# Patient Record
Sex: MALE | Race: WHITE | Employment: UNEMPLOYED | URBAN - METROPOLITAN AREA
[De-identification: names, ages, dates, MRNs, and addresses within clinical notes are randomized per-mention and may not be internally consistent; named-entity substitution may affect disease eponyms.]

---

## 2017-02-08 ENCOUNTER — OP HISTORICAL/CONVERTED ENCOUNTER (OUTPATIENT)
Dept: OTHER | Age: 62
End: 2017-02-08

## 2017-02-10 ENCOUNTER — OP HISTORICAL/CONVERTED ENCOUNTER (OUTPATIENT)
Dept: OTHER | Age: 62
End: 2017-02-10

## 2017-02-17 ENCOUNTER — OP HISTORICAL/CONVERTED ENCOUNTER (OUTPATIENT)
Dept: OTHER | Age: 62
End: 2017-02-17

## 2017-02-23 ENCOUNTER — OP HISTORICAL/CONVERTED ENCOUNTER (OUTPATIENT)
Dept: OTHER | Age: 62
End: 2017-02-23

## 2017-02-24 ENCOUNTER — OP HISTORICAL/CONVERTED ENCOUNTER (OUTPATIENT)
Dept: OTHER | Age: 62
End: 2017-02-24

## 2017-03-10 ENCOUNTER — OP HISTORICAL/CONVERTED ENCOUNTER (OUTPATIENT)
Dept: OTHER | Age: 62
End: 2017-03-10

## 2017-03-17 ENCOUNTER — OP HISTORICAL/CONVERTED ENCOUNTER (OUTPATIENT)
Dept: OTHER | Age: 62
End: 2017-03-17

## 2017-03-22 ENCOUNTER — OP HISTORICAL/CONVERTED ENCOUNTER (OUTPATIENT)
Dept: OTHER | Age: 62
End: 2017-03-22

## 2017-03-23 ENCOUNTER — OP HISTORICAL/CONVERTED ENCOUNTER (OUTPATIENT)
Dept: OTHER | Age: 62
End: 2017-03-23

## 2017-03-29 ENCOUNTER — OP HISTORICAL/CONVERTED ENCOUNTER (OUTPATIENT)
Dept: OTHER | Age: 62
End: 2017-03-29

## 2017-03-31 ENCOUNTER — OP HISTORICAL/CONVERTED ENCOUNTER (OUTPATIENT)
Dept: OTHER | Age: 62
End: 2017-03-31

## 2017-04-07 ENCOUNTER — OP HISTORICAL/CONVERTED ENCOUNTER (OUTPATIENT)
Dept: OTHER | Age: 62
End: 2017-04-07

## 2017-04-21 ENCOUNTER — OP HISTORICAL/CONVERTED ENCOUNTER (OUTPATIENT)
Dept: OTHER | Age: 62
End: 2017-04-21

## 2017-04-28 ENCOUNTER — OP HISTORICAL/CONVERTED ENCOUNTER (OUTPATIENT)
Dept: OTHER | Age: 62
End: 2017-04-28

## 2017-05-05 ENCOUNTER — OP HISTORICAL/CONVERTED ENCOUNTER (OUTPATIENT)
Dept: OTHER | Age: 62
End: 2017-05-05

## 2017-05-12 ENCOUNTER — OP HISTORICAL/CONVERTED ENCOUNTER (OUTPATIENT)
Dept: OTHER | Age: 62
End: 2017-05-12

## 2017-05-19 ENCOUNTER — OP HISTORICAL/CONVERTED ENCOUNTER (OUTPATIENT)
Dept: OTHER | Age: 62
End: 2017-05-19

## 2017-05-22 ENCOUNTER — OP HISTORICAL/CONVERTED ENCOUNTER (OUTPATIENT)
Dept: OTHER | Age: 62
End: 2017-05-22

## 2017-06-02 ENCOUNTER — OP HISTORICAL/CONVERTED ENCOUNTER (OUTPATIENT)
Dept: OTHER | Age: 62
End: 2017-06-02

## 2017-06-09 ENCOUNTER — OP HISTORICAL/CONVERTED ENCOUNTER (OUTPATIENT)
Dept: OTHER | Age: 62
End: 2017-06-09

## 2017-06-16 ENCOUNTER — OP HISTORICAL/CONVERTED ENCOUNTER (OUTPATIENT)
Dept: OTHER | Age: 62
End: 2017-06-16

## 2017-06-21 ENCOUNTER — ED HISTORICAL/CONVERTED ENCOUNTER (OUTPATIENT)
Dept: OTHER | Age: 62
End: 2017-06-21

## 2017-07-05 ENCOUNTER — ED HISTORICAL/CONVERTED ENCOUNTER (OUTPATIENT)
Dept: OTHER | Age: 62
End: 2017-07-05

## 2017-07-13 ENCOUNTER — IP HISTORICAL/CONVERTED ENCOUNTER (OUTPATIENT)
Dept: OTHER | Age: 62
End: 2017-07-13

## 2017-07-25 ENCOUNTER — OP HISTORICAL/CONVERTED ENCOUNTER (OUTPATIENT)
Dept: OTHER | Age: 62
End: 2017-07-25

## 2017-08-11 ENCOUNTER — OP HISTORICAL/CONVERTED ENCOUNTER (OUTPATIENT)
Dept: OTHER | Age: 62
End: 2017-08-11

## 2017-08-25 ENCOUNTER — OP HISTORICAL/CONVERTED ENCOUNTER (OUTPATIENT)
Dept: OTHER | Age: 62
End: 2017-08-25

## 2017-09-08 ENCOUNTER — OP HISTORICAL/CONVERTED ENCOUNTER (OUTPATIENT)
Dept: OTHER | Age: 62
End: 2017-09-08

## 2017-09-15 ENCOUNTER — OP HISTORICAL/CONVERTED ENCOUNTER (OUTPATIENT)
Dept: OTHER | Age: 62
End: 2017-09-15

## 2017-09-19 ENCOUNTER — OP HISTORICAL/CONVERTED ENCOUNTER (OUTPATIENT)
Dept: OTHER | Age: 62
End: 2017-09-19

## 2017-09-22 ENCOUNTER — OP HISTORICAL/CONVERTED ENCOUNTER (OUTPATIENT)
Dept: OTHER | Age: 62
End: 2017-09-22

## 2017-10-06 ENCOUNTER — OP HISTORICAL/CONVERTED ENCOUNTER (OUTPATIENT)
Dept: OTHER | Age: 62
End: 2017-10-06

## 2017-10-13 ENCOUNTER — OP HISTORICAL/CONVERTED ENCOUNTER (OUTPATIENT)
Dept: OTHER | Age: 62
End: 2017-10-13

## 2017-10-20 ENCOUNTER — OP HISTORICAL/CONVERTED ENCOUNTER (OUTPATIENT)
Dept: OTHER | Age: 62
End: 2017-10-20

## 2017-11-03 ENCOUNTER — OP HISTORICAL/CONVERTED ENCOUNTER (OUTPATIENT)
Dept: OTHER | Age: 62
End: 2017-11-03

## 2017-11-10 ENCOUNTER — OP HISTORICAL/CONVERTED ENCOUNTER (OUTPATIENT)
Dept: OTHER | Age: 62
End: 2017-11-10

## 2017-11-17 ENCOUNTER — OP HISTORICAL/CONVERTED ENCOUNTER (OUTPATIENT)
Dept: OTHER | Age: 62
End: 2017-11-17

## 2017-12-01 ENCOUNTER — OP HISTORICAL/CONVERTED ENCOUNTER (OUTPATIENT)
Dept: OTHER | Age: 62
End: 2017-12-01

## 2017-12-15 ENCOUNTER — OP HISTORICAL/CONVERTED ENCOUNTER (OUTPATIENT)
Dept: OTHER | Age: 62
End: 2017-12-15

## 2017-12-21 ENCOUNTER — IP HISTORICAL/CONVERTED ENCOUNTER (OUTPATIENT)
Dept: OTHER | Age: 62
End: 2017-12-21

## 2018-05-14 ENCOUNTER — ED HISTORICAL/CONVERTED ENCOUNTER (OUTPATIENT)
Dept: OTHER | Age: 63
End: 2018-05-14

## 2018-05-23 ENCOUNTER — ED HISTORICAL/CONVERTED ENCOUNTER (OUTPATIENT)
Dept: OTHER | Age: 63
End: 2018-05-23

## 2018-06-12 ENCOUNTER — IP HISTORICAL/CONVERTED ENCOUNTER (OUTPATIENT)
Dept: OTHER | Age: 63
End: 2018-06-12

## 2018-09-09 ENCOUNTER — ED HISTORICAL/CONVERTED ENCOUNTER (OUTPATIENT)
Dept: OTHER | Age: 63
End: 2018-09-09

## 2018-11-22 ENCOUNTER — IP HISTORICAL/CONVERTED ENCOUNTER (OUTPATIENT)
Dept: OTHER | Age: 63
End: 2018-11-22

## 2018-11-30 ENCOUNTER — ED HISTORICAL/CONVERTED ENCOUNTER (OUTPATIENT)
Dept: OTHER | Age: 63
End: 2018-11-30

## 2018-12-01 ENCOUNTER — IP HISTORICAL/CONVERTED ENCOUNTER (OUTPATIENT)
Dept: OTHER | Age: 63
End: 2018-12-01

## 2023-05-29 ENCOUNTER — APPOINTMENT (OUTPATIENT)
Facility: HOSPITAL | Age: 68
End: 2023-05-29
Payer: MEDICAID

## 2023-05-29 ENCOUNTER — HOSPITAL ENCOUNTER (EMERGENCY)
Facility: HOSPITAL | Age: 68
Discharge: ANOTHER HOSPITAL | End: 2023-05-29
Attending: EMERGENCY MEDICINE
Payer: MEDICAID

## 2023-05-29 ENCOUNTER — HOSPITAL ENCOUNTER (INPATIENT)
Facility: HOSPITAL | Age: 68
LOS: 10 days | Discharge: NURSING, REHAB OR LONG-TERM CARE | DRG: 044 | End: 2023-06-08
Attending: ANESTHESIOLOGY | Admitting: ANESTHESIOLOGY
Payer: MEDICAID

## 2023-05-29 VITALS
DIASTOLIC BLOOD PRESSURE: 125 MMHG | SYSTOLIC BLOOD PRESSURE: 149 MMHG | HEIGHT: 65 IN | WEIGHT: 200 LBS | TEMPERATURE: 98.1 F | BODY MASS INDEX: 33.32 KG/M2 | HEART RATE: 102 BPM | OXYGEN SATURATION: 97 % | RESPIRATION RATE: 25 BRPM

## 2023-05-29 DIAGNOSIS — Z86.718 HISTORY OF DVT (DEEP VEIN THROMBOSIS): Primary | ICD-10-CM

## 2023-05-29 DIAGNOSIS — I61.9 INTRAPARENCHYMAL HEMORRHAGE OF BRAIN (HCC): Primary | ICD-10-CM

## 2023-05-29 DIAGNOSIS — I61.9 INTRAPARENCHYMAL HEMORRHAGE OF BRAIN (HCC): ICD-10-CM

## 2023-05-29 LAB
ALBUMIN SERPL-MCNC: 3.8 G/DL (ref 3.5–5)
ALBUMIN/GLOB SERPL: 1 (ref 1.1–2.2)
ALP SERPL-CCNC: 91 U/L (ref 45–117)
ALT SERPL-CCNC: 38 U/L (ref 12–78)
ANION GAP SERPL CALC-SCNC: 11 MMOL/L (ref 5–15)
AST SERPL W P-5'-P-CCNC: 48 U/L (ref 15–37)
BILIRUB SERPL-MCNC: 0.8 MG/DL (ref 0.2–1)
BUN SERPL-MCNC: 21 MG/DL (ref 6–20)
BUN/CREAT SERPL: 26 (ref 12–20)
CA-I BLD-MCNC: 9.5 MG/DL (ref 8.5–10.1)
CHLORIDE SERPL-SCNC: 108 MMOL/L (ref 97–108)
CO2 SERPL-SCNC: 22 MMOL/L (ref 21–32)
CREAT SERPL-MCNC: 0.82 MG/DL (ref 0.7–1.3)
GLOBULIN SER CALC-MCNC: 3.9 G/DL (ref 2–4)
GLUCOSE SERPL-MCNC: 87 MG/DL (ref 65–100)
POTASSIUM SERPL-SCNC: 4.5 MMOL/L (ref 3.5–5.1)
PROT SERPL-MCNC: 7.7 G/DL (ref 6.4–8.2)
SODIUM SERPL-SCNC: 141 MMOL/L (ref 136–145)
TROPONIN I SERPL HS-MCNC: 5 NG/L (ref 0–76)

## 2023-05-29 PROCEDURE — 36415 COLL VENOUS BLD VENIPUNCTURE: CPT

## 2023-05-29 PROCEDURE — 99285 EMERGENCY DEPT VISIT HI MDM: CPT

## 2023-05-29 PROCEDURE — 80053 COMPREHEN METABOLIC PANEL: CPT

## 2023-05-29 PROCEDURE — 2580000003 HC RX 258: Performed by: NURSE PRACTITIONER

## 2023-05-29 PROCEDURE — 84484 ASSAY OF TROPONIN QUANT: CPT

## 2023-05-29 PROCEDURE — 70450 CT HEAD/BRAIN W/O DYE: CPT

## 2023-05-29 PROCEDURE — 6360000002 HC RX W HCPCS: Performed by: NURSE PRACTITIONER

## 2023-05-29 PROCEDURE — 93005 ELECTROCARDIOGRAM TRACING: CPT | Performed by: EMERGENCY MEDICINE

## 2023-05-29 PROCEDURE — 2000000000 HC ICU R&B

## 2023-05-29 RX ORDER — SODIUM CHLORIDE 9 MG/ML
50 INJECTION, SOLUTION INTRAVENOUS ONCE
Status: COMPLETED | OUTPATIENT
Start: 2023-05-29 | End: 2023-05-29

## 2023-05-29 RX ORDER — LABETALOL HYDROCHLORIDE 5 MG/ML
20 INJECTION, SOLUTION INTRAVENOUS EVERY 4 HOURS PRN
Status: DISCONTINUED | OUTPATIENT
Start: 2023-05-29 | End: 2023-06-06

## 2023-05-29 RX ORDER — ONDANSETRON 2 MG/ML
4 INJECTION INTRAMUSCULAR; INTRAVENOUS EVERY 6 HOURS PRN
Status: DISCONTINUED | OUTPATIENT
Start: 2023-05-29 | End: 2023-06-05 | Stop reason: SDUPTHER

## 2023-05-29 RX ORDER — IPRATROPIUM BROMIDE AND ALBUTEROL SULFATE 2.5; .5 MG/3ML; MG/3ML
1 SOLUTION RESPIRATORY (INHALATION) EVERY 4 HOURS PRN
Status: DISCONTINUED | OUTPATIENT
Start: 2023-05-29 | End: 2023-06-06

## 2023-05-29 RX ORDER — MAGNESIUM SULFATE IN WATER 40 MG/ML
2000 INJECTION, SOLUTION INTRAVENOUS PRN
Status: DISCONTINUED | OUTPATIENT
Start: 2023-05-29 | End: 2023-06-01

## 2023-05-29 RX ORDER — SODIUM CHLORIDE, SODIUM LACTATE, POTASSIUM CHLORIDE, CALCIUM CHLORIDE 600; 310; 30; 20 MG/100ML; MG/100ML; MG/100ML; MG/100ML
INJECTION, SOLUTION INTRAVENOUS CONTINUOUS
Status: DISCONTINUED | OUTPATIENT
Start: 2023-05-29 | End: 2023-05-30

## 2023-05-29 RX ORDER — ACETAMINOPHEN 650 MG/1
650 SUPPOSITORY RECTAL EVERY 6 HOURS PRN
Status: DISCONTINUED | OUTPATIENT
Start: 2023-05-29 | End: 2023-06-08 | Stop reason: HOSPADM

## 2023-05-29 RX ORDER — POLYETHYLENE GLYCOL 3350 17 G/17G
17 POWDER, FOR SOLUTION ORAL DAILY PRN
Status: DISCONTINUED | OUTPATIENT
Start: 2023-05-29 | End: 2023-06-01 | Stop reason: SDUPTHER

## 2023-05-29 RX ORDER — SODIUM CHLORIDE 0.9 % (FLUSH) 0.9 %
5-40 SYRINGE (ML) INJECTION EVERY 12 HOURS SCHEDULED
Status: DISCONTINUED | OUTPATIENT
Start: 2023-05-29 | End: 2023-06-08 | Stop reason: HOSPADM

## 2023-05-29 RX ORDER — POTASSIUM CHLORIDE 7.45 MG/ML
10 INJECTION INTRAVENOUS PRN
Status: DISCONTINUED | OUTPATIENT
Start: 2023-05-29 | End: 2023-06-01

## 2023-05-29 RX ORDER — SODIUM CHLORIDE 0.9 % (FLUSH) 0.9 %
5-40 SYRINGE (ML) INJECTION PRN
Status: DISCONTINUED | OUTPATIENT
Start: 2023-05-29 | End: 2023-06-08 | Stop reason: HOSPADM

## 2023-05-29 RX ORDER — LANOLIN ALCOHOL/MO/W.PET/CERES
100 CREAM (GRAM) TOPICAL DAILY
Status: DISCONTINUED | OUTPATIENT
Start: 2023-05-30 | End: 2023-06-08 | Stop reason: HOSPADM

## 2023-05-29 RX ORDER — ONDANSETRON 4 MG/1
4 TABLET, ORALLY DISINTEGRATING ORAL EVERY 8 HOURS PRN
Status: DISCONTINUED | OUTPATIENT
Start: 2023-05-29 | End: 2023-06-05 | Stop reason: SDUPTHER

## 2023-05-29 RX ORDER — MULTIVITAMIN WITH IRON
1 TABLET ORAL DAILY
Status: DISCONTINUED | OUTPATIENT
Start: 2023-05-30 | End: 2023-06-08 | Stop reason: HOSPADM

## 2023-05-29 RX ORDER — SODIUM CHLORIDE 9 MG/ML
INJECTION, SOLUTION INTRAVENOUS PRN
Status: DISCONTINUED | OUTPATIENT
Start: 2023-05-29 | End: 2023-06-08 | Stop reason: HOSPADM

## 2023-05-29 RX ORDER — FOLIC ACID 1 MG/1
1 TABLET ORAL DAILY
Status: DISCONTINUED | OUTPATIENT
Start: 2023-05-30 | End: 2023-06-08 | Stop reason: HOSPADM

## 2023-05-29 RX ORDER — POTASSIUM CHLORIDE 29.8 MG/ML
20 INJECTION INTRAVENOUS PRN
Status: DISCONTINUED | OUTPATIENT
Start: 2023-05-29 | End: 2023-06-01

## 2023-05-29 RX ORDER — ACETAMINOPHEN 325 MG/1
650 TABLET ORAL EVERY 6 HOURS PRN
Status: DISCONTINUED | OUTPATIENT
Start: 2023-05-29 | End: 2023-06-08 | Stop reason: HOSPADM

## 2023-05-29 RX ADMIN — PROTHROMBIN, COAGULATION FACTOR VII HUMAN, COAGULATION FACTOR IX HUMAN, COAGULATION FACTOR X HUMAN, PROTEIN C, PROTEIN S HUMAN, AND WATER 2220 UNITS: KIT at 23:40

## 2023-05-29 RX ADMIN — SODIUM CHLORIDE, PRESERVATIVE FREE 10 ML: 5 INJECTION INTRAVENOUS at 23:00

## 2023-05-29 RX ADMIN — SODIUM CHLORIDE 50 ML: 9 INJECTION, SOLUTION INTRAVENOUS at 23:43

## 2023-05-29 RX ADMIN — SODIUM CHLORIDE, POTASSIUM CHLORIDE, SODIUM LACTATE AND CALCIUM CHLORIDE: 600; 310; 30; 20 INJECTION, SOLUTION INTRAVENOUS at 22:53

## 2023-05-29 ASSESSMENT — PAIN SCALES - GENERAL
PAINLEVEL_OUTOF10: 10
PAINLEVEL_OUTOF10: 0

## 2023-05-29 ASSESSMENT — LIFESTYLE VARIABLES
HOW OFTEN DO YOU HAVE A DRINK CONTAINING ALCOHOL: NEVER
HOW MANY STANDARD DRINKS CONTAINING ALCOHOL DO YOU HAVE ON A TYPICAL DAY: PATIENT DOES NOT DRINK

## 2023-05-29 ASSESSMENT — PAIN - FUNCTIONAL ASSESSMENT: PAIN_FUNCTIONAL_ASSESSMENT: 0-10

## 2023-05-29 NOTE — ED TRIAGE NOTES
Patient arrives today with slurred speech drooling, weakness to R arm, & vision loss in R eye. Patient has a BKA. Patient is very difficult to understand,stating he had trouble speaking about 3 days ago.

## 2023-05-29 NOTE — ED PROVIDER NOTES
[facility] EMERGENCY DEPT  EMERGENCY DEPARTMENT HISTORY AND PHYSICAL EXAM      Date: 5/29/2023  Patient Name: Gilbert Becerril  MRN: [id number]  [place] 1955  Date of evaluation: 5/29/2023  Provider: Danica Leon MD   Note Started: 4:07 PM EDT 5/29/23    HISTORY OF PRESENT ILLNESS     Chief Complaint   Patient presents with    Aphasia       History Provided By: Patient    HPI: Gilbert Becerril is a 79 y.o. male unknown past medical history presents for evaluation of difficulty with speech as well as weakness of his right upper and lower extremity. These have been present for 3 days. No pain or trauma. History is limited secondary to patient's dysarthria. Unknown thinners, no documented in the chart. On reeval, patient denies taking any medications x 2 days. Denies thinners. past Medical History:  History reviewed. No pertinent past medical history. Past Surgical History:  History reviewed. No pertinent surgical history. Family History:  History reviewed. No pertinent family history. Social History: Allergies:  No Known Allergies    PCP: None None    Current Meds:   No current facility-administered medications for this encounter. No current outpatient medications on file. Social Determinants of Health:   Social Determinants of Health     Tobacco Use: Not on file   Alcohol Use: Not At Risk    Frequency of Alcohol Consumption: Never    Average Number of Drinks: Patient does not drink    Frequency of Binge Drinking: Never   Financial Resource Strain: Not on file   Food Insecurity: Not on file   Transportation Needs: Not on file   Physical Activity: Not on file   Stress: Not on file   Social Connections: Not on file   Intimate Partner Violence: Not on file   Depression: Not on file   Housing Stability: Not on file       PHYSICAL EXAM   Physical Exam  Vitals and nursing note reviewed. Constitutional:       General: He is not in acute distress. Appearance: Normal appearance.  He is

## 2023-05-29 NOTE — ED NOTES
Transfer center had to page neurosurgeon. They will call back once he is available.       Blanca Olea RN  05/29/23 1935

## 2023-05-29 NOTE — ED NOTES
Assumed care of pt at this time. No signs of acute or respiratory distress noted. Pt on cardiac monitoring, BP and continuous pulse ox. Pt resting on stretcher in lowest position, with call bell within reach.         Julianne Molina RN  05/29/23 1920

## 2023-05-29 NOTE — ED NOTES
Impression:  Encounter for other specified surgical aftercare  Z48.89.  Excellent post op course   Post operative instructions reviewed - Condition is improving - Plan: removed sutures in clinic use ointment for 1 week the stop return for regular eye care Transfer center contacted      Ziyad Whitney RN  05/29/23 1656

## 2023-05-30 ENCOUNTER — APPOINTMENT (OUTPATIENT)
Facility: HOSPITAL | Age: 68
DRG: 044 | End: 2023-05-30
Attending: ANESTHESIOLOGY
Payer: MEDICAID

## 2023-05-30 LAB
AMPHET UR QL SCN: NEGATIVE
ANION GAP SERPL CALC-SCNC: 5 MMOL/L (ref 5–15)
APTT PPP: 25.1 SEC (ref 22.1–31)
BARBITURATES UR QL SCN: NEGATIVE
BASOPHILS # BLD: 0.1 K/UL (ref 0–0.1)
BASOPHILS NFR BLD: 3 % (ref 0–1)
BENZODIAZ UR QL: POSITIVE
BUN SERPL-MCNC: 19 MG/DL (ref 6–20)
BUN/CREAT SERPL: 38 (ref 12–20)
CALCIUM SERPL-MCNC: 8.5 MG/DL (ref 8.5–10.1)
CANNABINOIDS UR QL SCN: NEGATIVE
CHLORIDE SERPL-SCNC: 112 MMOL/L (ref 97–108)
CO2 SERPL-SCNC: 26 MMOL/L (ref 21–32)
COCAINE UR QL SCN: NEGATIVE
CREAT SERPL-MCNC: 0.5 MG/DL (ref 0.7–1.3)
DIFFERENTIAL METHOD BLD: ABNORMAL
ECHO BSA: 2.04 M2
EOSINOPHIL # BLD: 0.2 K/UL (ref 0–0.4)
EOSINOPHIL NFR BLD: 6 % (ref 0–7)
ERYTHROCYTE [DISTWIDTH] IN BLOOD BY AUTOMATED COUNT: 12.4 % (ref 11.5–14.5)
EST. AVERAGE GLUCOSE BLD GHB EST-MCNC: 80 MG/DL
GLUCOSE SERPL-MCNC: 89 MG/DL (ref 65–100)
HBA1C MFR BLD: 4.4 % (ref 4–5.6)
HCT VFR BLD AUTO: 43 % (ref 36.6–50.3)
HGB BLD-MCNC: 13.3 G/DL (ref 12.1–17)
IMM GRANULOCYTES # BLD AUTO: 0 K/UL (ref 0–0.04)
IMM GRANULOCYTES NFR BLD AUTO: 0 % (ref 0–0.5)
INR PPP: 1 (ref 0.9–1.1)
LYMPHOCYTES # BLD: 1 K/UL (ref 0.8–3.5)
LYMPHOCYTES NFR BLD: 31 % (ref 12–49)
Lab: ABNORMAL
MAGNESIUM SERPL-MCNC: 1.7 MG/DL (ref 1.6–2.4)
MCH RBC QN AUTO: 30 PG (ref 26–34)
MCHC RBC AUTO-ENTMCNC: 30.9 G/DL (ref 30–36.5)
MCV RBC AUTO: 96.8 FL (ref 80–99)
METHADONE UR QL: NEGATIVE
MONOCYTES # BLD: 0.3 K/UL (ref 0–1)
MONOCYTES NFR BLD: 11 % (ref 5–13)
NEUTS SEG # BLD: 1.5 K/UL (ref 1.8–8)
NEUTS SEG NFR BLD: 49 % (ref 32–75)
NRBC # BLD: 0 K/UL (ref 0–0.01)
NRBC BLD-RTO: 0 PER 100 WBC
OPIATES UR QL: NEGATIVE
PCP UR QL: NEGATIVE
PHOSPHATE SERPL-MCNC: 2.7 MG/DL (ref 2.6–4.7)
PLATELET # BLD AUTO: 78 K/UL (ref 150–400)
PMV BLD AUTO: 12.3 FL (ref 8.9–12.9)
POTASSIUM SERPL-SCNC: 3.7 MMOL/L (ref 3.5–5.1)
PROTHROMBIN TIME: 10.9 SEC (ref 9–11.1)
RBC # BLD AUTO: 4.44 M/UL (ref 4.1–5.7)
RBC MORPH BLD: ABNORMAL
SODIUM SERPL-SCNC: 143 MMOL/L (ref 136–145)
THERAPEUTIC RANGE: NORMAL SECS (ref 58–77)
WBC # BLD AUTO: 3.1 K/UL (ref 4.1–11.1)

## 2023-05-30 PROCEDURE — 36415 COLL VENOUS BLD VENIPUNCTURE: CPT

## 2023-05-30 PROCEDURE — 85610 PROTHROMBIN TIME: CPT

## 2023-05-30 PROCEDURE — 97161 PT EVAL LOW COMPLEX 20 MIN: CPT

## 2023-05-30 PROCEDURE — 6370000000 HC RX 637 (ALT 250 FOR IP): Performed by: NURSE PRACTITIONER

## 2023-05-30 PROCEDURE — 80307 DRUG TEST PRSMV CHEM ANLYZR: CPT

## 2023-05-30 PROCEDURE — 6370000000 HC RX 637 (ALT 250 FOR IP): Performed by: INTERNAL MEDICINE

## 2023-05-30 PROCEDURE — 84100 ASSAY OF PHOSPHORUS: CPT

## 2023-05-30 PROCEDURE — 83036 HEMOGLOBIN GLYCOSYLATED A1C: CPT

## 2023-05-30 PROCEDURE — 97535 SELF CARE MNGMENT TRAINING: CPT

## 2023-05-30 PROCEDURE — 97166 OT EVAL MOD COMPLEX 45 MIN: CPT

## 2023-05-30 PROCEDURE — 2500000003 HC RX 250 WO HCPCS: Performed by: NURSE PRACTITIONER

## 2023-05-30 PROCEDURE — 2000000000 HC ICU R&B

## 2023-05-30 PROCEDURE — 83735 ASSAY OF MAGNESIUM: CPT

## 2023-05-30 PROCEDURE — 6360000002 HC RX W HCPCS: Performed by: INTERNAL MEDICINE

## 2023-05-30 PROCEDURE — 85730 THROMBOPLASTIN TIME PARTIAL: CPT

## 2023-05-30 PROCEDURE — 92610 EVALUATE SWALLOWING FUNCTION: CPT

## 2023-05-30 PROCEDURE — 92522 EVALUATE SPEECH PRODUCTION: CPT

## 2023-05-30 PROCEDURE — 70450 CT HEAD/BRAIN W/O DYE: CPT

## 2023-05-30 PROCEDURE — 2580000003 HC RX 258: Performed by: NURSE PRACTITIONER

## 2023-05-30 PROCEDURE — 80048 BASIC METABOLIC PNL TOTAL CA: CPT

## 2023-05-30 PROCEDURE — 85025 COMPLETE CBC W/AUTO DIFF WBC: CPT

## 2023-05-30 PROCEDURE — 93971 EXTREMITY STUDY: CPT

## 2023-05-30 PROCEDURE — 97530 THERAPEUTIC ACTIVITIES: CPT

## 2023-05-30 RX ORDER — HYDRALAZINE HYDROCHLORIDE 20 MG/ML
10 INJECTION INTRAMUSCULAR; INTRAVENOUS EVERY 4 HOURS PRN
Status: DISCONTINUED | OUTPATIENT
Start: 2023-05-30 | End: 2023-06-06

## 2023-05-30 RX ORDER — MAGNESIUM SULFATE IN WATER 40 MG/ML
2000 INJECTION, SOLUTION INTRAVENOUS ONCE
Status: COMPLETED | OUTPATIENT
Start: 2023-05-30 | End: 2023-05-30

## 2023-05-30 RX ORDER — POTASSIUM CHLORIDE 750 MG/1
40 TABLET, FILM COATED, EXTENDED RELEASE ORAL ONCE
Status: COMPLETED | OUTPATIENT
Start: 2023-05-30 | End: 2023-05-30

## 2023-05-30 RX ADMIN — SODIUM CHLORIDE, PRESERVATIVE FREE 10 ML: 5 INJECTION INTRAVENOUS at 21:13

## 2023-05-30 RX ADMIN — Medication 100 MG: at 08:55

## 2023-05-30 RX ADMIN — MAGNESIUM SULFATE HEPTAHYDRATE 2000 MG: 40 INJECTION, SOLUTION INTRAVENOUS at 12:00

## 2023-05-30 RX ADMIN — THERA TABS 1 TABLET: TAB at 08:55

## 2023-05-30 RX ADMIN — SODIUM PHOSPHATE, MONOBASIC, MONOHYDRATE AND SODIUM PHOSPHATE, DIBASIC, ANHYDROUS 10 MMOL: 142; 276 INJECTION, SOLUTION INTRAVENOUS at 06:29

## 2023-05-30 RX ADMIN — FOLIC ACID 1 MG: 1 TABLET ORAL at 08:55

## 2023-05-30 RX ADMIN — POTASSIUM CHLORIDE 40 MEQ: 750 TABLET, FILM COATED, EXTENDED RELEASE ORAL at 12:00

## 2023-05-30 ASSESSMENT — PAIN SCALES - GENERAL
PAINLEVEL_OUTOF10: 0
PAINLEVEL_OUTOF10: 0

## 2023-05-31 LAB
ANION GAP SERPL CALC-SCNC: 5 MMOL/L (ref 5–15)
BUN SERPL-MCNC: 12 MG/DL (ref 6–20)
BUN/CREAT SERPL: 22 (ref 12–20)
CALCIUM SERPL-MCNC: 8.8 MG/DL (ref 8.5–10.1)
CHLORIDE SERPL-SCNC: 111 MMOL/L (ref 97–108)
CO2 SERPL-SCNC: 24 MMOL/L (ref 21–32)
CREAT SERPL-MCNC: 0.54 MG/DL (ref 0.7–1.3)
EKG ATRIAL RATE: 85 BPM
EKG DIAGNOSIS: NORMAL
EKG P AXIS: 79 DEGREES
EKG P-R INTERVAL: 186 MS
EKG Q-T INTERVAL: 422 MS
EKG QRS DURATION: 146 MS
EKG QTC CALCULATION (BAZETT): 502 MS
EKG R AXIS: -83 DEGREES
EKG T AXIS: 75 DEGREES
EKG VENTRICULAR RATE: 85 BPM
GLUCOSE SERPL-MCNC: 86 MG/DL (ref 65–100)
MAGNESIUM SERPL-MCNC: 2 MG/DL (ref 1.6–2.4)
PHOSPHATE SERPL-MCNC: 2.9 MG/DL (ref 2.6–4.7)
POTASSIUM SERPL-SCNC: 4 MMOL/L (ref 3.5–5.1)
SODIUM SERPL-SCNC: 140 MMOL/L (ref 136–145)

## 2023-05-31 PROCEDURE — 97542 WHEELCHAIR MNGMENT TRAINING: CPT

## 2023-05-31 PROCEDURE — 36415 COLL VENOUS BLD VENIPUNCTURE: CPT

## 2023-05-31 PROCEDURE — 97530 THERAPEUTIC ACTIVITIES: CPT

## 2023-05-31 PROCEDURE — 83735 ASSAY OF MAGNESIUM: CPT

## 2023-05-31 PROCEDURE — 2580000003 HC RX 258: Performed by: NURSE PRACTITIONER

## 2023-05-31 PROCEDURE — 80048 BASIC METABOLIC PNL TOTAL CA: CPT

## 2023-05-31 PROCEDURE — 6360000002 HC RX W HCPCS: Performed by: INTERNAL MEDICINE

## 2023-05-31 PROCEDURE — 6370000000 HC RX 637 (ALT 250 FOR IP): Performed by: NURSE PRACTITIONER

## 2023-05-31 PROCEDURE — 2060000000 HC ICU INTERMEDIATE R&B

## 2023-05-31 PROCEDURE — 97535 SELF CARE MNGMENT TRAINING: CPT

## 2023-05-31 PROCEDURE — 84100 ASSAY OF PHOSPHORUS: CPT

## 2023-05-31 RX ORDER — LORAZEPAM 2 MG/1
2 TABLET ORAL
Status: DISCONTINUED | OUTPATIENT
Start: 2023-05-31 | End: 2023-06-01

## 2023-05-31 RX ORDER — SODIUM CHLORIDE 9 MG/ML
INJECTION, SOLUTION INTRAVENOUS PRN
Status: DISCONTINUED | OUTPATIENT
Start: 2023-05-31 | End: 2023-06-08 | Stop reason: HOSPADM

## 2023-05-31 RX ORDER — LORAZEPAM 2 MG/1
4 TABLET ORAL
Status: DISCONTINUED | OUTPATIENT
Start: 2023-05-31 | End: 2023-06-01

## 2023-05-31 RX ORDER — SODIUM CHLORIDE 0.9 % (FLUSH) 0.9 %
5-40 SYRINGE (ML) INJECTION EVERY 12 HOURS SCHEDULED
Status: DISCONTINUED | OUTPATIENT
Start: 2023-05-31 | End: 2023-06-08 | Stop reason: HOSPADM

## 2023-05-31 RX ORDER — SODIUM CHLORIDE 0.9 % (FLUSH) 0.9 %
5-40 SYRINGE (ML) INJECTION PRN
Status: DISCONTINUED | OUTPATIENT
Start: 2023-05-31 | End: 2023-06-01

## 2023-05-31 RX ORDER — LORAZEPAM 2 MG/ML
2 INJECTION INTRAMUSCULAR
Status: DISCONTINUED | OUTPATIENT
Start: 2023-05-31 | End: 2023-06-01

## 2023-05-31 RX ORDER — IPRATROPIUM BROMIDE AND ALBUTEROL SULFATE 2.5; .5 MG/3ML; MG/3ML
1 SOLUTION RESPIRATORY (INHALATION)
Status: DISCONTINUED | OUTPATIENT
Start: 2023-05-31 | End: 2023-06-02

## 2023-05-31 RX ORDER — LORAZEPAM 2 MG/ML
3 INJECTION INTRAMUSCULAR
Status: DISCONTINUED | OUTPATIENT
Start: 2023-05-31 | End: 2023-06-01

## 2023-05-31 RX ORDER — LORAZEPAM 2 MG/ML
4 INJECTION INTRAMUSCULAR
Status: DISCONTINUED | OUTPATIENT
Start: 2023-05-31 | End: 2023-06-01

## 2023-05-31 RX ORDER — LORAZEPAM 2 MG/ML
1 INJECTION INTRAMUSCULAR
Status: DISCONTINUED | OUTPATIENT
Start: 2023-05-31 | End: 2023-06-01

## 2023-05-31 RX ORDER — LORAZEPAM 2 MG/1
3 TABLET ORAL
Status: DISCONTINUED | OUTPATIENT
Start: 2023-05-31 | End: 2023-06-01

## 2023-05-31 RX ORDER — LORAZEPAM 2 MG/1
1 TABLET ORAL
Status: DISCONTINUED | OUTPATIENT
Start: 2023-05-31 | End: 2023-06-01

## 2023-05-31 RX ADMIN — Medication 100 MG: at 09:00

## 2023-05-31 RX ADMIN — FOLIC ACID 1 MG: 1 TABLET ORAL at 09:00

## 2023-05-31 RX ADMIN — THERA TABS 1 TABLET: TAB at 09:00

## 2023-05-31 RX ADMIN — HYDRALAZINE HYDROCHLORIDE 10 MG: 20 INJECTION INTRAMUSCULAR; INTRAVENOUS at 05:19

## 2023-05-31 RX ADMIN — SODIUM CHLORIDE, PRESERVATIVE FREE 10 ML: 5 INJECTION INTRAVENOUS at 09:01

## 2023-06-01 PROCEDURE — 97535 SELF CARE MNGMENT TRAINING: CPT

## 2023-06-01 PROCEDURE — 6370000000 HC RX 637 (ALT 250 FOR IP): Performed by: NURSE PRACTITIONER

## 2023-06-01 PROCEDURE — 2060000000 HC ICU INTERMEDIATE R&B

## 2023-06-01 PROCEDURE — 2580000003 HC RX 258: Performed by: NURSE PRACTITIONER

## 2023-06-01 PROCEDURE — 97542 WHEELCHAIR MNGMENT TRAINING: CPT

## 2023-06-01 PROCEDURE — 94640 AIRWAY INHALATION TREATMENT: CPT

## 2023-06-01 PROCEDURE — 97530 THERAPEUTIC ACTIVITIES: CPT

## 2023-06-01 PROCEDURE — 99222 1ST HOSP IP/OBS MODERATE 55: CPT | Performed by: PSYCHIATRY & NEUROLOGY

## 2023-06-01 RX ORDER — POLYETHYLENE GLYCOL 3350 17 G/17G
17 POWDER, FOR SOLUTION ORAL DAILY PRN
Status: DISCONTINUED | OUTPATIENT
Start: 2023-06-01 | End: 2023-06-08 | Stop reason: HOSPADM

## 2023-06-01 RX ORDER — LORAZEPAM 2 MG/ML
0.5 INJECTION INTRAMUSCULAR
Status: DISCONTINUED | OUTPATIENT
Start: 2023-06-01 | End: 2023-06-08 | Stop reason: HOSPADM

## 2023-06-01 RX ORDER — ONDANSETRON 2 MG/ML
4 INJECTION INTRAMUSCULAR; INTRAVENOUS EVERY 6 HOURS PRN
Status: DISCONTINUED | OUTPATIENT
Start: 2023-06-01 | End: 2023-06-08 | Stop reason: HOSPADM

## 2023-06-01 RX ORDER — LORAZEPAM 0.5 MG/1
0.5 TABLET ORAL
Status: DISCONTINUED | OUTPATIENT
Start: 2023-06-01 | End: 2023-06-08 | Stop reason: HOSPADM

## 2023-06-01 RX ORDER — LORAZEPAM 2 MG/1
1 TABLET ORAL
Status: DISCONTINUED | OUTPATIENT
Start: 2023-06-01 | End: 2023-06-08 | Stop reason: HOSPADM

## 2023-06-01 RX ORDER — ONDANSETRON 4 MG/1
4 TABLET, ORALLY DISINTEGRATING ORAL EVERY 8 HOURS PRN
Status: DISCONTINUED | OUTPATIENT
Start: 2023-06-01 | End: 2023-06-08 | Stop reason: HOSPADM

## 2023-06-01 RX ORDER — LORAZEPAM 2 MG/ML
1 INJECTION INTRAMUSCULAR
Status: DISCONTINUED | OUTPATIENT
Start: 2023-06-01 | End: 2023-06-08 | Stop reason: HOSPADM

## 2023-06-01 RX ADMIN — IPRATROPIUM BROMIDE AND ALBUTEROL SULFATE 1 DOSE: 2.5; .5 SOLUTION RESPIRATORY (INHALATION) at 20:47

## 2023-06-01 RX ADMIN — IPRATROPIUM BROMIDE AND ALBUTEROL SULFATE 1 DOSE: 2.5; .5 SOLUTION RESPIRATORY (INHALATION) at 11:16

## 2023-06-01 RX ADMIN — FOLIC ACID 1 MG: 1 TABLET ORAL at 09:04

## 2023-06-01 RX ADMIN — IPRATROPIUM BROMIDE AND ALBUTEROL SULFATE 1 DOSE: 2.5; .5 SOLUTION RESPIRATORY (INHALATION) at 15:36

## 2023-06-01 RX ADMIN — Medication 100 MG: at 09:04

## 2023-06-01 RX ADMIN — SODIUM CHLORIDE, PRESERVATIVE FREE 10 ML: 5 INJECTION INTRAVENOUS at 22:14

## 2023-06-01 RX ADMIN — THERA TABS 1 TABLET: TAB at 09:04

## 2023-06-01 RX ADMIN — IPRATROPIUM BROMIDE AND ALBUTEROL SULFATE 1 DOSE: 2.5; .5 SOLUTION RESPIRATORY (INHALATION) at 07:47

## 2023-06-01 RX ADMIN — SODIUM CHLORIDE, PRESERVATIVE FREE 10 ML: 5 INJECTION INTRAVENOUS at 22:15

## 2023-06-01 ASSESSMENT — PAIN SCALES - GENERAL
PAINLEVEL_OUTOF10: 0

## 2023-06-02 ENCOUNTER — APPOINTMENT (OUTPATIENT)
Facility: HOSPITAL | Age: 68
DRG: 044 | End: 2023-06-02
Attending: ANESTHESIOLOGY
Payer: MEDICAID

## 2023-06-02 ENCOUNTER — APPOINTMENT (OUTPATIENT)
Facility: HOSPITAL | Age: 68
DRG: 044 | End: 2023-06-02
Attending: FAMILY MEDICINE
Payer: MEDICAID

## 2023-06-02 LAB
ANION GAP SERPL CALC-SCNC: 10 MMOL/L (ref 5–15)
BUN SERPL-MCNC: 14 MG/DL (ref 6–20)
BUN/CREAT SERPL: 18 (ref 12–20)
CALCIUM SERPL-MCNC: 9.1 MG/DL (ref 8.5–10.1)
CHLORIDE SERPL-SCNC: 108 MMOL/L (ref 97–108)
CHOLEST SERPL-MCNC: 127 MG/DL
CO2 SERPL-SCNC: 25 MMOL/L (ref 21–32)
CREAT SERPL-MCNC: 0.76 MG/DL (ref 0.7–1.3)
ECHO BSA: 1.7 M2
ECHO EST RA PRESSURE: 3 MMHG
ECHO LV E' LATERAL VELOCITY: 9 CM/S
ECHO LV E' SEPTAL VELOCITY: 8 CM/S
ECHO LVOT AREA: 3.5 CM2
ECHO LVOT DIAM: 2.1 CM
ECHO LVOT PEAK GRADIENT: 2 MMHG
ECHO LVOT PEAK VELOCITY: 0.8 M/S
ECHO MV A VELOCITY: 0.58 M/S
ECHO MV E VELOCITY: 0.47 M/S
ECHO MV E/A RATIO: 0.81
ECHO MV E/E' LATERAL: 5.22
ECHO MV E/E' RATIO (AVERAGED): 5.55
ECHO MV E/E' SEPTAL: 5.88
ECHO RIGHT VENTRICULAR SYSTOLIC PRESSURE (RVSP): 30 MMHG
ECHO RV INTERNAL DIMENSION: 5.9 CM
ECHO RV TAPSE: 1.9 CM (ref 1.7–?)
ECHO TV REGURGITANT MAX VELOCITY: 2.58 M/S
ECHO TV REGURGITANT PEAK GRADIENT: 27 MMHG
ERYTHROCYTE [DISTWIDTH] IN BLOOD BY AUTOMATED COUNT: 12.3 % (ref 11.5–14.5)
GLUCOSE SERPL-MCNC: 91 MG/DL (ref 65–100)
HCT VFR BLD AUTO: 44 % (ref 36.6–50.3)
HDLC SERPL-MCNC: 48 MG/DL
HDLC SERPL: 2.6 (ref 0–5)
HGB BLD-MCNC: 14.1 G/DL (ref 12.1–17)
LDLC SERPL CALC-MCNC: 60.6 MG/DL (ref 0–100)
MAGNESIUM SERPL-MCNC: 1.9 MG/DL (ref 1.6–2.4)
MCH RBC QN AUTO: 30 PG (ref 26–34)
MCHC RBC AUTO-ENTMCNC: 32 G/DL (ref 30–36.5)
MCV RBC AUTO: 93.6 FL (ref 80–99)
NRBC # BLD: 0 K/UL (ref 0–0.01)
NRBC BLD-RTO: 0 PER 100 WBC
PHOSPHATE SERPL-MCNC: 3.8 MG/DL (ref 2.6–4.7)
PLATELET # BLD AUTO: 77 K/UL (ref 150–400)
PMV BLD AUTO: 12.7 FL (ref 8.9–12.9)
POTASSIUM SERPL-SCNC: 4 MMOL/L (ref 3.5–5.1)
RBC # BLD AUTO: 4.7 M/UL (ref 4.1–5.7)
SODIUM SERPL-SCNC: 143 MMOL/L (ref 136–145)
TRIGL SERPL-MCNC: 92 MG/DL
VLDLC SERPL CALC-MCNC: 18.4 MG/DL
WBC # BLD AUTO: 3.2 K/UL (ref 4.1–11.1)

## 2023-06-02 PROCEDURE — 70551 MRI BRAIN STEM W/O DYE: CPT

## 2023-06-02 PROCEDURE — 99231 SBSQ HOSP IP/OBS SF/LOW 25: CPT | Performed by: NURSE PRACTITIONER

## 2023-06-02 PROCEDURE — 6370000000 HC RX 637 (ALT 250 FOR IP): Performed by: NURSE PRACTITIONER

## 2023-06-02 PROCEDURE — 84100 ASSAY OF PHOSPHORUS: CPT

## 2023-06-02 PROCEDURE — 80061 LIPID PANEL: CPT

## 2023-06-02 PROCEDURE — 85027 COMPLETE CBC AUTOMATED: CPT

## 2023-06-02 PROCEDURE — 70544 MR ANGIOGRAPHY HEAD W/O DYE: CPT

## 2023-06-02 PROCEDURE — 70547 MR ANGIOGRAPHY NECK W/O DYE: CPT

## 2023-06-02 PROCEDURE — 94640 AIRWAY INHALATION TREATMENT: CPT

## 2023-06-02 PROCEDURE — 2580000003 HC RX 258: Performed by: NURSE PRACTITIONER

## 2023-06-02 PROCEDURE — 83735 ASSAY OF MAGNESIUM: CPT

## 2023-06-02 PROCEDURE — 80048 BASIC METABOLIC PNL TOTAL CA: CPT

## 2023-06-02 PROCEDURE — 93306 TTE W/DOPPLER COMPLETE: CPT

## 2023-06-02 PROCEDURE — 6360000004 HC RX CONTRAST MEDICATION: Performed by: RADIOLOGY

## 2023-06-02 PROCEDURE — 70496 CT ANGIOGRAPHY HEAD: CPT

## 2023-06-02 PROCEDURE — 36415 COLL VENOUS BLD VENIPUNCTURE: CPT

## 2023-06-02 PROCEDURE — 2060000000 HC ICU INTERMEDIATE R&B

## 2023-06-02 RX ORDER — IPRATROPIUM BROMIDE AND ALBUTEROL SULFATE 2.5; .5 MG/3ML; MG/3ML
1 SOLUTION RESPIRATORY (INHALATION) EVERY 4 HOURS PRN
Status: DISCONTINUED | OUTPATIENT
Start: 2023-06-02 | End: 2023-06-08 | Stop reason: HOSPADM

## 2023-06-02 RX ADMIN — Medication 100 MG: at 09:59

## 2023-06-02 RX ADMIN — SODIUM CHLORIDE, PRESERVATIVE FREE 10 ML: 5 INJECTION INTRAVENOUS at 21:00

## 2023-06-02 RX ADMIN — SODIUM CHLORIDE, PRESERVATIVE FREE 10 ML: 5 INJECTION INTRAVENOUS at 11:16

## 2023-06-02 RX ADMIN — FOLIC ACID 1 MG: 1 TABLET ORAL at 10:00

## 2023-06-02 RX ADMIN — IPRATROPIUM BROMIDE AND ALBUTEROL SULFATE 1 DOSE: 2.5; .5 SOLUTION RESPIRATORY (INHALATION) at 09:48

## 2023-06-02 RX ADMIN — IOPAMIDOL 100 ML: 755 INJECTION, SOLUTION INTRAVENOUS at 12:18

## 2023-06-02 RX ADMIN — THERA TABS 1 TABLET: TAB at 09:59

## 2023-06-02 ASSESSMENT — PAIN SCALES - GENERAL
PAINLEVEL_OUTOF10: 0

## 2023-06-03 LAB
ANION GAP SERPL CALC-SCNC: 2 MMOL/L (ref 5–15)
BASOPHILS # BLD: 0.1 K/UL (ref 0–0.1)
BASOPHILS NFR BLD: 2 % (ref 0–1)
BUN SERPL-MCNC: 13 MG/DL (ref 6–20)
BUN/CREAT SERPL: 19 (ref 12–20)
CALCIUM SERPL-MCNC: 9 MG/DL (ref 8.5–10.1)
CHLORIDE SERPL-SCNC: 109 MMOL/L (ref 97–108)
CO2 SERPL-SCNC: 29 MMOL/L (ref 21–32)
CREAT SERPL-MCNC: 0.7 MG/DL (ref 0.7–1.3)
DIFFERENTIAL METHOD BLD: ABNORMAL
EOSINOPHIL # BLD: 0.3 K/UL (ref 0–0.4)
EOSINOPHIL NFR BLD: 7 % (ref 0–7)
ERYTHROCYTE [DISTWIDTH] IN BLOOD BY AUTOMATED COUNT: 12.2 % (ref 11.5–14.5)
GLUCOSE SERPL-MCNC: 87 MG/DL (ref 65–100)
HCT VFR BLD AUTO: 48.1 % (ref 36.6–50.3)
HGB BLD-MCNC: 15.4 G/DL (ref 12.1–17)
IMM GRANULOCYTES # BLD AUTO: 0 K/UL (ref 0–0.04)
IMM GRANULOCYTES NFR BLD AUTO: 0 % (ref 0–0.5)
LYMPHOCYTES # BLD: 0.6 K/UL (ref 0.8–3.5)
LYMPHOCYTES NFR BLD: 15 % (ref 12–49)
MAGNESIUM SERPL-MCNC: 2.1 MG/DL (ref 1.6–2.4)
MCH RBC QN AUTO: 30.6 PG (ref 26–34)
MCHC RBC AUTO-ENTMCNC: 32 G/DL (ref 30–36.5)
MCV RBC AUTO: 95.4 FL (ref 80–99)
MONOCYTES # BLD: 0.3 K/UL (ref 0–1)
MONOCYTES NFR BLD: 7 % (ref 5–13)
NEUTS SEG # BLD: 2.7 K/UL (ref 1.8–8)
NEUTS SEG NFR BLD: 69 % (ref 32–75)
NRBC # BLD: 0 K/UL (ref 0–0.01)
NRBC BLD-RTO: 0 PER 100 WBC
PHOSPHATE SERPL-MCNC: 2.8 MG/DL (ref 2.6–4.7)
PLATELET # BLD AUTO: 77 K/UL (ref 150–400)
PMV BLD AUTO: 12.5 FL (ref 8.9–12.9)
POTASSIUM SERPL-SCNC: 4.3 MMOL/L (ref 3.5–5.1)
RBC # BLD AUTO: 5.04 M/UL (ref 4.1–5.7)
RBC MORPH BLD: ABNORMAL
SODIUM SERPL-SCNC: 140 MMOL/L (ref 136–145)
WBC # BLD AUTO: 4 K/UL (ref 4.1–11.1)

## 2023-06-03 PROCEDURE — 6370000000 HC RX 637 (ALT 250 FOR IP): Performed by: NURSE PRACTITIONER

## 2023-06-03 PROCEDURE — 83735 ASSAY OF MAGNESIUM: CPT

## 2023-06-03 PROCEDURE — 80048 BASIC METABOLIC PNL TOTAL CA: CPT

## 2023-06-03 PROCEDURE — 2580000003 HC RX 258: Performed by: NURSE PRACTITIONER

## 2023-06-03 PROCEDURE — 36415 COLL VENOUS BLD VENIPUNCTURE: CPT

## 2023-06-03 PROCEDURE — 2060000000 HC ICU INTERMEDIATE R&B

## 2023-06-03 PROCEDURE — 85025 COMPLETE CBC W/AUTO DIFF WBC: CPT

## 2023-06-03 PROCEDURE — 84100 ASSAY OF PHOSPHORUS: CPT

## 2023-06-03 RX ADMIN — SODIUM CHLORIDE, PRESERVATIVE FREE 10 ML: 5 INJECTION INTRAVENOUS at 08:40

## 2023-06-03 RX ADMIN — THERA TABS 1 TABLET: TAB at 08:39

## 2023-06-03 RX ADMIN — FOLIC ACID 1 MG: 1 TABLET ORAL at 08:39

## 2023-06-03 RX ADMIN — SODIUM CHLORIDE, PRESERVATIVE FREE 10 ML: 5 INJECTION INTRAVENOUS at 22:29

## 2023-06-03 RX ADMIN — SODIUM CHLORIDE, PRESERVATIVE FREE 10 ML: 5 INJECTION INTRAVENOUS at 22:28

## 2023-06-03 RX ADMIN — Medication 100 MG: at 08:39

## 2023-06-03 ASSESSMENT — PAIN SCALES - GENERAL
PAINLEVEL_OUTOF10: 0
PAINLEVEL_OUTOF10: 0

## 2023-06-04 LAB
ANION GAP SERPL CALC-SCNC: 7 MMOL/L (ref 5–15)
BASOPHILS # BLD: 0.1 K/UL (ref 0–0.1)
BASOPHILS NFR BLD: 2 % (ref 0–1)
BUN SERPL-MCNC: 14 MG/DL (ref 6–20)
BUN/CREAT SERPL: 18 (ref 12–20)
CALCIUM SERPL-MCNC: 9.3 MG/DL (ref 8.5–10.1)
CHLORIDE SERPL-SCNC: 107 MMOL/L (ref 97–108)
CO2 SERPL-SCNC: 26 MMOL/L (ref 21–32)
CREAT SERPL-MCNC: 0.76 MG/DL (ref 0.7–1.3)
DIFFERENTIAL METHOD BLD: ABNORMAL
EOSINOPHIL # BLD: 0.3 K/UL (ref 0–0.4)
EOSINOPHIL NFR BLD: 8 % (ref 0–7)
ERYTHROCYTE [DISTWIDTH] IN BLOOD BY AUTOMATED COUNT: 12.3 % (ref 11.5–14.5)
GLUCOSE SERPL-MCNC: 80 MG/DL (ref 65–100)
HCT VFR BLD AUTO: 47.5 % (ref 36.6–50.3)
HGB BLD-MCNC: 15 G/DL (ref 12.1–17)
IMM GRANULOCYTES # BLD AUTO: 0 K/UL (ref 0–0.04)
IMM GRANULOCYTES NFR BLD AUTO: 0 % (ref 0–0.5)
LYMPHOCYTES # BLD: 1.1 K/UL (ref 0.8–3.5)
LYMPHOCYTES NFR BLD: 27 % (ref 12–49)
MAGNESIUM SERPL-MCNC: 2 MG/DL (ref 1.6–2.4)
MCH RBC QN AUTO: 29.9 PG (ref 26–34)
MCHC RBC AUTO-ENTMCNC: 31.6 G/DL (ref 30–36.5)
MCV RBC AUTO: 94.6 FL (ref 80–99)
MONOCYTES # BLD: 0.3 K/UL (ref 0–1)
MONOCYTES NFR BLD: 8 % (ref 5–13)
NEUTS SEG # BLD: 2.2 K/UL (ref 1.8–8)
NEUTS SEG NFR BLD: 55 % (ref 32–75)
NRBC # BLD: 0 K/UL (ref 0–0.01)
NRBC BLD-RTO: 0 PER 100 WBC
PHOSPHATE SERPL-MCNC: 3.1 MG/DL (ref 2.6–4.7)
PLATELET # BLD AUTO: 71 K/UL (ref 150–400)
POTASSIUM SERPL-SCNC: 4.2 MMOL/L (ref 3.5–5.1)
RBC # BLD AUTO: 5.02 M/UL (ref 4.1–5.7)
SODIUM SERPL-SCNC: 140 MMOL/L (ref 136–145)
WBC # BLD AUTO: 4.1 K/UL (ref 4.1–11.1)

## 2023-06-04 PROCEDURE — 2580000003 HC RX 258: Performed by: NURSE PRACTITIONER

## 2023-06-04 PROCEDURE — 84100 ASSAY OF PHOSPHORUS: CPT

## 2023-06-04 PROCEDURE — 36415 COLL VENOUS BLD VENIPUNCTURE: CPT

## 2023-06-04 PROCEDURE — 83735 ASSAY OF MAGNESIUM: CPT

## 2023-06-04 PROCEDURE — 2060000000 HC ICU INTERMEDIATE R&B

## 2023-06-04 PROCEDURE — 80048 BASIC METABOLIC PNL TOTAL CA: CPT

## 2023-06-04 PROCEDURE — 6370000000 HC RX 637 (ALT 250 FOR IP): Performed by: NURSE PRACTITIONER

## 2023-06-04 PROCEDURE — 85025 COMPLETE CBC W/AUTO DIFF WBC: CPT

## 2023-06-04 RX ADMIN — SODIUM CHLORIDE, PRESERVATIVE FREE 10 ML: 5 INJECTION INTRAVENOUS at 10:31

## 2023-06-04 RX ADMIN — THERA TABS 1 TABLET: TAB at 10:30

## 2023-06-04 RX ADMIN — SODIUM CHLORIDE, PRESERVATIVE FREE 10 ML: 5 INJECTION INTRAVENOUS at 21:49

## 2023-06-04 RX ADMIN — SODIUM CHLORIDE, PRESERVATIVE FREE 10 ML: 5 INJECTION INTRAVENOUS at 10:30

## 2023-06-04 RX ADMIN — FOLIC ACID 1 MG: 1 TABLET ORAL at 10:30

## 2023-06-04 RX ADMIN — Medication 100 MG: at 10:30

## 2023-06-04 ASSESSMENT — PAIN SCALES - GENERAL
PAINLEVEL_OUTOF10: 0

## 2023-06-05 ENCOUNTER — ANESTHESIA EVENT (OUTPATIENT)
Facility: HOSPITAL | Age: 68
DRG: 044 | End: 2023-06-05
Payer: MEDICAID

## 2023-06-05 LAB
ANION GAP SERPL CALC-SCNC: 5 MMOL/L (ref 5–15)
BASOPHILS # BLD: 0.1 K/UL (ref 0–0.1)
BASOPHILS NFR BLD: 2 % (ref 0–1)
BUN SERPL-MCNC: 20 MG/DL (ref 6–20)
BUN/CREAT SERPL: 28 (ref 12–20)
CALCIUM SERPL-MCNC: 9.6 MG/DL (ref 8.5–10.1)
CHLORIDE SERPL-SCNC: 110 MMOL/L (ref 97–108)
CO2 SERPL-SCNC: 26 MMOL/L (ref 21–32)
CREAT SERPL-MCNC: 0.72 MG/DL (ref 0.7–1.3)
DIFFERENTIAL METHOD BLD: ABNORMAL
EOSINOPHIL # BLD: 0.3 K/UL (ref 0–0.4)
EOSINOPHIL NFR BLD: 7 % (ref 0–7)
ERYTHROCYTE [DISTWIDTH] IN BLOOD BY AUTOMATED COUNT: 12.5 % (ref 11.5–14.5)
GLUCOSE SERPL-MCNC: 90 MG/DL (ref 65–100)
HCT VFR BLD AUTO: 50 % (ref 36.6–50.3)
HGB BLD-MCNC: 15.9 G/DL (ref 12.1–17)
IMM GRANULOCYTES # BLD AUTO: 0 K/UL (ref 0–0.04)
IMM GRANULOCYTES NFR BLD AUTO: 0 % (ref 0–0.5)
LYMPHOCYTES # BLD: 1 K/UL (ref 0.8–3.5)
LYMPHOCYTES NFR BLD: 26 % (ref 12–49)
MAGNESIUM SERPL-MCNC: 2.2 MG/DL (ref 1.6–2.4)
MCH RBC QN AUTO: 30 PG (ref 26–34)
MCHC RBC AUTO-ENTMCNC: 31.8 G/DL (ref 30–36.5)
MCV RBC AUTO: 94.3 FL (ref 80–99)
MONOCYTES # BLD: 0.4 K/UL (ref 0–1)
MONOCYTES NFR BLD: 9 % (ref 5–13)
NEUTS SEG # BLD: 2.2 K/UL (ref 1.8–8)
NEUTS SEG NFR BLD: 56 % (ref 32–75)
NRBC # BLD: 0 K/UL (ref 0–0.01)
NRBC BLD-RTO: 0 PER 100 WBC
PHOSPHATE SERPL-MCNC: 4.1 MG/DL (ref 2.6–4.7)
PLATELET # BLD AUTO: 72 K/UL (ref 150–400)
PMV BLD AUTO: 13 FL (ref 8.9–12.9)
POTASSIUM SERPL-SCNC: 4.4 MMOL/L (ref 3.5–5.1)
RBC # BLD AUTO: 5.3 M/UL (ref 4.1–5.7)
RBC MORPH BLD: ABNORMAL
SODIUM SERPL-SCNC: 141 MMOL/L (ref 136–145)
WBC # BLD AUTO: 4 K/UL (ref 4.1–11.1)

## 2023-06-05 PROCEDURE — 2580000003 HC RX 258: Performed by: NURSE PRACTITIONER

## 2023-06-05 PROCEDURE — 2060000000 HC ICU INTERMEDIATE R&B

## 2023-06-05 PROCEDURE — 83735 ASSAY OF MAGNESIUM: CPT

## 2023-06-05 PROCEDURE — APPNB45 APP NON BILLABLE 31-45 MINUTES: Performed by: NURSE PRACTITIONER

## 2023-06-05 PROCEDURE — 36415 COLL VENOUS BLD VENIPUNCTURE: CPT

## 2023-06-05 PROCEDURE — 80048 BASIC METABOLIC PNL TOTAL CA: CPT

## 2023-06-05 PROCEDURE — 84100 ASSAY OF PHOSPHORUS: CPT

## 2023-06-05 PROCEDURE — 6370000000 HC RX 637 (ALT 250 FOR IP): Performed by: NURSE PRACTITIONER

## 2023-06-05 PROCEDURE — 85025 COMPLETE CBC W/AUTO DIFF WBC: CPT

## 2023-06-05 RX ADMIN — SODIUM CHLORIDE, PRESERVATIVE FREE 10 ML: 5 INJECTION INTRAVENOUS at 21:00

## 2023-06-05 RX ADMIN — Medication 100 MG: at 08:48

## 2023-06-05 RX ADMIN — THERA TABS 1 TABLET: TAB at 08:48

## 2023-06-05 RX ADMIN — FOLIC ACID 1 MG: 1 TABLET ORAL at 08:48

## 2023-06-05 ASSESSMENT — PAIN SCALES - GENERAL: PAINLEVEL_OUTOF10: 0

## 2023-06-05 NOTE — CONSULTS
20 MG/DL    Creatinine 0.72 0.70 - 1.30 MG/DL    Bun/Cre Ratio 28 (H) 12 - 20      Est, Glom Filt Rate >60 >60 ml/min/1.73m2    Calcium 9.6 8.5 - 10.1 MG/DL   Magnesium    Collection Time: 06/05/23  6:52 AM   Result Value Ref Range    Magnesium 2.2 1.6 - 2.4 mg/dL   Phosphorus    Collection Time: 06/05/23  6:52 AM   Result Value Ref Range    Phosphorus 4.1 2.6 - 4.7 MG/DL   CBC with Auto Differential    Collection Time: 06/05/23  6:52 AM   Result Value Ref Range    WBC 4.0 (L) 4.1 - 11.1 K/uL    RBC 5.30 4. 10 - 5.70 M/uL    Hemoglobin 15.9 12.1 - 17.0 g/dL    Hematocrit 50.0 36.6 - 50.3 %    MCV 94.3 80.0 - 99.0 FL    MCH 30.0 26.0 - 34.0 PG    MCHC 31.8 30.0 - 36.5 g/dL    RDW 12.5 11.5 - 14.5 %    Platelets 72 (L) 801 - 400 K/uL    MPV 13.0 (H) 8.9 - 12.9 FL    Nucleated RBCs 0.0 0  WBC    nRBC 0.00 0.00 - 0.01 K/uL    Neutrophils % 56 32 - 75 %    Lymphocytes % 26 12 - 49 %    Monocytes % 9 5 - 13 %    Eosinophils % 7 0 - 7 %    Basophils % 2 (H) 0 - 1 %    Immature Granulocytes 0 0.0 - 0.5 %    Neutrophils Absolute 2.2 1.8 - 8.0 K/UL    Lymphocytes Absolute 1.0 0.8 - 3.5 K/UL    Monocytes Absolute 0.4 0.0 - 1.0 K/UL    Eosinophils Absolute 0.3 0.0 - 0.4 K/UL    Basophils Absolute 0.1 0.0 - 0.1 K/UL    Absolute Immature Granulocyte 0.0 0.00 - 0.04 K/UL    Differential Type SMEAR SCANNED      RBC Comment NORMOCYTIC, NORMOCHROMIC          Imaging (my read):  CT 5/29/23 head showed left basal ganglia ICH measuring 2.3 x 4.4 x 1.6 cm with 7 mm shift  CT 5/30/23 stable  MRA concerning for AVM  CTA showed superomedial left basal ganglia high flow vascular malformation with a nidus measuring 0.6 x 0.9 cm along the superomedial margin of the ICH with possible pseudoaneurysm.      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

## 2023-06-06 ENCOUNTER — ANESTHESIA (OUTPATIENT)
Facility: HOSPITAL | Age: 68
DRG: 044 | End: 2023-06-06
Payer: MEDICAID

## 2023-06-06 ENCOUNTER — HOSPITAL ENCOUNTER (INPATIENT)
Facility: HOSPITAL | Age: 68
Discharge: HOME | DRG: 044 | End: 2023-06-09
Attending: ANESTHESIOLOGY
Payer: MEDICAID

## 2023-06-06 VITALS
TEMPERATURE: 97.7 F | SYSTOLIC BLOOD PRESSURE: 109 MMHG | DIASTOLIC BLOOD PRESSURE: 51 MMHG | HEART RATE: 57 BPM | OXYGEN SATURATION: 97 % | RESPIRATION RATE: 10 BRPM

## 2023-06-06 PROCEDURE — 6370000000 HC RX 637 (ALT 250 FOR IP): Performed by: RADIOLOGY

## 2023-06-06 PROCEDURE — 6360000002 HC RX W HCPCS

## 2023-06-06 PROCEDURE — 2500000003 HC RX 250 WO HCPCS

## 2023-06-06 PROCEDURE — B3171ZZ FLUOROSCOPY OF LEFT INTERNAL CAROTID ARTERY USING LOW OSMOLAR CONTRAST: ICD-10-PCS | Performed by: RADIOLOGY

## 2023-06-06 PROCEDURE — 2000000000 HC ICU R&B

## 2023-06-06 PROCEDURE — 2580000003 HC RX 258

## 2023-06-06 PROCEDURE — 76942 ECHO GUIDE FOR BIOPSY: CPT

## 2023-06-06 PROCEDURE — B31R1ZZ FLUOROSCOPY OF INTRACRANIAL ARTERIES USING LOW OSMOLAR CONTRAST: ICD-10-PCS | Performed by: RADIOLOGY

## 2023-06-06 PROCEDURE — 2700000000 HC OXYGEN THERAPY PER DAY

## 2023-06-06 PROCEDURE — 2500000003 HC RX 250 WO HCPCS: Performed by: RADIOLOGY

## 2023-06-06 PROCEDURE — B31D1ZZ FLUOROSCOPY OF RIGHT VERTEBRAL ARTERY USING LOW OSMOLAR CONTRAST: ICD-10-PCS | Performed by: RADIOLOGY

## 2023-06-06 PROCEDURE — 6360000002 HC RX W HCPCS: Performed by: RADIOLOGY

## 2023-06-06 RX ORDER — LIDOCAINE HYDROCHLORIDE 20 MG/ML
INJECTION, SOLUTION EPIDURAL; INFILTRATION; INTRACAUDAL; PERINEURAL PRN
Status: DISCONTINUED | OUTPATIENT
Start: 2023-06-06 | End: 2023-06-06 | Stop reason: SDUPTHER

## 2023-06-06 RX ORDER — LABETALOL HYDROCHLORIDE 5 MG/ML
20 INJECTION, SOLUTION INTRAVENOUS EVERY 4 HOURS PRN
Status: DISCONTINUED | OUTPATIENT
Start: 2023-06-06 | End: 2023-06-08 | Stop reason: HOSPADM

## 2023-06-06 RX ORDER — DEXAMETHASONE SODIUM PHOSPHATE 4 MG/ML
INJECTION, SOLUTION INTRA-ARTICULAR; INTRALESIONAL; INTRAMUSCULAR; INTRAVENOUS; SOFT TISSUE PRN
Status: DISCONTINUED | OUTPATIENT
Start: 2023-06-06 | End: 2023-06-06 | Stop reason: SDUPTHER

## 2023-06-06 RX ORDER — FENTANYL CITRATE 50 UG/ML
INJECTION, SOLUTION INTRAMUSCULAR; INTRAVENOUS PRN
Status: DISCONTINUED | OUTPATIENT
Start: 2023-06-06 | End: 2023-06-06 | Stop reason: SDUPTHER

## 2023-06-06 RX ORDER — LIDOCAINE 40 MG/G
CREAM TOPICAL ONCE
Status: COMPLETED | OUTPATIENT
Start: 2023-06-06 | End: 2023-06-06

## 2023-06-06 RX ORDER — SODIUM CHLORIDE 9 MG/ML
INJECTION, SOLUTION INTRAVENOUS CONTINUOUS PRN
Status: DISCONTINUED | OUTPATIENT
Start: 2023-06-06 | End: 2023-06-06 | Stop reason: SDUPTHER

## 2023-06-06 RX ORDER — NEOSTIGMINE METHYLSULFATE 1 MG/ML
INJECTION, SOLUTION INTRAVENOUS PRN
Status: DISCONTINUED | OUTPATIENT
Start: 2023-06-06 | End: 2023-06-06 | Stop reason: SDUPTHER

## 2023-06-06 RX ORDER — ROCURONIUM BROMIDE 10 MG/ML
INJECTION, SOLUTION INTRAVENOUS PRN
Status: DISCONTINUED | OUTPATIENT
Start: 2023-06-06 | End: 2023-06-06 | Stop reason: SDUPTHER

## 2023-06-06 RX ORDER — ONDANSETRON 2 MG/ML
INJECTION INTRAMUSCULAR; INTRAVENOUS PRN
Status: DISCONTINUED | OUTPATIENT
Start: 2023-06-06 | End: 2023-06-06 | Stop reason: SDUPTHER

## 2023-06-06 RX ORDER — MIDAZOLAM HYDROCHLORIDE 1 MG/ML
INJECTION INTRAMUSCULAR; INTRAVENOUS PRN
Status: DISCONTINUED | OUTPATIENT
Start: 2023-06-06 | End: 2023-06-06 | Stop reason: SDUPTHER

## 2023-06-06 RX ORDER — PHENYLEPHRINE HCL IN 0.9% NACL 0.4MG/10ML
SYRINGE (ML) INTRAVENOUS PRN
Status: DISCONTINUED | OUTPATIENT
Start: 2023-06-06 | End: 2023-06-06 | Stop reason: SDUPTHER

## 2023-06-06 RX ORDER — EPHEDRINE SULFATE 50 MG/ML
INJECTION INTRAVENOUS PRN
Status: DISCONTINUED | OUTPATIENT
Start: 2023-06-06 | End: 2023-06-06 | Stop reason: SDUPTHER

## 2023-06-06 RX ORDER — SUCCINYLCHOLINE CHLORIDE 20 MG/ML
INJECTION INTRAMUSCULAR; INTRAVENOUS PRN
Status: DISCONTINUED | OUTPATIENT
Start: 2023-06-06 | End: 2023-06-06 | Stop reason: SDUPTHER

## 2023-06-06 RX ORDER — PROPOFOL 10 MG/ML
INJECTION, EMULSION INTRAVENOUS PRN
Status: DISCONTINUED | OUTPATIENT
Start: 2023-06-06 | End: 2023-06-06 | Stop reason: SDUPTHER

## 2023-06-06 RX ORDER — GLYCOPYRROLATE 0.2 MG/ML
INJECTION INTRAMUSCULAR; INTRAVENOUS PRN
Status: DISCONTINUED | OUTPATIENT
Start: 2023-06-06 | End: 2023-06-06 | Stop reason: SDUPTHER

## 2023-06-06 RX ORDER — VERAPAMIL HYDROCHLORIDE 2.5 MG/ML
INJECTION, SOLUTION INTRAVENOUS PRN
Status: COMPLETED | OUTPATIENT
Start: 2023-06-06 | End: 2023-06-06

## 2023-06-06 RX ORDER — HEPARIN SODIUM 1000 [USP'U]/ML
INJECTION, SOLUTION INTRAVENOUS; SUBCUTANEOUS PRN
Status: COMPLETED | OUTPATIENT
Start: 2023-06-06 | End: 2023-06-06

## 2023-06-06 RX ORDER — HYDRALAZINE HYDROCHLORIDE 20 MG/ML
10 INJECTION INTRAMUSCULAR; INTRAVENOUS EVERY 4 HOURS PRN
Status: DISCONTINUED | OUTPATIENT
Start: 2023-06-06 | End: 2023-06-08 | Stop reason: HOSPADM

## 2023-06-06 RX ORDER — LIDOCAINE HYDROCHLORIDE 10 MG/ML
INJECTION, SOLUTION EPIDURAL; INFILTRATION; INTRACAUDAL; PERINEURAL PRN
Status: COMPLETED | OUTPATIENT
Start: 2023-06-06 | End: 2023-06-06

## 2023-06-06 RX ADMIN — NITROGLYCERIN 0.5 INCH: 20 OINTMENT TOPICAL at 09:45

## 2023-06-06 RX ADMIN — SODIUM CHLORIDE: 9 INJECTION, SOLUTION INTRAVENOUS at 12:42

## 2023-06-06 RX ADMIN — FENTANYL CITRATE 75 MCG: 50 INJECTION, SOLUTION INTRAMUSCULAR; INTRAVENOUS at 12:45

## 2023-06-06 RX ADMIN — MIDAZOLAM HYDROCHLORIDE 2 MG: 1 INJECTION, SOLUTION INTRAMUSCULAR; INTRAVENOUS at 10:00

## 2023-06-06 RX ADMIN — SUCCINYLCHOLINE CHLORIDE 120 MG: 20 INJECTION, SOLUTION INTRAMUSCULAR; INTRAVENOUS at 12:46

## 2023-06-06 RX ADMIN — HEPARIN SODIUM 2000 UNITS: 1000 INJECTION, SOLUTION INTRAVENOUS; SUBCUTANEOUS at 13:12

## 2023-06-06 RX ADMIN — LIDOCAINE 4%: 4 CREAM TOPICAL at 09:45

## 2023-06-06 RX ADMIN — HEPARIN SODIUM 2000 UNITS: 1000 INJECTION, SOLUTION INTRAVENOUS; SUBCUTANEOUS at 13:13

## 2023-06-06 RX ADMIN — LIDOCAINE HYDROCHLORIDE 2 ML: 10 INJECTION, SOLUTION EPIDURAL; INFILTRATION; INTRACAUDAL; PERINEURAL at 13:13

## 2023-06-06 RX ADMIN — LIDOCAINE HYDROCHLORIDE 80 MG: 20 INJECTION, SOLUTION EPIDURAL; INFILTRATION; INTRACAUDAL; PERINEURAL at 12:45

## 2023-06-06 RX ADMIN — MIDAZOLAM HYDROCHLORIDE 1 MG: 1 INJECTION, SOLUTION INTRAMUSCULAR; INTRAVENOUS at 10:05

## 2023-06-06 RX ADMIN — GLYCOPYRROLATE 0.2 MG: 0.2 INJECTION INTRAMUSCULAR; INTRAVENOUS at 12:58

## 2023-06-06 RX ADMIN — PHENYLEPHRINE HYDROCHLORIDE 60 MCG/MIN: 10 INJECTION INTRAVENOUS at 12:54

## 2023-06-06 RX ADMIN — PROPOFOL 30 MG: 10 INJECTION, EMULSION INTRAVENOUS at 13:52

## 2023-06-06 RX ADMIN — LABETALOL HYDROCHLORIDE 20 MG: 5 INJECTION INTRAVENOUS at 17:35

## 2023-06-06 RX ADMIN — ROCURONIUM BROMIDE 20 MG: 10 SOLUTION INTRAVENOUS at 12:56

## 2023-06-06 RX ADMIN — MIDAZOLAM HYDROCHLORIDE 1 MG: 1 INJECTION, SOLUTION INTRAMUSCULAR; INTRAVENOUS at 10:07

## 2023-06-06 RX ADMIN — ROCURONIUM BROMIDE 10 MG: 10 SOLUTION INTRAVENOUS at 12:45

## 2023-06-06 RX ADMIN — EPHEDRINE SULFATE 10 MG: 50 INJECTION INTRAVENOUS at 12:58

## 2023-06-06 RX ADMIN — FENTANYL CITRATE 25 MCG: 50 INJECTION, SOLUTION INTRAMUSCULAR; INTRAVENOUS at 10:06

## 2023-06-06 RX ADMIN — Medication 200 MCG: at 13:17

## 2023-06-06 RX ADMIN — Medication 3 MG: at 13:51

## 2023-06-06 RX ADMIN — ONDANSETRON HYDROCHLORIDE 4 MG: 2 INJECTION, SOLUTION INTRAMUSCULAR; INTRAVENOUS at 13:51

## 2023-06-06 RX ADMIN — Medication 120 MCG: at 12:52

## 2023-06-06 RX ADMIN — PROPOFOL 150 MG: 10 INJECTION, EMULSION INTRAVENOUS at 12:45

## 2023-06-06 RX ADMIN — HEPARIN SODIUM 2000 UNITS: 1000 INJECTION, SOLUTION INTRAVENOUS; SUBCUTANEOUS at 13:17

## 2023-06-06 RX ADMIN — DEXAMETHASONE SODIUM PHOSPHATE 4 MG: 4 INJECTION, SOLUTION INTRAMUSCULAR; INTRAVENOUS at 12:59

## 2023-06-06 RX ADMIN — FENTANYL CITRATE 50 MCG: 50 INJECTION, SOLUTION INTRAMUSCULAR; INTRAVENOUS at 10:00

## 2023-06-06 RX ADMIN — Medication 120 MCG: at 13:18

## 2023-06-06 RX ADMIN — GLYCOPYRROLATE 0.4 MG: 0.2 INJECTION INTRAMUSCULAR; INTRAVENOUS at 13:51

## 2023-06-06 RX ADMIN — VERAPAMIL HYDROCHLORIDE 2.5 MG: 2.5 INJECTION, SOLUTION INTRAVENOUS at 13:17

## 2023-06-06 RX ADMIN — MIDAZOLAM HYDROCHLORIDE 1 MG: 1 INJECTION, SOLUTION INTRAMUSCULAR; INTRAVENOUS at 10:03

## 2023-06-06 ASSESSMENT — PAIN SCALES - GENERAL: PAINLEVEL_OUTOF10: 0

## 2023-06-06 NOTE — BRIEF OP NOTE
NEUROINTERVENTIONAL SURGERY BRIEF POSTOPERATIVE NOTE    Pre-Op Diagnosis: Intraparenchymal hemorrhage    Post-Op Diagnosis: Same as preoperative diagnosis. PROCEDURE:  Diagnostic cerebral angiogram  Ultrasound guided vascular access  Placement of arteriotomy closure device    VESSEL(S) STUDIED:  Right CCA  Left ICA  Left ECA  Right vertebral artery VESSEL(S) TREATED:  N/A      PRELIMINARY REPORT & DISPOSITION:   Left BG SM III AVM supplied by left MCA lenticulostriate arteries with deep venous drainage. 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 COMPLICATIONS:  None    SPECIMENS:   * No specimens in log *     IMPLANTS:   * No implants in log *    DRAINS:   * No LDAs found *    FOLLOW-UP:  Admit to IC  SBP   Q1 hr neurochecks  Discuss plan with Neurosurgery DATE OF SERVICE:  6/6/2023 1:54 PM     ATTENDING SURGEON(S):  Lacy Brown MD      ANESTHESIA:   GA    EBL: 5 cc    MEDICATIONS:   See nursing record    PUNCTURE SITE:  Right radial artery. Arteriotomy closed with TR Band. Angelina Sainz M.D.    Farrah Ibarra    , Department of Radiology  [facility]

## 2023-06-06 NOTE — ANESTHESIA PRE PROCEDURE
Department of Anesthesiology  Preprocedure Note       Name:  Shai Martins   Age:  79 y.o.  :  1955                                          MRN:  [id number]         Date:  2023      Surgeon: * No surgeons listed *    Procedure: * No procedures listed *    Medications prior to admission:   Prior to Admission medications    Not on File       Current medications:    No current facility-administered medications for this encounter. No current outpatient medications on file.      Facility-Administered Medications Ordered in Other Encounters   Medication Dose Route Frequency Provider Last Rate Last Admin    ipratropium 0.5 mg-albuterol 2.5 mg (DUONEB) nebulizer solution 1 Dose  1 Dose Inhalation Q4H PRN Teena Freedman MD        LORazepam (ATIVAN) tablet 0.5 mg  0.5 mg Oral Q1H PRN Teena Freedman MD        Or    LORazepam (ATIVAN) injection 0.5 mg  0.5 mg IntraVENous Q1H PRN Teena Freedman MD        Or    LORazepam (ATIVAN) tablet 1 mg  1 mg Oral Q1H PRN Teena Freedman MD        Or    LORazepam (ATIVAN) injection 1 mg  1 mg IntraVENous Q1H PRN Teena Freedman MD        ondansetron (ZOFRAN-ODT) disintegrating tablet 4 mg  4 mg Oral Q8H PRN Teena Freedman MD        Or    [facility]ron [facility] injection 4 mg  4 mg IntraVENous Q6H PRN Teena Freedman MD        polyethylene glycol [facility]) packet 17 g  17 g Oral Daily PRN Teena Freedman MD        sodium chloride flush 0.9 % injection 5-40 mL  5-40 mL IntraVENous 2 times per day ZULAY Cleaning - NP   10 mL at 23 2100    0.9 % sodium chloride infusion   IntraVENous PRN ZULAY Galvez - NP        hydrALAZINE (APRESOLINE) injection 10 mg  10 mg IntraVENous Q4H PRN Liliya Covarrubias MD   10 mg at 23 0519    sodium chloride flush 0.9 % injection 5-40 mL  5-40 mL IntraVENous 2 times per day Regis Jacobsen APRN - NP   10 mL at 23 2149    sodium chloride flush 0.9 % injection 5-40 mL  5-40 mL IntraVENous PRN Ther Bacca APRN

## 2023-06-06 NOTE — PROGRESS NOTES
Report received from [facility] at bedside. Pt transported to angio for cerebral angiogram and possible AVM embolization, arteriotomy closure device with general anesthesia. 1240: Patient placed on angio table without assistance. IR Staff and anesthesia present at bedside. Anesthesia to remain at bedside to manage pt airway, medications, VS and pt status. 1430: TRANSFER - OUT REPORT:    Verbal report given to [facility] DENISE REEVES on Johnathon Oakes  being transferred to ICU 13 for routine post-op       Report consisted of patient's Situation, Background, Assessment and   Recommendations(SBAR). Information from the following report(s) ED SBAR, Adult Overview, MAR, Recent Results, Cardiac Rhythm NSR, Alarm Parameters, Neuro Assessment, and Event Log was reviewed with the receiving nurse. Newcomb Assessment: No data recorded  Lines:   Peripheral IV 06/02/23 Left Other (Comment) (Active)   Site Assessment Clean, dry & intact 06/06/23 0800   Line Status Capped 06/06/23 0800   Line Care Connections checked and tightened 06/06/23 0800   Phlebitis Assessment No symptoms 06/06/23 0800   Infiltration Assessment 0 06/06/23 0800   Alcohol Cap Used Yes 06/06/23 0800   Dressing Status Clean, dry & intact 06/06/23 0800   Dressing Type Transparent 06/06/23 0800       Peripheral IV 06/06/23 Left; Anterior Arm (Active)   Site Assessment Clean, dry & intact 06/06/23 0903   Line Status Infusing;Flushed 06/06/23 0903   Phlebitis Assessment No symptoms 06/06/23 0903   Infiltration Assessment 0 06/06/23 0903   Alcohol Cap Used No 06/06/23 0903   Dressing Status Clean, dry & intact 06/06/23 0903   Dressing Type Transparent 06/06/23 0903   Dressing Intervention New 06/06/23 0903       Arterial Line 06/06/23 Radial (Active)       Arterial Line 06/06/23 Left Radial (Active)        Opportunity for questions and clarification was provided.       Patient transported with:  Monitor, O2 @ 2lpm, and Registered Nurse  CRNA

## 2023-06-06 NOTE — ANESTHESIA POSTPROCEDURE EVALUATION
Department of Anesthesiology  Postprocedure Note    Patient: Philomena Reaves  MRN: [id number]  YOB: 1955  Date of evaluation: 6/6/2023      Procedure Summary     Date: 06/06/23 Room / Location: [facility] ANGIO IR    Anesthesia Start: 1240 Anesthesia Stop: 1198    Procedure: IR ARCH UNI CAR CERV CEREBRAL Diagnosis: (Ruptured AVM.  Possible treatment)    Scheduled Providers: Cuong Kilgore MD Responsible Provider: Martha Leslie MD    Anesthesia Type: General ASA Status: 2          Anesthesia Type: General    Juanita Phase I: Juanita Score: 10    Juanita Phase II:        Anesthesia Post Evaluation    Patient location during evaluation: PACU  Patient participation: complete - patient participated  Level of consciousness: awake  Airway patency: patent  Nausea & Vomiting: no nausea  Complications: no  Cardiovascular status: blood pressure returned to baseline and hemodynamically stable  Respiratory status: acceptable  Hydration status: stable  Multimodal analgesia pain management approach

## 2023-06-06 NOTE — ANESTHESIA POSTPROCEDURE EVALUATION
Department of Anesthesiology  Postprocedure Note    Patient: Boris Velazquez  MRN: [id number]  YOB: 1955  Date of evaluation: 6/6/2023      Procedure Summary     Date: 06/06/23 Room / Location: [facility] ANGIO IR    Anesthesia Start: 1240 Anesthesia Stop: 1585    Procedure: IR ARCH UNI CAR CERV CEREBRAL Diagnosis: (Ruptured AVM.  Possible treatment)    Scheduled Providers: Sammie Duncan MD Responsible Provider: Martín Doss MD    Anesthesia Type: General ASA Status: 2          Anesthesia Type: General    Juanita Phase I: Juanita Score: 10    Juanita Phase II:        Anesthesia Post Evaluation    Patient location during evaluation: bedside  Patient participation: complete - patient participated  Level of consciousness: awake and sleepy but conscious  Airway patency: patent  Nausea & Vomiting: no nausea  Complications: no  Cardiovascular status: blood pressure returned to baseline and hemodynamically stable  Respiratory status: acceptable  Hydration status: stable

## 2023-06-06 NOTE — ANESTHESIA PROCEDURE NOTES
Arterial Line:    An arterial line was placed using surface landmarks, in the pre-op for the following indication(s): continuous blood pressure monitoring and blood sampling needed. A 20 gauge (size) (length), Arrow (type) catheter was placed, Seldinger technique used, into the left radial artery, secured by Tegaderm. Anesthesia type: Local  Local infiltration: Injection    Events:  patient tolerated procedure well with no complications. 6/6/2023 10:00 AM6/6/2023 10:19 AM  Anesthesiologist: Sheba Crockett MD  Performed: Anesthesiologist   Preanesthetic Checklist  Completed: patient identified, IV checked, site marked, risks and benefits discussed, surgical/procedural consents, equipment checked, pre-op evaluation, timeout performed, anesthesia consent given, oxygen available, monitors applied/VS acknowledged and fire risk safety assessment completed and verbalized

## 2023-06-06 NOTE — CARE COORDINATION
Transition of Care Plan:    RUR: 8%  Prior Level of Functioning:   Disposition: [facility]   If SNF or IPR: Date FOC offered: 6/1/23  Date [address] received: 6/1/23  Accepting facility: [facility]   Date authorization started with reference number: 6/2/23  Date authorization received and expires: 6/5/23  Follow up appointments: PCP Neuro  DME needed: Owns Manual WC  Transportation at discharge: [address]: None  Care Conference needed? No  Barriers to discharge:    Patient is s/p Diagnostic cerebral angiogram. Care management is continuing to follow.   Huy Sainz RN,Care Management

## 2023-06-07 PROCEDURE — 97530 THERAPEUTIC ACTIVITIES: CPT

## 2023-06-07 PROCEDURE — 97535 SELF CARE MNGMENT TRAINING: CPT

## 2023-06-07 PROCEDURE — 1100000003 HC PRIVATE W/ TELEMETRY

## 2023-06-07 PROCEDURE — 97164 PT RE-EVAL EST PLAN CARE: CPT

## 2023-06-07 PROCEDURE — APPNB45 APP NON BILLABLE 31-45 MINUTES: Performed by: NURSE PRACTITIONER

## 2023-06-07 PROCEDURE — 2580000003 HC RX 258: Performed by: NURSE PRACTITIONER

## 2023-06-07 RX ORDER — SODIUM CHLORIDE, SODIUM LACTATE, POTASSIUM CHLORIDE, CALCIUM CHLORIDE 600; 310; 30; 20 MG/100ML; MG/100ML; MG/100ML; MG/100ML
INJECTION, SOLUTION INTRAVENOUS CONTINUOUS
Status: DISCONTINUED | OUTPATIENT
Start: 2023-06-07 | End: 2023-06-08 | Stop reason: HOSPADM

## 2023-06-07 ASSESSMENT — PAIN SCALES - GENERAL
PAINLEVEL_OUTOF10: 0
PAINLEVEL_OUTOF10: 0

## 2023-06-07 NOTE — H&P
transferred out of ICU on 6/7/2023       REVIEW OF SYSTEMS:  A comprehensive review of systems was negative except for that written in the History of Present Illness. No past medical history on file. Past Surgical History:   Procedure Laterality Date    LEG AMPUTATION BELOW KNEE Right     LOWER EXTREMITY AMPUTATION Left     transmetatarsal     Prior to Admission medications    Not on File     Allergies   Allergen Reactions    Penicillins Anaphylaxis    Methadone Other (See Comments)      No family history on file. Social History:  reports that he has been smoking cigarettes. He has never used smokeless tobacco. He reports current alcohol use. He reports current drug use. Drugs: Marijuana (Weed) and Methamphetamines (Crystal Meth). Social Determinants of Health     Tobacco Use: High Risk    Smoking Tobacco Use: Every Day    Smokeless Tobacco Use: Never    Passive Exposure: Not on file   Alcohol Use: Not At Risk    Frequency of Alcohol Consumption: Never    Average Number of Drinks: Patient does not drink    Frequency of Binge Drinking: Never   Financial Resource Strain: Not on file   Food Insecurity: Not on file   Transportation Needs: Not on file   Physical Activity: Not on file   Stress: Not on file   Social Connections: Not on file   Intimate Partner Violence: Not on file   Depression: Not on file   Housing Stability: Not on file        Medications were reconciled to the best of my ability given all available resources at the time of admission. Route is PO if not otherwise noted. Family and social history were personally reviewed, all pertinent and relevant details are outlined as above.     Objective:   /66   Pulse 58   Temp 97 °F (36.1 °C) (Oral)   Resp 20   Ht 5' 5\" (1.651 m)   Wt 136 lb 1.6 oz (61.7 kg)   SpO2 95%   BMI 22.65 kg/m²         PHYSICAL EXAM:   General: Alert x oriented x 3, awake, no acute distress,   HEENT: PEERL, EOMI, moist mucus membranes  Neck: Supple, no JVD, no

## 2023-06-08 VITALS
TEMPERATURE: 97.7 F | BODY MASS INDEX: 19.99 KG/M2 | OXYGEN SATURATION: 98 % | WEIGHT: 120 LBS | SYSTOLIC BLOOD PRESSURE: 112 MMHG | HEIGHT: 65 IN | RESPIRATION RATE: 18 BRPM | HEART RATE: 65 BPM | DIASTOLIC BLOOD PRESSURE: 69 MMHG

## 2023-06-08 PROCEDURE — 2580000003 HC RX 258: Performed by: NURSE PRACTITIONER

## 2023-06-08 PROCEDURE — 6370000000 HC RX 637 (ALT 250 FOR IP): Performed by: NURSE PRACTITIONER

## 2023-06-08 RX ADMIN — SODIUM CHLORIDE, PRESERVATIVE FREE 10 ML: 5 INJECTION INTRAVENOUS at 10:11

## 2023-06-08 RX ADMIN — FOLIC ACID 1 MG: 1 TABLET ORAL at 10:11

## 2023-06-08 RX ADMIN — Medication 100 MG: at 10:11

## 2023-06-08 RX ADMIN — THERA TABS 1 TABLET: TAB at 10:11

## 2023-06-08 NOTE — DISCHARGE SUMMARY
hemorrhage concerning for AVM. CTA head/neck was then obtained to assess, which showed AVM with nidus measuring up to 2.6 x 0.9 cm for which NIS has been consulted. 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 Neuro-interventional surgery recommended outpatient follow-up with neurosurgery for gamma-knife therapy. No ASA/anticoagulation due to the hemorrhage      DISCHARGE DIAGNOSES / PLAN:       Follow-up outpatient with neurosurgery for gamma-knife therapy. FOLLOW UP APPOINTMENTS:    Follow-up Information       Follow up With Specialties Details Why Contact Info    None None        [facility] Neurosurgery Schedule an appointment as soon as possible for a visit  [address] [facility]  [phone]        May make an appointment with [facility] or [facility] neurosurgery as well        DISCHARGE MEDICATIONS:     Medication List      You have not been prescribed any medications. Discontinue Eliquis        PHYSICAL EXAMINATION AT DISCHARGE:  No acute events overnight, denies pain, dyspnea, n/v    /63   Pulse 58   Temp 98.6 °F (37 °C) (Oral)   Resp 19   Ht 1.651 m (5' 5\")   Wt 54.4 kg (120 lb)   SpO2 98%   BMI 19.97 kg/m²   NAD  RRR    CHRONIC MEDICAL DIAGNOSES:  Principal Problem:    Intraparenchymal hemorrhage of brain (HCC)  Resolved Problems:    * No resolved hospital problems. *    Greater than 30 minutes spent on discharge management. Signed:    Johnnie Bourgeois MD  6/8/2023  12:49 PM

## 2023-06-08 NOTE — PLAN OF CARE
0630 Patient allowed one needle stick for labs, when this was unsuccessful he refused anything further.      Problem: Pain  Goal: Verbalizes/displays adequate comfort level or baseline comfort level  6/7/2023 1749 by Freddie Awan RN  Outcome: Not Progressing  Flowsheets (Taken 6/7/2023 1618)  Verbalizes/displays adequate comfort level or baseline comfort level:   Encourage patient to monitor pain and request assistance   Assess pain using appropriate pain scale   Administer analgesics based on type and severity of pain and evaluate response   Implement non-pharmacological measures as appropriate and evaluate response   Consider cultural and social influences on pain and pain management   Notify Licensed Independent Practitioner if interventions unsuccessful or patient reports new pain     Problem: Discharge Planning  Goal: Discharge to home or other facility with appropriate resources  6/8/2023 0347 by Stephen Jiménez RN  Outcome: Progressing  6/7/2023 1749 by Freddie Awan RN  Outcome: Not Progressing  Flowsheets (Taken 6/7/2023 1618)  Discharge to home or other facility with appropriate resources:   Identify barriers to discharge with patient and caregiver   Arrange for needed discharge resources and transportation as appropriate   Identify discharge learning needs (meds, wound care, etc)   Arrange for interpreters to assist at discharge as needed   Refer to discharge planning if patient needs post-hospital services based on physician order or complex needs related to functional status, cognitive ability or social support system     Problem: Pain  Goal: Verbalizes/displays adequate comfort level or baseline comfort level  6/7/2023 1749 by Freddie Awan RN  Outcome: Not Progressing  Flowsheets (Taken 6/7/2023 1618)  Verbalizes/displays adequate comfort level or baseline comfort level:   Encourage patient to monitor pain and request assistance   Assess pain using appropriate pain scale   Administer
Problem: Discharge Planning  Goal: Discharge to home or other facility with appropriate resources  6/8/2023 1145 by Leeanne Hoover RN  Outcome: Progressing  6/8/2023 0347 by Marga Cole RN  Outcome: Progressing  Flowsheets (Taken 6/7/2023 2000)  Discharge to home or other facility with appropriate resources: Identify barriers to discharge with patient and caregiver     Problem: Pain  Goal: Verbalizes/displays adequate comfort level or baseline comfort level  Outcome: Progressing     Problem: Safety - Adult  Goal: Free from fall injury  6/8/2023 1145 by Leeanne Hoover RN  Outcome: Progressing  6/8/2023 0347 by Marga Cole RN  Outcome: Progressing     Problem: Chronic Conditions and Co-morbidities  Goal: Patient's chronic conditions and co-morbidity symptoms are monitored and maintained or improved  6/8/2023 1145 by Leeanne Hoover RN  Outcome: Progressing  6/8/2023 0347 by Marga Cole RN  Outcome: Progressing  Flowsheets (Taken 6/7/2023 2000)  Care Plan - Patient's Chronic Conditions and Co-Morbidity Symptoms are Monitored and Maintained or Improved: Monitor and assess patient's chronic conditions and comorbid symptoms for stability, deterioration, or improvement     Problem: Skin/Tissue Integrity  Goal: Absence of new skin breakdown  Description: 1. Monitor for areas of redness and/or skin breakdown  2. Assess vascular access sites hourly  3. Every 4-6 hours minimum:  Change oxygen saturation probe site  4. Every 4-6 hours:  If on nasal continuous positive airway pressure, respiratory therapy assess nares and determine need for appliance change or resting period.   6/8/2023 1145 by Leeanne Hoover RN  Outcome: Progressing  6/8/2023 0347 by Marga Cole RN  Outcome: Progressing
Problem: Discharge Planning  Goal: Discharge to home or other facility with appropriate resources  Outcome: Not Progressing  Flowsheets (Taken 6/7/2023 1618)  Discharge to home or other facility with appropriate resources:   Identify barriers to discharge with patient and caregiver   Arrange for needed discharge resources and transportation as appropriate   Identify discharge learning needs (meds, wound care, etc)   Arrange for interpreters to assist at discharge as needed   Refer to discharge planning if patient needs post-hospital services based on physician order or complex needs related to functional status, cognitive ability or social support system     Problem: Pain  Goal: Verbalizes/displays adequate comfort level or baseline comfort level  Outcome: Not Progressing  Flowsheets (Taken 6/7/2023 1618)  Verbalizes/displays adequate comfort level or baseline comfort level:   Encourage patient to monitor pain and request assistance   Assess pain using appropriate pain scale   Administer analgesics based on type and severity of pain and evaluate response   Implement non-pharmacological measures as appropriate and evaluate response   Consider cultural and social influences on pain and pain management   Notify Licensed Independent Practitioner if interventions unsuccessful or patient reports new pain     Problem: Safety - Adult  Goal: Free from fall injury  Outcome: Not Progressing     Problem: Chronic Conditions and Co-morbidities  Goal: Patient's chronic conditions and co-morbidity symptoms are monitored and maintained or improved  Outcome: Not Progressing  Flowsheets (Taken 6/7/2023 1618)  Care Plan - Patient's Chronic Conditions and Co-Morbidity Symptoms are Monitored and Maintained or Improved:   Monitor and assess patient's chronic conditions and comorbid symptoms for stability, deterioration, or improvement   Collaborate with multidisciplinary team to address chronic and comorbid conditions and prevent
Problem: Discharge Planning  Goal: Discharge to home or other facility with appropriate resources  Outcome: Progressing  Flowsheets (Taken 6/5/2023 0800)  Discharge to home or other facility with appropriate resources: Identify barriers to discharge with patient and caregiver     Problem: Pain  Goal: Verbalizes/displays adequate comfort level or baseline comfort level  Outcome: Progressing  Flowsheets (Taken 6/5/2023 0559 by Lenore Campbell RN)  Verbalizes/displays adequate comfort level or baseline comfort level: Encourage patient to monitor pain and request assistance     Problem: Safety - Adult  Goal: Free from fall injury  Outcome: Progressing     Problem: Chronic Conditions and Co-morbidities  Goal: Patient's chronic conditions and co-morbidity symptoms are monitored and maintained or improved  Outcome: Progressing  Flowsheets (Taken 6/5/2023 0800)  Care Plan - Patient's Chronic Conditions and Co-Morbidity Symptoms are Monitored and Maintained or Improved: Monitor and assess patient's chronic conditions and comorbid symptoms for stability, deterioration, or improvement
Problem: Discharge Planning  Goal: Discharge to home or other facility with appropriate resources  Outcome: Progressing  Flowsheets (Taken 6/6/2023 0800)  Discharge to home or other facility with appropriate resources: Identify barriers to discharge with patient and caregiver     Problem: Pain  Goal: Verbalizes/displays adequate comfort level or baseline comfort level  Outcome: Progressing     Problem: Safety - Adult  Goal: Free from fall injury  Outcome: Progressing     Problem: Chronic Conditions and Co-morbidities  Goal: Patient's chronic conditions and co-morbidity symptoms are monitored and maintained or improved  Outcome: Progressing  Flowsheets (Taken 6/6/2023 0800)  Care Plan - Patient's Chronic Conditions and Co-Morbidity Symptoms are Monitored and Maintained or Improved: Monitor and assess patient's chronic conditions and comorbid symptoms for stability, deterioration, or improvement     Problem: Skin/Tissue Integrity  Goal: Absence of new skin breakdown  Description: 1. Monitor for areas of redness and/or skin breakdown  2. Assess vascular access sites hourly  3. Every 4-6 hours minimum:  Change oxygen saturation probe site  4. Every 4-6 hours:  If on nasal continuous positive airway pressure, respiratory therapy assess nares and determine need for appliance change or resting period.   Outcome: Progressing
Problem: Occupational Therapy - Adult  Goal: By Discharge: Performs self-care activities at highest level of function for planned discharge setting. See evaluation for individualized goals. Description: FUNCTIONAL STATUS PRIOR TO ADMISSION:  Patient is questionable historian and limited by impaired cognition on eval.  Uses WC at baseline, reports independence with ADLs. HOME SUPPORT: Patient reports living on \"the streets\", per CM patient reported to be homeless. Occupational Therapy Goals  Initiated 5/30/2023; continue goals 6/7/23  1. Patient will perform lower body dressing with [place] within 7 day(s). 2.  Patient will perform bathing with [place] within 7 day(s). 3.  Patient will perform upper body dressing with [place] within 7 day(s). 4.  Patient will perform toilet transfers with Modified [place] within 7 day(s). 5.  Patient will perform all aspects of toileting with [place] within 7 day(s). 6.  Patient will participate in upper extremity therapeutic exercise/activities with [place] within 7 day(s). 7.  Patient will utilize energy conservation techniques during functional activities with verbal, visual, and tactile cues within 7 day(s). 8.  Patient will participate in/improve their [place] score by 5 points in prep for ADLs within 7 days. Outcome: Progressing   OCCUPATIONAL THERAPY TREATMENT: WEEKLY REASSESSMENT  Patient: Sage Banks (32 y.o. 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 ASSESSMENT  Patient continues to benefit from skilled OT services and is progressing towards goals.  Per chart patients MRI which showed \"a focal area of vascularity along the medial and superior margin of the left basilar ganglia hematoma, with
distress sitting up in chair, Call bell within reach, and Bed/ chair alarm activated    COMMUNICATION/EDUCATION:   The patient's plan of care was discussed with: occupational therapist and registered nurse    Patient Education  Education Given To: Patient  Education Provided: Role of Therapy;Plan of Care;Family Education; Fall Prevention Strategies; Home Exercise Program  Education Method: Verbal  Barriers to Learning: Cognition  Education Outcome: Continued education needed    Thank you for this referral.  Yuriy Wells PT, DPT  Minutes: 25

## 2023-06-08 NOTE — DISCHARGE INSTRUCTIONS
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 Please follow-up with neurosurgery. Stop Eliquis and all blood thinners until seeing neurosurgery. Can follow-up with [facility] as well.

## 2023-06-14 NOTE — PROGRESS NOTES
0550 - Pt refusing morning labs draw.
0730: Bedside shift change report given to Lupe Zelaya RN (oncoming nurse) by Eddie Neely RN (offgoing nurse). Report included the following information Nurse Handoff Report, Intake/Output, MAR, and Recent Results. 0800: Pt refusing assessment, vitals, and all meds including maintenance fluids. He is not cooperating in answering orientation questions. He is moving all extremities and speech is slurred. KRISTINA Grullon and Dr. Carlos Paige notified. 0900: Attempted assessment but pt threw his call perez at me when I approached his bed. 1000: Refused assessment. States he will leave AMA if he does not get food. Pt refusing to answer orientation questions. KRISTINA Grullon notified. Order received for diet and to change neuro checks to q4hrs. 1200: Pt more cooperative. Allowed full set of vitals to be taken and for more thorough neuro exam- see flowsheets. 1600: Report given to Cory Jean RN on NSTU.
[phone] called to give report, was asked to leave my name and number for RN to call back    Never received return call. Did review discharge instructions with patient, especially the follow-up with neurosurgery. Provided him with a copy of his discharge paperwork. Pt was transported via [facility] to [facility] with all his belongings in hand.
NUTRITION  Reason for Assessment: LOS      Recommendations/Interventions/Plan:     Add bowel regimen (last BM 6/1)    Will rescreen per policy     No past medical history on file. Pt screened for LOS. Chart/labs/meds reviewed. Admitted with Intraparenchymal hemorrhage of brain ([facility] [address] [I61.9] S/P diagnostic cerebral angiogram yesterday. Plan to transfer out of ICU this afternoon. Spoke with patient who reports a good appetite-visualized lunch tray-consumed 100%. Patient without any requests/complaints about meals. Unclear weight history PTA; within IBW range when adjusted for right BKA. Continue with Regular diet at this time; nutritional intervention not indicated. Nutrition Related Findings:   Edema: None                      Last BM: 06/01/23          Current Nutrition Therapies:  Diet: Regular  Supplements: None  Meal Intake:   Lunch 6/7: 100%      Weight Hx:  Wt Readings from Last 10 Encounters:   06/05/23 61.7 kg (136 lb 1.6 oz)   06/01/23 90.7 kg (200 lb)   05/29/23 90.7 kg (200 lb)         Estimated Nutrition Needs:   Energy Requirements Based On: Kcal/kg  Weight Used for Energy Requirements: Current (61.7 kg)  Energy (kcal/day): 1850 (30 kcal/kg)  Weight Used for Protein Requirements: Current  Protein (g/day): 62 (1g/kg)  Method Used for Fluid Requirements: 1 ml/kcal  Fluid (ml/day):          Recent Labs     06/05/23  0652   GLUCOSE 90   BUN 20   CREATININE 0.72      K 4.4   *   CO2 26   CALCIUM 9.6   PHOS 4.1   MG 2.2       No results for input(s): POCGLU in the last 72 hours.     Lab Results   Component Value Date/Time    LABA1C 4.4 05/30/2023 04:38 AM    EAG 80 05/30/2023 04:38 AM           BRUNO Pereira  Available via [employer]
Neuro Critical Care Brief Progress Note    Subjective:  Patient seen resting comfortably after procedure. Right radial site is C/D/I and patient denies any discomfort. TR band in place, no bleeding or hematoma noted.               Signed By: ZULAY Julian - KRISTINA, Neurocritical Care Nurse Practitioner    June 6, 2023
Neurocritical Care Brief Progress Note:  70-year-old male with  ICH in insula of left temporal lobe. 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 Physical Exam:  Gen: NAD, calm, cooperative  Neuro: A and oriented to person. Incoherent speech. Follows commands. Speech slurred. Affect normal. PERRL, 3 mm bilaterally. Blinks to threat. No disconjugate gaze present. EOMI. Face symmetric. Palate symmetric. Tongue midline. Petra spontaneously. No drift. R BKA, left toe amputations. Negative drift. Bulk and tone normal. No involuntary movements. Gait deferred. Skin: Warm, dry, color appropriate for ethnicity. Right wrist arteriotomy site soft and non-tender on palpation, dressing is C/D/I, with no active bleeding or drainage noted.      PLAN:   -Post NIS procedure neuro/vascular checks and VS  -ICU overnight    ZULAY Reaves - NP  Neurocritical Care Nurse Practitioner  [phone]
Occupational Therapy Note:  Chart reviewed and discussed with RN. Pt currently NEERAJ in angio for cerebral angiogram and possible AVM embolectomy and will transfer to ICU after procedure. Will hold and follow up for OT re-eval as able and medically appropriate.   Laura Bhagat, OTR/L, CBIS
Physical Therapy: Hold    Chart reviewed and discussed with RN. Pt currently NEERAJ in angio and will transfer to ICU after procedure. Will hold and follow up for PT re-eval as able and medically appropriate.       Regina Steele, PT, DPT
Physician Progress Note      Esha Mercedes  CSN #:                  [id number]  :                       1955  ADMIT DATE:       2023 10:13 PM  [address] [place] DATE:  RESPONDING  PROVIDER #:        Leah Sy MD          QUERY TEXT:    Patient admitted with Intraparenchymal hemorrhage. Documentation reflects Left   MCA ischemic CVA in Neurology consultant note on 23. If possible, please   document in the progress notes and discharge summary if left MCA ischemic CVA   was: The medical record reflects the following:  Risk Factors: h/o smoking, drug and alcohol use, COPD, MS    Clinical Indicators:   Neurology Consultant  [facility] with left MCA ischemic CVA with medial temporal IPH with extension to left   BG with mass effect and effacement of left lateral vent, 7mm shift. Suspected large right MCA ischemic CVA with hemorrhagic conversion. Treatment: ICU and NSTU level of care, Neurology consult, Neuro IR consult,   Neurosurgery consult, Brain imaging and angiography, VS and neuro checks per   unit routines, Apresoline and Labetolol PRN IV, Elevate HOB, PT/OT consults,   Seizure precautions, Stroke education    Thank you,  HANH DobsonN, RN, CCRN-K, CRCR  [place]: [phone]  I am also available via . Options provided:  -- Left MCA ischemic CVA confirmed after study  -- Left MCA ischemic CVA ruled out after study  -- Other - I will add my own diagnosis  -- Disagree - Not applicable / Not valid  -- Disagree - Clinically unable to determine / Unknown  -- Refer to Clinical Documentation Reviewer    PROVIDER RESPONSE TEXT:    Provider disagreed with this query. Query created by: Alejandra Kiran on 2023 11:40 AM      QUERY TEXT:    Pt admitted with Intraparenchymal hemorrhage. Pt noted to have mass effect   and effacement of left lateral vent, 7mm shift.   If clinically significant,   please document in progress notes and discharge summary if you are
Physician Progress Note      Lubna Argueta  [facility] #:                  [id number]  :                       1955  ADMIT DATE:       2023 10:13 PM  [address] [place] DATE:        2023 4:03 PM  RESPONDING  PROVIDER #:        Marleen Chatman MD          QUERY TEXT:    Patient admitted with Intraparenchymal hemorrhage. Documentation reflects Left   MCA ischemic CVA in Neurology consultant note on 23. If possible, please   document in the progress notes and discharge summary if left MCA ischemic CVA   was: The medical record reflects the following:  Risk Factors: h/o smoking, drug and alcohol use, COPD, MS    Clinical Indicators:   Neurology Consultant  [facility] with left MCA ischemic CVA with medial temporal IPH with extension to left   BG with mass effect and effacement of left lateral vent, 7mm shift. Suspected large right MCA ischemic CVA with hemorrhagic conversion. Treatment: ICU and NSTU level of care, Neurology consult, Neuro IR consult,   Neurosurgery consult, Brain imaging and angiography, VS and neuro checks per   unit routines, Apresoline and Labetolol PRN IV, Elevate HOB, PT/OT consults,   Seizure precautions, Stroke education    Thank you,  Nurys Lai, HANHN, RN, CCRN-K, CRCR  [place]: [phone]  I am also available via . Options provided:  -- Left MCA ischemic CVA confirmed after study  -- Left MCA ischemic CVA ruled out after study  -- Other - I will add my own diagnosis  -- Disagree - Not applicable / Not valid  -- Disagree - Clinically unable to determine / Unknown  -- Refer to Clinical Documentation Reviewer    PROVIDER RESPONSE TEXT:    Left MCA ischemic CVA ruled out after study. Query created by: Flavio Foley on 2023 8:13 AM      QUERY TEXT:    Pt admitted with Intraparenchymal hemorrhage. Pt noted to have mass effect   and effacement of left lateral vent, 7mm shift.   If clinically significant,   please document in progress notes and discharge
Transition of Care Plan:    IPR - [facility] - auth received, discharge when cleared by IR    Transport: [facility]    Patient is homeless. RUR: 6%  Prior Level of Functioning: Independent - [place]  Disposition: IPR with transition to LTC   If SNF or IPR: Date FOC offered: 6/1  Date FOC received: 6/1  Accepting facility: [facility]  Date authorization started with reference number: 6/2  Date authorization received and expires: 6/5  Follow up appointments: PCP, neuro   DME needed: Owns manual   Transportation at discharge: [facility]  Caregiver Contact: None  Barriers to discharge:   Radha Mendez 1420 consulted    Pt has received insurance auth for [facility] IPR, they have bed available today. 1140: Holding discharge today per IR. EUNICE Dowling.
Transition of Care Plan:    IPR - [facility] - auth received, DC today    RN to call report to [phone]    Transport: [facility] 1500    Patient is homeless. RUR: 6%  Prior Level of Functioning: Independent - [place]  Disposition: IPR with transition to LTC   If SNF or IPR: Date FOC offered: 6/1  Date FOC received: 6/1  Accepting facility: [facility]  Date authorization started with reference number: 6/2  Date authorization received and expires: 6/5  Follow up appointments: PCP, neuro   DME needed: Owns manual   Transportation at discharge:   Caregiver Contact: None  Barriers to discharge: None    Pt case discussed in IDRs with Dr. Michell Julio, plan for patient to follow up OP with Neuro IR. [facility] is able to accept patient this afternoon. CM discussed discharge plan with patient and he is in agreement to discharge to IPR at [facility] today. CM unable to reach Access to Care via [facility] Medicaid for [facility] transport. Hospital to Home [facility] transport scheduled for 1500, hospital to cover cost $70.35. Elyn Shack) Linton Epley, M.S.W.
Allergen Reactions    Penicillins Anaphylaxis    Methadone Other (See Comments)      Social History     Tobacco Use    Smoking status: Every Day     Types: Cigarettes    Smokeless tobacco: Never   Substance Use Topics    Alcohol use: Yes      No family history on file. Review of Systems:     Review of systems not obtained due to patient factors. Objective:   Vital Signs:  BP (!) 98/45   Pulse 55   Temp 97 °F (36.1 °C) (Oral)   Resp 20   Ht 1.651 m (5' 5\")   Wt 61.7 kg (136 lb 1.6 oz)   SpO2 95%   BMI 22.65 kg/m²      Temp (24hrs), Av.3 °F (36.3 °C), Min:97 °F (36.1 °C), Max:97.7 °F (36.5 °C)           Intake/Output:     Intake/Output Summary (Last 24 hours) at 2023 0714  Last data filed at 2023 0400  Gross per 24 hour   Intake 500 ml   Output 750 ml   Net -250 ml       Physical Exam:    Gen pleasant, attempting to speak  Lungs diminished  CV RRR  Abd NT, ND  Ext No edema, no rashes    LABS AND  DATA: Personally reviewed  Recent Labs     23  0715 23  0652   WBC 4.1 4.0*   HGB 15.0 15.9   HCT 47.5 50.0   PLT 71* 72*     Recent Labs     23  0715 23  0652    141   K 4.2 4.4    110*   CO2 26 26   BUN 14 20   MG 2.0 2.2   PHOS 3.1 4.1     No results for input(s): TP, ALB, GLOB, AML in the last 72 hours. Invalid input(s): SGOT, GPT, AP, TBIL, AMYP, LPSE  No results for input(s): INR, APTT in the last 72 hours. Invalid input(s): PTP   Invalid input(s): PHI, PCO2I, PO2I, FIO2I  No results for input(s): CPK, CKMB in the last 72 hours. Invalid input(s): Teresa Saucedo    [facility]: Reviewed        CRITICAL CARE CONSULTANT NOTE  I had a face to face encounter with the patient, reviewed and interpreted patient data including clinical events, labs, images, vital signs, I/O's, and examined patient.   I have discussed the case and the plan and management of the patient's care with the consulting services, the bedside nurses and the respiratory therapist.      NOTE OF
APRN - NP        acetaminophen (TYLENOL) tablet 650 mg  650 mg Oral Q6H PRN Prentis Maidens, APRN - NP        Or    acetaminophen (TYLENOL) suppository 650 mg  650 mg Rectal Q6H PRN Prentis Maidens, APRN - NP        thiamine tablet 100 mg  100 mg Oral Daily Prentis Maidens, APRN - NP   100 mg at 23 0848    multivitamin 1 tablet  1 tablet Oral Daily Prentis Maidens, APRN - NP   1 tablet at  3615    folic acid (FOLVITE) tablet 1 mg  1 mg Oral Daily Prentis Maidens, APRN - NP   1 mg at 23 0848     Allergies   Allergen Reactions    Penicillins Anaphylaxis    Methadone Other (See Comments)     Review of Systems:  Review of systems not obtained due to patient factors. /AMS and agitation    Objective:     Vital signs  Temp (24hrs), Av.2 °F (36.2 °C), Min:97 °F (36.1 °C), Max:97.7 °F (36.5 °C)   No intake/output data recorded.  1901 -  0700  In: 500 [I.V.:500]  Out: 750 [Urine:750]    BP (!) 98/45   Pulse 55   Temp 97 °F (36.1 °C) (Oral)   Resp 20   Ht 5' 5\" (1.651 m)   Wt 136 lb 1.6 oz (61.7 kg)   SpO2 95%   BMI 22.65 kg/m²            Intake/Output Summary (Last 24 hours) at 2023 1026  Last data filed at 2023 0400  Gross per 24 hour   Intake 500 ml   Output 750 ml   Net -250 ml        Physical Exam:  GENERAL: Calm, cooperative, NAD  SKIN: Warm, dry, color appropriate for ethnicity. Neurologic Exam:  Mental Status:               Alert and oriented to person and place. Stated incorrect year, month, situation and incorrect age. Speech/Language:                    Speech is slow and slurred, word finding difficulty noted. Difficult to understand speech. Follows commands. Cranial Nerves:             Pupils 3 mm, equal, round and briskly reactive to light. Visual fields full to confrontation. Extraocular movements intact. Facial sensation intact. Full facial strength, no asymmetry. Hearing grossly intact bilaterally.   Tongue protrudes to midline, palate elevates
Speech is slow and slurred, word finding difficulty noted. Follows commands. Cranial Nerves:             Pupils 3 mm, equal, round and briskly reactive to light. Visual fields full to confrontation. Extraocular movements intact. Facial sensation intact. Full facial strength, no asymmetry. Hearing grossly intact bilaterally. Tongue protrudes to midline, palate elevates symmetrically. Shoulder shrug 5/5 bilaterally. Motor:                            Right arm drift noted, 3/5 strength. 5/5 power left upper and lower. (Right BKA, left toe amputations). Bulk and tone normal.   No involuntary movements. Sensation:                     Sensation diminished in right arm/upper leg. Coordination:   FTN intact with no ataxia present, not able to perform heel to shin due to right BKA. Gait:       Deferred. 24 hour results:  No results found for this or any previous visit (from the past 12 hour(s)). Imaging (personally reviewed by myself):  CT 5/29/23 head showed left basal ganglia ICH measuring 2.3 x 4.4 x 1.6 cm with 7 mm shift  CT 5/30/23 stable  MRA concerning for AVM  CTA showed superomedial left basal ganglia high flow vascular malformation with a nidus measuring 0.6 x 0.9 cm along the superomedial margin of the ICH with possible pseudoaneurysm. I have spent 30 minutes of time involved in lab review, imaging review, consultations with specialists and attendings, family discussion/decision making and documentation.      Signed By: ZULAY Faria NP, Neurocritical Care Nurse Practitioner    June 6, 2023
the extra-axial space as above. Regional mass effect and left-to-right midline shift also similar to prior CT examinations. No additional areas of abnormal hemosiderin deposition. 2. Focal increased vascularity along the medial and superior margin of the left basal ganglia hemorrhage as above, could represent small vascular malformation or developmental venous anomaly. CTA or conventional angiography suggested. .       I have independently reviewed and interpreted patient's lab and all other diagnostic data    Notes reviewed from all clinical/nonclinical/nursing services involved in patient's clinical care. Care coordination discussions were held with appropriate clinical/nonclinical/ nursing providers based on care coordination needs. Labs:     Recent Labs     06/04/23 0715 06/05/23 0652   WBC 4.1 4.0*   HGB 15.0 15.9   HCT 47.5 50.0   PLT 71* 72*       Recent Labs     06/03/23  0742 06/04/23 0715 06/05/23 0652    140 141   K 4.3 4.2 4.4   * 107 110*   CO2 29 26 26   BUN 13 14 20   MG 2.1 2.0 2.2   PHOS 2.8 3.1 4.1       No results for input(s): ALT, TP, ALB, GLOB, GGT, AML in the last 72 hours. Invalid input(s): SGOT, GPT, AP, TBIL, TBILI, AMYP, LPSE, HLPSE  No results for input(s): INR, APTT in the last 72 hours. Invalid input(s): PTP     No results for input(s): TIBC, FERR in the last 72 hours. Invalid input(s): FE, PSAT   No results found for: FOL, RBCF   No results for input(s): PH, PCO2, PO2 in the last 72 hours. No results for input(s): CPK in the last 72 hours.     Invalid input(s): CPKMB, CKNDX, TROIQ  Lab Results   Component Value Date/Time    CHOL 127 06/02/2023 07:40 AM    HDL 48 06/02/2023 07:40 AM     No results found for: GLUCPOC        Medications Reviewed:     Current Facility-Administered Medications   Medication Dose Route Frequency    ipratropium 0.5 mg-albuterol 2.5 mg (DUONEB) nebulizer solution 1 Dose  1 Dose Inhalation Q4H PRN    LORazepam (ATIVAN) tablet 0.5 mg
LORazepam (ATIVAN) tablet 0.5 mg  0.5 mg Oral Q1H PRN    Or    LORazepam (ATIVAN) injection 0.5 mg  0.5 mg IntraVENous Q1H PRN    Or    LORazepam (ATIVAN) tablet 1 mg  1 mg Oral Q1H PRN    Or    LORazepam (ATIVAN) injection 1 mg  1 mg IntraVENous Q1H PRN    ondansetron (ZOFRAN-ODT) disintegrating tablet 4 mg  4 mg Oral Q8H PRN    Or    ondansetron (ZOFRAN) injection 4 mg  4 mg IntraVENous Q6H PRN    polyethylene glycol (GLYCOLAX) packet 17 g  17 g Oral Daily PRN    sodium chloride flush 0.9 % injection 5-40 mL  5-40 mL IntraVENous 2 times per day    0.9 % sodium chloride infusion   IntraVENous PRN    hydrALAZINE (APRESOLINE) injection 10 mg  10 mg IntraVENous Q4H PRN    sodium chloride flush 0.9 % injection 5-40 mL  5-40 mL IntraVENous 2 times per day    sodium chloride flush 0.9 % injection 5-40 mL  5-40 mL IntraVENous PRN    0.9 % sodium chloride infusion   IntraVENous PRN    acetaminophen (TYLENOL) tablet 650 mg  650 mg Oral Q6H PRN    Or    acetaminophen (TYLENOL) suppository 650 mg  650 mg Rectal Q6H PRN    labetalol (NORMODYNE;TRANDATE) injection 20 mg  20 mg IntraVENous Q4H PRN    thiamine tablet 100 mg  100 mg Oral Daily    multivitamin 1 tablet  1 tablet Oral Daily    folic acid (FOLVITE) tablet 1 mg  1 mg Oral Daily    ipratropium 0.5 mg-albuterol 2.5 mg (DUONEB) nebulizer solution 1 Dose  1 Dose Inhalation Q4H PRN     ______________________________________________________________________  EXPECTED LENGTH OF STAY: Unable to retrieve estimated LOS  ACTUAL LENGTH OF STAY:          8                 Mariely Jimenez MD

## 2023-06-15 NOTE — ADT AUTH CERT
6/7  by Patricia Henderson       Review Status Review Entered   In Primary 6/14/2023 1127       Created By   Patricia Henderson      Criteria Review   DATE: IP TELEMETRY 6/7     PERTINENT UPDATES:  -No neuro events overnight but has been very agitated and refusing assessments and meds. Reportedly, pt attempting to leave AMA, per Neurointerventional Surgery     VITALS:  T: 97.8 °F (36.6 °C)  RR: 18  HR: 77  BP: 112/49, 97/46, 119/52, 104/44, 98/45, 110/46, 109/95  SpO2: 95% on RA        PHYSICAL EXAM:  Gen pleasant, attempting to speak  Lungs diminished  CV RRR  Abd NT, ND  Ext No edema, no rashes        CRITICAL CARE NOTES  ICH s/p DSA     1. Neurological System  -Q 1 h neuro checks  -ICU monitoring  -BP control     2. Cardiovascular System  -No gtt  -SBP Goal of: < 140 mmHg  -MAP Goal of: 65-85 mmHg  -Transfusion Trigger (Hgb): <7 g/dL  -Keep K > 4; Mg > 2      3. Respiratory System     Pulmonary toilet:  per unit protocol    SpO2 Goal: > 92%     4. 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, s/p diagnostic cerebral angiogram  -continue neuro check and BP monitoring  -PT/OT  -NIS and Neurology on board      Hx of CVA in 2001  -stable  -lipid panel normal, A1c 4.4  -not on ASA due to IPH  -PT/OT     Hx of COPD  -not bronchospastic   -SpO2 95-97% on RA  -prn duo neb, monitor pulse ox      Liver cirrhosis due to chronic hep C  -treated and cured Hep C one and half years ago     Right BKA  -wheelchair bound at base line     Tobacco abuse   -counseled to stop smoking      Alcohol abuse   -continue folic acid, thiamin and mvi  -counseled to stop alcohol        MEDICATIONS:  Folvite 1 mg

## 2023-06-17 ENCOUNTER — HOSPITAL ENCOUNTER (EMERGENCY)
Facility: HOSPITAL | Age: 68
Discharge: ANOTHER HOSPITAL | End: 2023-06-18
Attending: EMERGENCY MEDICINE
Payer: MEDICAID

## 2023-06-17 ENCOUNTER — APPOINTMENT (OUTPATIENT)
Facility: HOSPITAL | Age: 68
End: 2023-06-17
Payer: MEDICAID

## 2023-06-17 DIAGNOSIS — G93.40 ENCEPHALOPATHY: Primary | ICD-10-CM

## 2023-06-17 LAB
ALBUMIN SERPL-MCNC: 4.2 G/DL (ref 3.5–5)
ALBUMIN/GLOB SERPL: 1.1 (ref 1.1–2.2)
ALP SERPL-CCNC: 105 U/L (ref 45–117)
ALT SERPL-CCNC: 41 U/L (ref 12–78)
AMMONIA PLAS-SCNC: <10 UMOL/L
AMPHET UR QL SCN: NEGATIVE
ANION GAP SERPL CALC-SCNC: 9 MMOL/L (ref 5–15)
APPEARANCE UR: CLEAR
AST SERPL-CCNC: 48 U/L (ref 15–37)
BACTERIA URNS QL MICRO: NEGATIVE /HPF
BARBITURATES UR QL SCN: NEGATIVE
BASE EXCESS BLDV CALC-SCNC: 2.3 MMOL/L
BASOPHILS # BLD: 0.1 K/UL (ref 0–0.1)
BASOPHILS NFR BLD: 2 % (ref 0–1)
BDY SITE: ABNORMAL
BENZODIAZ UR QL: NEGATIVE
BILIRUB SERPL-MCNC: 0.6 MG/DL (ref 0.2–1)
BILIRUB UR QL: NEGATIVE
BREATHS.SPONTANEOUS ON VENT: 18
BUN SERPL-MCNC: 10 MG/DL (ref 6–20)
BUN/CREAT SERPL: 14 (ref 12–20)
CALCIUM SERPL-MCNC: 9.4 MG/DL (ref 8.5–10.1)
CANNABINOIDS UR QL SCN: POSITIVE
CHLORIDE SERPL-SCNC: 102 MMOL/L (ref 97–108)
CO2 SERPL-SCNC: 28 MMOL/L (ref 21–32)
COCAINE UR QL SCN: NEGATIVE
COLOR UR: ABNORMAL
CREAT SERPL-MCNC: 0.72 MG/DL (ref 0.7–1.3)
DIFFERENTIAL METHOD BLD: ABNORMAL
EOSINOPHIL # BLD: 0.1 K/UL (ref 0–0.4)
EOSINOPHIL NFR BLD: 2 % (ref 0–7)
EPITH CASTS URNS QL MICRO: ABNORMAL /LPF
ERYTHROCYTE [DISTWIDTH] IN BLOOD BY AUTOMATED COUNT: 12.4 % (ref 11.5–14.5)
ETHANOL SERPL-MCNC: <10 MG/DL (ref 0–0.08)
GLOBULIN SER CALC-MCNC: 4 G/DL (ref 2–4)
GLUCOSE BLD STRIP.AUTO-MCNC: 98 MG/DL (ref 65–117)
GLUCOSE SERPL-MCNC: 92 MG/DL (ref 65–100)
GLUCOSE UR STRIP.AUTO-MCNC: NEGATIVE MG/DL
HCO3 BLDV-SCNC: 27 MMOL/L (ref 23–28)
HCT VFR BLD AUTO: 51.5 % (ref 36.6–50.3)
HGB BLD-MCNC: 16.8 G/DL (ref 12.1–17)
HGB UR QL STRIP: NEGATIVE
IMM GRANULOCYTES # BLD AUTO: 0 K/UL (ref 0–0.04)
IMM GRANULOCYTES NFR BLD AUTO: 0 % (ref 0–0.5)
KETONES UR QL STRIP.AUTO: ABNORMAL MG/DL
LEUKOCYTE ESTERASE UR QL STRIP.AUTO: NEGATIVE
LYMPHOCYTES # BLD: 0.9 K/UL (ref 0.8–3.5)
LYMPHOCYTES NFR BLD: 14 % (ref 12–49)
Lab: ABNORMAL
MCH RBC QN AUTO: 29.9 PG (ref 26–34)
MCHC RBC AUTO-ENTMCNC: 32.6 G/DL (ref 30–36.5)
MCV RBC AUTO: 91.6 FL (ref 80–99)
METHADONE UR QL: NEGATIVE
MONOCYTES # BLD: 0.3 K/UL (ref 0–1)
MONOCYTES NFR BLD: 5 % (ref 5–13)
NEUTS SEG # BLD: 4.9 K/UL (ref 1.8–8)
NEUTS SEG NFR BLD: 77 % (ref 32–75)
NITRITE UR QL STRIP.AUTO: NEGATIVE
NRBC # BLD: 0 K/UL (ref 0–0.01)
NRBC BLD-RTO: 0 PER 100 WBC
OPIATES UR QL: NEGATIVE
PCO2 BLDV: 44 MMHG (ref 41–51)
PCP UR QL: NEGATIVE
PH BLDV: 7.41 (ref 7.32–7.42)
PH UR STRIP: 5.5 (ref 5–8)
PLATELET # BLD AUTO: 101 K/UL (ref 150–400)
PMV BLD AUTO: 11.8 FL (ref 8.9–12.9)
PO2 BLDV: 48 MMHG (ref 25–40)
POTASSIUM SERPL-SCNC: 4.2 MMOL/L (ref 3.5–5.1)
PROT SERPL-MCNC: 8.2 G/DL (ref 6.4–8.2)
PROT UR STRIP-MCNC: NEGATIVE MG/DL
RBC # BLD AUTO: 5.62 M/UL (ref 4.1–5.7)
RBC #/AREA URNS HPF: ABNORMAL /HPF (ref 0–5)
SAO2 % BLDV: 84 % (ref 65–88)
SAO2% DEVICE SAO2% SENSOR NAME: ABNORMAL
SERVICE CMNT-IMP: NORMAL
SODIUM SERPL-SCNC: 139 MMOL/L (ref 136–145)
SP GR UR REFRACTOMETRY: 1.01
SPECIMEN SITE: ABNORMAL
TROPONIN I SERPL HS-MCNC: 6 NG/L (ref 0–76)
URINE CULTURE IF INDICATED: ABNORMAL
UROBILINOGEN UR QL STRIP.AUTO: 1 EU/DL (ref 0.2–1)
WBC # BLD AUTO: 6.4 K/UL (ref 4.1–11.1)
WBC URNS QL MICRO: ABNORMAL /HPF (ref 0–4)

## 2023-06-17 PROCEDURE — 36415 COLL VENOUS BLD VENIPUNCTURE: CPT

## 2023-06-17 PROCEDURE — 70450 CT HEAD/BRAIN W/O DYE: CPT

## 2023-06-17 PROCEDURE — 96374 THER/PROPH/DIAG INJ IV PUSH: CPT

## 2023-06-17 PROCEDURE — 85025 COMPLETE CBC W/AUTO DIFF WBC: CPT

## 2023-06-17 PROCEDURE — 82077 ASSAY SPEC XCP UR&BREATH IA: CPT

## 2023-06-17 PROCEDURE — 6370000000 HC RX 637 (ALT 250 FOR IP): Performed by: EMERGENCY MEDICINE

## 2023-06-17 PROCEDURE — 80053 COMPREHEN METABOLIC PANEL: CPT

## 2023-06-17 PROCEDURE — 6360000004 HC RX CONTRAST MEDICATION: Performed by: EMERGENCY MEDICINE

## 2023-06-17 PROCEDURE — 80307 DRUG TEST PRSMV CHEM ANLYZR: CPT

## 2023-06-17 PROCEDURE — 99285 EMERGENCY DEPT VISIT HI MDM: CPT

## 2023-06-17 PROCEDURE — 94640 AIRWAY INHALATION TREATMENT: CPT

## 2023-06-17 PROCEDURE — 81001 URINALYSIS AUTO W/SCOPE: CPT

## 2023-06-17 PROCEDURE — 82962 GLUCOSE BLOOD TEST: CPT

## 2023-06-17 PROCEDURE — 82140 ASSAY OF AMMONIA: CPT

## 2023-06-17 PROCEDURE — 6360000002 HC RX W HCPCS: Performed by: EMERGENCY MEDICINE

## 2023-06-17 PROCEDURE — 71045 X-RAY EXAM CHEST 1 VIEW: CPT

## 2023-06-17 PROCEDURE — 74177 CT ABD & PELVIS W/CONTRAST: CPT

## 2023-06-17 PROCEDURE — 82803 BLOOD GASES ANY COMBINATION: CPT

## 2023-06-17 PROCEDURE — 84484 ASSAY OF TROPONIN QUANT: CPT

## 2023-06-17 PROCEDURE — 73030 X-RAY EXAM OF SHOULDER: CPT

## 2023-06-17 RX ORDER — DEXAMETHASONE SODIUM PHOSPHATE 10 MG/ML
8 INJECTION, SOLUTION INTRAMUSCULAR; INTRAVENOUS ONCE
Status: COMPLETED | OUTPATIENT
Start: 2023-06-17 | End: 2023-06-17

## 2023-06-17 RX ORDER — NYSTATIN 100000 U/G
CREAM TOPICAL ONCE
Status: COMPLETED | OUTPATIENT
Start: 2023-06-17 | End: 2023-06-17

## 2023-06-17 RX ORDER — MORPHINE SULFATE 2 MG/ML
2 INJECTION, SOLUTION INTRAMUSCULAR; INTRAVENOUS
Status: COMPLETED | OUTPATIENT
Start: 2023-06-17 | End: 2023-06-17

## 2023-06-17 RX ORDER — IPRATROPIUM BROMIDE AND ALBUTEROL SULFATE 2.5; .5 MG/3ML; MG/3ML
1 SOLUTION RESPIRATORY (INHALATION)
Status: COMPLETED | OUTPATIENT
Start: 2023-06-17 | End: 2023-06-17

## 2023-06-17 RX ADMIN — IPRATROPIUM BROMIDE AND ALBUTEROL SULFATE 1 DOSE: .5; 3 SOLUTION RESPIRATORY (INHALATION) at 16:07

## 2023-06-17 RX ADMIN — NYSTATIN: 100000 CREAM TOPICAL at 12:13

## 2023-06-17 RX ADMIN — IOPAMIDOL 100 ML: 755 INJECTION, SOLUTION INTRAVENOUS at 14:19

## 2023-06-17 RX ADMIN — MORPHINE SULFATE 2 MG: 2 INJECTION, SOLUTION INTRAMUSCULAR; INTRAVENOUS at 11:52

## 2023-06-17 RX ADMIN — DEXAMETHASONE SODIUM PHOSPHATE 8 MG: 10 INJECTION, SOLUTION INTRAMUSCULAR; INTRAVENOUS at 16:15

## 2023-06-17 ASSESSMENT — PAIN DESCRIPTION - DESCRIPTORS: DESCRIPTORS: OTHER (COMMENT)

## 2023-06-17 ASSESSMENT — PAIN DESCRIPTION - LOCATION: LOCATION: HIP

## 2023-06-17 ASSESSMENT — PAIN SCALES - GENERAL
PAINLEVEL_OUTOF10: 7
PAINLEVEL_OUTOF10: 6

## 2023-06-17 ASSESSMENT — PAIN - FUNCTIONAL ASSESSMENT: PAIN_FUNCTIONAL_ASSESSMENT: 0-10

## 2023-06-17 ASSESSMENT — PAIN DESCRIPTION - ORIENTATION: ORIENTATION: LEFT

## 2023-06-17 NOTE — ED NOTES
Attempt to call report to receiving unit, [facility] room 662. Report that they are currently in shift change report and no one able to take report at this time. Receiving unit reports that they may try to call back in about 20 minutes.              [facility]  06/17/23 1955

## 2023-06-17 NOTE — ED NOTES
CT came to get patient but he needs to be cleaned up again. Patient just received morphine for pain waiting on it to take affect.       Corin Greenberg RN  06/17/23 1026

## 2023-06-17 NOTE — ED PROVIDER NOTES
me.  Thiago Padron MD     [HW]      ED Course User Index  [HW] Thiago Padron MD  [WB] Kim Tinsley MD       FINAL IMPRESSION     1. Encephalopathy          DISPOSITION/PLAN   Rufina Reece's  results have been reviewed with him. He has been counseled regarding his diagnosis, treatment, and plan. He verbally conveys understanding and agreement of the signs, symptoms, diagnosis, treatment and prognosis and additionally agrees to follow up as discussed. He also agrees with the care-plan and conveys that all of his questions have been answered. I have also provided discharge instructions for him that include: educational information regarding their diagnosis and treatment, and list of reasons why they would want to return to the ED prior to their follow-up appointment, should his condition change. CLINICAL IMPRESSION    Transfer: The patient is being transferred to [facility] for admission. The results of their tests and reasons for their transfer have been discussed with the patient and/or available family. The patient/family has conveyed agreement and understanding for the need to be admitted and for their admission diagnosis. Consultation has been made with Dr Jack Miller, who agrees to accept the transfer. I am the Primary Clinician of Record. Kim Tinsley MD (electronically signed)    (Please note that parts of this dictation were completed with voice recognition software. Quite often unanticipated grammatical, syntax, homophones, and other interpretive errors are inadvertently transcribed by the computer software. Please disregards these errors.  Please excuse any errors that have escaped final proofreading.)         Kim Tinsley MD  06/18/23 5816

## 2023-06-17 NOTE — PROGRESS NOTES
Venous blood gas:  PH- 7.412  Pco2- 44  Po2- 48  HCO3- 27  BE ; 2.3  O2 sat- 83.8    Drawn @1610 By nurse

## 2023-06-17 NOTE — ED TRIAGE NOTES
Patient arrives via EMS with c/o left side pain s/p fall. EMS reports that patient fell from wheelchair, patient states \"I didn't fall, I just kind of slid down. \"  Patient with stool on his body, taken to shower decon room directly from lobby, bypassing vital signs and further assessment. Emergency Department Nursing Plan of Care       The Nursing Plan of Care is developed from the Nursing assessment and Emergency Department Attending provider initial evaluation. The plan of care may be reviewed in the ED Provider note.       The Plan of Care was developed with the following considerations:  Patient / Family readiness to learn indicated by:verbalized understanding  Persons(s) to be included in education: patient  Barriers to Learning/Limitations:None      Signed     Adilene Hansen RN    6/17/2023   10:32 AM

## 2023-06-18 ENCOUNTER — APPOINTMENT (OUTPATIENT)
Facility: HOSPITAL | Age: 68
DRG: 140 | End: 2023-06-18
Attending: INTERNAL MEDICINE
Payer: MEDICAID

## 2023-06-18 ENCOUNTER — HOSPITAL ENCOUNTER (INPATIENT)
Facility: HOSPITAL | Age: 68
LOS: 1 days | Discharge: HOME | DRG: 140 | End: 2023-06-19
Attending: INTERNAL MEDICINE | Admitting: STUDENT IN AN ORGANIZED HEALTH CARE EDUCATION/TRAINING PROGRAM
Payer: MEDICAID

## 2023-06-18 VITALS
HEART RATE: 56 BPM | RESPIRATION RATE: 19 BRPM | OXYGEN SATURATION: 98 % | SYSTOLIC BLOOD PRESSURE: 136 MMHG | TEMPERATURE: 98.4 F | HEIGHT: 69 IN | WEIGHT: 146.3 LBS | BODY MASS INDEX: 21.67 KG/M2 | DIASTOLIC BLOOD PRESSURE: 75 MMHG

## 2023-06-18 PROBLEM — I61.9 BRAIN BLEED (HCC): Status: ACTIVE | Noted: 2023-06-18

## 2023-06-18 LAB
ANION GAP SERPL CALC-SCNC: 6 MMOL/L (ref 5–15)
BASOPHILS # BLD: 0 K/UL (ref 0–0.1)
BASOPHILS NFR BLD: 0 % (ref 0–1)
BUN SERPL-MCNC: 16 MG/DL (ref 6–20)
BUN/CREAT SERPL: 19 (ref 12–20)
CALCIUM SERPL-MCNC: 9.3 MG/DL (ref 8.5–10.1)
CHLORIDE SERPL-SCNC: 107 MMOL/L (ref 97–108)
CO2 SERPL-SCNC: 27 MMOL/L (ref 21–32)
CREAT SERPL-MCNC: 0.83 MG/DL (ref 0.7–1.3)
DIFFERENTIAL METHOD BLD: ABNORMAL
EOSINOPHIL # BLD: 0 K/UL (ref 0–0.4)
EOSINOPHIL NFR BLD: 0 % (ref 0–7)
ERYTHROCYTE [DISTWIDTH] IN BLOOD BY AUTOMATED COUNT: 12.1 % (ref 11.5–14.5)
GLUCOSE SERPL-MCNC: 121 MG/DL (ref 65–100)
HCT VFR BLD AUTO: 47.5 % (ref 36.6–50.3)
HGB BLD-MCNC: 15 G/DL (ref 12.1–17)
IMM GRANULOCYTES # BLD AUTO: 0 K/UL (ref 0–0.04)
IMM GRANULOCYTES NFR BLD AUTO: 0 % (ref 0–0.5)
LYMPHOCYTES # BLD: 0.8 K/UL (ref 0.8–3.5)
LYMPHOCYTES NFR BLD: 17 % (ref 12–49)
MCH RBC QN AUTO: 29.6 PG (ref 26–34)
MCHC RBC AUTO-ENTMCNC: 31.6 G/DL (ref 30–36.5)
MCV RBC AUTO: 93.7 FL (ref 80–99)
MONOCYTES # BLD: 0.1 K/UL (ref 0–1)
MONOCYTES NFR BLD: 2 % (ref 5–13)
NEUTS SEG # BLD: 3.8 K/UL (ref 1.8–8)
NEUTS SEG NFR BLD: 81 % (ref 32–75)
NRBC # BLD: 0 K/UL (ref 0–0.01)
NRBC BLD-RTO: 0 PER 100 WBC
PLATELET # BLD AUTO: 103 K/UL (ref 150–400)
PMV BLD AUTO: 11.1 FL (ref 8.9–12.9)
POTASSIUM SERPL-SCNC: 3.8 MMOL/L (ref 3.5–5.1)
RBC # BLD AUTO: 5.07 M/UL (ref 4.1–5.7)
RBC MORPH BLD: ABNORMAL
SODIUM SERPL-SCNC: 140 MMOL/L (ref 136–145)
WBC # BLD AUTO: 4.7 K/UL (ref 4.1–11.1)

## 2023-06-18 PROCEDURE — G0378 HOSPITAL OBSERVATION PER HR: HCPCS

## 2023-06-18 PROCEDURE — 2060000000 HC ICU INTERMEDIATE R&B

## 2023-06-18 PROCEDURE — 6370000000 HC RX 637 (ALT 250 FOR IP): Performed by: STUDENT IN AN ORGANIZED HEALTH CARE EDUCATION/TRAINING PROGRAM

## 2023-06-18 PROCEDURE — 85025 COMPLETE CBC W/AUTO DIFF WBC: CPT

## 2023-06-18 PROCEDURE — 70450 CT HEAD/BRAIN W/O DYE: CPT

## 2023-06-18 PROCEDURE — 2580000003 HC RX 258: Performed by: STUDENT IN AN ORGANIZED HEALTH CARE EDUCATION/TRAINING PROGRAM

## 2023-06-18 PROCEDURE — 80048 BASIC METABOLIC PNL TOTAL CA: CPT

## 2023-06-18 PROCEDURE — 36415 COLL VENOUS BLD VENIPUNCTURE: CPT

## 2023-06-18 RX ORDER — HYDROCODONE BITARTRATE AND ACETAMINOPHEN 5; 325 MG/1; MG/1
1 TABLET ORAL EVERY 8 HOURS PRN
Status: DISCONTINUED | OUTPATIENT
Start: 2023-06-18 | End: 2023-06-19 | Stop reason: HOSPADM

## 2023-06-18 RX ORDER — SODIUM CHLORIDE 9 MG/ML
INJECTION, SOLUTION INTRAVENOUS PRN
Status: DISCONTINUED | OUTPATIENT
Start: 2023-06-18 | End: 2023-06-19 | Stop reason: HOSPADM

## 2023-06-18 RX ORDER — FOLIC ACID 1 MG/1
1 TABLET ORAL DAILY
Status: DISCONTINUED | OUTPATIENT
Start: 2023-06-18 | End: 2023-06-19 | Stop reason: HOSPADM

## 2023-06-18 RX ORDER — FLUTICASONE PROPIONATE AND SALMETEROL 250; 50 UG/1; UG/1
1 POWDER RESPIRATORY (INHALATION) 2 TIMES DAILY
Status: ON HOLD | COMMUNITY
Start: 2023-04-25 | End: 2023-06-19 | Stop reason: HOSPADM

## 2023-06-18 RX ORDER — SODIUM CHLORIDE 9 MG/ML
INJECTION, SOLUTION INTRAVENOUS CONTINUOUS
Status: DISCONTINUED | OUTPATIENT
Start: 2023-06-18 | End: 2023-06-19 | Stop reason: HOSPADM

## 2023-06-18 RX ORDER — ACETAMINOPHEN 325 MG/1
650 TABLET ORAL EVERY 6 HOURS PRN
Status: DISCONTINUED | OUTPATIENT
Start: 2023-06-18 | End: 2023-06-19 | Stop reason: HOSPADM

## 2023-06-18 RX ORDER — SODIUM CHLORIDE 0.9 % (FLUSH) 0.9 %
5-40 SYRINGE (ML) INJECTION EVERY 12 HOURS SCHEDULED
Status: DISCONTINUED | OUTPATIENT
Start: 2023-06-18 | End: 2023-06-19 | Stop reason: HOSPADM

## 2023-06-18 RX ORDER — ONDANSETRON 2 MG/ML
4 INJECTION INTRAMUSCULAR; INTRAVENOUS EVERY 6 HOURS PRN
Status: DISCONTINUED | OUTPATIENT
Start: 2023-06-18 | End: 2023-06-19 | Stop reason: HOSPADM

## 2023-06-18 RX ORDER — ONDANSETRON 4 MG/1
4 TABLET, ORALLY DISINTEGRATING ORAL EVERY 8 HOURS PRN
Status: DISCONTINUED | OUTPATIENT
Start: 2023-06-18 | End: 2023-06-19 | Stop reason: HOSPADM

## 2023-06-18 RX ORDER — FOLIC ACID 1 MG/1
TABLET ORAL
Status: ON HOLD | COMMUNITY
Start: 2020-10-09 | End: 2023-06-19 | Stop reason: HOSPADM

## 2023-06-18 RX ORDER — IPRATROPIUM BROMIDE AND ALBUTEROL SULFATE 2.5; .5 MG/3ML; MG/3ML
SOLUTION RESPIRATORY (INHALATION)
Status: ON HOLD | COMMUNITY
Start: 2023-05-26 | End: 2023-06-19 | Stop reason: HOSPADM

## 2023-06-18 RX ORDER — HYDROCODONE BITARTRATE AND ACETAMINOPHEN 5; 325 MG/1; MG/1
TABLET ORAL
Status: ON HOLD | COMMUNITY
End: 2023-06-19 | Stop reason: HOSPADM

## 2023-06-18 RX ORDER — CHLORDIAZEPOXIDE HYDROCHLORIDE 25 MG/1
50 CAPSULE, GELATIN COATED ORAL EVERY 6 HOURS
Status: DISCONTINUED | OUTPATIENT
Start: 2023-06-18 | End: 2023-06-19 | Stop reason: HOSPADM

## 2023-06-18 RX ORDER — POLYETHYLENE GLYCOL 3350 17 G/17G
17 POWDER, FOR SOLUTION ORAL DAILY PRN
Status: DISCONTINUED | OUTPATIENT
Start: 2023-06-18 | End: 2023-06-19 | Stop reason: HOSPADM

## 2023-06-18 RX ORDER — CHLORDIAZEPOXIDE HYDROCHLORIDE 5 MG/1
10 CAPSULE, GELATIN COATED ORAL 3 TIMES DAILY PRN
Status: ON HOLD | COMMUNITY
Start: 2016-11-08 | End: 2023-06-19 | Stop reason: HOSPADM

## 2023-06-18 RX ORDER — ACETAMINOPHEN 650 MG/1
650 SUPPOSITORY RECTAL EVERY 6 HOURS PRN
Status: DISCONTINUED | OUTPATIENT
Start: 2023-06-18 | End: 2023-06-19 | Stop reason: HOSPADM

## 2023-06-18 RX ORDER — IPRATROPIUM BROMIDE AND ALBUTEROL SULFATE 2.5; .5 MG/3ML; MG/3ML
1 SOLUTION RESPIRATORY (INHALATION) EVERY 4 HOURS PRN
Status: DISCONTINUED | OUTPATIENT
Start: 2023-06-18 | End: 2023-06-19 | Stop reason: HOSPADM

## 2023-06-18 RX ORDER — SODIUM CHLORIDE 0.9 % (FLUSH) 0.9 %
5-40 SYRINGE (ML) INJECTION PRN
Status: DISCONTINUED | OUTPATIENT
Start: 2023-06-18 | End: 2023-06-19 | Stop reason: HOSPADM

## 2023-06-18 RX ADMIN — CHLORDIAZEPOXIDE HYDROCHLORIDE 50 MG: 25 CAPSULE ORAL at 08:24

## 2023-06-18 RX ADMIN — CHLORDIAZEPOXIDE HYDROCHLORIDE 50 MG: 25 CAPSULE ORAL at 13:25

## 2023-06-18 RX ADMIN — SODIUM CHLORIDE, PRESERVATIVE FREE 10 ML: 5 INJECTION INTRAVENOUS at 08:27

## 2023-06-18 RX ADMIN — SODIUM CHLORIDE: 9 INJECTION, SOLUTION INTRAVENOUS at 06:21

## 2023-06-18 RX ADMIN — MOMETASONE FUROATE AND FORMOTEROL FUMARATE DIHYDRATE 2 PUFF: 200; 5 AEROSOL RESPIRATORY (INHALATION) at 08:25

## 2023-06-18 RX ADMIN — CHLORDIAZEPOXIDE HYDROCHLORIDE 50 MG: 25 CAPSULE ORAL at 19:08

## 2023-06-18 RX ADMIN — Medication 1 MG: at 08:24

## 2023-06-18 RX ADMIN — SODIUM CHLORIDE, PRESERVATIVE FREE 10 ML: 5 INJECTION INTRAVENOUS at 22:47

## 2023-06-18 RX ADMIN — HYDROCODONE BITARTRATE AND ACETAMINOPHEN 1 TABLET: 5; 325 TABLET ORAL at 09:41

## 2023-06-18 ASSESSMENT — PAIN SCALES - GENERAL
PAINLEVEL_OUTOF10: 7
PAINLEVEL_OUTOF10: 0

## 2023-06-18 NOTE — PROGRESS NOTES
Ct shows sub-acute resolving left bg hemorrhage. No surgical intervention appropriate.   Consider for outpt gamma knife

## 2023-06-18 NOTE — PROGRESS NOTES
Occupational Therapy:     Consult received, chart reviewed and note order for \"strict bedrest\". OT will hold, follow, and see as able an appropriate once the bed rest order is discontinued.      Thank you,   Doyle Fang, MICKEY, OTR/L

## 2023-06-18 NOTE — PLAN OF CARE
Problem: Discharge Planning  Goal: Discharge to home or other facility with appropriate resources  6/18/2023 0302 by Xander Clinton RN  Outcome: Progressing  Flowsheets (Taken 6/18/2023 0230)  Discharge to home or other facility with appropriate resources: Identify barriers to discharge with patient and caregiver

## 2023-06-18 NOTE — PROGRESS NOTES
Consult received, chart reviewed and note order for \"strict bedrest\". PT will hold, follow, and see as able an appropriate once the bed rest order is discontinued.  Thank you, Qamar Arzola, PT

## 2023-06-18 NOTE — PROGRESS NOTES
Skin noted to be red ( almost looks like a sun burn) on bilateral arms. He also has a notible rash on his abdomen. Wound care consulted.

## 2023-06-18 NOTE — PROGRESS NOTES
[address] [facility] Adult  Hospitalist Group                                                                                          Hospitalist Progress Note  Kaila Rivera MD  Office Phone: [phone]        Date of Service:  2023  NAME:  Deon Quezada  :  1955  MRN:  [id number]       Admission Summary:   Deon Quezada is a 79 y.o. 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 Per chart review, he slid out of his chair and had to activate ems for hel. Reportedly covered in his own feces at time of arrival. Denied any head injury at time of fall. Patient is seen and examined at bedside. Bedside RN is concerned for alcohol withdrawal.  Patient states he does not recall what happened exactly. He states he was recently arrested by police for loitering. He dose not recall his fall or recent hospitalization. The patient denies any fever, chills, chest or abdominal pain, nausea, vomiting, cough, congestion, recent illness, palpitations, or dysuria. Remarkable vitals on ER Presentation: vss  Labs Remarkable for: vss  ER Images: Chest x-ray and right shoulder x-ray showed no acute processes. CT head showed evolving left frontal parenchymal hemorrhage without acute hemorrhage. CT abdomen pelvis: No acute process. Liver cirrhosis with cholelithiasis.        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 after fall  -Repeat CT head shows evolving left frontal parenchymal hemorrhage  -No acute hemorrhage on CT  -repeat CT stable  -Transferred to [facility] for

## 2023-06-19 VITALS
DIASTOLIC BLOOD PRESSURE: 71 MMHG | TEMPERATURE: 98.2 F | HEART RATE: 64 BPM | WEIGHT: 134.48 LBS | SYSTOLIC BLOOD PRESSURE: 129 MMHG | BODY MASS INDEX: 19.86 KG/M2 | RESPIRATION RATE: 16 BRPM | OXYGEN SATURATION: 97 %

## 2023-06-19 LAB
ANION GAP SERPL CALC-SCNC: 6 MMOL/L (ref 5–15)
BASOPHILS # BLD: 0 K/UL (ref 0–0.1)
BASOPHILS NFR BLD: 1 % (ref 0–1)
BUN SERPL-MCNC: 16 MG/DL (ref 6–20)
BUN/CREAT SERPL: 24 (ref 12–20)
CALCIUM SERPL-MCNC: 8.9 MG/DL (ref 8.5–10.1)
CHLORIDE SERPL-SCNC: 110 MMOL/L (ref 97–108)
CO2 SERPL-SCNC: 26 MMOL/L (ref 21–32)
CREAT SERPL-MCNC: 0.66 MG/DL (ref 0.7–1.3)
DIFFERENTIAL METHOD BLD: ABNORMAL
EOSINOPHIL # BLD: 0 K/UL (ref 0–0.4)
EOSINOPHIL NFR BLD: 1 % (ref 0–7)
ERYTHROCYTE [DISTWIDTH] IN BLOOD BY AUTOMATED COUNT: 12 % (ref 11.5–14.5)
GLUCOSE SERPL-MCNC: 109 MG/DL (ref 65–100)
HCT VFR BLD AUTO: 44 % (ref 36.6–50.3)
HGB BLD-MCNC: 14.1 G/DL (ref 12.1–17)
IMM GRANULOCYTES # BLD AUTO: 0 K/UL (ref 0–0.04)
IMM GRANULOCYTES NFR BLD AUTO: 0 % (ref 0–0.5)
LYMPHOCYTES # BLD: 0.8 K/UL (ref 0.8–3.5)
LYMPHOCYTES NFR BLD: 26 % (ref 12–49)
MAGNESIUM SERPL-MCNC: 1.9 MG/DL (ref 1.6–2.4)
MCH RBC QN AUTO: 29.6 PG (ref 26–34)
MCHC RBC AUTO-ENTMCNC: 32 G/DL (ref 30–36.5)
MCV RBC AUTO: 92.2 FL (ref 80–99)
MONOCYTES # BLD: 0.2 K/UL (ref 0–1)
MONOCYTES NFR BLD: 7 % (ref 5–13)
NEUTS SEG # BLD: 2 K/UL (ref 1.8–8)
NEUTS SEG NFR BLD: 65 % (ref 32–75)
NRBC # BLD: 0 K/UL (ref 0–0.01)
NRBC BLD-RTO: 0 PER 100 WBC
PLATELET # BLD AUTO: 86 K/UL (ref 150–400)
PMV BLD AUTO: 11.5 FL (ref 8.9–12.9)
POTASSIUM SERPL-SCNC: 3.5 MMOL/L (ref 3.5–5.1)
RBC # BLD AUTO: 4.77 M/UL (ref 4.1–5.7)
RBC MORPH BLD: ABNORMAL
SODIUM SERPL-SCNC: 142 MMOL/L (ref 136–145)
WBC # BLD AUTO: 3 K/UL (ref 4.1–11.1)

## 2023-06-19 PROCEDURE — G0378 HOSPITAL OBSERVATION PER HR: HCPCS

## 2023-06-19 PROCEDURE — 85025 COMPLETE CBC W/AUTO DIFF WBC: CPT

## 2023-06-19 PROCEDURE — 83735 ASSAY OF MAGNESIUM: CPT

## 2023-06-19 PROCEDURE — 6370000000 HC RX 637 (ALT 250 FOR IP): Performed by: STUDENT IN AN ORGANIZED HEALTH CARE EDUCATION/TRAINING PROGRAM

## 2023-06-19 PROCEDURE — 80048 BASIC METABOLIC PNL TOTAL CA: CPT

## 2023-06-19 PROCEDURE — 36415 COLL VENOUS BLD VENIPUNCTURE: CPT

## 2023-06-19 PROCEDURE — 94640 AIRWAY INHALATION TREATMENT: CPT

## 2023-06-19 PROCEDURE — 2580000003 HC RX 258: Performed by: STUDENT IN AN ORGANIZED HEALTH CARE EDUCATION/TRAINING PROGRAM

## 2023-06-19 RX ADMIN — CHLORDIAZEPOXIDE HYDROCHLORIDE 50 MG: 25 CAPSULE ORAL at 02:17

## 2023-06-19 RX ADMIN — Medication 1 MG: at 08:22

## 2023-06-19 RX ADMIN — CHLORDIAZEPOXIDE HYDROCHLORIDE 50 MG: 25 CAPSULE ORAL at 12:27

## 2023-06-19 RX ADMIN — SODIUM CHLORIDE, PRESERVATIVE FREE 10 ML: 5 INJECTION INTRAVENOUS at 08:23

## 2023-06-19 RX ADMIN — MOMETASONE FUROATE AND FORMOTEROL FUMARATE DIHYDRATE 2 PUFF: 200; 5 AEROSOL RESPIRATORY (INHALATION) at 08:19

## 2023-06-19 RX ADMIN — CHLORDIAZEPOXIDE HYDROCHLORIDE 50 MG: 25 CAPSULE ORAL at 06:25

## 2023-06-19 NOTE — CARE COORDINATION
06/19/23 1012   Readmission Assessment   Number of Days since last admission? 8-30 days   Previous Disposition Acute Rehab   Who is being Interviewed Patient   What was the patient's/caregiver's perception as to why they think they needed to return back to the hospital? Other (Comment)  (Pt is homeless.)   Did you visit your Primary Care Physician after you left the hospital, before you returned this time? No   Did you see a specialist, such as Cardiac, Pulmonary, Orthopedic Physician, etc. after you left the hospital? No   Who advised the patient to return to the hospital? Self-referral   Does the patient report anything that got in the way of taking their medications? No   In our efforts to provide the best possible care to you and others like you, can you think of anything that we could have done to help you after you left the hospital the first time, so that you might not have needed to return so soon?  Other (Comment)  (Pt is homeless and not compliant.)

## 2023-06-19 NOTE — CARE COORDINATION
CM met with pt to offer a ride to the [facility] or anywhere else that he wants to go. He declined and said that he will just \"roll out of this hospital\". CM met with CM Leadership regarding pt leaving the building with no destination. CM was told that it is this pt's right to d/c in this manner. CM informed Dr. Irais Rasheed and pt's nurse.  Rock Agrawal, RADHA,ACM-SW

## 2023-06-19 NOTE — DISCHARGE INSTRUCTIONS
Discharge Instructions       PATIENT ID: Marisol Pace  MRN: [id number]   YOB: 1955    DATE OF ADMISSION: 6/18/2023   DATE OF DISCHARGE: 6/19/2023    PRIMARY CARE PROVIDER: None None     ATTENDING PHYSICIAN: Luis Felipe John MD   DISCHARGING PROVIDER: Luis Felipe John MD    To contact this individual call [phone] and ask the  to page. If unavailable ask to be transferred the Adult Hospitalist Department. 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 Keep your medication in the bottles provided by the pharmacist and keep a list of the medication names, dosages, and times to be taken in your wallet. Do not take other medications without consulting your doctor. NOTIFY YOUR PHYSICIAN FOR ANY OF THE FOLLOWING:   Fever over 101 degrees for 24 hours. Chest pain, shortness of breath, fever, chills, nausea, vomiting, diarrhea, change in mentation, falling, weakness, bleeding. Severe pain or pain not relieved by medications. Or, any other signs or symptoms that you may have questions about.       DISPOSITION:  x  Home With:   OT  PT  HH  RN       SNF/Inpatient Rehab/LTAC    Independent/assisted living    Hospice    Other:     CDMP Checked:   Yes x     PROBLEM LIST Updated:  Yes x       Signed:   Luis Felipe John MD  6/19/2023  10:48 AM

## 2023-06-19 NOTE — DISCHARGE SUMMARY
Discharge Summary       PATIENT ID: Ke Ralph  MRN: [id number]   YOB: 1955    DATE OF ADMISSION: 6/18/2023  1:19 AM    DATE OF DISCHARGE: 6/19/23   PRIMARY CARE PROVIDER: None None     ATTENDING PHYSICIAN: Ca Chappell  DISCHARGING PROVIDER: Hieu Nelson MD    To contact this individual call [phone] and ask the  to page. If unavailable ask to be transferred the Adult Hospitalist Department. CONSULTATIONS: IP WOUND CARE NURSE CONSULT TO EVAL    PROCEDURES/SURGERIES: * No surgery found *    ADMITTING DIAGNOSES & HOSPITAL COURSE:     Ke Ralph is a 79 y.o. 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 Per chart review, he slid out of his chair and had to activate ems for hel. Reportedly covered in his own feces at time of arrival. Denied any head injury at time of fall. Patient is seen and examined at bedside. Bedside RN is concerned for alcohol withdrawal.  Patient states he does not recall what happened exactly. He states he was recently arrested by police for loitering. He dose not recall his fall or recent hospitalization. Pt has social issues, he is homeless and possibly abusing ETOH. He was send to a SNF last admission and left that place on his own and  than send to a shelter that he left as well. Its very difficult to treat this pt with non complaince and social issues for a head bleed when he is not willing to comply. He was seen by NSGY and not a candidate for craniotomy he was advised to follow up as out pt for gamma knife. This will be difficult as well given above issues.    Seen by NSGY rec --

## 2023-06-19 NOTE — WOUND CARE
Wound Care Note:     New consult placed by nurse request for red irritated skin    Chart shows:  Admitted for brain bleed with a past medical history of COPD, Hepatitis C, liver cirrhosis, DVT, CVA, Right BKA and left first through 4th toe amputations with partial amputation of 5th toe. No past medical history on file. WBC = 3.0 on 6/19/23  Admitted from [facility]    Assessment:   Patient is alert and talking, continent with no assistance needed in repositioning. Bed: [place]  Diet: Adult diet regular; 4 carb choices  Patient reports no pain. Left heels, buttocks, and sacral skin intact and without erythema. 1. POA bilateral lower arms with erythema and elevated areas, right arm with several crusted wounds also. Patient stated this is how his arms normally look, do not itch. Left open to air. Photos take to determine if improving or getting worse. 2.  POA abdomen with pale, elevated rash. This also does not itch. Left open to air. Patient repositioned supine. Recommendations:    No wound care needed. Skin Care & Pressure Prevention:  Minimize layers of linen/pads under patient to optimize support surface. Turn/reposition approximately every 2 hours and offload heels. Manage incontinence / promote continence   Nourishing Skin Cream to dry skin, minimize use of briefs when able    Discussed above plan with patient & Magdaleno Appiah RN    Transition of Care: Wound care will sign off.       HANH AngeloN, RN, [facility]  Certified Wound and Ostomy Nurse  office [phone]  Best way to contact me is through [employer]

## 2023-06-19 NOTE — PROGRESS NOTES
Occupational Therapy: defer    Chart reviewed, consulted with RN and . Note patient was recently at [facility] for [address] and d/c to [facility], now readmitted after being found down. Note patient is homeless, has a R BKA, and uses a manual wheelchair to mobilize. Per RN, patient has been transferring to wheelchair and self-propelling without assistance. Attempted OT evaluation, but patient refused and denied need for PT/OT evaluations. Note he is expected to discharge today.      Briana Krabbe, OTR/L

## 2023-06-19 NOTE — PLAN OF CARE
Problem: Discharge Planning  Goal: Discharge to home or other facility with appropriate resources  Outcome: Completed  Flowsheets (Taken 6/91/6336 4830 by Angelique Maravilla RN)  Discharge to home or other facility with appropriate resources: Identify barriers to discharge with patient and caregiver     Problem: Safety - Adult  Goal: Free from fall injury  Outcome: Completed     Problem: Skin/Tissue Integrity  Goal: Absence of new skin breakdown  Description: 1. Monitor for areas of redness and/or skin breakdown  2. Assess vascular access sites hourly  3. Every 4-6 hours minimum:  Change oxygen saturation probe site  4. Every 4-6 hours:  If on nasal continuous positive airway pressure, respiratory therapy assess nares and determine need for appliance change or resting period.   Outcome: Completed

## 2023-06-19 NOTE — CARE COORDINATION
Care Management Initial Assessment       RUR:17%  Readmission? Yes   1st IM letter given? No  1st  letter given: No      06/19/23 1005   Service Assessment   Patient Orientation Alert and Oriented   Cognition Alert   History Provided By Patient;Medical Record   Primary Caregiver Self   Support Systems None   PCP Verified by CM No   Prior Functional Level Assistance with the following:;Mobility   Current Functional Level Other (see comment)  (TBD)   Can patient return to prior living arrangement Other (see comment)  (Pt is homeless)   Ability to make needs known: Fair   Family able to assist with home care needs: No   Would you like for me to discuss the discharge plan with any other family members/significant others, and if so, who? No   Financial Resources None     OTTO met with Dr. Dominique Hurtado and he said that this pt is ready for d/c. CM reviewed the last admission and noted that pt went to [facility] at d/c. OTTO was notified by [facility] that pt requested to be discharged from there on 6/12/23. He was sent to the [facility] at that time. OTTO met with this pt to discuss where he is going from here. He stated that he will go \"to the street\". CM asked him where he wants to go and he would not provide an address. He just said \"wheel me out of the hospital\". OTTO notified Dr. Dominique Hurtado.  DANITA Avila-

## 2023-06-19 NOTE — PROGRESS NOTES
Pt is being D/C today to the curb. Pt is homeless and states he doesn't want a ride anywhere or any assistance, to just \"push him out the door. \" I reviewed D/C paperwork and allowed time for questions/concerns which the pt stated none. Bedside RN performed patient education and medication education. Discharge concerns initiated and discussed with patient, including clarification on \"who\" assists the patient at their home and instructions for when the home going patient should call their provider after discharge. Opportunity for questions and clarification was provided. Patient receptive to education:Yes  Patient stated: understanding  Barriers to Education: none  Diagnosis Education given:  Yes    Length of stay: 1  Expected Day of Discharge: 6/19/23  Ask if they have \"Help at Home\" & add to white board?   Yes    Education Day #: 6/19/23    Medication Education Given:  Yes  M in the box Medication name: Pt    Pt aware of HCAHPS survey: Yes    Stroke Education documented in Patient Education: Yes  Core Measures Documented in [facility]:  Risk Factors: Yes  Warning signs of stroke: Yes  When to Activate 911: Yes  Medication Education for Risk Factors: Yes  Smoking cessation if applicable: Yes  Written Education Given:  Yes    Discharge NIH Completed: Yes  Score: 3    BRAINS: Yes    Follow Up Appointment Made: NO  Date/Time if applicable: None

## 2023-09-01 ENCOUNTER — HOSPITAL ENCOUNTER (EMERGENCY)
Facility: HOSPITAL | Age: 68
Discharge: HOME | End: 2023-09-01
Attending: EMERGENCY MEDICINE
Payer: MEDICAID

## 2023-09-01 VITALS
DIASTOLIC BLOOD PRESSURE: 81 MMHG | TEMPERATURE: 98.8 F | OXYGEN SATURATION: 95 % | HEART RATE: 98 BPM | SYSTOLIC BLOOD PRESSURE: 120 MMHG | RESPIRATION RATE: 18 BRPM

## 2023-09-01 DIAGNOSIS — Z59.812 HOUSING INSTABILITY, HOUSED, HOMELESSNESS IN PAST 12 MONTHS: Primary | ICD-10-CM

## 2023-09-01 DIAGNOSIS — Z72.0 TOBACCO ABUSE: ICD-10-CM

## 2023-09-01 DIAGNOSIS — Z89.511 HX OF BKA, RIGHT (HCC): ICD-10-CM

## 2023-09-01 DIAGNOSIS — F10.10 ALCOHOL ABUSE: ICD-10-CM

## 2023-09-01 DIAGNOSIS — Z99.3 WHEELCHAIR DEPENDENT: ICD-10-CM

## 2023-09-01 PROCEDURE — 99282 EMERGENCY DEPT VISIT SF MDM: CPT

## 2023-09-01 RX ORDER — NYSTATIN 100000 U/G
CREAM TOPICAL ONCE
Status: DISCONTINUED | OUTPATIENT
Start: 2023-09-01 | End: 2023-09-01

## 2023-09-01 SDOH — ECONOMIC STABILITY - HOUSING INSECURITY: HOMELESS IN THE PAST 12 MONTHS: Z59.812

## 2023-09-01 ASSESSMENT — ENCOUNTER SYMPTOMS
SHORTNESS OF BREATH: 0
COUGH: 0
DIARRHEA: 0
ABDOMINAL PAIN: 0
NAUSEA: 0
RHINORRHEA: 0
VOMITING: 0
SORE THROAT: 0
EYE PAIN: 0

## 2023-09-01 NOTE — DISCHARGE INSTRUCTIONS
Thank You! It was a pleasure taking care of you in our Emergency Department today. We know that when you come to [facility], you are entrusting us with your health, comfort, and safety. Our physicians and nurses honor that trust, and truly appreciate the opportunity to care for you and your loved ones. We also value your feedback. If you receive a survey about your Emergency Department experience today, please fill it out. We care about our patients' feedback, and we listen to what you have to say. Thank you. Dr. Avani Mcmahon M.D.      ____________________________________________________________________  I have included a copy of your lab results and/or radiologic studies from today's visit so you can have them easily available at your follow-up visit. We hope you feel better and please do not hesitate to contact the ED if you have any questions at all! No results found for this or any previous visit (from the past 12 hour(s)). No orders to display     [unfilled]  The exam and treatment you received in the Emergency Department were for an urgent problem and are not intended as complete care. It is important that you follow up with a doctor, nurse practitioner, or physician assistant for ongoing care. If your symptoms become worse or you do not improve as expected and you are unable to reach your usual health care provider, you should return to the Emergency Department. We are available 24 hours a day. Please take your discharge instructions with you when you go to your follow-up appointment. If a prescription has been provided, please have it filled as soon as possible to prevent a delay in treatment. Read the entire medication instruction sheet provided to you by the pharmacy.  If you have any questions or reservations about taking the medication due to side effects or interactions with other medications, please call your primary care physician or contact the ER to

## 2023-09-02 NOTE — ED NOTES
Discharge instructions were given to the patient by Blas Macias RN. The patient left the Emergency Department in wheelchair, alert and oriented and in no acute distress with 0 prescriptions. The patient was encouraged to call or return to the ED for worsening issues or problems and was encouraged to schedule a follow up appointment for continuing care. The patient verbalized understanding of discharge instructions and prescriptions, all questions were answered. The patient has no further concerns at this time.         Bree Cummings RN  09/01/23 2010

## 2023-09-09 ENCOUNTER — APPOINTMENT (OUTPATIENT)
Facility: HOSPITAL | Age: 68
End: 2023-09-09
Payer: MEDICAID

## 2023-09-09 ENCOUNTER — HOSPITAL ENCOUNTER (EMERGENCY)
Facility: HOSPITAL | Age: 68
Discharge: HOME | End: 2023-09-09
Attending: EMERGENCY MEDICINE
Payer: MEDICAID

## 2023-09-09 VITALS
HEART RATE: 81 BPM | OXYGEN SATURATION: 95 % | TEMPERATURE: 97.8 F | DIASTOLIC BLOOD PRESSURE: 73 MMHG | RESPIRATION RATE: 19 BRPM | SYSTOLIC BLOOD PRESSURE: 120 MMHG

## 2023-09-09 DIAGNOSIS — F10.920 ACUTE ALCOHOLIC INTOXICATION WITHOUT COMPLICATION (HCC): Primary | ICD-10-CM
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
## 2023-09-09 DIAGNOSIS — Z72.0 TOBACCO ABUSE: ICD-10-CM

## 2023-09-09 DIAGNOSIS — R09.02 HYPOXIA: ICD-10-CM

## 2023-09-09 LAB
ALBUMIN SERPL-MCNC: 3.4 G/DL (ref 3.5–5)
ALBUMIN/GLOB SERPL: 1 (ref 1.1–2.2)
ALP SERPL-CCNC: 102 U/L (ref 45–117)
ALT SERPL-CCNC: 22 U/L (ref 12–78)
ANION GAP SERPL CALC-SCNC: 8 MMOL/L (ref 5–15)
AST SERPL-CCNC: 40 U/L (ref 15–37)
BASOPHILS # BLD: 0.1 K/UL (ref 0–0.1)
BASOPHILS NFR BLD: 3 % (ref 0–1)
BILIRUB SERPL-MCNC: 0.4 MG/DL (ref 0.2–1)
BUN SERPL-MCNC: 8 MG/DL (ref 6–20)
BUN/CREAT SERPL: 12 (ref 12–20)
CALCIUM SERPL-MCNC: 8.5 MG/DL (ref 8.5–10.1)
CHLORIDE SERPL-SCNC: 104 MMOL/L (ref 97–108)
CO2 SERPL-SCNC: 29 MMOL/L (ref 21–32)
CREAT SERPL-MCNC: 0.68 MG/DL (ref 0.7–1.3)
DIFFERENTIAL METHOD BLD: ABNORMAL
EOSINOPHIL # BLD: 0.3 K/UL (ref 0–0.4)
EOSINOPHIL NFR BLD: 6 % (ref 0–7)
ERYTHROCYTE [DISTWIDTH] IN BLOOD BY AUTOMATED COUNT: 15.3 % (ref 11.5–14.5)
ETHANOL SERPL-MCNC: 292 MG/DL (ref 0–0.08)
GLOBULIN SER CALC-MCNC: 3.3 G/DL (ref 2–4)
GLUCOSE SERPL-MCNC: 92 MG/DL (ref 65–100)
HCT VFR BLD AUTO: 47.7 % (ref 36.6–50.3)
HGB BLD-MCNC: 15.5 G/DL (ref 12.1–17)
IMM GRANULOCYTES # BLD AUTO: 0 K/UL (ref 0–0.04)
IMM GRANULOCYTES NFR BLD AUTO: 0 % (ref 0–0.5)
LYMPHOCYTES # BLD: 2 K/UL (ref 0.8–3.5)
LYMPHOCYTES NFR BLD: 45 % (ref 12–49)
MCH RBC QN AUTO: 30 PG (ref 26–34)
MCHC RBC AUTO-ENTMCNC: 32.5 G/DL (ref 30–36.5)
MCV RBC AUTO: 92.4 FL (ref 80–99)
MONOCYTES # BLD: 0.4 K/UL (ref 0–1)
MONOCYTES NFR BLD: 9 % (ref 5–13)
NEUTS SEG # BLD: 1.6 K/UL (ref 1.8–8)
NEUTS SEG NFR BLD: 37 % (ref 32–75)
NRBC # BLD: 0 K/UL (ref 0–0.01)
NRBC BLD-RTO: 0 PER 100 WBC
PLATELET # BLD AUTO: 120 K/UL (ref 150–400)
PMV BLD AUTO: 10.5 FL (ref 8.9–12.9)
POTASSIUM SERPL-SCNC: 4.4 MMOL/L (ref 3.5–5.1)
PROT SERPL-MCNC: 6.7 G/DL (ref 6.4–8.2)
RBC # BLD AUTO: 5.16 M/UL (ref 4.1–5.7)
SODIUM SERPL-SCNC: 141 MMOL/L (ref 136–145)
WBC # BLD AUTO: 4.4 K/UL (ref 4.1–11.1)

## 2023-09-09 PROCEDURE — 80053 COMPREHEN METABOLIC PANEL: CPT

## 2023-09-09 PROCEDURE — 85025 COMPLETE CBC W/AUTO DIFF WBC: CPT

## 2023-09-09 PROCEDURE — 99284 EMERGENCY DEPT VISIT MOD MDM: CPT

## 2023-09-09 PROCEDURE — 36415 COLL VENOUS BLD VENIPUNCTURE: CPT

## 2023-09-09 PROCEDURE — 70450 CT HEAD/BRAIN W/O DYE: CPT

## 2023-09-09 PROCEDURE — 82077 ASSAY SPEC XCP UR&BREATH IA: CPT

## 2023-09-09 NOTE — ED PROVIDER NOTES
[facility] EMERGENCY DEPT  EMERGENCY DEPARTMENT ENCOUNTER    Patient Name: Jarrell Gillespie  MRN: [id number]  YOB: 1955  Provider: Dion Quintero MD  PCP: None None   Time/Date of evaluation: 2:49 PM EDT on 9/9/23    History of Presenting Illness     Chief Complaint   Patient presents with    Alcohol Intoxication     History Provided by: EMS   History is limited by: Acuity of Condition    HISTORY (Narrative):   Jarrell Gillespie is a 79 y.o. male with an unknown PMHx who presents to the emergency department (room 6) by EMS C/O altered mental status. Per EMS, patient fell out of his wheelchair secondary to alcohol intoxication. Patient is refusing to answer any questions for me but does open his eyes to name call and moves all extremities spontaneously. Nursing Notes were all reviewed and agreed with or any disagreements were addressed in the HPI. Past History     PAST MEDICAL HISTORY:  No past medical history on file. PAST SURGICAL HISTORY:  Past Surgical History:   Procedure Laterality Date    LEG AMPUTATION BELOW KNEE Right     LOWER EXTREMITY AMPUTATION Left     transmetatarsal       FAMILY HISTORY:  No family history on file. SOCIAL HISTORY:  Social History     Tobacco Use    Smoking status: Every Day     Types: Cigarettes    Smokeless tobacco: Never   Substance Use Topics    Alcohol use: Yes    Drug use: Yes     Types: Marijuana (Mere Sella), Methamphetamines (Crystal Meth)       MEDICATIONS:  No current facility-administered medications for this encounter. No current outpatient medications on file.        Prior to Admission medications    Not on File        ALLERGIES:  Allergies   Allergen Reactions    Penicillins Anaphylaxis    Methadone Other (See Comments)       SOCIAL DETERMINANTS OF HEALTH:  Social Determinants of Health     Tobacco Use: High Risk (9/1/2023)    Patient History     Smoking Tobacco Use: Every Day     Smokeless Tobacco Use: Never     Passive Exposure: Not on file chase.  Steven Bean MD    Medical Decision Making     SCREENING TOOLS:              No data recorded         DDX: Acute alcohol intoxication, intracranial hemorrhage, homelessness, acute encephalopathy, COPD, tobacco abuse    DISCUSSION:  This appears to be a severe conditon. This appears to be an acute condition. 79 y.o. male ***     ADDITIONAL CONSIDERATIONS:  The following Chronic Conditions impacted the patient's care: Alcohol abuse because patient has chronic alcohol abuse. The following Social Determinants of Health affected management of the patient: Homelessness and lack of access to medical care because patient has inadequate housing and lacks transportation and does not see primary care. Mitigating strategies included observation in the emergency department and discharged with case management follow-up. Diagnosis     1. Acute alcoholic intoxication without complication ([address])    2. Fall from wheelchair, initial encounter    3. Tobacco abuse    4. Hypoxia        Disposition     Wiley Shabnam Reece's  results have been reviewed with him. He has been counseled regarding his diagnosis, treatment, and plan. He verbally conveys understanding and agreement of the signs, symptoms, diagnosis, treatment and prognosis and additionally agrees to follow up as discussed. He also agrees with the care-plan and conveys that all of his questions have been answered. I have also provided discharge instructions for him that include: educational information regarding their diagnosis and treatment, and list of reasons why they would want to return to the ED prior to their follow-up appointment, should his condition change. Discharge Note: The patient is stable for discharge home. The signs, symptoms, diagnosis, and discharge instructions have been discussed, understanding conveyed, and agreed upon. The patient is to follow up as recommended or return to ER should their symptoms worsen.       PATIENT REFERRED

## 2023-09-09 NOTE — ED NOTES
Patient found lying on his right side, incont of urine. Lips dusky. Patient placed on oxygen 3lm via nasal cannula. Patient coughing productively of thick , clear secretions.  MD notified     Ashley Dear, RN  09/09/23 0784

## 2023-09-09 NOTE — ED NOTES
Patient awake with intermittent cursing. Patient remains on oxygen at 3l/m via nasal cannula.      Ashley Dear, RN  09/09/23 0638

## 2023-09-10 NOTE — ED NOTES
Pt is now A&Ox4 and cursing thing RN out stating \"fuck you\". Pt IV removed. Security notified of pt being discharged.       Naila Reddy RN  09/09/23 2003

## 2023-09-10 NOTE — ED NOTES
Discharge instructions were given to the patient by Adriane Lennox, RN    The patient left the Emergency Department ambulatory, alert and oriented and in no acute distress with 0 prescriptions. The patient was encouraged to call or return to the ED for worsening issues or problems and was encouraged to schedule a follow up appointment for continuing care. The patient verbalized understanding of discharge instructions and prescriptions, all questions were answered. The patient has no further concerns at this time.          Zayra Peñaloza RN  09/09/23 2004

## 2023-11-26 ENCOUNTER — HOSPITAL ENCOUNTER (EMERGENCY)
Facility: HOSPITAL | Age: 68
Discharge: HOME | End: 2023-11-27
Attending: EMERGENCY MEDICINE

## 2023-11-26 DIAGNOSIS — Z59.02 UNSHELTERED HOMELESSNESS: ICD-10-CM

## 2023-11-26 DIAGNOSIS — Z72.0 TOBACCO ABUSE: ICD-10-CM

## 2023-11-26 DIAGNOSIS — F10.920 ACUTE ALCOHOLIC INTOXICATION WITHOUT COMPLICATION (HCC): Primary | ICD-10-CM

## 2023-11-26 PROCEDURE — 99283 EMERGENCY DEPT VISIT LOW MDM: CPT

## 2023-11-26 RX ORDER — ACETAMINOPHEN 500 MG
1000 TABLET ORAL
Status: DISCONTINUED | OUTPATIENT
Start: 2023-11-26 | End: 2023-11-27 | Stop reason: HOSPADM

## 2023-11-26 SDOH — ECONOMIC STABILITY - HOUSING INSECURITY: UNSHELTERED HOMELESSNESS: Z59.02

## 2023-11-26 ASSESSMENT — PAIN - FUNCTIONAL ASSESSMENT: PAIN_FUNCTIONAL_ASSESSMENT: NONE - DENIES PAIN

## 2023-11-27 VITALS
TEMPERATURE: 98.6 F | HEIGHT: 69 IN | RESPIRATION RATE: 19 BRPM | SYSTOLIC BLOOD PRESSURE: 145 MMHG | WEIGHT: 145 LBS | BODY MASS INDEX: 21.48 KG/M2 | DIASTOLIC BLOOD PRESSURE: 62 MMHG | OXYGEN SATURATION: 97 % | HEART RATE: 97 BPM

## 2023-11-27 LAB
GLUCOSE BLD STRIP.AUTO-MCNC: 94 MG/DL (ref 65–117)
SERVICE CMNT-IMP: NORMAL

## 2023-11-27 PROCEDURE — 6370000000 HC RX 637 (ALT 250 FOR IP): Performed by: EMERGENCY MEDICINE

## 2023-11-27 PROCEDURE — 82962 GLUCOSE BLOOD TEST: CPT

## 2023-11-27 ASSESSMENT — ENCOUNTER SYMPTOMS
DIARRHEA: 0
SORE THROAT: 0
EYE PAIN: 0
SHORTNESS OF BREATH: 0
VOMITING: 0
ABDOMINAL PAIN: 0
RHINORRHEA: 0
COUGH: 0
NAUSEA: 0

## 2023-11-27 NOTE — ED TRIAGE NOTES
PT BIBA, was found on the ground at a [employer] store. Pt appears intoxicated. Responds to voice. Pt is homeless. Resp even and unlabored. Skin warm and dry. No vomiting at this time. VSS.

## 2023-11-27 NOTE — ED NOTES
This RN and Linda RN cleaned pt up after he had a bowel movement and peed on the floor. Pt had feces over all of his clothes and body. Pt was cleaned and changed into paper scrubs and put into a brief. Ptn wheel chair was cleaned, we gave him his belongings.        Demetrice Jaramillo RN  11/27/23 5631

## 2023-11-27 NOTE — ED PROVIDER NOTES
EMERGENCY DEPARTMENT HISTORY AND PHYSICAL EXAM            Please note that this dictation was completed with the assistance of \"Dragon\", the computer voice recognition software. Quite often unanticipated grammatical, syntax, homophones, and other interpretive errors are inadvertently transcribed by the computer software. Please disregard these errors and any errors that have escaped final proofreading. Thank you. Date of Evaluation: 11/27/23  Patient: Martha Gould  Patient Age and Sex: 79 y.o. male   MRN: [id number]  CSN: [id number]  PCP: None, None    History of Present Illness     Chief Complaint   Patient presents with    Alcohol Intoxication     History Provided By: Patient/family/EMS (if available)    History is limited by: Nothing     HPI: Martha Gould, 79 y.o. male with past medical history as documented below presents to the ED with c/o of homelessness and alcohol abuse. Per EMS, patient was found outside a convenience store. EMS notes vital signs stable. No trauma. Patient states that he is homeless. He has been drinking all day including three x 40 ounce. Patient does have a left BKA and is wheelchair dependent. Patient has no current complaints. .  Denies headache. Denies any vomiting. Reports being hungry and cold. Pt denies any other exacerbating or ameliorating factors. There are no other complaints, changes or physical findings pertinent to the HPI at this time. Nursing notes were all reviewed and agreed with or any disagreements were addressed in the HPI. Past History   Past Medical History:  History reviewed. No pertinent past medical history. Past Surgical History:  Past Surgical History:   Procedure Laterality Date    LEG AMPUTATION BELOW KNEE Right     LOWER EXTREMITY AMPUTATION Left     transmetatarsal       Family History:   Family history reviewed and was non-contributory, unless specified below:  History reviewed. No pertinent family history.     Social

## 2023-11-27 NOTE — ED NOTES
Discharge instructions were given to the patient by Lorne Voss. The patient left the Emergency Department in a wheel chair, alert and oriented and in no acute distress with 0 prescriptions. The patient was encouraged to call or return to the ED for worsening issues or problems and was encouraged to schedule a follow up appointment for continuing care. The patient verbalized understanding of discharge instructions and prescriptions, all questions were answered. The patient has no further concerns at this time.         Bipin Padilla RN  11/27/23 1128

## 2023-12-29 ENCOUNTER — HOSPITAL ENCOUNTER (EMERGENCY)
Facility: HOSPITAL | Age: 68
Discharge: ANOTHER HOSPITAL | End: 2023-12-30
Attending: STUDENT IN AN ORGANIZED HEALTH CARE EDUCATION/TRAINING PROGRAM
Payer: MEDICAID

## 2023-12-29 ENCOUNTER — APPOINTMENT (OUTPATIENT)
Facility: HOSPITAL | Age: 68
End: 2023-12-29
Payer: MEDICAID

## 2023-12-29 DIAGNOSIS — I96 WET GANGRENE (HCC): ICD-10-CM

## 2023-12-29 DIAGNOSIS — T69.9XXA COLD EXPOSURE, INITIAL ENCOUNTER: ICD-10-CM

## 2023-12-29 DIAGNOSIS — J18.9 PNEUMONIA OF RIGHT LUNG DUE TO INFECTIOUS ORGANISM, UNSPECIFIED PART OF LUNG: Primary | ICD-10-CM

## 2023-12-29 DIAGNOSIS — I73.9 PERIPHERAL VASCULAR DISEASE (HCC): ICD-10-CM

## 2023-12-29 PROBLEM — I11.9 HYPERTENSIVE HEART DISEASE WITHOUT HEART FAILURE: Status: ACTIVE | Noted: 2023-04-20

## 2023-12-29 PROBLEM — E72.20 HYPERAMMONEMIA (HCC): Status: ACTIVE | Noted: 2021-11-14

## 2023-12-29 PROBLEM — F10.10 ALCOHOL ABUSE: Status: ACTIVE | Noted: 2023-02-27

## 2023-12-29 PROBLEM — K70.30 ALCOHOLIC CIRRHOSIS OF LIVER WITHOUT ASCITES (HCC): Status: ACTIVE | Noted: 2023-04-20

## 2023-12-29 PROBLEM — R26.2 IMPAIRED AMBULATION: Status: ACTIVE | Noted: 2020-09-25

## 2023-12-29 PROBLEM — R00.0 TACHYCARDIA: Status: ACTIVE | Noted: 2020-09-15

## 2023-12-29 PROBLEM — A41.9 SEPSIS WITHOUT ACUTE ORGAN DYSFUNCTION (HCC): Status: ACTIVE | Noted: 2020-09-25

## 2023-12-29 PROBLEM — M86.9 OSTEOMYELITIS OF ANKLE AND FOOT (HCC): Status: ACTIVE | Noted: 2020-10-07

## 2023-12-29 PROBLEM — J44.9 COPD (CHRONIC OBSTRUCTIVE PULMONARY DISEASE) (HCC): Status: ACTIVE | Noted: 2020-10-07

## 2023-12-29 PROBLEM — G35 HISTORY OF MULTIPLE SCLEROSIS (HCC): Status: ACTIVE | Noted: 2022-03-03

## 2023-12-29 PROBLEM — Z89.511 HX OF RIGHT BKA (HCC): Status: ACTIVE | Noted: 2020-10-14

## 2023-12-29 PROBLEM — F19.10 POLYSUBSTANCE ABUSE (HCC): Status: ACTIVE | Noted: 2023-04-20

## 2023-12-29 LAB
ALBUMIN SERPL-MCNC: 3.1 G/DL (ref 3.5–5)
ALBUMIN/GLOB SERPL: 0.8 (ref 1.1–2.2)
ALP SERPL-CCNC: 115 U/L (ref 45–117)
ALT SERPL-CCNC: 37 U/L (ref 12–78)
ANION GAP BLD CALC-SCNC: 8 (ref 10–20)
ANION GAP SERPL CALC-SCNC: 10 MMOL/L (ref 5–15)
APPEARANCE UR: CLEAR
AST SERPL-CCNC: 62 U/L (ref 15–37)
BACTERIA URNS QL MICRO: NEGATIVE /HPF
BASOPHILS # BLD: 0.1 K/UL (ref 0–0.1)
BASOPHILS NFR BLD: 2 % (ref 0–1)
BILIRUB SERPL-MCNC: 0.5 MG/DL (ref 0.2–1)
BILIRUB UR QL: NEGATIVE
BUN SERPL-MCNC: 10 MG/DL (ref 6–20)
BUN/CREAT SERPL: 17 (ref 12–20)
CA-I BLD-MCNC: 1.15 MMOL/L (ref 1.12–1.32)
CALCIUM SERPL-MCNC: 9.1 MG/DL (ref 8.5–10.1)
CHLORIDE BLD-SCNC: 104 MMOL/L (ref 100–108)
CHLORIDE SERPL-SCNC: 97 MMOL/L (ref 97–108)
CO2 BLD-SCNC: 26 MMOL/L (ref 19–24)
CO2 SERPL-SCNC: 27 MMOL/L (ref 21–32)
COLOR UR: ABNORMAL
CREAT SERPL-MCNC: 0.58 MG/DL (ref 0.7–1.3)
CREAT UR-MCNC: 0.5 MG/DL (ref 0.6–1.3)
DIFFERENTIAL METHOD BLD: ABNORMAL
EOSINOPHIL # BLD: 0.1 K/UL (ref 0–0.4)
EOSINOPHIL NFR BLD: 2 % (ref 0–7)
EPITH CASTS URNS QL MICRO: ABNORMAL /LPF
ERYTHROCYTE [DISTWIDTH] IN BLOOD BY AUTOMATED COUNT: 13.2 % (ref 11.5–14.5)
FLUAV RNA SPEC QL NAA+PROBE: NOT DETECTED
FLUBV RNA SPEC QL NAA+PROBE: NOT DETECTED
GLOBULIN SER CALC-MCNC: 4.1 G/DL (ref 2–4)
GLUCOSE BLD STRIP.AUTO-MCNC: 69 MG/DL (ref 74–106)
GLUCOSE BLD STRIP.AUTO-MCNC: 73 MG/DL (ref 65–117)
GLUCOSE SERPL-MCNC: 74 MG/DL (ref 65–100)
GLUCOSE UR STRIP.AUTO-MCNC: NEGATIVE MG/DL
HCT VFR BLD AUTO: 40.9 % (ref 36.6–50.3)
HGB BLD-MCNC: 13.3 G/DL (ref 12.1–17)
HGB UR QL STRIP: NEGATIVE
IMM GRANULOCYTES # BLD AUTO: 0 K/UL (ref 0–0.04)
IMM GRANULOCYTES NFR BLD AUTO: 0 % (ref 0–0.5)
KETONES UR QL STRIP.AUTO: ABNORMAL MG/DL
LACTATE BLD-SCNC: 1.59 MMOL/L (ref 0.4–2)
LEUKOCYTE ESTERASE UR QL STRIP.AUTO: NEGATIVE
LYMPHOCYTES # BLD: 0.9 K/UL (ref 0.8–3.5)
LYMPHOCYTES NFR BLD: 16 % (ref 12–49)
MCH RBC QN AUTO: 28.9 PG (ref 26–34)
MCHC RBC AUTO-ENTMCNC: 32.5 G/DL (ref 30–36.5)
MCV RBC AUTO: 88.9 FL (ref 80–99)
MONOCYTES # BLD: 0.4 K/UL (ref 0–1)
MONOCYTES NFR BLD: 7 % (ref 5–13)
MUCOUS THREADS URNS QL MICRO: ABNORMAL /LPF
NEUTS SEG # BLD: 4.3 K/UL (ref 1.8–8)
NEUTS SEG NFR BLD: 73 % (ref 32–75)
NITRITE UR QL STRIP.AUTO: NEGATIVE
NRBC # BLD: 0 K/UL (ref 0–0.01)
NRBC BLD-RTO: 0 PER 100 WBC
PH UR STRIP: 5.5 (ref 5–8)
PLATELET # BLD AUTO: 171 K/UL (ref 150–400)
PMV BLD AUTO: 10.5 FL (ref 8.9–12.9)
POTASSIUM BLD-SCNC: 4.6 MMOL/L (ref 3.5–5.5)
POTASSIUM SERPL-SCNC: 4.5 MMOL/L (ref 3.5–5.1)
PROCALCITONIN SERPL-MCNC: <0.05 NG/ML
PROT SERPL-MCNC: 7.2 G/DL (ref 6.4–8.2)
PROT UR STRIP-MCNC: NEGATIVE MG/DL
RBC # BLD AUTO: 4.6 M/UL (ref 4.1–5.7)
RBC #/AREA URNS HPF: ABNORMAL /HPF (ref 0–5)
SARS-COV-2 RNA RESP QL NAA+PROBE: NOT DETECTED
SERVICE CMNT-IMP: ABNORMAL
SERVICE CMNT-IMP: NORMAL
SODIUM BLD-SCNC: 138 MMOL/L (ref 136–145)
SODIUM SERPL-SCNC: 134 MMOL/L (ref 136–145)
SP GR UR REFRACTOMETRY: 1.01
SPECIMEN SITE: ABNORMAL
TROPONIN I SERPL HS-MCNC: 7 NG/L (ref 0–76)
URINE CULTURE IF INDICATED: ABNORMAL
UROBILINOGEN UR QL STRIP.AUTO: 0.2 EU/DL (ref 0.2–1)
WBC # BLD AUTO: 5.8 K/UL (ref 4.1–11.1)
WBC URNS QL MICRO: ABNORMAL /HPF (ref 0–4)

## 2023-12-29 PROCEDURE — 87186 SC STD MICRODIL/AGAR DIL: CPT

## 2023-12-29 PROCEDURE — 36415 COLL VENOUS BLD VENIPUNCTURE: CPT

## 2023-12-29 PROCEDURE — 85025 COMPLETE CBC W/AUTO DIFF WBC: CPT

## 2023-12-29 PROCEDURE — 2580000003 HC RX 258: Performed by: NURSE PRACTITIONER

## 2023-12-29 PROCEDURE — 81001 URINALYSIS AUTO W/SCOPE: CPT

## 2023-12-29 PROCEDURE — 73620 X-RAY EXAM OF FOOT: CPT

## 2023-12-29 PROCEDURE — 6370000000 HC RX 637 (ALT 250 FOR IP): Performed by: NURSE PRACTITIONER

## 2023-12-29 PROCEDURE — 6360000002 HC RX W HCPCS: Performed by: NURSE PRACTITIONER

## 2023-12-29 PROCEDURE — 71045 X-RAY EXAM CHEST 1 VIEW: CPT

## 2023-12-29 PROCEDURE — 87154 CUL TYP ID BLD PTHGN 6+ TRGT: CPT

## 2023-12-29 PROCEDURE — 87040 BLOOD CULTURE FOR BACTERIA: CPT

## 2023-12-29 PROCEDURE — 83605 ASSAY OF LACTIC ACID: CPT

## 2023-12-29 PROCEDURE — 87077 CULTURE AEROBIC IDENTIFY: CPT

## 2023-12-29 PROCEDURE — 96365 THER/PROPH/DIAG IV INF INIT: CPT

## 2023-12-29 PROCEDURE — 80053 COMPREHEN METABOLIC PANEL: CPT

## 2023-12-29 PROCEDURE — 87636 SARSCOV2 & INF A&B AMP PRB: CPT

## 2023-12-29 PROCEDURE — 6360000002 HC RX W HCPCS: Performed by: STUDENT IN AN ORGANIZED HEALTH CARE EDUCATION/TRAINING PROGRAM

## 2023-12-29 PROCEDURE — 84145 PROCALCITONIN (PCT): CPT

## 2023-12-29 PROCEDURE — 2580000003 HC RX 258: Performed by: STUDENT IN AN ORGANIZED HEALTH CARE EDUCATION/TRAINING PROGRAM

## 2023-12-29 PROCEDURE — 84484 ASSAY OF TROPONIN QUANT: CPT

## 2023-12-29 PROCEDURE — 96368 THER/DIAG CONCURRENT INF: CPT

## 2023-12-29 PROCEDURE — 99285 EMERGENCY DEPT VISIT HI MDM: CPT

## 2023-12-29 PROCEDURE — 96366 THER/PROPH/DIAG IV INF ADDON: CPT

## 2023-12-29 PROCEDURE — P9047 ALBUMIN (HUMAN), 25%, 50ML: HCPCS | Performed by: NURSE PRACTITIONER

## 2023-12-29 PROCEDURE — 82962 GLUCOSE BLOOD TEST: CPT

## 2023-12-29 PROCEDURE — 80047 BASIC METABLC PNL IONIZED CA: CPT

## 2023-12-29 RX ORDER — SODIUM CHLORIDE, SODIUM LACTATE, POTASSIUM CHLORIDE, CALCIUM CHLORIDE 600; 310; 30; 20 MG/100ML; MG/100ML; MG/100ML; MG/100ML
INJECTION, SOLUTION INTRAVENOUS CONTINUOUS
Status: DISCONTINUED | OUTPATIENT
Start: 2023-12-30 | End: 2023-12-30 | Stop reason: HOSPADM

## 2023-12-29 RX ORDER — ALBUMIN (HUMAN) 12.5 G/50ML
25 SOLUTION INTRAVENOUS ONCE
Status: COMPLETED | OUTPATIENT
Start: 2023-12-29 | End: 2023-12-30

## 2023-12-29 RX ORDER — CHLORDIAZEPOXIDE HYDROCHLORIDE 25 MG/1
25 CAPSULE, GELATIN COATED ORAL ONCE
Status: COMPLETED | OUTPATIENT
Start: 2023-12-29 | End: 2023-12-30

## 2023-12-29 RX ORDER — 0.9 % SODIUM CHLORIDE 0.9 %
30 INTRAVENOUS SOLUTION INTRAVENOUS ONCE
Status: COMPLETED | OUTPATIENT
Start: 2023-12-29 | End: 2023-12-30

## 2023-12-29 RX ORDER — 0.9 % SODIUM CHLORIDE 0.9 %
1000 INTRAVENOUS SOLUTION INTRAVENOUS ONCE
Status: DISCONTINUED | OUTPATIENT
Start: 2023-12-29 | End: 2023-12-29

## 2023-12-29 RX ORDER — HYDROCODONE BITARTRATE AND ACETAMINOPHEN 5; 325 MG/1; MG/1
1 TABLET ORAL
Status: COMPLETED | OUTPATIENT
Start: 2023-12-29 | End: 2023-12-29

## 2023-12-29 RX ADMIN — HYDROCODONE BITARTRATE AND ACETAMINOPHEN 1 TABLET: 5; 325 TABLET ORAL at 11:15

## 2023-12-29 RX ADMIN — ALBUMIN (HUMAN) 25 G: 0.25 INJECTION, SOLUTION INTRAVENOUS at 23:45

## 2023-12-29 RX ADMIN — CEFEPIME 2000 MG: 2 INJECTION, POWDER, FOR SOLUTION INTRAVENOUS at 12:59

## 2023-12-29 RX ADMIN — SODIUM CHLORIDE 2040 ML: 9 INJECTION, SOLUTION INTRAVENOUS at 12:53

## 2023-12-29 RX ADMIN — HYDROCODONE BITARTRATE AND ACETAMINOPHEN 1 TABLET: 5; 325 TABLET ORAL at 22:00

## 2023-12-29 RX ADMIN — VANCOMYCIN HYDROCHLORIDE 1750 MG: 1 INJECTION, POWDER, LYOPHILIZED, FOR SOLUTION INTRAVENOUS at 14:28

## 2023-12-29 RX ADMIN — CEFEPIME 2000 MG: 2 INJECTION, POWDER, FOR SOLUTION INTRAVENOUS at 21:51

## 2023-12-29 RX ADMIN — SODIUM CHLORIDE, POTASSIUM CHLORIDE, SODIUM LACTATE AND CALCIUM CHLORIDE: 600; 310; 30; 20 INJECTION, SOLUTION INTRAVENOUS at 23:28

## 2023-12-29 ASSESSMENT — PAIN DESCRIPTION - PAIN TYPE: TYPE: CHRONIC PAIN;DEEP SOMATIC PAIN

## 2023-12-29 ASSESSMENT — PAIN - FUNCTIONAL ASSESSMENT
PAIN_FUNCTIONAL_ASSESSMENT: 0-10
PAIN_FUNCTIONAL_ASSESSMENT: ACTIVITIES ARE NOT PREVENTED

## 2023-12-29 ASSESSMENT — PAIN DESCRIPTION - ORIENTATION: ORIENTATION: LEFT

## 2023-12-29 ASSESSMENT — PAIN DESCRIPTION - LOCATION: LOCATION: FOOT

## 2023-12-29 ASSESSMENT — PAIN SCALES - GENERAL: PAINLEVEL_OUTOF10: 10

## 2023-12-29 ASSESSMENT — PAIN DESCRIPTION - DESCRIPTORS: DESCRIPTORS: ACHING;SORE

## 2023-12-29 ASSESSMENT — PAIN DESCRIPTION - FREQUENCY: FREQUENCY: CONTINUOUS

## 2023-12-29 NOTE — CARE COORDINATION
CM reviewed pt's chart. Pt to be transferred to another medical facility for inpatient care. CM to follow for discharge from the ED.     Porsche Mabry, [address] ,   [phone]

## 2023-12-29 NOTE — ED NOTES
Patient brought to ER by EMS with c/o chronic back pain.   Patient however arrives having soiled himself with stool, patient extremities

## 2023-12-29 NOTE — ED TRIAGE NOTES
Pt presents via [facility] d/t reported back pain. EMS reports that multiple bystanders called 911 because patient was on the ground with wheelchair overturned across the street from [facility].     On arrival, pt c/o left foot pain. Left foot is wrapped in soiled dressing. Pt is covered in feces. Pt bilat hands are purple in color. Cap refill is greater than 3 seconds. Unable to get a proper pulse ox reading. BG en route was 85. Pt is BKA of the right lower extremity.

## 2023-12-30 ENCOUNTER — HOSPITAL ENCOUNTER (INPATIENT)
Facility: HOSPITAL | Age: 68
LOS: 12 days | Discharge: NURSING, REHAB OR LONG-TERM CARE | DRG: 710 | End: 2024-01-11
Attending: STUDENT IN AN ORGANIZED HEALTH CARE EDUCATION/TRAINING PROGRAM | Admitting: GENERAL ACUTE CARE HOSPITAL
Payer: MEDICAID

## 2023-12-30 ENCOUNTER — APPOINTMENT (OUTPATIENT)
Facility: HOSPITAL | Age: 68
DRG: 710 | End: 2023-12-30
Attending: STUDENT IN AN ORGANIZED HEALTH CARE EDUCATION/TRAINING PROGRAM
Payer: MEDICAID

## 2023-12-30 VITALS
RESPIRATION RATE: 19 BRPM | OXYGEN SATURATION: 95 % | SYSTOLIC BLOOD PRESSURE: 102 MMHG | DIASTOLIC BLOOD PRESSURE: 56 MMHG | BODY MASS INDEX: 22.22 KG/M2 | HEART RATE: 93 BPM | WEIGHT: 150 LBS | TEMPERATURE: 98.9 F | HEIGHT: 69 IN

## 2023-12-30 DIAGNOSIS — M86.9 OSTEOMYELITIS OF FOOT, UNSPECIFIED LATERALITY, UNSPECIFIED TYPE (HCC): ICD-10-CM

## 2023-12-30 DIAGNOSIS — R60.0 BILATERAL LEG EDEMA: ICD-10-CM

## 2023-12-30 DIAGNOSIS — I99.8 ISCHEMIA OF FOOT: Primary | ICD-10-CM

## 2023-12-30 PROBLEM — R78.81 BACTEREMIA: Status: ACTIVE | Noted: 2023-12-30

## 2023-12-30 LAB
ACCESSION NUMBER, LLC1M: ABNORMAL
ACINETOBACTER CALCOAC BAUMANNII COMPLEX BY PCR: DETECTED
B FRAGILIS DNA BLD POS QL NAA+NON-PROBE: NOT DETECTED
BIOFIRE TEST COMMENT: ABNORMAL
BLACTX-M ISLT/SPM QL: NOT DETECTED
BLAIMP ISLT/SPM QL: NOT DETECTED
BLAKPC BLD POS QL NAA+NON-PROBE: NOT DETECTED
BLAVIM ISLT/SPM QL: NOT DETECTED
C ALBICANS DNA BLD POS QL NAA+NON-PROBE: NOT DETECTED
C AURIS DNA BLD POS QL NAA+NON-PROBE: NOT DETECTED
C GATTII+NEOFOR DNA BLD POS QL NAA+N-PRB: NOT DETECTED
C GLABRATA DNA BLD POS QL NAA+NON-PROBE: NOT DETECTED
C KRUSEI DNA BLD POS QL NAA+NON-PROBE: NOT DETECTED
C PARAP DNA BLD POS QL NAA+NON-PROBE: NOT DETECTED
C TROPICLS DNA BLD POS QL NAA+NON-PROBE: NOT DETECTED
E CLOAC COMP DNA BLD POS NAA+NON-PROBE: NOT DETECTED
E COLI DNA BLD POS QL NAA+NON-PROBE: NOT DETECTED
E FAECALIS DNA BLD POS QL NAA+NON-PROBE: DETECTED
E FAECIUM DNA BLD POS QL NAA+NON-PROBE: DETECTED
ENTEROBACTERALES DNA BLD POS NAA+N-PRB: NOT DETECTED
GLUCOSE BLD STRIP.AUTO-MCNC: 73 MG/DL (ref 65–117)
GP B STREP DNA BLD POS QL NAA+NON-PROBE: NOT DETECTED
HAEM INFLU DNA BLD POS QL NAA+NON-PROBE: NOT DETECTED
K OXYTOCA DNA BLD POS QL NAA+NON-PROBE: NOT DETECTED
KLEBSIELLA SP DNA BLD POS QL NAA+NON-PRB: NOT DETECTED
KLEBSIELLA SP DNA BLD POS QL NAA+NON-PRB: NOT DETECTED
L MONOCYTOG DNA BLD POS QL NAA+NON-PROBE: NOT DETECTED
MECA+MECC ISLT/SPM QL: DETECTED
N MEN DNA BLD POS QL NAA+NON-PROBE: NOT DETECTED
P AERUGINOSA DNA BLD POS NAA+NON-PROBE: NOT DETECTED
PROTEUS SP DNA BLD POS QL NAA+NON-PROBE: NOT DETECTED
RESISTANT GENE NDM BY PCR: NOT DETECTED
RESISTANT GENE TARGETS: ABNORMAL
S AUREUS DNA BLD POS QL NAA+NON-PROBE: NOT DETECTED
S AUREUS+CONS DNA BLD POS NAA+NON-PROBE: DETECTED
S EPIDERMIDIS DNA BLD POS QL NAA+NON-PRB: DETECTED
S LUGDUNENSIS DNA BLD POS QL NAA+NON-PRB: NOT DETECTED
S MALTOPHILIA DNA BLD POS QL NAA+NON-PRB: NOT DETECTED
S MARCESCENS DNA BLD POS NAA+NON-PROBE: NOT DETECTED
S PNEUM DNA BLD POS QL NAA+NON-PROBE: NOT DETECTED
S PYO DNA BLD POS QL NAA+NON-PROBE: NOT DETECTED
SALMONELLA DNA BLD POS QL NAA+NON-PROBE: NOT DETECTED
SERVICE CMNT-IMP: NORMAL
STREPTOCOCCUS DNA BLD POS NAA+NON-PROBE: NOT DETECTED
VANA+VANB BLD POS QL NAA+NON-PROBE: NOT DETECTED

## 2023-12-30 PROCEDURE — 97535 SELF CARE MNGMENT TRAINING: CPT

## 2023-12-30 PROCEDURE — 6360000002 HC RX W HCPCS: Performed by: GENERAL ACUTE CARE HOSPITAL

## 2023-12-30 PROCEDURE — 6370000000 HC RX 637 (ALT 250 FOR IP): Performed by: GENERAL ACUTE CARE HOSPITAL

## 2023-12-30 PROCEDURE — 97161 PT EVAL LOW COMPLEX 20 MIN: CPT

## 2023-12-30 PROCEDURE — 2060000000 HC ICU INTERMEDIATE R&B

## 2023-12-30 PROCEDURE — 6360000002 HC RX W HCPCS: Performed by: NURSE PRACTITIONER

## 2023-12-30 PROCEDURE — 2580000003 HC RX 258: Performed by: NURSE PRACTITIONER

## 2023-12-30 PROCEDURE — 97166 OT EVAL MOD COMPLEX 45 MIN: CPT

## 2023-12-30 PROCEDURE — 87040 BLOOD CULTURE FOR BACTERIA: CPT

## 2023-12-30 PROCEDURE — 36415 COLL VENOUS BLD VENIPUNCTURE: CPT

## 2023-12-30 PROCEDURE — 73130 X-RAY EXAM OF HAND: CPT

## 2023-12-30 PROCEDURE — 2580000003 HC RX 258: Performed by: GENERAL ACUTE CARE HOSPITAL

## 2023-12-30 PROCEDURE — 84145 PROCALCITONIN (PCT): CPT

## 2023-12-30 PROCEDURE — 6370000000 HC RX 637 (ALT 250 FOR IP): Performed by: STUDENT IN AN ORGANIZED HEALTH CARE EDUCATION/TRAINING PROGRAM

## 2023-12-30 PROCEDURE — 82962 GLUCOSE BLOOD TEST: CPT

## 2023-12-30 PROCEDURE — 83605 ASSAY OF LACTIC ACID: CPT

## 2023-12-30 PROCEDURE — 93970 EXTREMITY STUDY: CPT

## 2023-12-30 RX ORDER — SODIUM CHLORIDE 0.9 % (FLUSH) 0.9 %
5-40 SYRINGE (ML) INJECTION EVERY 12 HOURS SCHEDULED
Status: DISCONTINUED | OUTPATIENT
Start: 2023-12-30 | End: 2024-01-04 | Stop reason: SDUPTHER

## 2023-12-30 RX ORDER — MAGNESIUM SULFATE IN WATER 40 MG/ML
2000 INJECTION, SOLUTION INTRAVENOUS PRN
Status: DISCONTINUED | OUTPATIENT
Start: 2023-12-30 | End: 2024-01-11 | Stop reason: HOSPADM

## 2023-12-30 RX ORDER — ONDANSETRON 4 MG/1
4 TABLET, ORALLY DISINTEGRATING ORAL EVERY 8 HOURS PRN
Status: DISCONTINUED | OUTPATIENT
Start: 2023-12-30 | End: 2024-01-11 | Stop reason: HOSPADM

## 2023-12-30 RX ORDER — SODIUM CHLORIDE 0.9 % (FLUSH) 0.9 %
5-40 SYRINGE (ML) INJECTION EVERY 12 HOURS SCHEDULED
Status: DISCONTINUED | OUTPATIENT
Start: 2023-12-30 | End: 2024-01-11 | Stop reason: HOSPADM

## 2023-12-30 RX ORDER — SODIUM CHLORIDE 9 MG/ML
INJECTION, SOLUTION INTRAVENOUS PRN
Status: DISCONTINUED | OUTPATIENT
Start: 2023-12-30 | End: 2024-01-11 | Stop reason: HOSPADM

## 2023-12-30 RX ORDER — SODIUM CHLORIDE 0.9 % (FLUSH) 0.9 %
5-40 SYRINGE (ML) INJECTION PRN
Status: DISCONTINUED | OUTPATIENT
Start: 2023-12-30 | End: 2024-01-11 | Stop reason: HOSPADM

## 2023-12-30 RX ORDER — POTASSIUM CHLORIDE 7.45 MG/ML
10 INJECTION INTRAVENOUS PRN
Status: DISCONTINUED | OUTPATIENT
Start: 2023-12-30 | End: 2024-01-11 | Stop reason: HOSPADM

## 2023-12-30 RX ORDER — SODIUM CHLORIDE 9 MG/ML
INJECTION, SOLUTION INTRAVENOUS CONTINUOUS
Status: ACTIVE | OUTPATIENT
Start: 2023-12-30 | End: 2023-12-31

## 2023-12-30 RX ORDER — POLYETHYLENE GLYCOL 3350 17 G/17G
17 POWDER, FOR SOLUTION ORAL DAILY PRN
Status: DISCONTINUED | OUTPATIENT
Start: 2023-12-30 | End: 2024-01-11 | Stop reason: HOSPADM

## 2023-12-30 RX ORDER — ACETAMINOPHEN 325 MG/1
650 TABLET ORAL EVERY 6 HOURS PRN
Status: DISCONTINUED | OUTPATIENT
Start: 2023-12-30 | End: 2024-01-11 | Stop reason: HOSPADM

## 2023-12-30 RX ORDER — LORAZEPAM 1 MG/1
4 TABLET ORAL
Status: DISCONTINUED | OUTPATIENT
Start: 2023-12-30 | End: 2024-01-11 | Stop reason: HOSPADM

## 2023-12-30 RX ORDER — ACETAMINOPHEN 650 MG/1
650 SUPPOSITORY RECTAL EVERY 6 HOURS PRN
Status: DISCONTINUED | OUTPATIENT
Start: 2023-12-30 | End: 2024-01-11 | Stop reason: HOSPADM

## 2023-12-30 RX ORDER — LORAZEPAM 1 MG/1
2 TABLET ORAL
Status: DISCONTINUED | OUTPATIENT
Start: 2023-12-30 | End: 2024-01-11 | Stop reason: HOSPADM

## 2023-12-30 RX ORDER — ONDANSETRON 2 MG/ML
4 INJECTION INTRAMUSCULAR; INTRAVENOUS EVERY 6 HOURS PRN
Status: DISCONTINUED | OUTPATIENT
Start: 2023-12-30 | End: 2024-01-11 | Stop reason: HOSPADM

## 2023-12-30 RX ORDER — LORAZEPAM 1 MG/1
3 TABLET ORAL
Status: DISCONTINUED | OUTPATIENT
Start: 2023-12-30 | End: 2024-01-11 | Stop reason: HOSPADM

## 2023-12-30 RX ORDER — ATORVASTATIN CALCIUM 40 MG/1
40 TABLET, FILM COATED ORAL NIGHTLY
Status: DISCONTINUED | OUTPATIENT
Start: 2023-12-30 | End: 2024-01-11 | Stop reason: HOSPADM

## 2023-12-30 RX ORDER — DIAZEPAM 5 MG/ML
5 INJECTION, SOLUTION INTRAMUSCULAR; INTRAVENOUS EVERY 4 HOURS PRN
Status: DISCONTINUED | OUTPATIENT
Start: 2023-12-30 | End: 2024-01-11 | Stop reason: HOSPADM

## 2023-12-30 RX ORDER — POTASSIUM CHLORIDE 20 MEQ/1
40 TABLET, EXTENDED RELEASE ORAL PRN
Status: DISCONTINUED | OUTPATIENT
Start: 2023-12-30 | End: 2024-01-11 | Stop reason: HOSPADM

## 2023-12-30 RX ORDER — LORAZEPAM 0.5 MG/1
0.5 TABLET ORAL EVERY 4 HOURS PRN
Status: DISCONTINUED | OUTPATIENT
Start: 2023-12-30 | End: 2024-01-11 | Stop reason: HOSPADM

## 2023-12-30 RX ORDER — IPRATROPIUM BROMIDE AND ALBUTEROL SULFATE 2.5; .5 MG/3ML; MG/3ML
1 SOLUTION RESPIRATORY (INHALATION) EVERY 4 HOURS PRN
Status: DISCONTINUED | OUTPATIENT
Start: 2023-12-30 | End: 2024-01-11 | Stop reason: HOSPADM

## 2023-12-30 RX ORDER — LORAZEPAM 1 MG/1
1 TABLET ORAL
Status: DISCONTINUED | OUTPATIENT
Start: 2023-12-30 | End: 2024-01-11 | Stop reason: HOSPADM

## 2023-12-30 RX ORDER — GAUZE BANDAGE 2" X 2"
100 BANDAGE TOPICAL DAILY
Status: DISCONTINUED | OUTPATIENT
Start: 2023-12-30 | End: 2024-01-11 | Stop reason: HOSPADM

## 2023-12-30 RX ORDER — FOLIC ACID 1 MG/1
1 TABLET ORAL DAILY
Status: DISCONTINUED | OUTPATIENT
Start: 2023-12-30 | End: 2024-01-11 | Stop reason: HOSPADM

## 2023-12-30 RX ORDER — ENOXAPARIN SODIUM 100 MG/ML
40 INJECTION SUBCUTANEOUS DAILY
Status: DISCONTINUED | OUTPATIENT
Start: 2023-12-30 | End: 2024-01-11 | Stop reason: HOSPADM

## 2023-12-30 RX ORDER — MORPHINE SULFATE 2 MG/ML
1 INJECTION, SOLUTION INTRAMUSCULAR; INTRAVENOUS EVERY 4 HOURS PRN
Status: DISCONTINUED | OUTPATIENT
Start: 2023-12-30 | End: 2024-01-03

## 2023-12-30 RX ORDER — OXYCODONE HYDROCHLORIDE 5 MG/1
5 TABLET ORAL EVERY 4 HOURS PRN
Status: DISCONTINUED | OUTPATIENT
Start: 2023-12-30 | End: 2024-01-11 | Stop reason: HOSPADM

## 2023-12-30 RX ADMIN — ATORVASTATIN CALCIUM 40 MG: 40 TABLET, FILM COATED ORAL at 21:03

## 2023-12-30 RX ADMIN — MEROPENEM 1000 MG: 1 INJECTION, POWDER, FOR SOLUTION INTRAVENOUS at 12:50

## 2023-12-30 RX ADMIN — LORAZEPAM 3 MG: 1 TABLET ORAL at 12:06

## 2023-12-30 RX ADMIN — MEROPENEM 1000 MG: 1 INJECTION, POWDER, FOR SOLUTION INTRAVENOUS at 19:01

## 2023-12-30 RX ADMIN — LORAZEPAM 2 MG: 1 TABLET ORAL at 21:03

## 2023-12-30 RX ADMIN — VANCOMYCIN HYDROCHLORIDE 1250 MG: 10 INJECTION, POWDER, LYOPHILIZED, FOR SOLUTION INTRAVENOUS at 13:31

## 2023-12-30 RX ADMIN — SODIUM CHLORIDE, PRESERVATIVE FREE 10 ML: 5 INJECTION INTRAVENOUS at 19:02

## 2023-12-30 RX ADMIN — Medication 100 MG: at 12:06

## 2023-12-30 RX ADMIN — CHLORDIAZEPOXIDE HYDROCHLORIDE 25 MG: 25 CAPSULE ORAL at 00:14

## 2023-12-30 RX ADMIN — FOLIC ACID 1 MG: 1 TABLET ORAL at 12:06

## 2023-12-30 RX ADMIN — VANCOMYCIN HYDROCHLORIDE 1250 MG: 1.25 INJECTION, POWDER, LYOPHILIZED, FOR SOLUTION INTRAVENOUS at 03:46

## 2023-12-30 RX ADMIN — VANCOMYCIN HYDROCHLORIDE 1250 MG: 10 INJECTION, POWDER, LYOPHILIZED, FOR SOLUTION INTRAVENOUS at 23:59

## 2023-12-30 RX ADMIN — SODIUM CHLORIDE: 9 INJECTION, SOLUTION INTRAVENOUS at 12:49

## 2023-12-30 RX ADMIN — ACETAMINOPHEN 650 MG: 325 TABLET ORAL at 12:06

## 2023-12-30 ASSESSMENT — PAIN DESCRIPTION - PAIN TYPE: TYPE: CHRONIC PAIN

## 2023-12-30 ASSESSMENT — PAIN SCALES - GENERAL
PAINLEVEL_OUTOF10: 8
PAINLEVEL_OUTOF10: 8
PAINLEVEL_OUTOF10: 5

## 2023-12-30 ASSESSMENT — PAIN DESCRIPTION - LOCATION
LOCATION: FOOT

## 2023-12-30 ASSESSMENT — PAIN DESCRIPTION - FREQUENCY
FREQUENCY: CONTINUOUS
FREQUENCY: CONTINUOUS

## 2023-12-30 ASSESSMENT — PAIN DESCRIPTION - ORIENTATION
ORIENTATION: LEFT

## 2023-12-30 ASSESSMENT — PAIN DESCRIPTION - DESCRIPTORS: DESCRIPTORS: ACHING

## 2023-12-30 NOTE — ED NOTES
TRANSFER - OUT REPORT:    Verbal report given to RN on Danis Bloom  being transferred to ED [facility] for routine progression of patient care       Report consisted of patient's Situation, Background, Assessment and   Recommendations(SBAR). Information from the following report(s) ED SBAR was reviewed with the receiving nurse. [place] Assessment:                           Lines:   Peripheral IV 12/29/23 Distal;Left; Anterior Cephalic (Active)   Site Assessment Clean, dry & intact 12/29/23 1223   Line Status Blood return noted; Flushed 12/29/23 1223   Phlebitis Assessment No symptoms 12/29/23 1223   Infiltration Assessment 0 12/29/23 1223   Dressing Status New dressing applied;Clean, dry & intact 12/29/23 1223   Dressing Type Transparent 12/29/23 1223        Opportunity for questions and clarification was provided.       Patient transported with:  Monitor, O2 @ 2lpm, and Tech

## 2023-12-30 NOTE — H&P
Hospitalist Admission Note    NAME:   Baldomero Reece   : 1955   MRN: [id number]     Date/Time: 2023 10:47 AM    Patient PCP: None, None    ______________________________________________________________________  Given the patient's current clinical presentation, I have a high level of concern for decompensation if discharged from the emergency department.  Complex decision making was performed, which includes reviewing the patient's available past medical records, laboratory results, and x-ray films.       My assessment of this patient's clinical condition and my plan of care is as follows.    Assessment / Plan:    Bacteremia  L Foot infection/ischemia    Right hand swelling/redness/tenderness, ? Septic arthritis   ? 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  Keep n.p.o. in case going for amputation of left foot  IV fluids, gentle hydration given history of cirrhosis  Monitor renal function  Stat labs  CIWA protocol, folate, B1 supplement   [place]/morphine for pain control  Patient not on antiplatelet given recent brain bleed.  Venous duplex of left lower extremity given history of DVT  DuoNeb as needed given history of COPD and active smoker  Wound care consult        Medical Decision Making:   I personally reviewed labs: yes as below   I personally reviewed imaging reports:yes as below   Toxic drug monitoring: Monitor renal function while on antibiotics  Discussed case with: ED provider. After discussion I am in agreement that acuity of patient's medical condition

## 2023-12-30 NOTE — ED PROVIDER NOTES
[facility] EMERGENCY DEPT  EMERGENCY DEPARTMENT ENCOUNTER       Pt Name: Emi Oakley  MRN: [id number]  [address] [facility] 1955  Date of evaluation: 12/29/2023  Provider: ZULAY Karimi NP   PCP: None, None  Note Started: 8:31 PM 12/29/23     CHIEF COMPLAINT       Chief Complaint   Patient presents with    Cold Exposure    Foot Pain        HISTORY OF PRESENT ILLNESS: 1 or more elements      History Provided by:  Patient and EMS    History is limited by: Nothing     Emi Oakley is a 79 y.o. male who presents by EMS to the Emergency department. Patient was found outside of [facility] on the ground with a wheelchair overturned patient was soiled and clothing wet. Patient was alert and oriented. Patient complaining of back pain which she states she has all the time and of pain in his left foot. Nursing Notes were all reviewed and agreed with or any disagreements were addressed in the HPI. REVIEW OF SYSTEMS      Review of Systems   Constitutional:  Negative for fever. HENT:  Negative for congestion. Eyes:  Negative for visual disturbance. Respiratory:  Negative for shortness of breath. Cardiovascular:  Negative for chest pain. Gastrointestinal:  Negative for abdominal pain. Genitourinary:  Negative for difficulty urinating. Musculoskeletal:  Positive for back pain. Negative for neck pain. Arthralgias: foot pain. Skin:  Negative for rash. Wound foot   Neurological:  Negative for weakness. All other systems reviewed and are negative. Positives and Pertinent negatives as per HPI. PAST HISTORY     Past Medical History:  No past medical history on file. Past Surgical History:  Past Surgical History:   Procedure Laterality Date    LEG AMPUTATION BELOW KNEE Right     LOWER EXTREMITY AMPUTATION Left     transmetatarsal       Family History:  No family history on file.     Social History:  Social History     Tobacco Use    Smoking status: Every Day     Types: Cigarettes chloride 0.9 % 500 mL IVPB (1,750 mg IntraVENous New Bag 12/29/23 8477)       CONSULTS: (Who and What was discussed)  None    Chronic Conditions: Right BKA hypertensive heart disease polysubstance abuse alcoholic cirrhosis    ADDITIONAL CONSIDERATIONS:  Patient is homeless impaired mobility right BKA    RECORDS REVIEWED: Nursing Notes and Old Medical Records    CC/HPI Summary, DDx, ED Course, and Reassessment:   68-year-old male presents by ambulance to the emergency department found lying in his wheelchair with the wheelchair on the ground alert and oriented when EMS arrival patient's close covered in feces wet.  Patient complaining of back pain which she states he has all the time.  Physical exam patient lying on stretcher clothing covered in feces clothing is soaking wet.  Lungs are clear bilaterally abdomen is nontender multiple abrasions at abdomen friction rub abrasions on coccyx and sacrum superficial skin ulcer on medial malleolus patient right BKA.  Left foot toes are absent fifth toe is dangling from foot skin is thick foot is erythematous and cold to touch no pulses present.  There is erythema extending up into the leg.  Erythema also noted right forearm and right hand.  Suspect patient may be septic will order septic workup x-ray of foot concern for osteomyelitis patient will need Ortho podiatry for probable amputation of left foot and will need to be transferred.  Patient does understand plan of care and is cooperative.  I will start vancomycin and cefepime and reevaluate.  Attending has also evaluated patient. results chest x-ray right perihilar pneumonia.  Cefepime infusing.  Will call to transfer patient.  ED Course as of 12/30/23 1254   Fri Dec 29, 2023   1240 EKG interpreted by me. Shows sinus tach with a HR of 112. No ST elevations or depressions concerning for ischemia. L anterior fascicular block    [NM]   1321 Cxr right perihilar pneumonia [AN]   1521  I haveDiscussed test results.with

## 2023-12-30 NOTE — Clinical Note
Orthopedic consult  Seen with JUAN GOEL    67 yo we are asked to see over concern about septic arthritis right hand.    Pt was transferred today from [facility] due to lack of beds after ER presentation yesterday, due to declining function overall, worsening ulcers of the left remaining foot after prior R BKA, and the appearance of cellulitis of the hands R>L,. Blood cultures returned positive - bacteremia with risk of sepsis.     PMH: alcoholism, PVD, cirrhosis, CVA in 2021, osteomyelitis led to R BKA, L foot chronic ulcers being followed by podiatry, Hep C and active smoker.    Meds: chart is accurate, and IV Van was started today.    ROS: drowsy but denies significantly severe hand pains today,   c/o left foot pain, but uses it to move in bed and reposition the covers.    Exam:   Photos posted to chart appear well representative.    Cachetic male, drowsy, not easily awakened, then ignored much of our effort to question him, although seems not to be [place].    Hands: both are puffy and discolored to a dark red color diffusely, as though from chronic stasis more than the appearance of acute cellulitis, and this discoloration is without a clear demarcation from more normal flesh as the discoloration subsides at the mid forearm bilaterally. The right side is moderately edematous whereas the left is mild, yet both are equally moderately discolored to mid forearm.    He responds to painful stimulus appropriately as his drowsiness subsides during our encounter, yet in his hands there is no marked tenderness and there is no localization of the mild tenderness present diffusely, throughout both of his reddened hands.    Specific testing of individual joint and wrist motions and palpation for localizing tenderness raises no concerns regarding tense effusions at the joints, which does not support the possibility of infection contained or any need for surgery to aid in his acosta with infectious bacteremia.    His left foot has

## 2023-12-31 ENCOUNTER — APPOINTMENT (OUTPATIENT)
Facility: HOSPITAL | Age: 68
DRG: 710 | End: 2023-12-31
Attending: STUDENT IN AN ORGANIZED HEALTH CARE EDUCATION/TRAINING PROGRAM
Payer: MEDICAID

## 2023-12-31 LAB
ALBUMIN SERPL-MCNC: 2.6 G/DL (ref 3.5–5)
ALBUMIN/GLOB SERPL: 0.9 (ref 1.1–2.2)
ALP SERPL-CCNC: 76 U/L (ref 45–117)
ALT SERPL-CCNC: 27 U/L (ref 12–78)
ANION GAP SERPL CALC-SCNC: 6 MMOL/L (ref 5–15)
AST SERPL-CCNC: 37 U/L (ref 15–37)
BACTERIA SPEC CULT: ABNORMAL
BACTERIA SPEC CULT: NORMAL
BASOPHILS # BLD: 0 K/UL (ref 0–0.1)
BASOPHILS NFR BLD: 2 % (ref 0–1)
BILIRUB SERPL-MCNC: 0.8 MG/DL (ref 0.2–1)
BUN SERPL-MCNC: 6 MG/DL (ref 6–20)
BUN/CREAT SERPL: 17 (ref 12–20)
CALCIUM SERPL-MCNC: 8.7 MG/DL (ref 8.5–10.1)
CHLORIDE SERPL-SCNC: 111 MMOL/L (ref 97–108)
CO2 SERPL-SCNC: 23 MMOL/L (ref 21–32)
CREAT SERPL-MCNC: 0.35 MG/DL (ref 0.7–1.3)
DIFFERENTIAL METHOD BLD: ABNORMAL
ECHO BSA: 1.82 M2
EOSINOPHIL # BLD: 0.2 K/UL (ref 0–0.4)
EOSINOPHIL NFR BLD: 8 % (ref 0–7)
ERYTHROCYTE [DISTWIDTH] IN BLOOD BY AUTOMATED COUNT: 13.7 % (ref 11.5–14.5)
GLOBULIN SER CALC-MCNC: 2.9 G/DL (ref 2–4)
GLUCOSE SERPL-MCNC: 71 MG/DL (ref 65–100)
HCT VFR BLD AUTO: 35.1 % (ref 36.6–50.3)
HGB BLD-MCNC: 11.1 G/DL (ref 12.1–17)
IMM GRANULOCYTES # BLD AUTO: 0 K/UL (ref 0–0.04)
IMM GRANULOCYTES NFR BLD AUTO: 0 % (ref 0–0.5)
INR PPP: 1.1 (ref 0.9–1.1)
LACTATE SERPL-SCNC: 0.5 MMOL/L (ref 0.4–2)
LYMPHOCYTES # BLD: 0.7 K/UL (ref 0.8–3.5)
LYMPHOCYTES NFR BLD: 29 % (ref 12–49)
MAGNESIUM SERPL-MCNC: 1.4 MG/DL (ref 1.6–2.4)
MCH RBC QN AUTO: 29.1 PG (ref 26–34)
MCHC RBC AUTO-ENTMCNC: 31.6 G/DL (ref 30–36.5)
MCV RBC AUTO: 91.9 FL (ref 80–99)
MONOCYTES # BLD: 0.2 K/UL (ref 0–1)
MONOCYTES NFR BLD: 10 % (ref 5–13)
NEUTS SEG # BLD: 1.2 K/UL (ref 1.8–8)
NEUTS SEG NFR BLD: 51 % (ref 32–75)
NRBC # BLD: 0 K/UL (ref 0–0.01)
NRBC BLD-RTO: 0 PER 100 WBC
PLATELET # BLD AUTO: 112 K/UL (ref 150–400)
PMV BLD AUTO: 10.5 FL (ref 8.9–12.9)
POTASSIUM SERPL-SCNC: 3.5 MMOL/L (ref 3.5–5.1)
PROCALCITONIN SERPL-MCNC: <0.05 NG/ML
PROT SERPL-MCNC: 5.5 G/DL (ref 6.4–8.2)
PROTHROMBIN TIME: 11.3 SEC (ref 9–11.1)
RBC # BLD AUTO: 3.82 M/UL (ref 4.1–5.7)
RBC MORPH BLD: ABNORMAL
SERVICE CMNT-IMP: ABNORMAL
SERVICE CMNT-IMP: NORMAL
SODIUM SERPL-SCNC: 140 MMOL/L (ref 136–145)
URATE SERPL-MCNC: 5.1 MG/DL (ref 3.5–7.2)
VANCOMYCIN SERPL-MCNC: 15 UG/ML
WBC # BLD AUTO: 2.3 K/UL (ref 4.1–11.1)

## 2023-12-31 PROCEDURE — 6370000000 HC RX 637 (ALT 250 FOR IP): Performed by: GENERAL ACUTE CARE HOSPITAL

## 2023-12-31 PROCEDURE — 6360000002 HC RX W HCPCS: Performed by: GENERAL ACUTE CARE HOSPITAL

## 2023-12-31 PROCEDURE — 85025 COMPLETE CBC W/AUTO DIFF WBC: CPT

## 2023-12-31 PROCEDURE — 84550 ASSAY OF BLOOD/URIC ACID: CPT

## 2023-12-31 PROCEDURE — 80202 ASSAY OF VANCOMYCIN: CPT

## 2023-12-31 PROCEDURE — 2580000003 HC RX 258: Performed by: GENERAL ACUTE CARE HOSPITAL

## 2023-12-31 PROCEDURE — 85610 PROTHROMBIN TIME: CPT

## 2023-12-31 PROCEDURE — 83735 ASSAY OF MAGNESIUM: CPT

## 2023-12-31 PROCEDURE — 2060000000 HC ICU INTERMEDIATE R&B

## 2023-12-31 PROCEDURE — 80053 COMPREHEN METABOLIC PANEL: CPT

## 2023-12-31 RX ORDER — POTASSIUM CHLORIDE 750 MG/1
40 TABLET, FILM COATED, EXTENDED RELEASE ORAL ONCE
Status: COMPLETED | OUTPATIENT
Start: 2023-12-31 | End: 2023-12-31

## 2023-12-31 RX ORDER — MAGNESIUM SULFATE IN WATER 40 MG/ML
2000 INJECTION, SOLUTION INTRAVENOUS ONCE
Status: COMPLETED | OUTPATIENT
Start: 2023-12-31 | End: 2023-12-31

## 2023-12-31 RX ADMIN — MAGNESIUM SULFATE IN WATER FOR 2000 MG: 40 INJECTION INTRAVENOUS at 16:20

## 2023-12-31 RX ADMIN — LORAZEPAM 0.5 MG: 0.5 TABLET ORAL at 18:35

## 2023-12-31 RX ADMIN — MEROPENEM 1000 MG: 1 INJECTION, POWDER, FOR SOLUTION INTRAVENOUS at 18:37

## 2023-12-31 RX ADMIN — MEROPENEM 1000 MG: 1 INJECTION, POWDER, FOR SOLUTION INTRAVENOUS at 03:34

## 2023-12-31 RX ADMIN — LORAZEPAM 2 MG: 1 TABLET ORAL at 02:18

## 2023-12-31 RX ADMIN — FOLIC ACID 1 MG: 1 TABLET ORAL at 08:58

## 2023-12-31 RX ADMIN — VANCOMYCIN HYDROCHLORIDE 1250 MG: 10 INJECTION, POWDER, LYOPHILIZED, FOR SOLUTION INTRAVENOUS at 13:18

## 2023-12-31 RX ADMIN — MEROPENEM 1000 MG: 1 INJECTION, POWDER, FOR SOLUTION INTRAVENOUS at 12:16

## 2023-12-31 RX ADMIN — Medication 100 MG: at 08:58

## 2023-12-31 RX ADMIN — SODIUM CHLORIDE, PRESERVATIVE FREE 10 ML: 5 INJECTION INTRAVENOUS at 09:00

## 2023-12-31 RX ADMIN — VANCOMYCIN HYDROCHLORIDE 1250 MG: 10 INJECTION, POWDER, LYOPHILIZED, FOR SOLUTION INTRAVENOUS at 23:31

## 2023-12-31 RX ADMIN — POTASSIUM CHLORIDE 40 MEQ: 750 TABLET, FILM COATED, EXTENDED RELEASE ORAL at 16:23

## 2023-12-31 RX ADMIN — ATORVASTATIN CALCIUM 40 MG: 40 TABLET, FILM COATED ORAL at 21:04

## 2023-12-31 RX ADMIN — SODIUM CHLORIDE: 9 INJECTION, SOLUTION INTRAVENOUS at 23:30

## 2023-12-31 ASSESSMENT — PAIN DESCRIPTION - LOCATION: LOCATION: FOOT

## 2023-12-31 ASSESSMENT — PAIN DESCRIPTION - PAIN TYPE: TYPE: CHRONIC PAIN

## 2023-12-31 ASSESSMENT — PAIN SCALES - GENERAL
PAINLEVEL_OUTOF10: 0
PAINLEVEL_OUTOF10: 5
PAINLEVEL_OUTOF10: 0

## 2023-12-31 ASSESSMENT — PAIN DESCRIPTION - DESCRIPTORS: DESCRIPTORS: ACHING

## 2023-12-31 ASSESSMENT — PAIN DESCRIPTION - ORIENTATION: ORIENTATION: LEFT

## 2023-12-31 ASSESSMENT — PAIN DESCRIPTION - FREQUENCY: FREQUENCY: CONTINUOUS

## 2023-12-31 NOTE — CONSULTS
Vascular Surgery consult    Reason: Left foot wounds    HPI: Patient is a 67 yo homeless/poorly compliant gentleman with chronic left foot wounds and right bka.  He was found on the ground soiled and sent to [facility] and then transferred here.  He is s/p bka elsewhere.  He is unable to provide any history.    Patient Active Problem List   Diagnosis    Intraparenchymal hemorrhage of brain (HCC)    Brain bleed (HCC)    Alcoholic cirrhosis of liver without ascites (HCC)    Alcohol abuse    Osteomyelitis of ankle and foot (HCC)    COPD (chronic obstructive pulmonary disease) (HCC)    Hx of right BKA (HCC)    History of multiple sclerosis (HCC)    Hyperammonemia (HCC)    Impaired ambulation    Hypertensive heart disease without heart failure    Polysubstance abuse (HCC)    Sepsis without acute organ dysfunction (HCC)    Tachycardia    Bacteremia     No current facility-administered medications on file prior to encounter.     No current outpatient medications on file prior to encounter.     SH: +tob/EtOH    PE:  Vitals:    12/31/23 0330 12/31/23 0338 12/31/23 0730 12/31/23 1156   BP:  122/71 (!) 147/82 (!) 130/93   Pulse:  94  (!) 110   Resp:  18  21   Temp:  98.3 °F (36.8 °C)  98.2 °F (36.8 °C)   TempSrc:  Oral  Oral   SpO2:  95%  97%   Weight: 63 kg (139 lb)      Height: 1.753 m (5' 9\")        NAD  Ctab  RRR  Right bka site looks ok  Left foot with necrotic portions of toes (likely s/p some form of amputation)    Recent Results (from the past 24 hour(s))   POCT Glucose    Collection Time: 12/30/23  6:35 PM   Result Value Ref Range    POC Glucose 73 65 - 117 mg/dL    Performed by: Lang Merlin RN    Vascular duplex lower extremity venous bilateral    Collection Time: 12/30/23 10:09 PM   Result Value Ref Range    Body Surface Area 1.82 m2   Culture, Blood 2    Collection Time: 12/30/23 11:53 PM    Specimen: Blood   Result Value Ref Range    Special Requests RAC     Culture NO GROWTH AFTER 7 HOURS     Lactic Acid    Collection

## 2023-12-31 NOTE — CARE COORDINATION
Care Management Initial Assessment     RUR: 16%  Readmission? No  1st IM letter given? N/a  1st  letter given: N/a    69 YO White Male admitted on 12/30/23 for bacteremia. Hx of homelessness. Hx of IPR ([facility]). Uses manual w/c following R BKA. Offered Homeless Point of Entry in past, but requested to be discharged to the curb on 6/19/23. No Pharmacy listed on file at this time. Has [employer] insurance. PT/OT consults placed, pending at this time.     CM received call from Nursing requesting assistance with locating LNOK for pt as none listed on file and pt does not have capacity to consent for medical procedure at this time. CM received all medical chart/documentation notes. Per review, hx of homelessness. Several documentation encounters with CM's from prior hospitalizations but declined any family/friend/POC's that are documented in chart.     CM notified CM Manager & Team Leads upon return to office after holiday weekend to see if any possible attempts can be made to search/locate any LNOK.      12/31/23 1107   Service Assessment   Patient Orientation Unable to Assess   Cognition Severely Impaired   History Provided By Medical Record   Primary Caregiver Self   Support Systems None   Patient's Healthcare Decision Maker is: Patient Declined (Legal Next of Kin Remains as Decision Maker)  (None listed on file, listed in past encounters with patient, currently does not have capacity)   PCP Verified by CM No  (No PCP listed on file)   Prior Functional Level Other (see comment);Independent in ADLs/IADLs  (Unable to assess full baseline d/t capacity, likely [place] w/c)   Current Functional Level Other (see comment)  (Pending evaluations)   Can patient return to prior living arrangement Unknown at present  (Hx of Homelessness)   Family able to assist with home care needs: No   Would you like for me to discuss the discharge plan with any other family members/significant others, and if so, who?

## 2024-01-01 LAB
ERYTHROCYTE [DISTWIDTH] IN BLOOD BY AUTOMATED COUNT: 13.4 % (ref 11.5–14.5)
HCT VFR BLD AUTO: 32.2 % (ref 36.6–50.3)
HGB BLD-MCNC: 10.5 G/DL (ref 12.1–17)
MCH RBC QN AUTO: 29.7 PG (ref 26–34)
MCHC RBC AUTO-ENTMCNC: 32.6 G/DL (ref 30–36.5)
MCV RBC AUTO: 91.2 FL (ref 80–99)
NRBC # BLD: 0 K/UL (ref 0–0.01)
NRBC BLD-RTO: 0 PER 100 WBC
PLATELET # BLD AUTO: 123 K/UL (ref 150–400)
PMV BLD AUTO: 10.3 FL (ref 8.9–12.9)
RBC # BLD AUTO: 3.53 M/UL (ref 4.1–5.7)
WBC # BLD AUTO: 2.2 K/UL (ref 4.1–11.1)

## 2024-01-01 PROCEDURE — 80053 COMPREHEN METABOLIC PANEL: CPT

## 2024-01-01 PROCEDURE — 2060000000 HC ICU INTERMEDIATE R&B

## 2024-01-01 PROCEDURE — 83735 ASSAY OF MAGNESIUM: CPT

## 2024-01-01 PROCEDURE — 36415 COLL VENOUS BLD VENIPUNCTURE: CPT

## 2024-01-01 PROCEDURE — 6360000002 HC RX W HCPCS: Performed by: GENERAL ACUTE CARE HOSPITAL

## 2024-01-01 PROCEDURE — 2580000003 HC RX 258: Performed by: GENERAL ACUTE CARE HOSPITAL

## 2024-01-01 PROCEDURE — 85027 COMPLETE CBC AUTOMATED: CPT

## 2024-01-01 PROCEDURE — 6370000000 HC RX 637 (ALT 250 FOR IP): Performed by: GENERAL ACUTE CARE HOSPITAL

## 2024-01-01 RX ADMIN — SODIUM CHLORIDE, PRESERVATIVE FREE 10 ML: 5 INJECTION INTRAVENOUS at 09:23

## 2024-01-01 RX ADMIN — Medication 100 MG: at 09:22

## 2024-01-01 RX ADMIN — MEROPENEM 1000 MG: 1 INJECTION, POWDER, FOR SOLUTION INTRAVENOUS at 22:03

## 2024-01-01 RX ADMIN — MEROPENEM 1000 MG: 1 INJECTION, POWDER, FOR SOLUTION INTRAVENOUS at 11:37

## 2024-01-01 RX ADMIN — VANCOMYCIN HYDROCHLORIDE 1250 MG: 10 INJECTION, POWDER, LYOPHILIZED, FOR SOLUTION INTRAVENOUS at 12:37

## 2024-01-01 RX ADMIN — SODIUM CHLORIDE, PRESERVATIVE FREE 10 ML: 5 INJECTION INTRAVENOUS at 22:04

## 2024-01-01 RX ADMIN — MEROPENEM 1000 MG: 1 INJECTION, POWDER, FOR SOLUTION INTRAVENOUS at 02:52

## 2024-01-01 RX ADMIN — LORAZEPAM 1 MG: 1 TABLET ORAL at 12:29

## 2024-01-01 RX ADMIN — ATORVASTATIN CALCIUM 40 MG: 40 TABLET, FILM COATED ORAL at 22:03

## 2024-01-01 RX ADMIN — FOLIC ACID 1 MG: 1 TABLET ORAL at 09:22

## 2024-01-01 ASSESSMENT — PAIN SCALES - GENERAL
PAINLEVEL_OUTOF10: 2
PAINLEVEL_OUTOF10: 2

## 2024-01-01 ASSESSMENT — PAIN DESCRIPTION - LOCATION: LOCATION: HEAD

## 2024-01-02 ENCOUNTER — APPOINTMENT (OUTPATIENT)
Facility: HOSPITAL | Age: 69
DRG: 710 | End: 2024-01-02
Attending: SURGERY
Payer: MEDICAID

## 2024-01-02 ENCOUNTER — ANESTHESIA EVENT (OUTPATIENT)
Facility: HOSPITAL | Age: 69
End: 2024-01-02
Payer: MEDICAID

## 2024-01-02 ENCOUNTER — ANESTHESIA (OUTPATIENT)
Facility: HOSPITAL | Age: 69
End: 2024-01-02
Payer: MEDICAID

## 2024-01-02 LAB
ALBUMIN SERPL-MCNC: 2.5 G/DL (ref 3.5–5)
ALBUMIN/GLOB SERPL: 0.8 (ref 1.1–2.2)
ALP SERPL-CCNC: 67 U/L (ref 45–117)
ALT SERPL-CCNC: 22 U/L (ref 12–78)
ANION GAP SERPL CALC-SCNC: 6 MMOL/L (ref 5–15)
AST SERPL-CCNC: 22 U/L (ref 15–37)
BILIRUB SERPL-MCNC: 0.3 MG/DL (ref 0.2–1)
BUN SERPL-MCNC: 10 MG/DL (ref 6–20)
BUN/CREAT SERPL: 18 (ref 12–20)
CALCIUM SERPL-MCNC: 8.5 MG/DL (ref 8.5–10.1)
CHLORIDE SERPL-SCNC: 110 MMOL/L (ref 97–108)
CO2 SERPL-SCNC: 31 MMOL/L (ref 21–32)
CREAT SERPL-MCNC: 0.55 MG/DL (ref 0.7–1.3)
ERYTHROCYTE [DISTWIDTH] IN BLOOD BY AUTOMATED COUNT: 13.6 % (ref 11.5–14.5)
GLOBULIN SER CALC-MCNC: 3.2 G/DL (ref 2–4)
GLUCOSE BLD STRIP.AUTO-MCNC: 93 MG/DL (ref 65–117)
GLUCOSE SERPL-MCNC: 97 MG/DL (ref 65–100)
HCT VFR BLD AUTO: 34.7 % (ref 36.6–50.3)
HGB BLD-MCNC: 11 G/DL (ref 12.1–17)
MAGNESIUM SERPL-MCNC: 1.5 MG/DL (ref 1.6–2.4)
MCH RBC QN AUTO: 28.6 PG (ref 26–34)
MCHC RBC AUTO-ENTMCNC: 31.7 G/DL (ref 30–36.5)
MCV RBC AUTO: 90.1 FL (ref 80–99)
NRBC # BLD: 0 K/UL (ref 0–0.01)
NRBC BLD-RTO: 0 PER 100 WBC
PLATELET # BLD AUTO: 136 K/UL (ref 150–400)
PMV BLD AUTO: 10.4 FL (ref 8.9–12.9)
POTASSIUM SERPL-SCNC: 3.9 MMOL/L (ref 3.5–5.1)
PROT SERPL-MCNC: 5.7 G/DL (ref 6.4–8.2)
RBC # BLD AUTO: 3.85 M/UL (ref 4.1–5.7)
SERVICE CMNT-IMP: NORMAL
SODIUM SERPL-SCNC: 147 MMOL/L (ref 136–145)
VANCOMYCIN SERPL-MCNC: 12.9 UG/ML
WBC # BLD AUTO: 2.5 K/UL (ref 4.1–11.1)

## 2024-01-02 PROCEDURE — 87186 SC STD MICRODIL/AGAR DIL: CPT

## 2024-01-02 PROCEDURE — 2500000003 HC RX 250 WO HCPCS: Performed by: NURSE ANESTHETIST, CERTIFIED REGISTERED

## 2024-01-02 PROCEDURE — 2580000003 HC RX 258: Performed by: GENERAL ACUTE CARE HOSPITAL

## 2024-01-02 PROCEDURE — 87205 SMEAR GRAM STAIN: CPT

## 2024-01-02 PROCEDURE — 82962 GLUCOSE BLOOD TEST: CPT

## 2024-01-02 PROCEDURE — 87075 CULTR BACTERIA EXCEPT BLOOD: CPT

## 2024-01-02 PROCEDURE — 6370000000 HC RX 637 (ALT 250 FOR IP): Performed by: STUDENT IN AN ORGANIZED HEALTH CARE EDUCATION/TRAINING PROGRAM

## 2024-01-02 PROCEDURE — 83735 ASSAY OF MAGNESIUM: CPT

## 2024-01-02 PROCEDURE — 2720000010 HC SURG SUPPLY STERILE: Performed by: PODIATRIST

## 2024-01-02 PROCEDURE — 6360000002 HC RX W HCPCS: Performed by: GENERAL ACUTE CARE HOSPITAL

## 2024-01-02 PROCEDURE — 7100000001 HC PACU RECOVERY - ADDTL 15 MIN: Performed by: PODIATRIST

## 2024-01-02 PROCEDURE — 1100000003 HC PRIVATE W/ TELEMETRY

## 2024-01-02 PROCEDURE — 0Y6Y0Z0 DETACHMENT AT LEFT 5TH TOE, COMPLETE, OPEN APPROACH: ICD-10-PCS | Performed by: PODIATRIST

## 2024-01-02 PROCEDURE — 3700000000 HC ANESTHESIA ATTENDED CARE: Performed by: PODIATRIST

## 2024-01-02 PROCEDURE — 88311 DECALCIFY TISSUE: CPT

## 2024-01-02 PROCEDURE — 3700000001 HC ADD 15 MINUTES (ANESTHESIA): Performed by: PODIATRIST

## 2024-01-02 PROCEDURE — 0HRNXK3 REPLACEMENT OF LEFT FOOT SKIN WITH NONAUTOLOGOUS TISSUE SUBSTITUTE, FULL THICKNESS, EXTERNAL APPROACH: ICD-10-PCS | Performed by: PODIATRIST

## 2024-01-02 PROCEDURE — 02HV33Z INSERTION OF INFUSION DEVICE INTO SUPERIOR VENA CAVA, PERCUTANEOUS APPROACH: ICD-10-PCS | Performed by: PODIATRIST

## 2024-01-02 PROCEDURE — 6360000002 HC RX W HCPCS: Performed by: PODIATRIST

## 2024-01-02 PROCEDURE — 80202 ASSAY OF VANCOMYCIN: CPT

## 2024-01-02 PROCEDURE — 2580000003 HC RX 258: Performed by: PODIATRIST

## 2024-01-02 PROCEDURE — 85027 COMPLETE CBC AUTOMATED: CPT

## 2024-01-02 PROCEDURE — 0QBP0ZZ EXCISION OF LEFT METATARSAL, OPEN APPROACH: ICD-10-PCS | Performed by: PODIATRIST

## 2024-01-02 PROCEDURE — 6360000002 HC RX W HCPCS: Performed by: INTERNAL MEDICINE

## 2024-01-02 PROCEDURE — 6370000000 HC RX 637 (ALT 250 FOR IP): Performed by: PODIATRIST

## 2024-01-02 PROCEDURE — 6360000002 HC RX W HCPCS: Performed by: NURSE ANESTHETIST, CERTIFIED REGISTERED

## 2024-01-02 PROCEDURE — 36415 COLL VENOUS BLD VENIPUNCTURE: CPT

## 2024-01-02 PROCEDURE — 87070 CULTURE OTHR SPECIMN AEROBIC: CPT

## 2024-01-02 PROCEDURE — 2709999900 HC NON-CHARGEABLE SUPPLY: Performed by: PODIATRIST

## 2024-01-02 PROCEDURE — 7100000000 HC PACU RECOVERY - FIRST 15 MIN: Performed by: PODIATRIST

## 2024-01-02 PROCEDURE — 93922 UPR/L XTREMITY ART 2 LEVELS: CPT

## 2024-01-02 PROCEDURE — 88305 TISSUE EXAM BY PATHOLOGIST: CPT

## 2024-01-02 PROCEDURE — 3600000012 HC SURGERY LEVEL 2 ADDTL 15MIN: Performed by: PODIATRIST

## 2024-01-02 PROCEDURE — 6370000000 HC RX 637 (ALT 250 FOR IP): Performed by: GENERAL ACUTE CARE HOSPITAL

## 2024-01-02 PROCEDURE — 3600000002 HC SURGERY LEVEL 2 BASE: Performed by: PODIATRIST

## 2024-01-02 PROCEDURE — 88307 TISSUE EXAM BY PATHOLOGIST: CPT

## 2024-01-02 PROCEDURE — 87077 CULTURE AEROBIC IDENTIFY: CPT

## 2024-01-02 PROCEDURE — 6360000002 HC RX W HCPCS: Performed by: NURSE PRACTITIONER

## 2024-01-02 PROCEDURE — 80053 COMPREHEN METABOLIC PANEL: CPT

## 2024-01-02 DEVICE — THERASKIN LG 39SQ CM 5.1X7.6CM: Type: IMPLANTABLE DEVICE | Site: FOOT | Status: FUNCTIONAL

## 2024-01-02 RX ORDER — SODIUM CHLORIDE 0.9 % (FLUSH) 0.9 %
5-40 SYRINGE (ML) INJECTION PRN
Status: DISCONTINUED | OUTPATIENT
Start: 2024-01-02 | End: 2024-01-02 | Stop reason: HOSPADM

## 2024-01-02 RX ORDER — PROPOFOL 10 MG/ML
INJECTION, EMULSION INTRAVENOUS PRN
Status: DISCONTINUED | OUTPATIENT
Start: 2024-01-02 | End: 2024-01-02 | Stop reason: SDUPTHER

## 2024-01-02 RX ORDER — HYDROMORPHONE HYDROCHLORIDE 1 MG/ML
0.5 INJECTION, SOLUTION INTRAMUSCULAR; INTRAVENOUS; SUBCUTANEOUS EVERY 5 MIN PRN
Status: DISCONTINUED | OUTPATIENT
Start: 2024-01-02 | End: 2024-01-02 | Stop reason: HOSPADM

## 2024-01-02 RX ORDER — ONDANSETRON 2 MG/ML
4 INJECTION INTRAMUSCULAR; INTRAVENOUS
Status: DISCONTINUED | OUTPATIENT
Start: 2024-01-02 | End: 2024-01-02 | Stop reason: HOSPADM

## 2024-01-02 RX ORDER — MIDAZOLAM HYDROCHLORIDE 1 MG/ML
INJECTION INTRAMUSCULAR; INTRAVENOUS PRN
Status: DISCONTINUED | OUTPATIENT
Start: 2024-01-02 | End: 2024-01-02 | Stop reason: SDUPTHER

## 2024-01-02 RX ORDER — SODIUM CHLORIDE 0.9 % (FLUSH) 0.9 %
5-40 SYRINGE (ML) INJECTION EVERY 12 HOURS SCHEDULED
Status: DISCONTINUED | OUTPATIENT
Start: 2024-01-02 | End: 2024-01-02 | Stop reason: HOSPADM

## 2024-01-02 RX ORDER — MAGNESIUM SULFATE IN WATER 40 MG/ML
2000 INJECTION, SOLUTION INTRAVENOUS ONCE
Status: COMPLETED | OUTPATIENT
Start: 2024-01-02 | End: 2024-01-02

## 2024-01-02 RX ORDER — SODIUM CHLORIDE 9 MG/ML
INJECTION, SOLUTION INTRAVENOUS PRN
Status: DISCONTINUED | OUTPATIENT
Start: 2024-01-02 | End: 2024-01-02 | Stop reason: HOSPADM

## 2024-01-02 RX ORDER — DEXTROSE MONOHYDRATE 100 MG/ML
INJECTION, SOLUTION INTRAVENOUS CONTINUOUS PRN
Status: DISCONTINUED | OUTPATIENT
Start: 2024-01-02 | End: 2024-01-02 | Stop reason: HOSPADM

## 2024-01-02 RX ORDER — MAGNESIUM SULFATE 1 G/100ML
1000 INJECTION INTRAVENOUS ONCE
Status: COMPLETED | OUTPATIENT
Start: 2024-01-02 | End: 2024-01-02

## 2024-01-02 RX ORDER — FENTANYL CITRATE 50 UG/ML
INJECTION, SOLUTION INTRAMUSCULAR; INTRAVENOUS PRN
Status: DISCONTINUED | OUTPATIENT
Start: 2024-01-02 | End: 2024-01-02 | Stop reason: SDUPTHER

## 2024-01-02 RX ORDER — PROCHLORPERAZINE EDISYLATE 5 MG/ML
5 INJECTION INTRAMUSCULAR; INTRAVENOUS
Status: DISCONTINUED | OUTPATIENT
Start: 2024-01-02 | End: 2024-01-02 | Stop reason: HOSPADM

## 2024-01-02 RX ORDER — BUPIVACAINE HYDROCHLORIDE 5 MG/ML
INJECTION, SOLUTION PERINEURAL PRN
Status: DISCONTINUED | OUTPATIENT
Start: 2024-01-02 | End: 2024-01-02 | Stop reason: ALTCHOICE

## 2024-01-02 RX ORDER — IPRATROPIUM BROMIDE AND ALBUTEROL SULFATE 2.5; .5 MG/3ML; MG/3ML
1 SOLUTION RESPIRATORY (INHALATION)
Status: COMPLETED | OUTPATIENT
Start: 2024-01-02 | End: 2024-01-02

## 2024-01-02 RX ORDER — DEXMEDETOMIDINE HYDROCHLORIDE 100 UG/ML
INJECTION, SOLUTION INTRAVENOUS PRN
Status: DISCONTINUED | OUTPATIENT
Start: 2024-01-02 | End: 2024-01-02 | Stop reason: SDUPTHER

## 2024-01-02 RX ORDER — FENTANYL CITRATE 50 UG/ML
25 INJECTION, SOLUTION INTRAMUSCULAR; INTRAVENOUS EVERY 5 MIN PRN
Status: DISCONTINUED | OUTPATIENT
Start: 2024-01-02 | End: 2024-01-02 | Stop reason: HOSPADM

## 2024-01-02 RX ORDER — ONDANSETRON 2 MG/ML
INJECTION INTRAMUSCULAR; INTRAVENOUS PRN
Status: DISCONTINUED | OUTPATIENT
Start: 2024-01-02 | End: 2024-01-02 | Stop reason: SDUPTHER

## 2024-01-02 RX ADMIN — MAGNESIUM SULFATE HEPTAHYDRATE 2000 MG: 40 INJECTION, SOLUTION INTRAVENOUS at 04:03

## 2024-01-02 RX ADMIN — SODIUM CHLORIDE: 9 INJECTION, SOLUTION INTRAVENOUS at 22:12

## 2024-01-02 RX ADMIN — DEXMEDETOMIDINE HYDROCHLORIDE 4 MCG: 100 INJECTION, SOLUTION, CONCENTRATE INTRAVENOUS at 18:19

## 2024-01-02 RX ADMIN — SODIUM CHLORIDE: 9 INJECTION, SOLUTION INTRAVENOUS at 18:00

## 2024-01-02 RX ADMIN — PROPOFOL 30 MG: 10 INJECTION, EMULSION INTRAVENOUS at 18:04

## 2024-01-02 RX ADMIN — OXYCODONE HYDROCHLORIDE 5 MG: 5 TABLET ORAL at 22:02

## 2024-01-02 RX ADMIN — Medication 100 MG: at 09:07

## 2024-01-02 RX ADMIN — DEXMEDETOMIDINE HYDROCHLORIDE 4 MCG: 100 INJECTION, SOLUTION, CONCENTRATE INTRAVENOUS at 18:23

## 2024-01-02 RX ADMIN — VANCOMYCIN HYDROCHLORIDE 1250 MG: 10 INJECTION, POWDER, LYOPHILIZED, FOR SOLUTION INTRAVENOUS at 00:57

## 2024-01-02 RX ADMIN — MEROPENEM 1000 MG: 1 INJECTION, POWDER, FOR SOLUTION INTRAVENOUS at 13:40

## 2024-01-02 RX ADMIN — DEXMEDETOMIDINE HYDROCHLORIDE 6 MCG: 100 INJECTION, SOLUTION, CONCENTRATE INTRAVENOUS at 18:27

## 2024-01-02 RX ADMIN — IPRATROPIUM BROMIDE AND ALBUTEROL SULFATE 1 DOSE: 2.5; .5 SOLUTION RESPIRATORY (INHALATION) at 17:45

## 2024-01-02 RX ADMIN — MEROPENEM 1000 MG: 1 INJECTION, POWDER, FOR SOLUTION INTRAVENOUS at 06:09

## 2024-01-02 RX ADMIN — ATORVASTATIN CALCIUM 40 MG: 40 TABLET, FILM COATED ORAL at 21:52

## 2024-01-02 RX ADMIN — MEROPENEM 1000 MG: 1 INJECTION, POWDER, FOR SOLUTION INTRAVENOUS at 22:13

## 2024-01-02 RX ADMIN — VANCOMYCIN HYDROCHLORIDE 1250 MG: 10 INJECTION, POWDER, LYOPHILIZED, FOR SOLUTION INTRAVENOUS at 13:42

## 2024-01-02 RX ADMIN — MAGNESIUM SULFATE HEPTAHYDRATE 1000 MG: 1 INJECTION, SOLUTION INTRAVENOUS at 11:39

## 2024-01-02 RX ADMIN — FENTANYL CITRATE 50 MCG: 50 INJECTION, SOLUTION INTRAMUSCULAR; INTRAVENOUS at 18:20

## 2024-01-02 RX ADMIN — MIDAZOLAM HYDROCHLORIDE 2 MG: 1 INJECTION, SOLUTION INTRAMUSCULAR; INTRAVENOUS at 18:00

## 2024-01-02 RX ADMIN — SODIUM CHLORIDE, PRESERVATIVE FREE 10 ML: 5 INJECTION INTRAVENOUS at 21:52

## 2024-01-02 RX ADMIN — PROPOFOL 20 MG: 10 INJECTION, EMULSION INTRAVENOUS at 18:07

## 2024-01-02 RX ADMIN — PROPOFOL 55 MCG/KG/MIN: 10 INJECTION, EMULSION INTRAVENOUS at 18:05

## 2024-01-02 RX ADMIN — FENTANYL CITRATE 50 MCG: 50 INJECTION, SOLUTION INTRAMUSCULAR; INTRAVENOUS at 18:04

## 2024-01-02 RX ADMIN — SODIUM CHLORIDE, PRESERVATIVE FREE 10 ML: 5 INJECTION INTRAVENOUS at 09:45

## 2024-01-02 RX ADMIN — DEXMEDETOMIDINE HYDROCHLORIDE 6 MCG: 100 INJECTION, SOLUTION, CONCENTRATE INTRAVENOUS at 18:15

## 2024-01-02 RX ADMIN — FOLIC ACID 1 MG: 1 TABLET ORAL at 09:06

## 2024-01-02 RX ADMIN — SODIUM CHLORIDE: 9 INJECTION, SOLUTION INTRAVENOUS at 11:38

## 2024-01-02 RX ADMIN — DEXMEDETOMIDINE HYDROCHLORIDE 10 MCG: 100 INJECTION, SOLUTION, CONCENTRATE INTRAVENOUS at 18:31

## 2024-01-02 RX ADMIN — MORPHINE SULFATE 1 MG: 2 INJECTION, SOLUTION INTRAMUSCULAR; INTRAVENOUS at 13:43

## 2024-01-02 RX ADMIN — ONDANSETRON 4 MG: 2 INJECTION INTRAMUSCULAR; INTRAVENOUS at 18:35

## 2024-01-02 RX ADMIN — MORPHINE SULFATE 1 MG: 2 INJECTION, SOLUTION INTRAMUSCULAR; INTRAVENOUS at 23:18

## 2024-01-02 RX ADMIN — DEXMEDETOMIDINE HYDROCHLORIDE 10 MCG: 100 INJECTION, SOLUTION, CONCENTRATE INTRAVENOUS at 18:35

## 2024-01-02 RX ADMIN — DIAZEPAM 5 MG: 5 INJECTION, SOLUTION INTRAMUSCULAR; INTRAVENOUS at 12:36

## 2024-01-02 ASSESSMENT — PAIN SCALES - GENERAL
PAINLEVEL_OUTOF10: 7
PAINLEVEL_OUTOF10: 7
PAINLEVEL_OUTOF10: 6
PAINLEVEL_OUTOF10: 8

## 2024-01-02 ASSESSMENT — PAIN DESCRIPTION - LOCATION
LOCATION: BACK;ABDOMEN
LOCATION: BACK
LOCATION: BACK;ABDOMEN

## 2024-01-02 ASSESSMENT — PAIN DESCRIPTION - DESCRIPTORS
DESCRIPTORS: ACHING

## 2024-01-02 ASSESSMENT — PAIN DESCRIPTION - ORIENTATION
ORIENTATION: RIGHT;LEFT;POSTERIOR
ORIENTATION: MID;UPPER
ORIENTATION: POSTERIOR;RIGHT;LEFT

## 2024-01-02 ASSESSMENT — LIFESTYLE VARIABLES: SMOKING_STATUS: 1

## 2024-01-02 NOTE — PERIOP NOTE
[place]-op assessment at patient bedside in room 2315.  Alert and oriented.on seizure precaution related to alcohol use Patient states he is aware of surgery today on left foot with possible amputation of top portion of foot. States only has 1 toe left on left foot. Dressing bulky, clean and dry. Removal deferred to OR.  Right BKA. Skin clean and intact on stump.  Patient with redness to entire right buttocks with healing wound at top of right buttocks. + tenderness to right buttocks, blanchable skin. Patient currently right side lying . Also swelling of right hand  #18 left forearm iv, patent, flushes easily.   Anesthesia and procedural consent signed at bedside. Patient states no family or friends to call after surgery and patient has no phone for himself. Patient was asking about housing assistance. Patients primary nurse Fang REEVES, informed and would place an order for /case management.   Patient states both allergies to penicillin and methadone cause sob, tongue swelling.  Voided 275 in urinal without assistance. Informed Fang that I documented this on flowsheet

## 2024-01-02 NOTE — WOUND CARE
Wound care consult for the Left foot and the buttocks wounds:  Chart reviewed and patient assessed.  The left foot was examined by Podiatry and   Pt.'s foot will be operated on tonight by Dr. Salazar for debridement.  Please see his note after surgery for any wound care to be done by the staff.     Past Medical History:   Diagnosis Date    Hypertension      Past Surgical History:   Procedure Laterality Date    LEG AMPUTATION BELOW KNEE Right     LOWER EXTREMITY AMPUTATION Left     transmetatarsal     Buttocks assessment: pt. Has a very dry scabby area about the size 4 x 4 cm that is dry. The surrounding skin is also intact and dry.   Treatment for the buttocks / lower back:  Cleansed the skin and then applied zinc oxide cream and a foam dressing (this would have been done in the OR anyway).   Laurie Cage RN, BSN, CWON

## 2024-01-02 NOTE — Clinical Note
End of Shift Note    Bedside shift change report given to *** (oncoming nurse) by DAYANA GORMAN RN (offgoing nurse).  Report included the following information {SBAR REPORTS LIST:25958}    Shift worked:  ***     Shift summary and any significant changes:     ***     Concerns for physician to address:  ***     Zone phone for oncoming shift:   ***       Activity:     Number times ambulated in hallways past shift: {Numbers; 0-5:612171}  Number of times OOB to chair past shift: {Numbers; 0-5:345341}    Cardiac:   Cardiac Monitoring: {YES/NO:97941}           Access:  Current line(s): {Access:36361}     Genitourinary:   Urinary status: {:84505}    Respiratory:      Chronic home O2 use?: {YES/NO/NA:16748}  Incentive spirometer at bedside: {YES/NO/NA:77092}       GI:     Current diet:  Diet NPO Exceptions are: Sips of Water with Meds  Passing flatus: {YES/NO:56907}  Tolerating current diet: {YES/NO:87313}       Pain Management:   Patient states pain is manageable on current regimen: {YES/NO/NA:98810}    Skin:     Interventions: {pepe interventions:79034}    Patient Safety:  Fall Score:    Interventions: {fall interventions:56043}       Length of Stay:  Expected LOS: 4  Actual LOS: 3      DAYANA GORMAN, RN

## 2024-01-02 NOTE — CARE COORDINATION
Transition of Care Plan:    RUR: 15% (Moderate RUR)  Prior Level of Functioning: Able to perform ADLS/IADLS  Disposition: SNF vs Homeless Point of Entry   If SNF or IPR: Date FOC offered: 01/02/2024  Date FOC received:   Accepting facility:   Date authorization started with reference number:   Date authorization received and expires:   Follow up appointments: Per SNF  DME needed: Has wheelchair  Transportation at discharge: Needs transportation   IM/IMM Medicare/ letter given: na  Is patient a  and connected with VA? na   If yes, was [place] transfer form completed and VA notified? na  Caregiver Contact: Unable to locate any family members and the patient is not able to provide  Discharge Caregiver contacted prior to discharge? na  Care Conference needed? no  Barriers to discharge: Medical clearance     CM met the patient who stated that he cannot make SNF choices at this time but agreeable to have the list placed close to him on the bedside table.     The patient was brought by ambulance to the emergency department found lying in his wheelchair with the wheelchair on the ground alert and oriented.        Wale Black RN  Case Management  [phone]

## 2024-01-02 NOTE — ANESTHESIA PRE PROCEDURE
BP Readings from Last 3 Encounters:   01/02/24 117/62   12/30/23 (!) 102/56   11/26/23 (!) 145/62       NPO Status: Time of last liquid consumption: 2200                        Time of last solid consumption: 1730                        Date of last liquid consumption: 01/01/24                        Date of last solid food consumption: 01/01/24    BMI:   Wt Readings from Last 3 Encounters:   12/31/23 63 kg (139 lb)   12/30/23 68 kg (149 lb 14.6 oz)   12/29/23 68 kg (150 lb)     Body mass index is 20.53 kg/m².    CBC:   Lab Results   Component Value Date/Time    WBC 2.5 01/02/2024 02:39 AM    RBC 3.85 01/02/2024 02:39 AM    HGB 11.0 01/02/2024 02:39 AM    HCT 34.7 01/02/2024 02:39 AM    MCV 90.1 01/02/2024 02:39 AM    RDW 13.6 01/02/2024 02:39 AM     01/02/2024 02:39 AM       CMP:   Lab Results   Component Value Date/Time     01/02/2024 02:39 AM    K 3.9 01/02/2024 02:39 AM     01/02/2024 02:39 AM    CO2 31 01/02/2024 02:39 AM    BUN 10 01/02/2024 02:39 AM    CREATININE 0.55 01/02/2024 02:39 AM    LABGLOM >60 01/02/2024 02:39 AM    GLUCOSE 97 01/02/2024 02:39 AM    PROT 5.7 01/02/2024 02:39 AM    CALCIUM 8.5 01/02/2024 02:39 AM    BILITOT 0.3 01/02/2024 02:39 AM    ALKPHOS 67 01/02/2024 02:39 AM    AST 22 01/02/2024 02:39 AM    ALT 22 01/02/2024 02:39 AM       POC Tests:   Recent Labs     12/30/23  1835   POCGLU 73       Coags:   Lab Results   Component Value Date/Time    PROTIME 11.3 12/31/2023 03:54 PM    INR 1.1 12/31/2023 03:54 PM    APTT 25.1 05/30/2023 04:38 AM       HCG (If Applicable): No results found for: \"PREGTESTUR\", \"PREGSERUM\", \"HCG\", \"HCGQUANT\"     ABGs: No results found for: \"PHART\", \"PO2ART\", \"ZPP0KMX\", \"QWH7QTX\", \"BEART\", \"B5XYDHVI\"     Type & Screen (If Applicable):  No results found for: \"LABABO\", \"LABRH\"    Drug/Infectious Status (If Applicable):  No results found for: \"HIV\", \"HEPCAB\"    COVID-19 Screening (If Applicable):   Lab Results   Component

## 2024-01-03 LAB
ALBUMIN SERPL-MCNC: 2.9 G/DL (ref 3.5–5)
ALBUMIN/GLOB SERPL: 0.8 (ref 1.1–2.2)
ALP SERPL-CCNC: 81 U/L (ref 45–117)
ALT SERPL-CCNC: 22 U/L (ref 12–78)
ANION GAP SERPL CALC-SCNC: 3 MMOL/L (ref 5–15)
AST SERPL-CCNC: 29 U/L (ref 15–37)
BACTERIA SPEC CULT: NORMAL
BILIRUB SERPL-MCNC: 0.5 MG/DL (ref 0.2–1)
BUN SERPL-MCNC: 7 MG/DL (ref 6–20)
BUN/CREAT SERPL: 12 (ref 12–20)
CALCIUM SERPL-MCNC: 8.9 MG/DL (ref 8.5–10.1)
CHLORIDE SERPL-SCNC: 106 MMOL/L (ref 97–108)
CO2 SERPL-SCNC: 32 MMOL/L (ref 21–32)
CREAT SERPL-MCNC: 0.6 MG/DL (ref 0.7–1.3)
ERYTHROCYTE [DISTWIDTH] IN BLOOD BY AUTOMATED COUNT: 13.7 % (ref 11.5–14.5)
GLOBULIN SER CALC-MCNC: 3.7 G/DL (ref 2–4)
GLUCOSE SERPL-MCNC: 93 MG/DL (ref 65–100)
HCT VFR BLD AUTO: 41.6 % (ref 36.6–50.3)
HGB BLD-MCNC: 13.1 G/DL (ref 12.1–17)
MCH RBC QN AUTO: 28.8 PG (ref 26–34)
MCHC RBC AUTO-ENTMCNC: 31.5 G/DL (ref 30–36.5)
MCV RBC AUTO: 91.4 FL (ref 80–99)
NRBC # BLD: 0 K/UL (ref 0–0.01)
NRBC BLD-RTO: 0 PER 100 WBC
PLATELET # BLD AUTO: 145 K/UL (ref 150–400)
PMV BLD AUTO: 10.3 FL (ref 8.9–12.9)
POTASSIUM SERPL-SCNC: 3.9 MMOL/L (ref 3.5–5.1)
PROT SERPL-MCNC: 6.6 G/DL (ref 6.4–8.2)
RBC # BLD AUTO: 4.55 M/UL (ref 4.1–5.7)
SERVICE CMNT-IMP: NORMAL
SODIUM SERPL-SCNC: 141 MMOL/L (ref 136–145)
WBC # BLD AUTO: 3.8 K/UL (ref 4.1–11.1)

## 2024-01-03 PROCEDURE — 1100000003 HC PRIVATE W/ TELEMETRY

## 2024-01-03 PROCEDURE — 85027 COMPLETE CBC AUTOMATED: CPT

## 2024-01-03 PROCEDURE — 36415 COLL VENOUS BLD VENIPUNCTURE: CPT

## 2024-01-03 PROCEDURE — 6370000000 HC RX 637 (ALT 250 FOR IP): Performed by: PODIATRIST

## 2024-01-03 PROCEDURE — 6360000002 HC RX W HCPCS: Performed by: PODIATRIST

## 2024-01-03 PROCEDURE — 2580000003 HC RX 258: Performed by: PODIATRIST

## 2024-01-03 PROCEDURE — 87040 BLOOD CULTURE FOR BACTERIA: CPT

## 2024-01-03 PROCEDURE — 2500000003 HC RX 250 WO HCPCS: Performed by: INTERNAL MEDICINE

## 2024-01-03 PROCEDURE — 80053 COMPREHEN METABOLIC PANEL: CPT

## 2024-01-03 RX ORDER — HYDROMORPHONE HYDROCHLORIDE 1 MG/ML
1 INJECTION, SOLUTION INTRAMUSCULAR; INTRAVENOUS; SUBCUTANEOUS EVERY 4 HOURS PRN
Status: DISCONTINUED | OUTPATIENT
Start: 2024-01-03 | End: 2024-01-11 | Stop reason: HOSPADM

## 2024-01-03 RX ADMIN — VANCOMYCIN HYDROCHLORIDE 1250 MG: 10 INJECTION, POWDER, LYOPHILIZED, FOR SOLUTION INTRAVENOUS at 14:25

## 2024-01-03 RX ADMIN — OXYCODONE HYDROCHLORIDE 5 MG: 5 TABLET ORAL at 06:36

## 2024-01-03 RX ADMIN — FOLIC ACID 1 MG: 1 TABLET ORAL at 09:23

## 2024-01-03 RX ADMIN — ACETAMINOPHEN 650 MG: 325 TABLET ORAL at 17:41

## 2024-01-03 RX ADMIN — ATORVASTATIN CALCIUM 40 MG: 40 TABLET, FILM COATED ORAL at 21:51

## 2024-01-03 RX ADMIN — MEROPENEM 1000 MG: 1 INJECTION, POWDER, FOR SOLUTION INTRAVENOUS at 14:19

## 2024-01-03 RX ADMIN — SODIUM CHLORIDE: 9 INJECTION, SOLUTION INTRAVENOUS at 00:19

## 2024-01-03 RX ADMIN — Medication 100 MG: at 09:23

## 2024-01-03 RX ADMIN — MEROPENEM 1000 MG: 1 INJECTION, POWDER, FOR SOLUTION INTRAVENOUS at 06:45

## 2024-01-03 RX ADMIN — SODIUM CHLORIDE, PRESERVATIVE FREE 10 ML: 5 INJECTION INTRAVENOUS at 21:52

## 2024-01-03 RX ADMIN — MEROPENEM 1000 MG: 1 INJECTION, POWDER, FOR SOLUTION INTRAVENOUS at 21:57

## 2024-01-03 RX ADMIN — SODIUM CHLORIDE, PRESERVATIVE FREE 10 ML: 5 INJECTION INTRAVENOUS at 09:30

## 2024-01-03 RX ADMIN — LORAZEPAM 1 MG: 1 TABLET ORAL at 12:52

## 2024-01-03 RX ADMIN — HYDROMORPHONE HYDROCHLORIDE 1 MG: 1 INJECTION, SOLUTION INTRAMUSCULAR; INTRAVENOUS; SUBCUTANEOUS at 09:23

## 2024-01-03 RX ADMIN — MORPHINE SULFATE 1 MG: 2 INJECTION, SOLUTION INTRAMUSCULAR; INTRAVENOUS at 04:34

## 2024-01-03 RX ADMIN — DIAZEPAM 5 MG: 5 INJECTION, SOLUTION INTRAMUSCULAR; INTRAVENOUS at 05:44

## 2024-01-03 RX ADMIN — DIAZEPAM 5 MG: 5 INJECTION, SOLUTION INTRAMUSCULAR; INTRAVENOUS at 09:23

## 2024-01-03 RX ADMIN — HYDROMORPHONE HYDROCHLORIDE 1 MG: 1 INJECTION, SOLUTION INTRAMUSCULAR; INTRAVENOUS; SUBCUTANEOUS at 12:54

## 2024-01-03 RX ADMIN — VANCOMYCIN HYDROCHLORIDE 1250 MG: 10 INJECTION, POWDER, LYOPHILIZED, FOR SOLUTION INTRAVENOUS at 00:20

## 2024-01-03 ASSESSMENT — PAIN DESCRIPTION - DESCRIPTORS
DESCRIPTORS: ACHING
DESCRIPTORS: ACHING
DESCRIPTORS: SHARP

## 2024-01-03 ASSESSMENT — PAIN SCALES - GENERAL
PAINLEVEL_OUTOF10: 8
PAINLEVEL_OUTOF10: 8
PAINLEVEL_OUTOF10: 0
PAINLEVEL_OUTOF10: 9
PAINLEVEL_OUTOF10: 0
PAINLEVEL_OUTOF10: 7
PAINLEVEL_OUTOF10: 0
PAINLEVEL_OUTOF10: 0
PAINLEVEL_OUTOF10: 8

## 2024-01-03 ASSESSMENT — PAIN DESCRIPTION - LOCATION
LOCATION: FOOT
LOCATION: LEG
LOCATION: FOOT
LOCATION: LEG

## 2024-01-03 ASSESSMENT — PAIN DESCRIPTION - ORIENTATION
ORIENTATION: LEFT
ORIENTATION: LOWER
ORIENTATION: RIGHT;LEFT
ORIENTATION: LEFT

## 2024-01-03 NOTE — BRIEF OP NOTE
Brief Postoperative Note      Patient: Baldomero Reece  YOB: 1955  MRN: [id number]    Date of Procedure: 1/2/2024    Pre-Op Diagnosis Codes:     * Other acute osteomyelitis of left foot ([facility]) [M86.172] Left ankle medial ulcer     Post-Op Diagnosis: Same       Procedure(s):  LEFT FOOT INCISION AND DRAINAGE with misonix , First metatarsal head amputation and left fifth toe amputation, skin graft application left foot left 5th toe amputation to MPJ Excisional Debridement left ankle (3x2x0.2cm) and application of Theraskin Graft     Surgeon(s):  Percy Salazar DPM    Assistant:  * No surgical staff found *    Anesthesia: Monitor Anesthesia Care    Hemostasis: Ankle Tourniquet at 250 mmHg    Estimated Blood Loss (mL): less than 100     Complications: None    Specimens:   ID Type Source Tests Collected by Time Destination   1 : left foot wound Swab Foot CULTURE, ANAEROBIC, GRAM STAIN, CULTURE, WOUND Percy Salazar DPM 1/2/2024 1821    A : Left first metatarsal head with margins marked Tissue Tissue SURGICAL PATHOLOGY Percy Salazar DPM 1/2/2024 1836    B : Fifth toe left Tissue Tissue SURGICAL PATHOLOGY Percy Salazar DPM 1/2/2024 1837        Implants:  Implant Name Type Inv. Item Serial No.  Lot No. LRB No. Used Action   [id number] - [id number]   [id number] [facility] [employer]COV  Left 1 Implanted         Drains: * No LDAs found * 1/4\" iodoform packing     Findings: Viable margins on the bone soft tissue large plantar tracking in to the fat pad applied packing 1/4\" iodoform Applied 39sq cm Theraskin graft stapled in       Electronically signed by Percy Salazar DPM on 1/2/2024 at 7:17 PM

## 2024-01-03 NOTE — ANESTHESIA POSTPROCEDURE EVALUATION
Department of Anesthesiology  Postprocedure Note    Patient: Baldomero Reece  MRN: [id number]  YOB: 1955  Date of evaluation: 1/2/2024    Procedure Summary       Date: 01/02/24 Room / Location: [facility] MAIN OR M3 / [facility] MAIN OR    Anesthesia Start: 1800 Anesthesia Stop: 1915    Procedure: LEFT FOOT INCISION AND DRAINAGE with misonix , First metatarsal head amputation and left fifth toe amputation, skin graft application left foot (Left: Foot) Diagnosis:       Other acute osteomyelitis of left foot (HCC)      (Other acute osteomyelitis of left foot (HCC) [M86.172])    Providers: Percy Salazar DPM Responsible Provider: Robin Hearn MD    Anesthesia Type: MAC ASA Status: 3            Anesthesia Type: MAC    Juanita Phase I: Juanita Score: 9    Juanita Phase II:      Anesthesia Post Evaluation    Patient location during evaluation: PACU  Patient participation: complete - patient participated  Level of consciousness: awake and alert  Airway patency: patent  Nausea & Vomiting: no nausea  Cardiovascular status: hemodynamically stable  Respiratory status: acceptable  Hydration status: euvolemic  Pain management: adequate    No notable events documented.

## 2024-01-04 ENCOUNTER — APPOINTMENT (OUTPATIENT)
Facility: HOSPITAL | Age: 69
DRG: 710 | End: 2024-01-04
Attending: STUDENT IN AN ORGANIZED HEALTH CARE EDUCATION/TRAINING PROGRAM
Payer: MEDICAID

## 2024-01-04 PROBLEM — I99.8 ISCHEMIA OF FOOT: Status: ACTIVE | Noted: 2024-01-04

## 2024-01-04 PROBLEM — M86.9 OSTEOMYELITIS OF FOOT (HCC): Status: ACTIVE | Noted: 2024-01-04

## 2024-01-04 PROBLEM — Z22.340: Status: ACTIVE | Noted: 2024-01-04

## 2024-01-04 LAB
ALBUMIN SERPL-MCNC: 2.5 G/DL (ref 3.5–5)
ALBUMIN/GLOB SERPL: 0.7 (ref 1.1–2.2)
ALP SERPL-CCNC: 71 U/L (ref 45–117)
ALT SERPL-CCNC: 22 U/L (ref 12–78)
ANION GAP SERPL CALC-SCNC: 2 MMOL/L (ref 5–15)
AST SERPL-CCNC: 32 U/L (ref 15–37)
BACTERIA SPEC CULT: NORMAL
BASOPHILS # BLD: 0.1 K/UL (ref 0–0.1)
BASOPHILS NFR BLD: 1 % (ref 0–1)
BILIRUB SERPL-MCNC: 0.6 MG/DL (ref 0.2–1)
BUN SERPL-MCNC: 11 MG/DL (ref 6–20)
BUN/CREAT SERPL: 17 (ref 12–20)
CALCIUM SERPL-MCNC: 8.7 MG/DL (ref 8.5–10.1)
CHLORIDE SERPL-SCNC: 106 MMOL/L (ref 97–108)
CO2 SERPL-SCNC: 30 MMOL/L (ref 21–32)
CREAT SERPL-MCNC: 0.65 MG/DL (ref 0.7–1.3)
DATE LAST DOSE: ABNORMAL
DIFFERENTIAL METHOD BLD: ABNORMAL
DOSE AMOUNT: ABNORMAL UNITS
DOSE DATE/TIME: ABNORMAL
EKG ATRIAL RATE: 112 BPM
EKG DIAGNOSIS: NORMAL
EKG P AXIS: 71 DEGREES
EKG P-R INTERVAL: 142 MS
EKG Q-T INTERVAL: 348 MS
EKG QRS DURATION: 120 MS
EKG QTC CALCULATION (BAZETT): 475 MS
EKG R AXIS: -83 DEGREES
EKG T AXIS: 91 DEGREES
EKG VENTRICULAR RATE: 112 BPM
EOSINOPHIL # BLD: 0.2 K/UL (ref 0–0.4)
EOSINOPHIL NFR BLD: 4 % (ref 0–7)
ERYTHROCYTE [DISTWIDTH] IN BLOOD BY AUTOMATED COUNT: 13.5 % (ref 11.5–14.5)
GLOBULIN SER CALC-MCNC: 3.7 G/DL (ref 2–4)
GLUCOSE SERPL-MCNC: 109 MG/DL (ref 65–100)
HCT VFR BLD AUTO: 37.3 % (ref 36.6–50.3)
HGB BLD-MCNC: 11.5 G/DL (ref 12.1–17)
IMM GRANULOCYTES # BLD AUTO: 0 K/UL (ref 0–0.04)
IMM GRANULOCYTES NFR BLD AUTO: 0 % (ref 0–0.5)
LYMPHOCYTES # BLD: 1 K/UL (ref 0.8–3.5)
LYMPHOCYTES NFR BLD: 17 % (ref 12–49)
MCH RBC QN AUTO: 28.3 PG (ref 26–34)
MCHC RBC AUTO-ENTMCNC: 30.8 G/DL (ref 30–36.5)
MCV RBC AUTO: 91.6 FL (ref 80–99)
MONOCYTES # BLD: 0.5 K/UL (ref 0–1)
MONOCYTES NFR BLD: 9 % (ref 5–13)
NEUTS SEG # BLD: 4.1 K/UL (ref 1.8–8)
NEUTS SEG NFR BLD: 69 % (ref 32–75)
NRBC # BLD: 0 K/UL (ref 0–0.01)
NRBC BLD-RTO: 0 PER 100 WBC
PLATELET # BLD AUTO: 146 K/UL (ref 150–400)
PMV BLD AUTO: 10.8 FL (ref 8.9–12.9)
POTASSIUM SERPL-SCNC: 3.8 MMOL/L (ref 3.5–5.1)
PROT SERPL-MCNC: 6.2 G/DL (ref 6.4–8.2)
RBC # BLD AUTO: 4.07 M/UL (ref 4.1–5.7)
SARS-COV-2 RNA RESP QL NAA+PROBE: NOT DETECTED
SERVICE CMNT-IMP: NORMAL
SODIUM SERPL-SCNC: 138 MMOL/L (ref 136–145)
SOURCE: NORMAL
VANCOMYCIN TROUGH SERPL-MCNC: 14.4 UG/ML (ref 5–10)
WBC # BLD AUTO: 5.8 K/UL (ref 4.1–11.1)

## 2024-01-04 PROCEDURE — 6360000002 HC RX W HCPCS: Performed by: GENERAL ACUTE CARE HOSPITAL

## 2024-01-04 PROCEDURE — 6360000002 HC RX W HCPCS: Performed by: PODIATRIST

## 2024-01-04 PROCEDURE — 2500000003 HC RX 250 WO HCPCS: Performed by: INTERNAL MEDICINE

## 2024-01-04 PROCEDURE — 6370000000 HC RX 637 (ALT 250 FOR IP): Performed by: PODIATRIST

## 2024-01-04 PROCEDURE — 2580000003 HC RX 258: Performed by: PODIATRIST

## 2024-01-04 PROCEDURE — 97530 THERAPEUTIC ACTIVITIES: CPT

## 2024-01-04 PROCEDURE — 97535 SELF CARE MNGMENT TRAINING: CPT

## 2024-01-04 PROCEDURE — 80202 ASSAY OF VANCOMYCIN: CPT

## 2024-01-04 PROCEDURE — 99223 1ST HOSP IP/OBS HIGH 75: CPT | Performed by: INTERNAL MEDICINE

## 2024-01-04 PROCEDURE — 87635 SARS-COV-2 COVID-19 AMP PRB: CPT

## 2024-01-04 PROCEDURE — 85025 COMPLETE CBC W/AUTO DIFF WBC: CPT

## 2024-01-04 PROCEDURE — 1100000003 HC PRIVATE W/ TELEMETRY

## 2024-01-04 PROCEDURE — 36415 COLL VENOUS BLD VENIPUNCTURE: CPT

## 2024-01-04 PROCEDURE — 71045 X-RAY EXAM CHEST 1 VIEW: CPT

## 2024-01-04 PROCEDURE — 80053 COMPREHEN METABOLIC PANEL: CPT

## 2024-01-04 RX ORDER — SODIUM CHLORIDE 9 MG/ML
INJECTION, SOLUTION INTRAVENOUS CONTINUOUS
Status: DISCONTINUED | OUTPATIENT
Start: 2024-01-04 | End: 2024-01-06

## 2024-01-04 RX ADMIN — Medication 100 MG: at 08:41

## 2024-01-04 RX ADMIN — HYDROMORPHONE HYDROCHLORIDE 1 MG: 1 INJECTION, SOLUTION INTRAMUSCULAR; INTRAVENOUS; SUBCUTANEOUS at 14:27

## 2024-01-04 RX ADMIN — MEROPENEM 1000 MG: 1 INJECTION, POWDER, FOR SOLUTION INTRAVENOUS at 22:10

## 2024-01-04 RX ADMIN — ACETAMINOPHEN 650 MG: 325 TABLET ORAL at 23:38

## 2024-01-04 RX ADMIN — VANCOMYCIN HYDROCHLORIDE 1250 MG: 10 INJECTION, POWDER, LYOPHILIZED, FOR SOLUTION INTRAVENOUS at 23:46

## 2024-01-04 RX ADMIN — VANCOMYCIN HYDROCHLORIDE 1250 MG: 10 INJECTION, POWDER, LYOPHILIZED, FOR SOLUTION INTRAVENOUS at 17:43

## 2024-01-04 RX ADMIN — SODIUM CHLORIDE, PRESERVATIVE FREE 10 ML: 5 INJECTION INTRAVENOUS at 21:45

## 2024-01-04 RX ADMIN — SODIUM CHLORIDE, PRESERVATIVE FREE 10 ML: 5 INJECTION INTRAVENOUS at 08:41

## 2024-01-04 RX ADMIN — HYDROMORPHONE HYDROCHLORIDE 1 MG: 1 INJECTION, SOLUTION INTRAMUSCULAR; INTRAVENOUS; SUBCUTANEOUS at 00:36

## 2024-01-04 RX ADMIN — OXYCODONE HYDROCHLORIDE 5 MG: 5 TABLET ORAL at 18:17

## 2024-01-04 RX ADMIN — MEROPENEM 1000 MG: 1 INJECTION, POWDER, FOR SOLUTION INTRAVENOUS at 14:30

## 2024-01-04 RX ADMIN — VANCOMYCIN HYDROCHLORIDE 1250 MG: 10 INJECTION, POWDER, LYOPHILIZED, FOR SOLUTION INTRAVENOUS at 00:28

## 2024-01-04 RX ADMIN — FOLIC ACID 1 MG: 1 TABLET ORAL at 08:41

## 2024-01-04 RX ADMIN — HYDROMORPHONE HYDROCHLORIDE 1 MG: 1 INJECTION, SOLUTION INTRAMUSCULAR; INTRAVENOUS; SUBCUTANEOUS at 08:42

## 2024-01-04 RX ADMIN — ATORVASTATIN CALCIUM 40 MG: 40 TABLET, FILM COATED ORAL at 21:45

## 2024-01-04 RX ADMIN — MEROPENEM 1000 MG: 1 INJECTION, POWDER, FOR SOLUTION INTRAVENOUS at 05:40

## 2024-01-04 RX ADMIN — ENOXAPARIN SODIUM 40 MG: 100 INJECTION SUBCUTANEOUS at 08:41

## 2024-01-04 ASSESSMENT — PAIN SCALES - GENERAL
PAINLEVEL_OUTOF10: 9
PAINLEVEL_OUTOF10: 8
PAINLEVEL_OUTOF10: 0

## 2024-01-04 ASSESSMENT — PAIN DESCRIPTION - LOCATION
LOCATION: LEG;ABDOMEN
LOCATION: LEG

## 2024-01-04 ASSESSMENT — PAIN DESCRIPTION - DESCRIPTORS: DESCRIPTORS: ACHING;SHARP

## 2024-01-04 ASSESSMENT — PAIN DESCRIPTION - ORIENTATION: ORIENTATION: LEFT

## 2024-01-04 NOTE — CONSULTS
Infectious Disease Consult    Impression/Plan   Polymicrobial bacteremia.  Blood cultures from 12/29 are growing Enterococcus faecalis, multiple colony types of CoNS, Enterococcus casseliflavus, Aerococcus viridans, and a carbapenem resistant Acinetobacter.  Blood cultures drawn 6 minutes later are sterile at 6 days.  Repeat blood cultures from 1/3 are also sterile.  Preliminary cultures from I&D of the foot are growing Staphylococcus.  I suspect the positive blood cultures represent a contaminated sample.  I am reluctant to add coverage for the Acinetobacter as I am concerned that exposure to further antibiotics will induce further resistance.  At this point the risk of further antibiotic exposure outweighs any potential benefits.  I have asked the microbiology lab to check further sensitivities to the Acinetobacter in case treatment does become necessary.  Left foot infection.  Status post I&D with left first metatarsal head amputation and left fifth toe amputation 1/2/2024.  Preliminary cultures growing staph species.  Continue current antibiotics pending further culture data  Right hand cellulitis?  Patient unable to tolerate MRI.  Will check CT scan  Medical noncompliance/homeless  History of EtOH and tobacco abuse  History of right BKA 2020  History of hepatitis C/cirrhosis  History of penicillin allergy.  This caused anaphylaxis in the past per chart records.  Currently tolerating meropenem      Anti-infectives:   Vancomycin  Meropenem    Subjective:   Date of Consultation:  January 4, 2024  Date of Admission: 12/30/2023   Referring Physician:     Patient is a 68 y.o. male who is being seen for polymicrobial bacteremia.  Please note patient is a poor historian.     Per HPI: \"Baldomero Reece is a 68 y.o.  male with past medical history of COPD, active smoker, hepatitis C, liver cirrhosis, Left lower extremity DVT, CVA in 2021, right BKA in 2020 due to osteomyelitis, alcohol abuse, recent left basal ganglia bleed

## 2024-01-05 ENCOUNTER — APPOINTMENT (OUTPATIENT)
Facility: HOSPITAL | Age: 69
DRG: 710 | End: 2024-01-05
Attending: STUDENT IN AN ORGANIZED HEALTH CARE EDUCATION/TRAINING PROGRAM
Payer: MEDICAID

## 2024-01-05 LAB
BACTERIA SPEC CULT: ABNORMAL
GRAM STN SPEC: ABNORMAL
GRAM STN SPEC: ABNORMAL
SERVICE CMNT-IMP: ABNORMAL
SERVICE CMNT-IMP: ABNORMAL

## 2024-01-05 PROCEDURE — 6370000000 HC RX 637 (ALT 250 FOR IP): Performed by: PODIATRIST

## 2024-01-05 PROCEDURE — 6360000002 HC RX W HCPCS: Performed by: PODIATRIST

## 2024-01-05 PROCEDURE — 6360000002 HC RX W HCPCS: Performed by: INTERNAL MEDICINE

## 2024-01-05 PROCEDURE — 6360000004 HC RX CONTRAST MEDICATION: Performed by: RADIOLOGY

## 2024-01-05 PROCEDURE — 1100000003 HC PRIVATE W/ TELEMETRY

## 2024-01-05 PROCEDURE — 2580000003 HC RX 258: Performed by: INTERNAL MEDICINE

## 2024-01-05 PROCEDURE — 6360000002 HC RX W HCPCS: Performed by: GENERAL ACUTE CARE HOSPITAL

## 2024-01-05 PROCEDURE — 2500000003 HC RX 250 WO HCPCS: Performed by: INTERNAL MEDICINE

## 2024-01-05 PROCEDURE — 2580000003 HC RX 258: Performed by: PODIATRIST

## 2024-01-05 PROCEDURE — 73201 CT UPPER EXTREMITY W/DYE: CPT

## 2024-01-05 RX ADMIN — HYDROMORPHONE HYDROCHLORIDE 1 MG: 1 INJECTION, SOLUTION INTRAMUSCULAR; INTRAVENOUS; SUBCUTANEOUS at 15:17

## 2024-01-05 RX ADMIN — OXYCODONE HYDROCHLORIDE 5 MG: 5 TABLET ORAL at 17:02

## 2024-01-05 RX ADMIN — SODIUM CHLORIDE, PRESERVATIVE FREE 10 ML: 5 INJECTION INTRAVENOUS at 21:18

## 2024-01-05 RX ADMIN — MEROPENEM 1000 MG: 1 INJECTION, POWDER, FOR SOLUTION INTRAVENOUS at 05:39

## 2024-01-05 RX ADMIN — MEROPENEM 1000 MG: 1 INJECTION, POWDER, FOR SOLUTION INTRAVENOUS at 15:21

## 2024-01-05 RX ADMIN — FOLIC ACID 1 MG: 1 TABLET ORAL at 09:43

## 2024-01-05 RX ADMIN — Medication 100 MG: at 09:43

## 2024-01-05 RX ADMIN — SODIUM CHLORIDE, PRESERVATIVE FREE 10 ML: 5 INJECTION INTRAVENOUS at 09:44

## 2024-01-05 RX ADMIN — OXYCODONE HYDROCHLORIDE 5 MG: 5 TABLET ORAL at 12:52

## 2024-01-05 RX ADMIN — SODIUM CHLORIDE: 9 INJECTION, SOLUTION INTRAVENOUS at 12:55

## 2024-01-05 RX ADMIN — VANCOMYCIN HYDROCHLORIDE 1250 MG: 10 INJECTION, POWDER, LYOPHILIZED, FOR SOLUTION INTRAVENOUS at 12:58

## 2024-01-05 RX ADMIN — MEROPENEM 1000 MG: 1 INJECTION, POWDER, FOR SOLUTION INTRAVENOUS at 21:17

## 2024-01-05 RX ADMIN — HYDROMORPHONE HYDROCHLORIDE 1 MG: 1 INJECTION, SOLUTION INTRAMUSCULAR; INTRAVENOUS; SUBCUTANEOUS at 09:52

## 2024-01-05 RX ADMIN — IOPAMIDOL 100 ML: 755 INJECTION, SOLUTION INTRAVENOUS at 11:17

## 2024-01-05 RX ADMIN — ATORVASTATIN CALCIUM 40 MG: 40 TABLET, FILM COATED ORAL at 21:11

## 2024-01-05 RX ADMIN — ENOXAPARIN SODIUM 40 MG: 100 INJECTION SUBCUTANEOUS at 09:43

## 2024-01-05 RX ADMIN — HYDROMORPHONE HYDROCHLORIDE 1 MG: 1 INJECTION, SOLUTION INTRAMUSCULAR; INTRAVENOUS; SUBCUTANEOUS at 21:11

## 2024-01-05 ASSESSMENT — PAIN DESCRIPTION - ORIENTATION
ORIENTATION: LEFT
ORIENTATION: LEFT
ORIENTATION: RIGHT
ORIENTATION: LEFT
ORIENTATION: RIGHT
ORIENTATION: LEFT

## 2024-01-05 ASSESSMENT — PAIN - FUNCTIONAL ASSESSMENT
PAIN_FUNCTIONAL_ASSESSMENT: PREVENTS OR INTERFERES SOME ACTIVE ACTIVITIES AND ADLS
PAIN_FUNCTIONAL_ASSESSMENT: PREVENTS OR INTERFERES SOME ACTIVE ACTIVITIES AND ADLS
PAIN_FUNCTIONAL_ASSESSMENT: PREVENTS OR INTERFERES WITH MANY ACTIVE NOT PASSIVE ACTIVITIES
PAIN_FUNCTIONAL_ASSESSMENT: PREVENTS OR INTERFERES WITH MANY ACTIVE NOT PASSIVE ACTIVITIES
PAIN_FUNCTIONAL_ASSESSMENT: PREVENTS OR INTERFERES SOME ACTIVE ACTIVITIES AND ADLS
PAIN_FUNCTIONAL_ASSESSMENT: PREVENTS OR INTERFERES WITH MANY ACTIVE NOT PASSIVE ACTIVITIES
PAIN_FUNCTIONAL_ASSESSMENT: PREVENTS OR INTERFERES WITH MANY ACTIVE NOT PASSIVE ACTIVITIES

## 2024-01-05 ASSESSMENT — PAIN SCALES - GENERAL
PAINLEVEL_OUTOF10: 0
PAINLEVEL_OUTOF10: 5
PAINLEVEL_OUTOF10: 8
PAINLEVEL_OUTOF10: 0
PAINLEVEL_OUTOF10: 8
PAINLEVEL_OUTOF10: 8
PAINLEVEL_OUTOF10: 0
PAINLEVEL_OUTOF10: 4
PAINLEVEL_OUTOF10: 0
PAINLEVEL_OUTOF10: 4
PAINLEVEL_OUTOF10: 5
PAINLEVEL_OUTOF10: 8
PAINLEVEL_OUTOF10: 8

## 2024-01-05 ASSESSMENT — PAIN DESCRIPTION - PAIN TYPE
TYPE: SURGICAL PAIN
TYPE: ACUTE PAIN
TYPE: SURGICAL PAIN

## 2024-01-05 ASSESSMENT — PAIN DESCRIPTION - DESCRIPTORS
DESCRIPTORS: ACHING

## 2024-01-05 ASSESSMENT — PAIN DESCRIPTION - FREQUENCY
FREQUENCY: CONTINUOUS

## 2024-01-05 ASSESSMENT — PAIN DESCRIPTION - LOCATION
LOCATION: LEG;FOOT
LOCATION: FOOT
LOCATION: LEG;FOOT
LOCATION: LEG
LOCATION: LEG;FOOT
LOCATION: FOOT;LEG
LOCATION: LEG;FOOT
LOCATION: FOOT;LEG

## 2024-01-05 ASSESSMENT — PAIN DESCRIPTION - ONSET
ONSET: ON-GOING

## 2024-01-06 LAB
ALBUMIN SERPL-MCNC: 2.4 G/DL (ref 3.5–5)
ALBUMIN/GLOB SERPL: 0.6 (ref 1.1–2.2)
ALP SERPL-CCNC: 66 U/L (ref 45–117)
ALT SERPL-CCNC: 20 U/L (ref 12–78)
ANION GAP SERPL CALC-SCNC: 0 MMOL/L (ref 5–15)
AST SERPL-CCNC: 24 U/L (ref 15–37)
BACTERIA SPEC CULT: NORMAL
BASOPHILS # BLD: 0.1 K/UL (ref 0–0.1)
BASOPHILS NFR BLD: 1 % (ref 0–1)
BILIRUB SERPL-MCNC: 0.6 MG/DL (ref 0.2–1)
BUN SERPL-MCNC: 13 MG/DL (ref 6–20)
BUN/CREAT SERPL: 27 (ref 12–20)
CALCIUM SERPL-MCNC: 9.1 MG/DL (ref 8.5–10.1)
CHLORIDE SERPL-SCNC: 109 MMOL/L (ref 97–108)
CO2 SERPL-SCNC: 31 MMOL/L (ref 21–32)
CREAT SERPL-MCNC: 0.49 MG/DL (ref 0.7–1.3)
DIFFERENTIAL METHOD BLD: ABNORMAL
EOSINOPHIL # BLD: 0.4 K/UL (ref 0–0.4)
EOSINOPHIL NFR BLD: 10 % (ref 0–7)
ERYTHROCYTE [DISTWIDTH] IN BLOOD BY AUTOMATED COUNT: 13.2 % (ref 11.5–14.5)
GLOBULIN SER CALC-MCNC: 3.9 G/DL (ref 2–4)
GLUCOSE SERPL-MCNC: 90 MG/DL (ref 65–100)
HCT VFR BLD AUTO: 37.3 % (ref 36.6–50.3)
HGB BLD-MCNC: 11.6 G/DL (ref 12.1–17)
IMM GRANULOCYTES # BLD AUTO: 0 K/UL (ref 0–0.04)
IMM GRANULOCYTES NFR BLD AUTO: 0 % (ref 0–0.5)
LYMPHOCYTES # BLD: 1 K/UL (ref 0.8–3.5)
LYMPHOCYTES NFR BLD: 28 % (ref 12–49)
MCH RBC QN AUTO: 28.4 PG (ref 26–34)
MCHC RBC AUTO-ENTMCNC: 31.1 G/DL (ref 30–36.5)
MCV RBC AUTO: 91.4 FL (ref 80–99)
MONOCYTES # BLD: 0.5 K/UL (ref 0–1)
MONOCYTES NFR BLD: 14 % (ref 5–13)
NEUTS SEG # BLD: 1.7 K/UL (ref 1.8–8)
NEUTS SEG NFR BLD: 47 % (ref 32–75)
NRBC # BLD: 0 K/UL (ref 0–0.01)
NRBC BLD-RTO: 0 PER 100 WBC
PLATELET # BLD AUTO: 123 K/UL (ref 150–400)
PMV BLD AUTO: 10.9 FL (ref 8.9–12.9)
POTASSIUM SERPL-SCNC: 3.8 MMOL/L (ref 3.5–5.1)
PROT SERPL-MCNC: 6.3 G/DL (ref 6.4–8.2)
RBC # BLD AUTO: 4.08 M/UL (ref 4.1–5.7)
SERVICE CMNT-IMP: NORMAL
SODIUM SERPL-SCNC: 140 MMOL/L (ref 136–145)
WBC # BLD AUTO: 3.7 K/UL (ref 4.1–11.1)

## 2024-01-06 PROCEDURE — 85025 COMPLETE CBC W/AUTO DIFF WBC: CPT

## 2024-01-06 PROCEDURE — 99232 SBSQ HOSP IP/OBS MODERATE 35: CPT | Performed by: INTERNAL MEDICINE

## 2024-01-06 PROCEDURE — 2500000003 HC RX 250 WO HCPCS: Performed by: INTERNAL MEDICINE

## 2024-01-06 PROCEDURE — 6370000000 HC RX 637 (ALT 250 FOR IP): Performed by: PODIATRIST

## 2024-01-06 PROCEDURE — 2700000000 HC OXYGEN THERAPY PER DAY

## 2024-01-06 PROCEDURE — 2580000003 HC RX 258: Performed by: INTERNAL MEDICINE

## 2024-01-06 PROCEDURE — 1100000000 HC RM PRIVATE

## 2024-01-06 PROCEDURE — 6360000002 HC RX W HCPCS: Performed by: PODIATRIST

## 2024-01-06 PROCEDURE — 2580000003 HC RX 258: Performed by: PODIATRIST

## 2024-01-06 PROCEDURE — 6360000002 HC RX W HCPCS: Performed by: INTERNAL MEDICINE

## 2024-01-06 PROCEDURE — 36415 COLL VENOUS BLD VENIPUNCTURE: CPT

## 2024-01-06 PROCEDURE — 80053 COMPREHEN METABOLIC PANEL: CPT

## 2024-01-06 RX ADMIN — FOLIC ACID 1 MG: 1 TABLET ORAL at 09:24

## 2024-01-06 RX ADMIN — HYDROMORPHONE HYDROCHLORIDE 1 MG: 1 INJECTION, SOLUTION INTRAMUSCULAR; INTRAVENOUS; SUBCUTANEOUS at 12:11

## 2024-01-06 RX ADMIN — SODIUM CHLORIDE: 9 INJECTION, SOLUTION INTRAVENOUS at 00:40

## 2024-01-06 RX ADMIN — HYDROMORPHONE HYDROCHLORIDE 1 MG: 1 INJECTION, SOLUTION INTRAMUSCULAR; INTRAVENOUS; SUBCUTANEOUS at 20:57

## 2024-01-06 RX ADMIN — SODIUM CHLORIDE, PRESERVATIVE FREE 10 ML: 5 INJECTION INTRAVENOUS at 09:24

## 2024-01-06 RX ADMIN — VANCOMYCIN HYDROCHLORIDE 1250 MG: 10 INJECTION, POWDER, LYOPHILIZED, FOR SOLUTION INTRAVENOUS at 00:41

## 2024-01-06 RX ADMIN — SODIUM CHLORIDE, PRESERVATIVE FREE 10 ML: 5 INJECTION INTRAVENOUS at 21:00

## 2024-01-06 RX ADMIN — MEROPENEM 1000 MG: 1 INJECTION, POWDER, FOR SOLUTION INTRAVENOUS at 21:42

## 2024-01-06 RX ADMIN — ATORVASTATIN CALCIUM 40 MG: 40 TABLET, FILM COATED ORAL at 20:56

## 2024-01-06 RX ADMIN — Medication 100 MG: at 09:24

## 2024-01-06 RX ADMIN — OXYCODONE HYDROCHLORIDE 5 MG: 5 TABLET ORAL at 09:24

## 2024-01-06 RX ADMIN — MEROPENEM 1000 MG: 1 INJECTION, POWDER, FOR SOLUTION INTRAVENOUS at 14:18

## 2024-01-06 RX ADMIN — MEROPENEM 1000 MG: 1 INJECTION, POWDER, FOR SOLUTION INTRAVENOUS at 05:09

## 2024-01-06 RX ADMIN — VANCOMYCIN HYDROCHLORIDE 1250 MG: 10 INJECTION, POWDER, LYOPHILIZED, FOR SOLUTION INTRAVENOUS at 12:06

## 2024-01-06 ASSESSMENT — PAIN DESCRIPTION - ONSET
ONSET: ON-GOING

## 2024-01-06 ASSESSMENT — PAIN DESCRIPTION - FREQUENCY
FREQUENCY: CONTINUOUS
FREQUENCY: INTERMITTENT
FREQUENCY: CONTINUOUS

## 2024-01-06 ASSESSMENT — PAIN DESCRIPTION - DESCRIPTORS
DESCRIPTORS: ACHING

## 2024-01-06 ASSESSMENT — PAIN DESCRIPTION - ORIENTATION
ORIENTATION: LEFT

## 2024-01-06 ASSESSMENT — PAIN SCALES - GENERAL
PAINLEVEL_OUTOF10: 0
PAINLEVEL_OUTOF10: 0
PAINLEVEL_OUTOF10: 8

## 2024-01-06 ASSESSMENT — PAIN - FUNCTIONAL ASSESSMENT
PAIN_FUNCTIONAL_ASSESSMENT: PREVENTS OR INTERFERES SOME ACTIVE ACTIVITIES AND ADLS
PAIN_FUNCTIONAL_ASSESSMENT: PREVENTS OR INTERFERES SOME ACTIVE ACTIVITIES AND ADLS

## 2024-01-06 ASSESSMENT — PAIN DESCRIPTION - LOCATION
LOCATION: LEG
LOCATION: FOOT

## 2024-01-06 ASSESSMENT — PAIN DESCRIPTION - PAIN TYPE
TYPE: SURGICAL PAIN

## 2024-01-06 NOTE — CONSULTS
ORTHOPAEDIC CONSULT NOTE    Subjective:     Date of Consultation:  January 6, 2024      Baldomero Reece is a 68 y.o. male who is being seen for right hand swelling. Recent CT scan. Postop recently from surgery Dr Salazar. No c/o regarding his hand today.     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    Osteomyelitis of foot (HCC) 01/04/2024    Bacteremia 12/30/2023    Brain bleed (HCC) 06/18/2023    Intraparenchymal hemorrhage of brain (HCC) 05/29/2023    Alcoholic cirrhosis of liver without ascites (HCC) 04/20/2023    Hypertensive heart disease without heart failure 04/20/2023    Polysubstance abuse (HCC) 04/20/2023    Alcohol abuse 02/27/2023    History of multiple sclerosis ([facility]) 03/03/2022    Hyperammonemia ([facility]) 11/14/2021    Hx of right BKA ([facility]) 10/14/2020    Osteomyelitis of ankle and foot ([facility]) 10/07/2020    COPD (chronic obstructive pulmonary disease) ([facility]) 10/07/2020    Impaired ambulation 09/25/2020    Sepsis without acute organ dysfunction ([facility]) 09/25/2020    Tachycardia 09/15/2020     History reviewed. No pertinent family history.   Social History     Tobacco Use    Smoking status: Every Day     Types: Cigarettes    Smokeless tobacco: Never   Substance Use Topics    Alcohol use: Yes     Past Medical History:   Diagnosis Date    Hypertension       Past Surgical History:   Procedure Laterality Date    FOOT DEBRIDEMENT Left 1/2/2024    LEFT FOOT INCISION AND DRAINAGE with misonix , First metatarsal head amputation and left fifth toe amputation, skin graft application left foot performed by Percy Salazar DPM at [facility] MAIN OR    LEG AMPUTATION BELOW KNEE Right     LOWER EXTREMITY AMPUTATION Left     transmetatarsal      Prior to Admission medications    Not on File     Current Facility-Administered Medications   Medication Dose Route Frequency    HYDROmorphone HCl PF (DILAUDID) injection 1 mg

## 2024-01-07 LAB
ALBUMIN SERPL-MCNC: 2.5 G/DL (ref 3.5–5)
ALBUMIN/GLOB SERPL: 0.7 (ref 1.1–2.2)
ALP SERPL-CCNC: 71 U/L (ref 45–117)
ALT SERPL-CCNC: 21 U/L (ref 12–78)
ANION GAP SERPL CALC-SCNC: 1 MMOL/L (ref 5–15)
AST SERPL-CCNC: 26 U/L (ref 15–37)
BILIRUB SERPL-MCNC: 0.5 MG/DL (ref 0.2–1)
BUN SERPL-MCNC: 16 MG/DL (ref 6–20)
BUN/CREAT SERPL: 31 (ref 12–20)
CALCIUM SERPL-MCNC: 8.7 MG/DL (ref 8.5–10.1)
CHLORIDE SERPL-SCNC: 111 MMOL/L (ref 97–108)
CO2 SERPL-SCNC: 31 MMOL/L (ref 21–32)
CREAT SERPL-MCNC: 0.52 MG/DL (ref 0.7–1.3)
GLOBULIN SER CALC-MCNC: 3.8 G/DL (ref 2–4)
GLUCOSE SERPL-MCNC: 92 MG/DL (ref 65–100)
POTASSIUM SERPL-SCNC: 3.8 MMOL/L (ref 3.5–5.1)
PROT SERPL-MCNC: 6.3 G/DL (ref 6.4–8.2)
SODIUM SERPL-SCNC: 143 MMOL/L (ref 136–145)

## 2024-01-07 PROCEDURE — 6360000002 HC RX W HCPCS: Performed by: GENERAL ACUTE CARE HOSPITAL

## 2024-01-07 PROCEDURE — 99232 SBSQ HOSP IP/OBS MODERATE 35: CPT | Performed by: INTERNAL MEDICINE

## 2024-01-07 PROCEDURE — 36415 COLL VENOUS BLD VENIPUNCTURE: CPT

## 2024-01-07 PROCEDURE — 6370000000 HC RX 637 (ALT 250 FOR IP): Performed by: PODIATRIST

## 2024-01-07 PROCEDURE — 2580000003 HC RX 258: Performed by: INTERNAL MEDICINE

## 2024-01-07 PROCEDURE — 6360000002 HC RX W HCPCS: Performed by: INTERNAL MEDICINE

## 2024-01-07 PROCEDURE — 2500000003 HC RX 250 WO HCPCS: Performed by: INTERNAL MEDICINE

## 2024-01-07 PROCEDURE — 6360000002 HC RX W HCPCS: Performed by: PODIATRIST

## 2024-01-07 PROCEDURE — 80053 COMPREHEN METABOLIC PANEL: CPT

## 2024-01-07 PROCEDURE — 2580000003 HC RX 258: Performed by: PODIATRIST

## 2024-01-07 PROCEDURE — 1100000000 HC RM PRIVATE

## 2024-01-07 RX ADMIN — SODIUM CHLORIDE, PRESERVATIVE FREE 10 ML: 5 INJECTION INTRAVENOUS at 19:58

## 2024-01-07 RX ADMIN — OXYCODONE HYDROCHLORIDE 5 MG: 5 TABLET ORAL at 10:24

## 2024-01-07 RX ADMIN — FOLIC ACID 1 MG: 1 TABLET ORAL at 10:19

## 2024-01-07 RX ADMIN — ATORVASTATIN CALCIUM 40 MG: 40 TABLET, FILM COATED ORAL at 19:57

## 2024-01-07 RX ADMIN — Medication 100 MG: at 10:22

## 2024-01-07 RX ADMIN — HYDROMORPHONE HYDROCHLORIDE 1 MG: 1 INJECTION, SOLUTION INTRAMUSCULAR; INTRAVENOUS; SUBCUTANEOUS at 20:00

## 2024-01-07 RX ADMIN — ENOXAPARIN SODIUM 40 MG: 100 INJECTION SUBCUTANEOUS at 10:19

## 2024-01-07 RX ADMIN — MEROPENEM 1000 MG: 1 INJECTION, POWDER, FOR SOLUTION INTRAVENOUS at 05:20

## 2024-01-07 RX ADMIN — MEROPENEM 1000 MG: 1 INJECTION, POWDER, FOR SOLUTION INTRAVENOUS at 21:21

## 2024-01-07 RX ADMIN — MEROPENEM 1000 MG: 1 INJECTION, POWDER, FOR SOLUTION INTRAVENOUS at 14:32

## 2024-01-07 RX ADMIN — HYDROMORPHONE HYDROCHLORIDE 1 MG: 1 INJECTION, SOLUTION INTRAMUSCULAR; INTRAVENOUS; SUBCUTANEOUS at 14:37

## 2024-01-07 RX ADMIN — VANCOMYCIN HYDROCHLORIDE 1250 MG: 10 INJECTION, POWDER, LYOPHILIZED, FOR SOLUTION INTRAVENOUS at 01:23

## 2024-01-07 RX ADMIN — VANCOMYCIN HYDROCHLORIDE 1250 MG: 10 INJECTION, POWDER, LYOPHILIZED, FOR SOLUTION INTRAVENOUS at 12:20

## 2024-01-07 ASSESSMENT — PAIN DESCRIPTION - FREQUENCY: FREQUENCY: INTERMITTENT

## 2024-01-07 ASSESSMENT — PAIN SCALES - WONG BAKER: WONGBAKER_NUMERICALRESPONSE: 2

## 2024-01-07 ASSESSMENT — PAIN DESCRIPTION - PAIN TYPE: TYPE: SURGICAL PAIN

## 2024-01-07 ASSESSMENT — PAIN DESCRIPTION - LOCATION
LOCATION: LEG
LOCATION: FOOT

## 2024-01-07 ASSESSMENT — PAIN - FUNCTIONAL ASSESSMENT: PAIN_FUNCTIONAL_ASSESSMENT: ACTIVITIES ARE NOT PREVENTED

## 2024-01-07 ASSESSMENT — PAIN DESCRIPTION - ORIENTATION
ORIENTATION: LEFT

## 2024-01-07 ASSESSMENT — PAIN SCALES - GENERAL
PAINLEVEL_OUTOF10: 10
PAINLEVEL_OUTOF10: 7
PAINLEVEL_OUTOF10: 7

## 2024-01-07 ASSESSMENT — PAIN DESCRIPTION - ONSET: ONSET: GRADUAL

## 2024-01-07 ASSESSMENT — PAIN DESCRIPTION - DESCRIPTORS
DESCRIPTORS: ACHING
DESCRIPTORS: ACHING

## 2024-01-08 LAB
ECHO BSA: 1.75 M2
VAS LEFT ABI: 1.13
VAS LEFT ARM BP: 135 MMHG
VAS LEFT PTA BP: 161 MMHG
VAS RIGHT ARM BP: 142 MMHG

## 2024-01-08 PROCEDURE — 97530 THERAPEUTIC ACTIVITIES: CPT

## 2024-01-08 PROCEDURE — 6360000002 HC RX W HCPCS: Performed by: PODIATRIST

## 2024-01-08 PROCEDURE — 6360000002 HC RX W HCPCS: Performed by: INTERNAL MEDICINE

## 2024-01-08 PROCEDURE — 2580000003 HC RX 258: Performed by: PODIATRIST

## 2024-01-08 PROCEDURE — 1100000000 HC RM PRIVATE

## 2024-01-08 PROCEDURE — 97535 SELF CARE MNGMENT TRAINING: CPT

## 2024-01-08 PROCEDURE — 6360000002 HC RX W HCPCS: Performed by: GENERAL ACUTE CARE HOSPITAL

## 2024-01-08 PROCEDURE — 2500000003 HC RX 250 WO HCPCS: Performed by: INTERNAL MEDICINE

## 2024-01-08 PROCEDURE — 97110 THERAPEUTIC EXERCISES: CPT

## 2024-01-08 PROCEDURE — 2580000003 HC RX 258: Performed by: INTERNAL MEDICINE

## 2024-01-08 PROCEDURE — 6370000000 HC RX 637 (ALT 250 FOR IP): Performed by: PODIATRIST

## 2024-01-08 RX ADMIN — SODIUM CHLORIDE, PRESERVATIVE FREE 10 ML: 5 INJECTION INTRAVENOUS at 09:38

## 2024-01-08 RX ADMIN — MEROPENEM 1000 MG: 1 INJECTION, POWDER, FOR SOLUTION INTRAVENOUS at 05:09

## 2024-01-08 RX ADMIN — LORAZEPAM 0.5 MG: 0.5 TABLET ORAL at 22:08

## 2024-01-08 RX ADMIN — HYDROMORPHONE HYDROCHLORIDE 1 MG: 1 INJECTION, SOLUTION INTRAMUSCULAR; INTRAVENOUS; SUBCUTANEOUS at 13:40

## 2024-01-08 RX ADMIN — SODIUM CHLORIDE, PRESERVATIVE FREE 10 ML: 5 INJECTION INTRAVENOUS at 21:45

## 2024-01-08 RX ADMIN — ENOXAPARIN SODIUM 40 MG: 100 INJECTION SUBCUTANEOUS at 09:37

## 2024-01-08 RX ADMIN — MEROPENEM 1000 MG: 1 INJECTION, POWDER, FOR SOLUTION INTRAVENOUS at 21:19

## 2024-01-08 RX ADMIN — ATORVASTATIN CALCIUM 40 MG: 40 TABLET, FILM COATED ORAL at 21:14

## 2024-01-08 RX ADMIN — HYDROMORPHONE HYDROCHLORIDE 1 MG: 1 INJECTION, SOLUTION INTRAMUSCULAR; INTRAVENOUS; SUBCUTANEOUS at 18:31

## 2024-01-08 RX ADMIN — VANCOMYCIN HYDROCHLORIDE 1250 MG: 10 INJECTION, POWDER, LYOPHILIZED, FOR SOLUTION INTRAVENOUS at 00:35

## 2024-01-08 RX ADMIN — HYDROMORPHONE HYDROCHLORIDE 1 MG: 1 INJECTION, SOLUTION INTRAMUSCULAR; INTRAVENOUS; SUBCUTANEOUS at 22:09

## 2024-01-08 RX ADMIN — VANCOMYCIN HYDROCHLORIDE 1250 MG: 10 INJECTION, POWDER, LYOPHILIZED, FOR SOLUTION INTRAVENOUS at 13:36

## 2024-01-08 RX ADMIN — HYDROMORPHONE HYDROCHLORIDE 1 MG: 1 INJECTION, SOLUTION INTRAMUSCULAR; INTRAVENOUS; SUBCUTANEOUS at 09:37

## 2024-01-08 RX ADMIN — FOLIC ACID 1 MG: 1 TABLET ORAL at 09:37

## 2024-01-08 RX ADMIN — MEROPENEM 1000 MG: 1 INJECTION, POWDER, FOR SOLUTION INTRAVENOUS at 15:06

## 2024-01-08 RX ADMIN — Medication 100 MG: at 09:37

## 2024-01-08 ASSESSMENT — PAIN SCALES - WONG BAKER
WONGBAKER_NUMERICALRESPONSE: 0

## 2024-01-08 ASSESSMENT — PAIN DESCRIPTION - LOCATION
LOCATION: FOOT;LEG
LOCATION: LEG

## 2024-01-08 ASSESSMENT — PAIN SCALES - GENERAL
PAINLEVEL_OUTOF10: 8
PAINLEVEL_OUTOF10: 0
PAINLEVEL_OUTOF10: 0
PAINLEVEL_OUTOF10: 4
PAINLEVEL_OUTOF10: 9
PAINLEVEL_OUTOF10: 3
PAINLEVEL_OUTOF10: 4
PAINLEVEL_OUTOF10: 8
PAINLEVEL_OUTOF10: 8

## 2024-01-08 ASSESSMENT — PAIN DESCRIPTION - ORIENTATION: ORIENTATION: LEFT

## 2024-01-08 ASSESSMENT — PAIN DESCRIPTION - DESCRIPTORS: DESCRIPTORS: ACHING

## 2024-01-08 NOTE — CARE COORDINATION
NADIA Plan: LTC placement under Medicaid (pending acceptance)    CM requested legal NOK search 1/8/24    Update - 4:45 PM: Request for legal NOK search sent to legal team; updates to be provided once available. Perfect serve message sent to attending MD reporting updates related to CM's efforts.    Initial note: Chart reviewed, CM consult for SNF placement acknowledged. Pt transferred from PCU to surg-tele unit 1/6/24. Per review of previous ANNIE MENJIVAR' note from 1/2/24, pt was provided with a list of SNF's to review for placement. As of 1/2/24, pt was not agreeable to any referrals being sent on his behalf, however, was open to reviewing the list.    As of currently, pt has [facility] Medicaid coverage listed as primary payor source on the chart. Pt does not have a short-term benefit for skilled nursing facility placement under his Medicaid coverage. Placement within a skilled nursing facility would be for LTC, not for short-term rehab intervention.    CM reviewed documentation throughout course of admission & noted that pt does not have an emergency contact or AMD on the chart. CM conducted extensive review of previous admissions within [facility] system & noted an emergency contact listed as a  (Sarika David: [phone]).     CM met with pt at bedside, introduced role, & attempted to discuss the direction of the d/c plan. Pt presented as A&O x2 throughout duration of encounter. Upon introducing role as d/c planner, pt reported \"someone was already helping him find housing.\" CM inquired who pt was working with to secure housing; pt reported \"the foot doctor.\" CM offered to support efforts to secure LTC placement, inquiring if pt was agreeable to a mass referral being sent in effort to identify facilities able to accommodate his needs. Pt confirmed being agreeable. Pt reported no preference for placement aside from remaining adamant that he will not be returning to [facility].     OTTO

## 2024-01-08 NOTE — PROCEDURES
In room to obtain consent and place PICC line.  Patient stated, \"I am not leaving the hospital\".  Educated patient, MD having him going to a SNF to continue his antibiotics and will need PICC line for his antibiotics if leaving hospital.  Patient stated he was not told he was getting this line or leaving the hospital and was adamant he was not leaving the hospital to go to a rehab.  Discussed patient's refusal with nurse, who also spoke with patient.  PICC line discontinued, per patient refusal and no SNF arrangements set up at this time.

## 2024-01-09 LAB
ALBUMIN SERPL-MCNC: 2.8 G/DL (ref 3.5–5)
ALBUMIN/GLOB SERPL: 0.7 (ref 1.1–2.2)
ALP SERPL-CCNC: 82 U/L (ref 45–117)
ALT SERPL-CCNC: 23 U/L (ref 12–78)
ANION GAP SERPL CALC-SCNC: 0 MMOL/L (ref 5–15)
AST SERPL-CCNC: 30 U/L (ref 15–37)
BILIRUB SERPL-MCNC: 0.3 MG/DL (ref 0.2–1)
BUN SERPL-MCNC: 20 MG/DL (ref 6–20)
BUN/CREAT SERPL: 25 (ref 12–20)
CALCIUM SERPL-MCNC: 8.8 MG/DL (ref 8.5–10.1)
CHLORIDE SERPL-SCNC: 110 MMOL/L (ref 97–108)
CO2 SERPL-SCNC: 30 MMOL/L (ref 21–32)
CREAT SERPL-MCNC: 0.81 MG/DL (ref 0.7–1.3)
GLOBULIN SER CALC-MCNC: 4 G/DL (ref 2–4)
GLUCOSE SERPL-MCNC: 90 MG/DL (ref 65–100)
POTASSIUM SERPL-SCNC: 4.2 MMOL/L (ref 3.5–5.1)
PROT SERPL-MCNC: 6.8 G/DL (ref 6.4–8.2)
SODIUM SERPL-SCNC: 140 MMOL/L (ref 136–145)

## 2024-01-09 PROCEDURE — 1100000000 HC RM PRIVATE

## 2024-01-09 PROCEDURE — 6370000000 HC RX 637 (ALT 250 FOR IP): Performed by: PODIATRIST

## 2024-01-09 PROCEDURE — 2500000003 HC RX 250 WO HCPCS: Performed by: INTERNAL MEDICINE

## 2024-01-09 PROCEDURE — 36415 COLL VENOUS BLD VENIPUNCTURE: CPT

## 2024-01-09 PROCEDURE — 2580000003 HC RX 258: Performed by: PODIATRIST

## 2024-01-09 PROCEDURE — 6360000002 HC RX W HCPCS: Performed by: INTERNAL MEDICINE

## 2024-01-09 PROCEDURE — 6360000002 HC RX W HCPCS: Performed by: GENERAL ACUTE CARE HOSPITAL

## 2024-01-09 PROCEDURE — 6360000002 HC RX W HCPCS: Performed by: PODIATRIST

## 2024-01-09 PROCEDURE — 2580000003 HC RX 258: Performed by: INTERNAL MEDICINE

## 2024-01-09 PROCEDURE — 80053 COMPREHEN METABOLIC PANEL: CPT

## 2024-01-09 RX ADMIN — HYDROMORPHONE HYDROCHLORIDE 1 MG: 1 INJECTION, SOLUTION INTRAMUSCULAR; INTRAVENOUS; SUBCUTANEOUS at 21:38

## 2024-01-09 RX ADMIN — HYDROMORPHONE HYDROCHLORIDE 1 MG: 1 INJECTION, SOLUTION INTRAMUSCULAR; INTRAVENOUS; SUBCUTANEOUS at 10:21

## 2024-01-09 RX ADMIN — MEROPENEM 1000 MG: 1 INJECTION, POWDER, FOR SOLUTION INTRAVENOUS at 14:10

## 2024-01-09 RX ADMIN — FOLIC ACID 1 MG: 1 TABLET ORAL at 09:44

## 2024-01-09 RX ADMIN — ATORVASTATIN CALCIUM 40 MG: 40 TABLET, FILM COATED ORAL at 21:39

## 2024-01-09 RX ADMIN — Medication 100 MG: at 09:44

## 2024-01-09 RX ADMIN — HYDROMORPHONE HYDROCHLORIDE 1 MG: 1 INJECTION, SOLUTION INTRAMUSCULAR; INTRAVENOUS; SUBCUTANEOUS at 15:21

## 2024-01-09 RX ADMIN — VANCOMYCIN HYDROCHLORIDE 1250 MG: 10 INJECTION, POWDER, LYOPHILIZED, FOR SOLUTION INTRAVENOUS at 02:01

## 2024-01-09 RX ADMIN — ENOXAPARIN SODIUM 40 MG: 100 INJECTION SUBCUTANEOUS at 09:43

## 2024-01-09 RX ADMIN — MEROPENEM 1000 MG: 1 INJECTION, POWDER, FOR SOLUTION INTRAVENOUS at 06:12

## 2024-01-09 RX ADMIN — VANCOMYCIN HYDROCHLORIDE 1250 MG: 10 INJECTION, POWDER, LYOPHILIZED, FOR SOLUTION INTRAVENOUS at 15:21

## 2024-01-09 RX ADMIN — SODIUM CHLORIDE, PRESERVATIVE FREE 10 ML: 5 INJECTION INTRAVENOUS at 21:11

## 2024-01-09 RX ADMIN — SODIUM CHLORIDE, PRESERVATIVE FREE 10 ML: 5 INJECTION INTRAVENOUS at 09:43

## 2024-01-09 RX ADMIN — MEROPENEM 1000 MG: 1 INJECTION, POWDER, FOR SOLUTION INTRAVENOUS at 21:43

## 2024-01-09 ASSESSMENT — PAIN SCALES - WONG BAKER
WONGBAKER_NUMERICALRESPONSE: 0

## 2024-01-09 ASSESSMENT — PAIN SCALES - GENERAL
PAINLEVEL_OUTOF10: 10
PAINLEVEL_OUTOF10: 0
PAINLEVEL_OUTOF10: 10
PAINLEVEL_OUTOF10: 7
PAINLEVEL_OUTOF10: 3
PAINLEVEL_OUTOF10: 0

## 2024-01-09 ASSESSMENT — PAIN DESCRIPTION - LOCATION
LOCATION: LEG;FOOT
LOCATION: LEG;FOOT

## 2024-01-09 ASSESSMENT — PAIN DESCRIPTION - DESCRIPTORS: DESCRIPTORS: ACHING

## 2024-01-09 ASSESSMENT — PAIN DESCRIPTION - ORIENTATION: ORIENTATION: LEFT

## 2024-01-09 NOTE — CARE COORDINATION
Initial note: Chart reviewed, IDR completed. MD reported pt is medically stable for d/c pending LTC placement. PT specified in IDR's that pt is non-weight bearing, underlining the need for placement post d/c.     CM received outcome of legal NOK search. Pt's parents were from [place], [place], however, are now . The only other blood relative detailed on record is pt's grandmother, who's also .     CM contacted attending JUAN OLIVAS to discuss outcome of legal NOK search & determine if pt has capacity to make his own decisions. Per MD, pt has capacity to make his own decisions at this time. MD reiterated that pt is agreeable to LTC placement. Mass referral sent for LTC; several facilities have denied due to lack of bed availability, several referrals remain pending updates. CM will continue to follow & remain accessible for d/c planning.    SHOLA Nair  [facility] CM   [phone]

## 2024-01-10 LAB
ALBUMIN SERPL-MCNC: 2.7 G/DL (ref 3.5–5)
ALBUMIN/GLOB SERPL: 0.7 (ref 1.1–2.2)
ALP SERPL-CCNC: 87 U/L (ref 45–117)
ALT SERPL-CCNC: 24 U/L (ref 12–78)
ANION GAP SERPL CALC-SCNC: 2 MMOL/L (ref 5–15)
AST SERPL-CCNC: 33 U/L (ref 15–37)
BACTERIA SPEC CULT: NORMAL
BACTERIA SPEC CULT: NORMAL
BILIRUB SERPL-MCNC: 0.3 MG/DL (ref 0.2–1)
BUN SERPL-MCNC: 23 MG/DL (ref 6–20)
BUN/CREAT SERPL: 37 (ref 12–20)
CALCIUM SERPL-MCNC: 8.8 MG/DL (ref 8.5–10.1)
CHLORIDE SERPL-SCNC: 109 MMOL/L (ref 97–108)
CO2 SERPL-SCNC: 28 MMOL/L (ref 21–32)
CREAT SERPL-MCNC: 0.62 MG/DL (ref 0.7–1.3)
GLOBULIN SER CALC-MCNC: 3.8 G/DL (ref 2–4)
GLUCOSE SERPL-MCNC: 93 MG/DL (ref 65–100)
POTASSIUM SERPL-SCNC: 4.2 MMOL/L (ref 3.5–5.1)
PROT SERPL-MCNC: 6.5 G/DL (ref 6.4–8.2)
SERVICE CMNT-IMP: NORMAL
SERVICE CMNT-IMP: NORMAL
SODIUM SERPL-SCNC: 139 MMOL/L (ref 136–145)

## 2024-01-10 PROCEDURE — 6370000000 HC RX 637 (ALT 250 FOR IP): Performed by: PODIATRIST

## 2024-01-10 PROCEDURE — 94760 N-INVAS EAR/PLS OXIMETRY 1: CPT

## 2024-01-10 PROCEDURE — 6360000002 HC RX W HCPCS: Performed by: INTERNAL MEDICINE

## 2024-01-10 PROCEDURE — 2580000003 HC RX 258: Performed by: INTERNAL MEDICINE

## 2024-01-10 PROCEDURE — 1100000000 HC RM PRIVATE

## 2024-01-10 PROCEDURE — 2580000003 HC RX 258: Performed by: PODIATRIST

## 2024-01-10 PROCEDURE — 6360000002 HC RX W HCPCS: Performed by: PODIATRIST

## 2024-01-10 PROCEDURE — 80053 COMPREHEN METABOLIC PANEL: CPT

## 2024-01-10 PROCEDURE — 6360000002 HC RX W HCPCS: Performed by: GENERAL ACUTE CARE HOSPITAL

## 2024-01-10 PROCEDURE — 2500000003 HC RX 250 WO HCPCS: Performed by: INTERNAL MEDICINE

## 2024-01-10 PROCEDURE — 36415 COLL VENOUS BLD VENIPUNCTURE: CPT

## 2024-01-10 RX ADMIN — HYDROMORPHONE HYDROCHLORIDE 1 MG: 1 INJECTION, SOLUTION INTRAMUSCULAR; INTRAVENOUS; SUBCUTANEOUS at 02:48

## 2024-01-10 RX ADMIN — MEROPENEM 1000 MG: 1 INJECTION, POWDER, FOR SOLUTION INTRAVENOUS at 13:58

## 2024-01-10 RX ADMIN — OXYCODONE HYDROCHLORIDE 5 MG: 5 TABLET ORAL at 13:53

## 2024-01-10 RX ADMIN — VANCOMYCIN HYDROCHLORIDE 1250 MG: 10 INJECTION, POWDER, LYOPHILIZED, FOR SOLUTION INTRAVENOUS at 17:11

## 2024-01-10 RX ADMIN — FOLIC ACID 1 MG: 1 TABLET ORAL at 09:12

## 2024-01-10 RX ADMIN — OXYCODONE HYDROCHLORIDE 5 MG: 5 TABLET ORAL at 18:24

## 2024-01-10 RX ADMIN — ENOXAPARIN SODIUM 40 MG: 100 INJECTION SUBCUTANEOUS at 09:11

## 2024-01-10 RX ADMIN — HYDROMORPHONE HYDROCHLORIDE 1 MG: 1 INJECTION, SOLUTION INTRAMUSCULAR; INTRAVENOUS; SUBCUTANEOUS at 21:54

## 2024-01-10 RX ADMIN — MEROPENEM 1000 MG: 1 INJECTION, POWDER, FOR SOLUTION INTRAVENOUS at 21:53

## 2024-01-10 RX ADMIN — ACETAMINOPHEN 650 MG: 325 TABLET ORAL at 21:55

## 2024-01-10 RX ADMIN — SODIUM CHLORIDE: 9 INJECTION, SOLUTION INTRAVENOUS at 21:50

## 2024-01-10 RX ADMIN — Medication 100 MG: at 09:11

## 2024-01-10 RX ADMIN — SODIUM CHLORIDE, PRESERVATIVE FREE 10 ML: 5 INJECTION INTRAVENOUS at 09:12

## 2024-01-10 RX ADMIN — MEROPENEM 1000 MG: 1 INJECTION, POWDER, FOR SOLUTION INTRAVENOUS at 05:37

## 2024-01-10 RX ADMIN — OXYCODONE HYDROCHLORIDE 5 MG: 5 TABLET ORAL at 09:19

## 2024-01-10 RX ADMIN — ATORVASTATIN CALCIUM 40 MG: 40 TABLET, FILM COATED ORAL at 21:55

## 2024-01-10 RX ADMIN — VANCOMYCIN HYDROCHLORIDE 1250 MG: 10 INJECTION, POWDER, LYOPHILIZED, FOR SOLUTION INTRAVENOUS at 01:28

## 2024-01-10 ASSESSMENT — PAIN SCALES - GENERAL
PAINLEVEL_OUTOF10: 9
PAINLEVEL_OUTOF10: 4
PAINLEVEL_OUTOF10: 5
PAINLEVEL_OUTOF10: 9
PAINLEVEL_OUTOF10: 7
PAINLEVEL_OUTOF10: 8
PAINLEVEL_OUTOF10: 7
PAINLEVEL_OUTOF10: 7

## 2024-01-10 ASSESSMENT — PAIN DESCRIPTION - DESCRIPTORS
DESCRIPTORS: ACHING

## 2024-01-10 ASSESSMENT — PAIN - FUNCTIONAL ASSESSMENT
PAIN_FUNCTIONAL_ASSESSMENT: PREVENTS OR INTERFERES SOME ACTIVE ACTIVITIES AND ADLS
PAIN_FUNCTIONAL_ASSESSMENT: ACTIVITIES ARE NOT PREVENTED

## 2024-01-10 ASSESSMENT — PAIN DESCRIPTION - PAIN TYPE
TYPE: SURGICAL PAIN
TYPE: SURGICAL PAIN

## 2024-01-10 ASSESSMENT — PAIN DESCRIPTION - ORIENTATION
ORIENTATION: LEFT
ORIENTATION: LOWER
ORIENTATION: LEFT
ORIENTATION: LEFT;LOWER
ORIENTATION: LOWER

## 2024-01-10 ASSESSMENT — PAIN DESCRIPTION - LOCATION
LOCATION: FOOT
LOCATION: HEAD
LOCATION: LEG;FOOT
LOCATION: HEAD;NECK
LOCATION: FOOT

## 2024-01-10 ASSESSMENT — PAIN DESCRIPTION - ONSET
ONSET: ON-GOING
ONSET: AWAKENED FROM SLEEP

## 2024-01-10 ASSESSMENT — PAIN DESCRIPTION - FREQUENCY
FREQUENCY: INTERMITTENT
FREQUENCY: INTERMITTENT

## 2024-01-10 NOTE — CARDIO/PULMONARY
NADIA Plan: LTC - [facility] with IV Abx (bed confirmed for 1/11/24)    Report: [phone]  Room: 54 B  Transportation: BLS to be arranged  Updated LTSS completed 1/10/24 & provided to accepting facility    Update - 2:05 PM: MD reported JEFRY for tomorrow morning (1/11/24). PICC to be placed this afternoon. CM met with pt at bedside, re-introduced role, & informed of acceptance at [facility]. Pt agreeable to proceeding with placement at the facility. CM reiterated the importance of pt being agreeable to having PICC placed, as he previously refused. Pt confirmed he will be agreeable. Copy of LTSS placed on chart to be scanned into system.    Update - 1:25 PM: Follow up message sent to MD requesting clarification regarding line for IV Abx. MD informed facility can accept pt today if line is placed.    Update - 1:20 PM: LTSS signed & sent via e-mail to [facility] nurse liaison for reference. CM will also make copy of LTSS to be scanned into the chart.    Update - 12:58 PM: LTSS completed/submitted for signature.    Initial note: Chart reviewed, IDR completed. Pt remains medically cleared for d/c pending placement. OTTO received call from [facility] nurse liaison (Saeid: [phone]) reporting that [facility] is willing to accept pt for LTC placement. Saeid reported she met with the pt at bedside to discuss placement at the facility & address questions/concerns as needed. Saeid reported the pt is agreeable to pursing placement at [facility], as he's able to smoke on the premises. OTTO informed Saeid that pt will be on IV Abx until 1/16/24 & refused PICC placement 1/8/24. Saeid informed CM will need to confirm whether or not pt will have PICC placed prior to transitioning to the facility. OTTO  contacted attending JUAN OILVAS & informed him of updates detailed above; MD requested to call CM back to discuss more in-depth.    OTTO checked the [facility] Medicaid Web Portal to determine if pt has

## 2024-01-10 NOTE — PROCEDURES
PICC Education: Explained reason and rationale for PICC placement along with providing education in order to make an informed consent including nature, risks, benefits, potential complications, care and maintenance of PICC line. The opportunity for questions or concerns was given. Patient  gave written consent for PICC procedure to be done at the bedside. Patient Patient verbalizes understanding at this time.       Bilateral upper extremities assessed with ultrasound by 2 VAT nurses.  Both R and L cephalic vessels too small (L cephalic narrows significantly at shoulder area and difficult to visualize) with 4fr catheter occupying >45% of vessel.  Difficult visualizing and following left brachial vessel until joining with left basilic vessel, which does not compress below where it joins with the left brachial vessel. Right brachial vessel under nerve bundle and muscle until joining with right basilic vessel which does not fully compress until after joining right brachial vessels and at the axilla area.  No viable vessels visualized for bedside PICC.  Primary nurse, Neeta freeman and notified MD.  Patient educated on inability to place PICC at bedside.  Patient denies questions or concerns at this time.  Patient resting in bed, call bell in reach, bed in lowest position, side rails up x 2.

## 2024-01-11 ENCOUNTER — APPOINTMENT (OUTPATIENT)
Facility: HOSPITAL | Age: 69
DRG: 710 | End: 2024-01-11
Attending: STUDENT IN AN ORGANIZED HEALTH CARE EDUCATION/TRAINING PROGRAM
Payer: MEDICAID

## 2024-01-11 VITALS
HEART RATE: 64 BPM | HEIGHT: 69 IN | DIASTOLIC BLOOD PRESSURE: 64 MMHG | SYSTOLIC BLOOD PRESSURE: 108 MMHG | WEIGHT: 136.2 LBS | TEMPERATURE: 98.2 F | OXYGEN SATURATION: 97 % | BODY MASS INDEX: 20.17 KG/M2 | RESPIRATION RATE: 16 BRPM

## 2024-01-11 LAB
ALBUMIN SERPL-MCNC: 3.1 G/DL (ref 3.5–5)
ALBUMIN/GLOB SERPL: 0.9 (ref 1.1–2.2)
ALP SERPL-CCNC: 100 U/L (ref 45–117)
ALT SERPL-CCNC: 28 U/L (ref 12–78)
ANION GAP SERPL CALC-SCNC: 2 MMOL/L (ref 5–15)
AST SERPL-CCNC: 35 U/L (ref 15–37)
BACTERIA ISLT CULT: ABNORMAL
BILIRUB SERPL-MCNC: 0.5 MG/DL (ref 0.2–1)
BUN SERPL-MCNC: 16 MG/DL (ref 6–20)
BUN/CREAT SERPL: 38 (ref 12–20)
CALCIUM SERPL-MCNC: 8.9 MG/DL (ref 8.5–10.1)
CHLORIDE SERPL-SCNC: 109 MMOL/L (ref 97–108)
CO2 SERPL-SCNC: 31 MMOL/L (ref 21–32)
CREAT SERPL-MCNC: 0.42 MG/DL (ref 0.7–1.3)
GLOBULIN SER CALC-MCNC: 3.3 G/DL (ref 2–4)
GLUCOSE SERPL-MCNC: 86 MG/DL (ref 65–100)
OTHER ANTIBIOTIC ISLT MIC: ABNORMAL
POTASSIUM SERPL-SCNC: 4.2 MMOL/L (ref 3.5–5.1)
PROT SERPL-MCNC: 6.4 G/DL (ref 6.4–8.2)
SODIUM SERPL-SCNC: 142 MMOL/L (ref 136–145)
SPECIMEN SOURCE: ABNORMAL
SPECIMEN SOURCE: ABNORMAL
VANCOMYCIN SERPL-MCNC: 23.2 UG/ML

## 2024-01-11 PROCEDURE — 97530 THERAPEUTIC ACTIVITIES: CPT

## 2024-01-11 PROCEDURE — 80202 ASSAY OF VANCOMYCIN: CPT

## 2024-01-11 PROCEDURE — 2709999900 IR GUIDE INSERT PICC LESS THAN 5 YRS

## 2024-01-11 PROCEDURE — 2580000003 HC RX 258: Performed by: INTERNAL MEDICINE

## 2024-01-11 PROCEDURE — 36415 COLL VENOUS BLD VENIPUNCTURE: CPT

## 2024-01-11 PROCEDURE — 6370000000 HC RX 637 (ALT 250 FOR IP): Performed by: PODIATRIST

## 2024-01-11 PROCEDURE — 80053 COMPREHEN METABOLIC PANEL: CPT

## 2024-01-11 PROCEDURE — 94760 N-INVAS EAR/PLS OXIMETRY 1: CPT

## 2024-01-11 PROCEDURE — 2709999900 IR PICC WO SQ PORT/PUMP > 5 YEARS

## 2024-01-11 PROCEDURE — 6360000002 HC RX W HCPCS: Performed by: PODIATRIST

## 2024-01-11 PROCEDURE — 6360000002 HC RX W HCPCS: Performed by: GENERAL ACUTE CARE HOSPITAL

## 2024-01-11 PROCEDURE — 6360000002 HC RX W HCPCS: Performed by: INTERNAL MEDICINE

## 2024-01-11 PROCEDURE — 2580000003 HC RX 258: Performed by: PODIATRIST

## 2024-01-11 PROCEDURE — 2500000003 HC RX 250 WO HCPCS: Performed by: STUDENT IN AN ORGANIZED HEALTH CARE EDUCATION/TRAINING PROGRAM

## 2024-01-11 RX ORDER — FOLIC ACID 1 MG/1
1 TABLET ORAL DAILY
Qty: 30 TABLET | Refills: 3 | Status: SHIPPED
Start: 2024-01-12

## 2024-01-11 RX ORDER — OXYCODONE HYDROCHLORIDE 5 MG/1
5 TABLET ORAL EVERY 6 HOURS PRN
Qty: 12 TABLET | Refills: 0 | Status: SHIPPED | OUTPATIENT
Start: 2024-01-11 | End: 2024-01-14

## 2024-01-11 RX ORDER — HEPARIN SODIUM 200 [USP'U]/100ML
200 INJECTION, SOLUTION INTRAVENOUS ONCE
Status: DISCONTINUED | OUTPATIENT
Start: 2024-01-11 | End: 2024-01-11 | Stop reason: HOSPADM

## 2024-01-11 RX ORDER — ATORVASTATIN CALCIUM 40 MG/1
40 TABLET, FILM COATED ORAL NIGHTLY
Qty: 30 TABLET | Refills: 3 | Status: SHIPPED
Start: 2024-01-11

## 2024-01-11 RX ORDER — LIDOCAINE HYDROCHLORIDE 20 MG/ML
20 INJECTION, SOLUTION INFILTRATION; PERINEURAL ONCE
Status: COMPLETED | OUTPATIENT
Start: 2024-01-11 | End: 2024-01-11

## 2024-01-11 RX ORDER — THIAMINE MONONITRATE (VIT B1) 100 MG
100 TABLET ORAL DAILY
Qty: 30 TABLET | Refills: 0 | Status: SHIPPED
Start: 2024-01-12

## 2024-01-11 RX ADMIN — MEROPENEM 1000 MG: 1 INJECTION, POWDER, FOR SOLUTION INTRAVENOUS at 06:30

## 2024-01-11 RX ADMIN — LIDOCAINE HYDROCHLORIDE 10 ML: 20 INJECTION, SOLUTION INFILTRATION; PERINEURAL at 13:15

## 2024-01-11 RX ADMIN — FOLIC ACID 1 MG: 1 TABLET ORAL at 08:18

## 2024-01-11 RX ADMIN — MEROPENEM 1000 MG: 1 INJECTION, POWDER, FOR SOLUTION INTRAVENOUS at 15:56

## 2024-01-11 RX ADMIN — SODIUM CHLORIDE, PRESERVATIVE FREE 10 ML: 5 INJECTION INTRAVENOUS at 08:20

## 2024-01-11 RX ADMIN — Medication 100 MG: at 08:18

## 2024-01-11 RX ADMIN — VANCOMYCIN HYDROCHLORIDE 1000 MG: 1 INJECTION, POWDER, LYOPHILIZED, FOR SOLUTION INTRAVENOUS at 16:06

## 2024-01-11 RX ADMIN — ENOXAPARIN SODIUM 40 MG: 100 INJECTION SUBCUTANEOUS at 08:18

## 2024-01-11 RX ADMIN — SODIUM CHLORIDE 10 ML/HR: 9 INJECTION, SOLUTION INTRAVENOUS at 15:57

## 2024-01-11 RX ADMIN — SODIUM CHLORIDE 10 ML/HR: 9 INJECTION, SOLUTION INTRAVENOUS at 15:54

## 2024-01-11 RX ADMIN — OXYCODONE HYDROCHLORIDE 5 MG: 5 TABLET ORAL at 03:10

## 2024-01-11 RX ADMIN — VANCOMYCIN HYDROCHLORIDE 1250 MG: 10 INJECTION, POWDER, LYOPHILIZED, FOR SOLUTION INTRAVENOUS at 04:08

## 2024-01-11 RX ADMIN — OXYCODONE HYDROCHLORIDE 5 MG: 5 TABLET ORAL at 15:57

## 2024-01-11 RX ADMIN — OXYCODONE HYDROCHLORIDE 5 MG: 5 TABLET ORAL at 08:35

## 2024-01-11 ASSESSMENT — PAIN DESCRIPTION - LOCATION: LOCATION: HEAD

## 2024-01-11 ASSESSMENT — PAIN DESCRIPTION - ORIENTATION: ORIENTATION: LEFT;LOWER

## 2024-01-11 ASSESSMENT — PAIN DESCRIPTION - DESCRIPTORS
DESCRIPTORS: ACHING;DISCOMFORT;THROBBING
DESCRIPTORS: ACHING

## 2024-01-11 ASSESSMENT — PAIN SCALES - GENERAL: PAINLEVEL_OUTOF10: 6

## 2024-01-11 ASSESSMENT — PAIN - FUNCTIONAL ASSESSMENT: PAIN_FUNCTIONAL_ASSESSMENT: ACTIVITIES ARE NOT PREVENTED

## 2024-01-11 NOTE — PLAN OF CARE
Problem: Discharge Planning  Goal: Discharge to home or other facility with appropriate resources  1/7/2024 1107 by Leslie Glover RN  Outcome: Progressing  1/7/2024 1106 by Leslie Glover RN  Outcome: Progressing     Problem: Safety - Adult  Goal: Free from fall injury  1/7/2024 1107 by Leslie Glover RN  Outcome: Progressing  1/7/2024 1106 by Leslie Glover RN  Outcome: Progressing  1/7/2024 0058 by Lynnette Kessler RN  Outcome: Progressing     Problem: Pain  Goal: Verbalizes/displays adequate comfort level or baseline comfort level  1/7/2024 1107 by Leslie Glover RN  Outcome: Progressing  1/7/2024 1106 by Leslie Glover RN  Outcome: Progressing  1/7/2024 0058 by Lynnette Kessler RN  Outcome: Progressing     Problem: Skin/Tissue Integrity  Goal: Absence of new skin breakdown  Description: 1.  Monitor for areas of redness and/or skin breakdown  2.  Assess vascular access sites hourly  3.  Every 4-6 hours minimum:  Change oxygen saturation probe site  4.  Every 4-6 hours:  If on nasal continuous positive airway pressure, respiratory therapy assess nares and determine need for appliance change or resting period.  1/7/2024 1107 by Leslie Glover RN  Outcome: Progressing  1/7/2024 1106 by Leslie Glover RN  Outcome: Progressing  1/7/2024 0058 by Lynnette Kessler RN  Outcome: Progressing     
  Problem: Discharge Planning  Goal: Discharge to home or other facility with appropriate resources  12/31/2023 0243 by Dior Castanon RN  Outcome: Progressing  12/31/2023 0242 by Dior Castanon RN  Outcome: Progressing  Flowsheets (Taken 12/30/2023 1900)  Discharge to home or other facility with appropriate resources: Identify barriers to discharge with patient and caregiver     Problem: Safety - Adult  Goal: Free from fall injury  12/31/2023 0243 by Dior Castanon RN  Outcome: Progressing  12/31/2023 0242 by Dior Castanon RN  Outcome: Progressing  Flowsheets (Taken 12/30/2023 1900)  Free From Fall Injury: Based on caregiver fall risk screen, instruct family/caregiver to ask for assistance with transferring infant if caregiver noted to have fall risk factors     Problem: Pain  Goal: Verbalizes/displays adequate comfort level or baseline comfort level  12/31/2023 0243 by Dior Castanon RN  Outcome: Progressing  12/31/2023 0242 by Dior Castanon RN  Outcome: Progressing     Problem: Occupational Therapy - Adult  Goal: By Discharge: Performs self-care activities at highest level of function for planned discharge setting.  See evaluation for individualized goals.  Description: FUNCTIONAL STATUS PRIOR TO ADMISSION:  Unable to obtain PLOF from pt 2/2 cognition and drowsiness. Pt is unhoused and stays on the street, per chart. Pt was able to transfer to/from the ground from w/c, anticipate pt was independent with ADLs, but will need to clarify.    ,  ,  ,  ,  ,  ,  ,  ,  ,  ,       HOME SUPPORT: Pt unhoused, does not appear to have any support.    Occupational Therapy Goals:  Initiated 12/30/2023  1.  Patient will perform lower body dressing with Minimal Assist within 7 day(s).  2.  Patient will perform grooming with Stand by Assist within 7 day(s).  3.  Patient will perform upper body dressing with Stand by Assist within 7 day(s).  4.  Patient will perform toilet transfers with Moderate Assist  within 7 
  Problem: Discharge Planning  Goal: Discharge to home or other facility with appropriate resources  12/31/2023 1410 by Lang, Merlin, RN  Outcome: Progressing  12/31/2023 0243 by Dior Castanon RN  Outcome: Progressing  12/31/2023 0242 by Dior Castanon RN  Outcome: Progressing  Flowsheets (Taken 12/30/2023 1900)  Discharge to home or other facility with appropriate resources: Identify barriers to discharge with patient and caregiver     Problem: Safety - Adult  Goal: Free from fall injury  12/31/2023 1410 by Lang, Merlin, RN  Outcome: Progressing  12/31/2023 0243 by Dior Castanon RN  Outcome: Progressing  12/31/2023 0242 by Dior Castanon RN  Outcome: Progressing  Flowsheets (Taken 12/30/2023 1900)  Free From Fall Injury: Based on caregiver fall risk screen, instruct family/caregiver to ask for assistance with transferring infant if caregiver noted to have fall risk factors     Problem: Pain  Goal: Verbalizes/displays adequate comfort level or baseline comfort level  12/31/2023 1410 by Lang, Merlin, RN  Outcome: Progressing  12/31/2023 0243 by Dior Castanon RN  Outcome: Progressing  12/31/2023 0242 by Dior Castanon RN  Outcome: Progressing     Problem: Skin/Tissue Integrity  Goal: Absence of new skin breakdown  Description: 1.  Monitor for areas of redness and/or skin breakdown  2.  Assess vascular access sites hourly  3.  Every 4-6 hours minimum:  Change oxygen saturation probe site  4.  Every 4-6 hours:  If on nasal continuous positive airway pressure, respiratory therapy assess nares and determine need for appliance change or resting period.  12/31/2023 1410 by Lang, Merlin, RN  Outcome: Progressing  12/31/2023 0243 by Dior Castanon RN  Outcome: Progressing  12/31/2023 0242 by Dior Castanon RN  Outcome: Progressing     
  Problem: Discharge Planning  Goal: Discharge to home or other facility with appropriate resources  Outcome: Progressing     Problem: Safety - Adult  Goal: Free from fall injury  1/7/2024 1106 by Leslie Glover RN  Outcome: Progressing  1/7/2024 0058 by Lynnette Kessler RN  Outcome: Progressing     Problem: Pain  Goal: Verbalizes/displays adequate comfort level or baseline comfort level  1/7/2024 1106 by Leslie Glover RN  Outcome: Progressing  1/7/2024 0058 by Lynnette Kessler RN  Outcome: Progressing     Problem: Skin/Tissue Integrity  Goal: Absence of new skin breakdown  Description: 1.  Monitor for areas of redness and/or skin breakdown  2.  Assess vascular access sites hourly  3.  Every 4-6 hours minimum:  Change oxygen saturation probe site  4.  Every 4-6 hours:  If on nasal continuous positive airway pressure, respiratory therapy assess nares and determine need for appliance change or resting period.  1/7/2024 1106 by Leslie Glover RN  Outcome: Progressing  1/7/2024 0058 by Lynnette Kessler RN  Outcome: Progressing     
  Problem: Discharge Planning  Goal: Discharge to home or other facility with appropriate resources  Outcome: Progressing     Problem: Safety - Adult  Goal: Free from fall injury  Outcome: Progressing     Problem: Pain  Goal: Verbalizes/displays adequate comfort level or baseline comfort level  Outcome: Progressing     Problem: Skin/Tissue Integrity  Goal: Absence of new skin breakdown  Description: 1.  Monitor for areas of redness and/or skin breakdown  2.  Assess vascular access sites hourly  3.  Every 4-6 hours minimum:  Change oxygen saturation probe site  4.  Every 4-6 hours:  If on nasal continuous positive airway pressure, respiratory therapy assess nares and determine need for appliance change or resting period.  Outcome: Progressing     
  Problem: Discharge Planning  Goal: Discharge to home or other facility with appropriate resources  Outcome: Progressing  Flowsheets (Taken 12/30/2023 1900)  Discharge to home or other facility with appropriate resources: Identify barriers to discharge with patient and caregiver     Problem: Safety - Adult  Goal: Free from fall injury  Outcome: Progressing  Flowsheets (Taken 12/30/2023 1900)  Free From Fall Injury: Based on caregiver fall risk screen, instruct family/caregiver to ask for assistance with transferring infant if caregiver noted to have fall risk factors     Problem: Pain  Goal: Verbalizes/displays adequate comfort level or baseline comfort level  Outcome: Progressing     Problem: Occupational Therapy - Adult  Goal: By Discharge: Performs self-care activities at highest level of function for planned discharge setting.  See evaluation for individualized goals.  Description: FUNCTIONAL STATUS PRIOR TO ADMISSION:  Unable to obtain PLOF from pt 2/2 cognition and drowsiness. Pt is unhoused and stays on the street, per chart. Pt was able to transfer to/from the ground from w/c, anticipate pt was independent with ADLs, but will need to clarify.    ,  ,  ,  ,  ,  ,  ,  ,  ,  ,       HOME SUPPORT: Pt unhoused, does not appear to have any support.    Occupational Therapy Goals:  Initiated 12/30/2023  1.  Patient will perform lower body dressing with Minimal Assist within 7 day(s).  2.  Patient will perform grooming with Stand by Assist within 7 day(s).  3.  Patient will perform upper body dressing with Stand by Assist within 7 day(s).  4.  Patient will perform toilet transfers with Moderate Assist  within 7 day(s).  5.  Patient will perform all aspects of toileting with Moderate Assist within 7 day(s).      12/30/2023 1418 by Annalisa Pinto, OT  Outcome: Progressing     Problem: Physical Therapy - Adult  Goal: By Discharge: Performs mobility at highest level of function for planned discharge setting.  See 
  Problem: Occupational Therapy - Adult  Goal: By Discharge: Performs self-care activities at highest level of function for planned discharge setting.  See evaluation for individualized goals.  Description: FUNCTIONAL STATUS PRIOR TO ADMISSION:  Unable to obtain PLOF from pt 2/2 cognition and drowsiness. Pt is unhoused and stays on the street, per chart. Pt was able to transfer to/from the ground from w/c, anticipate pt was independent with ADLs, but will need to clarify.    ,  ,  ,  ,  ,  ,  ,  ,  ,  ,       HOME SUPPORT: Pt unhoused, does not appear to have any support.    Occupational Therapy Goals:    All goals reviewed and remain appropriate as of 1-8-23. Continue all until 1-15-23..  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   Initiated 12/30/2023  1.  Patient will perform lower body dressing with Minimal Assist within 7 day(s).  2.  Patient will perform grooming with Stand by Assist within 7 day(s).  3.  Patient will perform upper body dressing with Stand by Assist within 7 day(s).  4.  Patient will perform toilet transfers to drop arm [facility] with Moderate Assist  within 7 day(s).  5.  Patient will perform all aspects of toileting with Moderate Assist within 7 day(s).      Outcome: Not Progressing     Problem: Physical Therapy - Adult  Goal: By Discharge: Performs mobility at highest level of function for planned discharge setting.  See evaluation for individualized goals.  Description: FUNCTIONAL STATUS PRIOR TO ADMISSION: Patient unable to provide PLOF due to increased drowsiness, however per chart, patient uses W/C for mobility and is able to transfer to/from the W/C and ground independently.     HOME SUPPORT PRIOR TO ADMISSION: Patient is homeless per chart and appears to have no support.    Physical Therapy Goals  Initiated 12/30/2023 - reassessed and goals remain appropriate 1/8/24  1.  Patient will move from supine to sit and sit to supine, scoot up and down, and roll side to side in bed with 
  Problem: Occupational Therapy - Adult  Goal: By Discharge: Performs self-care activities at highest level of function for planned discharge setting.  See evaluation for individualized goals.  Description: FUNCTIONAL STATUS PRIOR TO ADMISSION:  Unable to obtain PLOF from pt 2/2 cognition and drowsiness. Pt is unhoused and stays on the street, per chart. Pt was able to transfer to/from the ground from w/c, anticipate pt was independent with ADLs, but will need to clarify.    ,  ,  ,  ,  ,  ,  ,  ,  ,  ,       HOME SUPPORT: Pt unhoused, does not appear to have any support.    Occupational Therapy Goals:    All goals reviewed and remain appropriate as of 1-8-23. Continue all until 1-15-23..  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   Initiated 12/30/2023  1.  Patient will perform lower body dressing with Minimal Assist within 7 day(s).  2.  Patient will perform grooming with Stand by Assist within 7 day(s).  3.  Patient will perform upper body dressing with Stand by Assist within 7 day(s).  4.  Patient will perform toilet transfers to drop arm [facility] with Moderate Assist  within 7 day(s).  5.  Patient will perform all aspects of toileting with Moderate Assist within 7 day(s).      Outcome: partially Progressing  OCCUPATIONAL THERAPY TREATMENT: WEEKLY REASSESSMENT    Patient: Baldomero Reece (68 y.o. 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    ASSESSMENT  Patient continues to benefit from skilled OT services and is slowly progressing towards goals. This patient has had inconsistent cooperation with OT this past week, yet exhibited good participation this 
  Problem: Occupational Therapy - Adult  Goal: By Discharge: Performs self-care activities at highest level of function for planned discharge setting.  See evaluation for individualized goals.  Description: FUNCTIONAL STATUS PRIOR TO ADMISSION:  Unable to obtain PLOF from pt 2/2 cognition and drowsiness. Pt is unhoused and stays on the street, per chart. Pt was able to transfer to/from the ground from w/c, anticipate pt was independent with ADLs, but will need to clarify.    ,  ,  ,  ,  ,  ,  ,  ,  ,  ,       HOME SUPPORT: Pt unhoused, does not appear to have any support.    Occupational Therapy Goals:  Initiated 12/30/2023  1.  Patient will perform lower body dressing with Minimal Assist within 7 day(s).  2.  Patient will perform grooming with Stand by Assist within 7 day(s).  3.  Patient will perform upper body dressing with Stand by Assist within 7 day(s).  4.  Patient will perform toilet transfers with Moderate Assist  within 7 day(s).  5.  Patient will perform all aspects of toileting with Moderate Assist within 7 day(s).      Outcome: Progressing   OCCUPATIONAL THERAPY TREATMENT  Patient: Baldomero Reeec (68 y.o. 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    ASSESSMENT  Patient continues to benefit from skilled OT services and is progressing towards goals. Pt with limited participation despite significant encouragement. Pt able to transfer EOB without assist, but declined further ADL or mobility engagement. Pt tolerated sitting EOB without LOB and states he's transferring supine<>EOB for meals and PRN. Pt presents with self limiting behaviors.          
  Problem: Occupational Therapy - Adult  Goal: By Discharge: Performs self-care activities at highest level of function for planned discharge setting.  See evaluation for individualized goals.  Description: FUNCTIONAL STATUS PRIOR TO ADMISSION:  Unable to obtain PLOF from pt 2/2 cognition and drowsiness. Pt is unhoused and stays on the street, per chart. Pt was able to transfer to/from the ground from w/c, anticipate pt was independent with ADLs, but will need to clarify.    ,  ,  ,  ,  ,  ,  ,  ,  ,  ,       HOME SUPPORT: Pt unhoused, does not appear to have any support.    Occupational Therapy Goals:  Initiated 12/30/2023  1.  Patient will perform lower body dressing with Minimal Assist within 7 day(s).  2.  Patient will perform grooming with Stand by Assist within 7 day(s).  3.  Patient will perform upper body dressing with Stand by Assist within 7 day(s).  4.  Patient will perform toilet transfers with Moderate Assist  within 7 day(s).  5.  Patient will perform all aspects of toileting with Moderate Assist within 7 day(s).    Outcome: Progressing   OCCUPATIONAL THERAPY EVALUATION    Patient: Baldomero Reece (68 y.o. male)  Date: 12/30/2023  Primary Diagnosis: Bacteremia [R78.81]         Precautions: Contact Precautions, Fall Risk, NPO (MRSA)                  ASSESSMENT :  The patient is limited by decreased functional mobility, independence in ADLs, ROM, strength, activity tolerance, endurance, cognition, command following, attention/concentration, coordination, balance, posture.    Based on the impairments listed above pt is presenting below his baseline, currently requiring up to max assist for ADLs and min/mod assist for functional mobility. Pt tolerated evaluation poorly, very drowsy 2/2 recent ativan administration. Progressed to EOB, but began falling asleep again, therefore returned to supine. Pt will benefit from acute OT services while admitted and SNF at d/c.     Functional Outcome Measure:  The 
  Problem: Physical Therapy - Adult  Goal: By Discharge: Performs mobility at highest level of function for planned discharge setting.  See evaluation for individualized goals.  Description: FUNCTIONAL STATUS PRIOR TO ADMISSION: Patient unable to provide PLOF due to increased drowsiness, however per chart, patient uses W/C for mobility and is able to transfer to/from the W/C and ground independently.     HOME SUPPORT PRIOR TO ADMISSION: Patient is homeless per chart and appears to have no support.    Physical Therapy Goals  Initiated 12/30/2023  1.  Patient will move from supine to sit and sit to supine, scoot up and down, and roll side to side in bed with modified independence within 7 day(s).    2.  Patient will perform sit to stand with modified independence within 7 day(s).  3.  Patient will transfer from bed to chair and chair to bed with modified independence using the least restrictive device within 7 day(s).    Outcome: Progressing     PHYSICAL THERAPY TREATMENT    Patient: Baldomero Reece (68 y.o. 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 Pt was received in supine and cleared by nursing to mobilize. Pt sleeping and needed significant assistance to mobilize to the EOB. Sat EOB for ~5 minutes with intact balalnce and then returned to supine (pt declined further activity). Pt very self limiting and disinterested in therapy, but will keep follow as pt allows.          PLAN:  Patient continues to benefit from skilled intervention to address the above impairments.  Continue treatment per established plan of 
  Problem: Physical Therapy - Adult  Goal: By Discharge: Performs mobility at highest level of function for planned discharge setting.  See evaluation for individualized goals.  Description: FUNCTIONAL STATUS PRIOR TO ADMISSION: Patient unable to provide PLOF due to increased drowsiness, however per chart, patient uses W/C for mobility and is able to transfer to/from the W/C and ground independently.     HOME SUPPORT PRIOR TO ADMISSION: Patient is homeless per chart and appears to have no support.    Physical Therapy Goals  Initiated 12/30/2023 - reassessed and goals remain appropriate 1/8/24  1.  Patient will move from supine to sit and sit to supine, scoot up and down, and roll side to side in bed with modified independence within 7 day(s).    2.  Patient will perform sit to stand with modified independence within 7 day(s).  3.  Patient will transfer from bed to chair and chair to bed with modified independence using the least restrictive device within 7 day(s).  Outcome: Progressing     PHYSICAL THERAPY TREATMENT    Patient: Baldomero Reece (68 y.o. 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 Pt received sleeping in bed, partially willing to work with therapy. Pt report not having his pain meds reporting 11 pain with LLE. Pt continues to be limited by self limiting behavior with any activity this session with continued complaints about not being able to eat and pain medications. Pt pending procedure today and is NPO.     Pt able to demonstrate bed mobility with no assistance needed with supine to sit to R side EOB , willing to change 
  Problem: Safety - Adult  Goal: Free from fall injury  1/7/2024 0058 by Lynnette Kessler RN  Outcome: Progressing     Problem: Pain  Goal: Verbalizes/displays adequate comfort level or baseline comfort level  1/7/2024 0058 by Lynnette Kessler RN  Outcome: Progressing     Problem: Skin/Tissue Integrity  Goal: Absence of new skin breakdown  Description: 1.  Monitor for areas of redness and/or skin breakdown  2.  Assess vascular access sites hourly  3.  Every 4-6 hours minimum:  Change oxygen saturation probe site  4.  Every 4-6 hours:  If on nasal continuous positive airway pressure, respiratory therapy assess nares and determine need for appliance change or resting period.  1/7/2024 0058 by Lynnette Kessler RN  Outcome: Progressing     
  Problem: Safety - Adult  Goal: Free from fall injury  1/8/2024 0008 by Lynnette Kessler RN  Outcome: Progressing     Problem: Pain  Goal: Verbalizes/displays adequate comfort level or baseline comfort level  1/8/2024 0008 by Lynnette Kessler RN  Outcome: Progressing     Problem: Skin/Tissue Integrity  Goal: Absence of new skin breakdown  Description: 1.  Monitor for areas of redness and/or skin breakdown  2.  Assess vascular access sites hourly  3.  Every 4-6 hours minimum:  Change oxygen saturation probe site  4.  Every 4-6 hours:  If on nasal continuous positive airway pressure, respiratory therapy assess nares and determine need for appliance change or resting period.  1/8/2024 0008 by Lynnette Kessler RN  Outcome: Progressing     
  Problem: Safety - Adult  Goal: Free from fall injury  Outcome: Progressing     Problem: Pain  Goal: Verbalizes/displays adequate comfort level or baseline comfort level  Outcome: Progressing     Problem: Skin/Tissue Integrity  Goal: Absence of new skin breakdown  Description: 1.  Monitor for areas of redness and/or skin breakdown  2.  Assess vascular access sites hourly  3.  Every 4-6 hours minimum:  Change oxygen saturation probe site  4.  Every 4-6 hours:  If on nasal continuous positive airway pressure, respiratory therapy assess nares and determine need for appliance change or resting period.  Outcome: Progressing     
Problem: Physical Therapy - Adult  Goal: By Discharge: Performs mobility at highest level of function for planned discharge setting.  See evaluation for individualized goals.  Description: FUNCTIONAL STATUS PRIOR TO ADMISSION: Patient unable to provide PLOF due to increased drowsiness, however per chart, patient uses W/C for mobility and is able to transfer to/from the W/C and ground independently.     HOME SUPPORT PRIOR TO ADMISSION: Patient is homeless per chart and appears to have no support.    Physical Therapy Goals  Initiated 12/30/2023 - reassessed and goals remain appropriate 1/8/24  1.  Patient will move from supine to sit and sit to supine, scoot up and down, and roll side to side in bed with modified independence within 7 day(s).    2.  Patient will perform sit to stand with modified independence within 7 day(s).  3.  Patient will transfer from bed to chair and chair to bed with modified independence using the least restrictive device within 7 day(s).  Outcome: Not Progressing     PHYSICAL THERAPY TREATMENT: WEEKLY REASSESSMENT    Patient: Baldomero Reece (68 y.o. 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 More willing to participate today compared to prior session but still benefits from encouragement to participate. Does well scooting edge of bed while maintaining L LE NWB (no clear precautions?). Would benefit from working towards transfers out of bed to drop arm chair or wheelchair (lateral) but he declined to perform this date. Will need to long term increased assist/support. Recommend SNF to LTC.    Patient's 
Total  How much help is needed climbing 3-5 steps with a railing?: Total    -PAC Inpatient Mobility Raw Score : 10  AM-PAC Inpatient T-Scale Score : 32.29     Cutoff score ?171,2,3 had higher odds of discharging home with home health or need of SNF/IPR.    1. Alka Emery, Ruchi Genao, Teodoro Barr, Archana Ramos, Howard Fischer, Ivan Emery.  Validity of the AM-PAC “6-Clicks” Inpatient Daily Activity and Basic Mobility Short Forms. Physical Therapy Mar 2014, [phone]) 379-391; DOI: 10.2522/ptj.[id number]  2. Clarence HESS, Morro J, Shanell RUTHERFORD, Chandrakant J. Association of AM-PAC \"6-Clicks\" Basic Mobility and Daily Activity Scores With Discharge Destination. Phys Ther. 2021 Apr 4;101(4):shnq336. doi: 10.1093/ptj/vvnn865. PMID: 94696886.  3. Kimmie RUTHERFORD, Mike D, Thuy S, Maira K, Kitty S. Activity Measure for Post-Acute Care \"6-Clicks\" Basic Mobility Scores Predict Discharge Destination After Acute Care Hospitalization in Select Patient Groups: A Retrospective, Observational Study. Arch Rehabil Res Clin Transl. 2022 Jul 16;4(3):765060. doi: 10.1016/j.arrct.2022.207925. PMID: 15069564; PMCID: [id number].  4. Rupert LAGOS, Scarlet S, Soto W, Ellen P. AM-PAC Short Forms Manual 4.0. Revised 2/2020.                                                                                                                                                                                                                               Pain Rating:  C/o pain, however did not specify location or rate pain  Pain Intervention(s):   rest and repositioning    Activity Tolerance:   Fair , requires rest breaks, and SpO2 stable on room air    After treatment:   Patient left in no apparent distress in bed, Call bell within reach, Bed/ chair alarm activated, Caregiver / family present, and Side rails x3    COMMUNICATION/EDUCATION:   The patient's plan of care was discussed with: physical therapist, occupational therapist, and registered

## 2024-01-11 NOTE — DISCHARGE SUMMARY
tablet  vitamin B-1 100 MG tablet             DISPOSITION:    Home with Family:       Home with HH/PT/OT/RN:    SNF/LTC: x   FANTA:    OTHER:            Code status: full code  Recommended diet: reg diet  Recommended activity: Partial weight bearing on the left heel with a post op shoe or forefoot offloading shoe if possible  Wound care: See surgical/procedure care instructions      Follow up with:   PCP : None, None    [facility] ([facility]  [address] [address] [phone]        Percy Salazar DPM  [address] [place] [id number] [phone]    Schedule an appointment as soon as possible for a visit in 3 week(s)            Total time in minutes spent coordinating this discharge (includes going over instructions, follow-up, prescriptions, and preparing report for sign off to her PCP) :  35 minutes

## 2024-01-11 NOTE — CARE COORDINATION
CM reviewed chart. Reported to CM that patient can leave for facility, once he returns from procedure. Patient went for procedure to place a PICC line for medication. Returned to the room approximately 1400.   CM called transportation line for Medicaid [phone]. Transport set up for [facility], [phone]. Company name and time was not given, only told that there is a 3 window. Ambulance should call unit, when enroute. They will arrive up until 6:00 pm today. Trip # is [id number].   CM informed patient, patient's nurse and [facility], [phone]  No other CM needs identified.  Patient may dc from CM standpoint.       01/11/24 9335   Services At/After Discharge   Transition of Care Consult (CM Consult) Discharge Planning   Services At/After Discharge Skilled Nursing Facility (SNF)  ([facility])   [place] Resource Information Provided? No   Mode of Transport at Discharge BLS  (set up through his Medicaid plan)   Hospital Transport Time of Discharge 1800  (an exact time was not given, only up until 6:00 pm or anytime earlier)   Confirm Follow Up Transport Other (see comment)  ([facility] Medicaid)   Condition of Participation: Discharge Planning   The Patient and/or Patient Representative was provided with a Choice of Provider?   (Discussed with patient by previous care manager)     Natasha Beaulieu RN  Care Manager  [id number]

## 2024-01-11 NOTE — PROGRESS NOTES
Hospitalist Progress Note    NAME:   Baldomero Reece   : 1955   MRN: [id number]     Date/Time: 2024    Patient PCP: None, None    JEFRY:   Barriers: Antibiotic recommendations, ID clearance, podiatry clearance    Assessment / Plan:      Polymicrobial bacteremia  L Foot infection/ischemia    -Blood cultures are growing multiple bacteria including Enterococcus, Carbapenem resistant Acinetobacter, staph species, Enterococcus and also Aerococcus viridans  -Repeat blood cultures from  showed no growth at 3 days  -Continue vancomycin and also meropenem.  Follow repeat blood cultures  -ID recommendations noted and they will discuss with micro lab and recommend further  -S/p left foot incision and drainage and also amputation of the first metatarsal head on .  Postoperative care per orthopedics.  Follow intraoperative culture results.  -Pain control with Dilaudid and oxycodone since morphine is not helping  -Wound care per podiatry        Right hand swelling/redness/tenderness, ? Septic arthritis vs cellulitis  -Evaluated by hand surgeon previously and did not recommend any surgical options at this time. Continue vancomycin and meropenem.  -MRI was ordered but patient could not tolerate MRI  -Plan to get CT hand today  -Appreciate IDs help         CAP  -Continue vancomycin and meropenem  Repeat chest x-ray    Chronic alcoholism  Hepatitis C  History of cirrhosis of the liver  Alcohol withdrawal  -Continue alcohol withdrawal protocol with Ativan.  Continue multivitamins      PVD  Recent left basal ganglia bleed due to AV malformation rupture in 2023  Homeless/noncompliance  History of left extremity DVT  CVA in   COPD  Active smoker  S/p right-sided BKA in  due to osteomyelitis  -Continue statin         Medical Decision Making:   I personally reviewed labs: CBC, BMP  I personally reviewed imaging reports:   Toxic drug monitoring:   Discussed case with:   Code Status: Full Code 
      Hospitalist Progress Note    NAME:   Baldomero Reece   : 1955   MRN: [id number]     Date/Time: 2024    Patient PCP: None, None    JEFRY:   Barriers: Antibiotic recommendations, ID clearance, podiatry clearance  Will need rehab    Assessment / Plan:      Polymicrobial bacteremia  L Foot infection/ischemia    -Blood cultures are growing multiple bacteria including Enterococcus, Carbapenem resistant Acinetobacter, staph species, Enterococcus and also Aerococcus viridans  -Repeat blood cultures from  showed no growth  -Continue vancomycin and also meropenem.  Follow repeat blood cultures  -ID recommendations noted and they will discuss with micro lab and recommend further  -S/p left foot incision and drainage and also amputation of the first metatarsal head on .  Postoperative care per orthopedics.  Follow intraoperative culture results.  -Pain control with Dilaudid and oxycodone since morphine is not helping  -Wound care per podiatry  -Pathology pending  -Podiatry recommends inpatient rehab/long-term care placement for the rest of postop.        Right hand swelling/redness/tenderness, septic arthritis ruled out  -Evaluated by hand surgeon previously and did not recommend any surgical options at this time. Continue vancomycin and meropenem.  -MRI was ordered but patient could not tolerate MRI  -CT right hand reviewed.  No abscess  -Appreciate IDs help         CAP  -Continue vancomycin and meropenem  -Chest x-ray  showed no pneumonia, interval development of small right pleural effusion    Chronic alcoholism  Hepatitis C  History of cirrhosis of the liver  Alcohol withdrawal  -Continue alcohol withdrawal protocol with Ativan.  Continue multivitamins      PVD  Recent left basal ganglia bleed due to AV malformation rupture in 2023  Homeless/noncompliance  History of left extremity DVT  CVA in   COPD  Active smoker  S/p right-sided BKA in  due to osteomyelitis  -Continue statin     
      Hospitalist Progress Note    NAME:   Baldomero Reece   : 1955   MRN: [id number]     Date/Time: 2024    Patient PCP: None, None    JEFRY:   Barriers: Clinical improvement, podiatry clearance, blood cultures    Assessment / Plan:      Polymicrobial bacteremia  L Foot infection/ischemia    -Blood cultures are growing multiple bacteria including Enterococcus, Carbapenem resistant Acinetobacter, staph species, Enterococcus and also Aerococcus viridans  -Repeat blood cultures from  showed no growth at 3 days  -Continue vancomycin and also meropenem.  Follow repeat blood cultures  -Consult ID  -IR is planning on surgical debridement this p.m.  Patient is to be at high risk of complications given his comorbid conditions.  -Check CBC and BMP in a.m.        Right hand swelling/redness/tenderness, ? Septic arthritis vs cellulitis  -Evaluated by hand surgeon previously and did not recommend any surgical options at this time.  Continue vancomycin and meropenem.  -Will check with orthopedics if an MRI is still needed as patient could not tolerate      ? CAP  -Continue vancomycin and meropenem    Chronic alcoholism  Hepatitis C  History of cirrhosis of the liver  Alcohol withdrawal  -Continue alcohol withdrawal protocol with Ativan.  Continue multivitamins      PVD  Recent left basal ganglia bleed due to AV malformation rupture in 2023  Homeless/noncompliance  History of left extremity DVT  CVA in   COPD  Active smoker  S/p right-sided BKA in  due to osteomyelitis  -Continue statin         Medical Decision Making:   I personally reviewed labs: CBC, BMP  I personally reviewed imaging reports: yes, as below   Toxic drug monitoring:   Discussed case with: Pharmacy, case management  Code Status: Full Code , confirmed with pt      Subjective:  Assessment / Plan:    Reports left foot pain that is about 6 x 10  Right hand pain and swelling not better  No fever  No diarrhea  Overnight events 
      Hospitalist Progress Note    NAME:   Baldomero Reece   : 1955   MRN: [id number]     Date/Time: 2024    Patient PCP: None, None    JEFRY: TBD  Barriers: Stable once LTAC secured    Assessment / Plan:      Polymicrobial bacteremia  L Foot infection/ischemia    -Blood cultures are growing multiple bacteria including Enterococcus, Carbapenem resistant Acinetobacter, staph species, Enterococcus and also Aerococcus viridans  -Repeat blood cultures from  showed no growth  -Continue vancomycin and also meropenem.  Follow repeat blood cultures  -ID recommendations noted and they will discuss with micro lab and recommend further  -S/p left foot incision and drainage and also amputation of the first metatarsal head on .  Postoperative care per orthopedics.  Follow intraoperative culture results.  -Pain control with Dilaudid and oxycodone since morphine is not helping  -Wound care per podiatry  -Pathology negative/clear margin  -Podiatry recommends inpatient rehab/long-term care placement for the rest of postop.  -Appreciate ID's help, need 2 weeks of antibiotics.          Right hand swelling/redness/tenderness, septic arthritis ruled out  -Evaluated by hand surgeon previously and did not recommend any surgical options at this time. Continue vancomycin and meropenem.  -MRI was ordered but patient could not tolerate MRI  -CT right hand reviewed.  No abscess  -Appreciate IDs help         CAP  -Continue vancomycin and meropenem  -Chest x-ray  showed no pneumonia, interval development of small right pleural effusion    Chronic alcoholism  Hepatitis C  History of cirrhosis of the liver  Alcohol withdrawal    Continue multivitamins      PVD  Recent left basal ganglia bleed due to AV malformation rupture in 2023  Homeless/noncompliance  History of left extremity DVT  CVA in   COPD  Active smoker  S/p right-sided BKA in  due to osteomyelitis  -Continue statin         Medical Decision Making:   I 
      Hospitalist Progress Note    NAME:   Baldomero Reece   : 1955   MRN: [id number]     Date/Time: 2023    Patient PCP: None, None    Assessment / Plan:      Bacteremia  L Foot infection/ischemia    Right hand swelling/redness/tenderness, ? Septic arthritis   ? CAP  PVD  Recent left basal ganglia bleed due to AV malformation rupture in 2023  Homeless/noncompliance  Alcohol abuse/withdrawal  Hep C  Liver cirrhosis  Left extremity DVT  CVA in   COPD  Active smoker  S/p right-sided BKA in  due to osteomyelitis     Monitor in stepdown  Repeat blood cultures in process  Start meropenem and vancomycin given prelim results of blood cultures  Vascular surgery consult, RN to call again today   Orthopedic consulted  Podiatry consult  MRI with and without contrast of the right hand pending  IV fluids, gentle hydration given history of cirrhosis  Monitor renal function  CIWA protocol, folate, B1 supplement   Vivien/morphine for pain control  Patient not on antiplatelet given recent brain bleed.  Venous duplex of left lower extremity neg for DVT  DuoNeb as needed given history of COPD and active smoker  Wound care consult       Medical Decision Making:   I personally reviewed labs: yes, as below   I personally reviewed imaging reports: yes, as below   Toxic drug monitoring:   Discussed case with: Pt, RN  Code Status: Full Code , confirmed with pt      Subjective:  Assessment / Plan:      Right hand pain and swelling better  Left foot pain  Afebrile   Discussed with RN events overnight.       Objective:      VITALS:   Last 24hrs VS reviewed since prior progress note. Most recent are:  Patient Vitals for the past 24 hrs:   BP Temp Temp src Pulse Resp SpO2 Height Weight   23 1156 (!) 130/93 98.2 °F (36.8 °C) Oral (!) 110 21 97 % -- --   23 0730 (!) 147/82 -- -- -- -- -- -- --   23 0338 122/71 98.3 °F (36.8 °C) Oral 94 18 95 % -- --   23 0330 -- -- -- -- -- -- 1.753 m (5' 9\") 63 kg 
      Hospitalist Progress Note    NAME:   Baldomero Reece   : 1955   MRN: [id number]     Date/Time: 2024    Patient PCP: None, None    JEFRY:   Barriers: Antibiotic recommendations, ID clearance, podiatry clearance    Assessment / Plan:      Polymicrobial bacteremia  L Foot infection/ischemia    -Blood cultures are growing multiple bacteria including Enterococcus, Carbapenem resistant Acinetobacter, staph species, Enterococcus and also Aerococcus viridans  -Repeat blood cultures from  showed no growth at 3 days  -Continue vancomycin and also meropenem.  Follow repeat blood cultures  -ID recommendations noted and they will discuss with micro lab and recommend further  -S/p left foot incision and drainage and also amputation of the first metatarsal head on .  Postoperative care per orthopedics.  Follow intraoperative culture results.  -Pain control with Dilaudid and oxycodone since morphine is not helping  -Wound care per podiatry        Right hand swelling/redness/tenderness, ? Septic arthritis vs cellulitis  -Evaluated by hand surgeon previously and did not recommend any surgical options at this time. Continue vancomycin and meropenem.  -MRI was ordered but patient could not tolerate MRI  -CT right hand reviewed.  No abscess  -Appreciate IDs help         CAP  -Continue vancomycin and meropenem  -Chest x-ray  showed no pneumonia, interval development of small right pleural effusion    Chronic alcoholism  Hepatitis C  History of cirrhosis of the liver  Alcohol withdrawal  -Continue alcohol withdrawal protocol with Ativan.  Continue multivitamins      PVD  Recent left basal ganglia bleed due to AV malformation rupture in 2023  Homeless/noncompliance  History of left extremity DVT  CVA in   COPD  Active smoker  S/p right-sided BKA in  due to osteomyelitis  -Continue statin         Medical Decision Making:   I personally reviewed labs: CBC, BMP  I personally reviewed imaging reports: 
      Hospitalist Progress Note    NAME:   Baldomero Reece   : 1955   MRN: [id number]     Date/Time: 2024    Patient PCP: None, None    JEFRY:   Barriers: Antibiotic recommendations, ID clearance, podiatry clearance    Assessment / Plan:      Polymicrobial bacteremia  L Foot infection/ischemia    -Blood cultures are growing multiple bacteria including Enterococcus, Carbapenem resistant Acinetobacter, staph species, Enterococcus and also Aerococcus viridans  -Repeat blood cultures from  showed no growth at 3 days  -Continue vancomycin and also meropenem.  Follow repeat blood cultures  -ID recommendations noted and they will discuss with micro lab and recommend further  -S/p left foot incision and drainage and also amputation of the first metatarsal head on .  Postoperative care per orthopedics.  Follow intraoperative culture results.  -Pain control with Dilaudid and oxycodone since morphine is not helping  -Wound care per podiatry  Gentle IVF      Right hand swelling/redness/tenderness, ? Septic arthritis vs cellulitis  -Evaluated by hand surgeon previously and did not recommend any surgical options at this time. Continue vancomycin and meropenem.  -MRI was ordered but patient could not tolerate MRI  -May need MRI if pain get worse, so far swelling/erythema improving        ? CAP  -Continue vancomycin and meropenem  Repeat chest x-ray    Chronic alcoholism  Hepatitis C  History of cirrhosis of the liver  Alcohol withdrawal  -Continue alcohol withdrawal protocol with Ativan.  Continue multivitamins      PVD  Recent left basal ganglia bleed due to AV malformation rupture in 2023  Homeless/noncompliance  History of left extremity DVT  CVA in   COPD  Active smoker  S/p right-sided BKA in  due to osteomyelitis  -Continue statin         Medical Decision Making:   I personally reviewed labs: CBC, BMP  I personally reviewed imaging reports:   Toxic drug monitoring:   Discussed case with: 
    End of Shift Note    Bedside shift change report given to Fang REEVES (oncoming nurse) by Nikita Cross RN (offgoing nurse).  Report included the following information SBAR, MAR, and Cardiac Rhythm sinus rhythm    Shift worked:  7p-7a     Shift summary and any significant changes:    Patient got Morphine times 2 throughout the night for his back and foot pain.    Got valium once for anxiety.  Patient is hard stick, Tried once, unable to draw his labs.- Left the message for PICC team.  Had ultrasound guided IV insertion on right upper arm.- Labs sent.         Concerns for physician to address:       Zone phone for oncoming shift:          Activity:     Number times ambulated in hallways past shift: 0  Number of times OOB to chair past shift: 0    Cardiac:   Cardiac Monitoring: Yes           Access:  Current line(s): PIV     Genitourinary:   Urinary status: voiding    Respiratory:      Chronic home O2 use?: NO  Incentive spirometer at bedside: NO       GI:     Current diet:  ADULT DIET; Regular; 5 carb choices (75 gm/meal)  Passing flatus: YES  Tolerating current diet: YES       Pain Management:   Patient states pain is manageable on current regimen: YES    Skin:     Interventions: float heels, increase time out of bed, PT/OT consult, and nutritional support    Patient Safety:  Fall Score:    Interventions: bed/chair alarm, gripper socks, and pt to call before getting OOB       Length of Stay:  Expected LOS: 6  Actual LOS: 4      Nikita Cross RN                            
  Physician Progress Note      PATIENT:               LILIA BOX  CSN #:                  [id number]  :                       1955  ADMIT DATE:       2023 6:20 AM  DISCH DATE:  RESPONDING  PROVIDER #:        Rashawn Iglesias MD          QUERY TEXT:    Dr Iglesias  Pt admitted with Bacteremia, L Foot infection/ischemia and CAP . Pt noted to   have WBC 5-2.5-2.2; temp 100.5(1/3/24). If possible, please document in the   progress notes and discharge summary if you are evaluating and /or treating   any of the following:    The medical record reflects the following:  Risk Factors: PVD, Recent left basal ganglia bleed 2023,   Homeless/noncompliance, History of left extremity DVT  CVA in , COPD, Active smoker  Clinical Indicators: WBC 5(admission)-3-2.5-2.3-2.2-3.7, platelets: 123-145,   lactic acid: 0.5-1.59; temp 100.5po(1/3@1701); pulse 114-110, BP   101//56-98/57-96/53, PCT 0.05--0.05  Treatment:  ivf@75cc/hr, Merrem, Vancomycin  Options provided:  -- Sepsis, present on admission  -- Sepsis, present on admission, now resolved  -- Sepsis, developed following admission  -- Bacteremia, L Foot infection/ischemia, CAP without Sepsis  -- Sepsis was ruled out  -- Other - I will add my own diagnosis  -- Disagree - Not applicable / Not valid  -- Disagree - Clinically unable to determine / Unknown  -- Refer to Clinical Documentation Reviewer    PROVIDER RESPONSE TEXT:    This patient has sepsis which was present on admission.    Query created by: Kathleen Whitfield on 2024 3:12 PM      Electronically signed by:  Rashawn Iglesias MD 2024 9:56 AM          
-Please complete MRI History and Safety Screening Form   - Patient cannot be scanned until this form is completed, including signatures, and reviewed in MRI to ensure patient is SAFE and eligible for MRI.  - CALL MRI when this has been successfully completed at [phone].  
.End of Shift Note    Bedside shift change report given to LEANDRO Rose (oncoming nurse) by YARED GALDAMEZ LPN (offgoing nurse).  Report included the following information SBAR, Kardex, ED Summary, OR Summary, Procedure Summary, Intake/Output, MAR, and Recent Results    Shift worked:  7am-7pm     Shift summary and any significant changes:    Patient to have PICC line placed in IR for continuing antibiotic therapy at rehab. Pain controlled by aid of Roxicodone. Patient had no other complaints or concerns.      Concerns for physician to address:  Discharge planning to Rehab     Zone phone for oncoming shift:   [phone]       Activity:     Number times ambulated in hallways past shift: 0  Number of times OOB to chair past shift: 1    Cardiac:   Cardiac Monitoring: No           Access:  Current line(s): PIV     Genitourinary:   Urinary status: voiding    Respiratory:      Chronic home O2 use?: NO  Incentive spirometer at bedside: Yes       GI:     Current diet:  ADULT DIET; Regular; Double Protein Portions  Passing flatus: YES  Tolerating current diet: YES       Pain Management:   Patient states pain is manageable on current regimen: YES    Skin:     Interventions: nutritional support    Patient Safety:  Fall Score:    Interventions: bed/chair alarm, gripper socks, pt to call before getting OOB, and stay with me (per policy)       Length of Stay:  Expected LOS: 12  Actual LOS: 11      YARED GALDAMEZ LPN                            
0245 Pt very edematous in both arms. Two nurses attempted to get morning labs but were unsuccessful.  Pt refusing to allow for another attempt at this time.    0329 Pt refuses 3am vitals.  Wants to be left alone.  
1230: pt arrived to [facility] via stretcher with transport. Aox4, vss.     1320: double lumen PICC placed in upper right arm successfully. Pt has no complaints at this time. Enjoying some potato chips and pepsi.     1327: report given to primary nurse. Pt placed on transport list to go back to room.   
1945 TRANSFER - IN REPORT:    Verbal report received from [facility] on Baldomero Reece  being received from PACU for routine post-op      Report consisted of patient's Situation, Background, Assessment and   Recommendations(SBAR).     Information from the following report(s) Nurse Handoff Report, MAR, and Cardiac Rhythm Sinus rhythm  was reviewed with the receiving nurse.    Opportunity for questions and clarification was provided.      Assessment completed upon patient's arrival to unit and care assumed.     2000 Pt now in his room. Appears comfortable. Denies pain and discomfort. Asking for food, he reports being hungry. Pt given tb meal: baked chicken and coffee, ginger ale as requested.    On assessment, pulses noted left ankle and + popliteal pulse. Able to move bilateral limbs.    Nikita, RN will chart on this pt.    0452 Pt is a hard stick. Nikita attempted x 1. Pt reports they were using his IV line to draw labs, explained to pt that his IV line is no longer giving enough blood for lab. Called ED charge x 2 - no answer. Left a message for PICC team to help us w/ lab.  
4 Eyes Skin Assessment     NAME:  Baldomero Reece  YOB: 1955  MEDICAL RECORD NUMBER:  [id number]    The patient is being assessed for  Shift Handoff    I agree that at least one RN has performed a thorough Head to Toe Skin Assessment on the patient. ALL assessment sites listed below have been assessed.      Areas assessed by both nurses:    Head, Face, Ears, Shoulders, Back, Chest, Arms, Elbows, Hands, Sacrum. Buttock, Coccyx, Ischium, and Legs. Feet and Heels        Does the Patient have a Wound? Yes wound(s) were present on assessment. LDA wound assessment was Initiated and completed by RN       Jack Prevention initiated by RN: No  Wound Care Orders initiated by RN: Yes    Pressure Injury (Stage 3,4, Unstageable, DTI, NWPT, and Complex wounds) if present, place Wound referral order by RN under : Yes    New Ostomies, if present place, Ostomy referral order under : No     Nurse 1 eSignature: Electronically signed by Merlin Lang, RN on 12/30/23 at 6:45 PM EST    **SHARE this note so that the co-signing nurse can place an eSignature**    Nurse 2 eSignature: {Esignature:[id number]}   
710 Bedside shift change report given to Tung (oncoming nurse) by Nikhil (offgoing nurse). Report included the following information Nurse Handoff Report.     1010 pt off the floor/ vasc lab.     1113 pt back from Vasc lab, to remain NPO for debridement tonight at 6    1700 pt off the floor /OR    End of Shift Note    Bedside shift change report given to Briseyda/ Nikita (oncoming nurse) by TUNG SANTOS RN (offgoing nurse).  Report included the following information SBAR and Kardex    Shift worked:  7a-7p     Shift summary and any significant changes:     Pt off the unit for debridement and possible partial amputation.      Concerns for physician to address:       Zone phone for oncoming shift:          Activity:     Number times ambulated in hallways past shift: 0  Number of times OOB to chair past shift: 0    Cardiac:   Cardiac Monitoring: Yes           Access:  Current line(s): PIV     Genitourinary:   Urinary status: voiding    Respiratory:      Chronic home O2 use?: NO  Incentive spirometer at bedside: NO       GI:     Current diet:  Diet NPO Exceptions are: Sips of Water with Meds  Passing flatus: YES  Tolerating current diet: YES       Pain Management:   Patient states pain is manageable on current regimen: NO    Skin:     Interventions: float heels    Patient Safety:  Fall Score:    Interventions: bed/chair alarm, assistive device (walker, cane. etc), and pt to call before getting OOB Wheelchair       Length of Stay:  Expected LOS: 6  Actual LOS: 3      TUNG SANTOS RN                            
715 Bedside shift change report given to Tung/ Jose Alejandro (oncoming nurse) by Sakshi/ Nikita (offgoing nurse). Report included the following information Nurse Handoff Report.     800 pt refusing vitals Dr Ferreira informed. Pt asking for change to his pain medication  930 morning vitals taken once pain medication changed  1130 repeat vitals assessment performed  1500 afternoon vitals and assessment refused. Pt just wants to sleep. Nurses told to \"get out\" and \"don't touch me\".    1730 vitals were obtained, temp 102.1, dr west, tylenol administered, paired blood cultures and Covid test ordered    1830 temp down to 100.5, pt refusing blood work and COVID swab    End of Shift Note    Bedside shift change report given to Sakshi (oncoming nurse) by TUNG SANTOS RN (offgoing nurse).  Report included the following information SBAR, Kardex, and MAR    Shift worked:  7a-7p     Shift summary and any significant changes:     Pt refusing care, running a fever, just wants to sleep.     Concerns for physician to address:       Zone phone for oncoming shift:          Activity:     Number times ambulated in hallways past shift: 0  Number of times OOB to chair past shift: 0    Cardiac:   Cardiac Monitoring: Yes           Access:  Current line(s): PIV     Genitourinary:   Urinary status: voiding    Respiratory:      Chronic home O2 use?: NO  Incentive spirometer at bedside: NO       GI:     Current diet:  ADULT DIET; Regular; 5 carb choices (75 gm/meal)  Passing flatus: YES  Tolerating current diet: YES       Pain Management:   Patient states pain is manageable on current regimen: NO    Skin:     Interventions: float heels and PT/OT consult    Patient Safety:  Fall Score:    Interventions: bed/chair alarm, gripper socks, and pt to call before getting OOB       Length of Stay:  Expected LOS: 8  Actual LOS: 4      TUNG SANTOS RN                               
A burst of tachycardia with HR: 160s-170s bpm noted on the monitor.  Pt. Is sleening in bed without distress.  RN notify hospitalist (Arturo Miranda ).  
Attempted to obtain blood work/labs (CMP, Vancomycin random level) x 4 attempts- unsuccessful. Patient refused any more attempts. Patient did not receive his PICC line as planned and unable to obtain labs. Pharmacy contacted, spoke with Rubio Tirado- recommendation was to give Vancomycin 1250mg/250ml at scheduled time and to have level taken with before next dose. Patient in agreement w/ plan.  
Bedside and Verbal shift change report given to LEANDRO Rain (oncoming nurse) by LEANDRO Headley (offgoing nurse). Report included the following information Nurse Handoff Report, MAR, and Cardiac Rhythm Sinus rhythm .     LEANDRO Shelton will chart on this pt.     Pt has been refusing patient care. Did not want us to assess him and refused swab and cultures done. LEANDRO Shelton will notify the on call provider.     0030 Pt agreed to be changed at this time, pt given a bath. Explained that we can't leave him soaking in urine. Pt has expressed being upset about events during the day.   
Bedside shift change report given to Dior Castanon RN (oncoming nurse) by Lang, Merlin, RN  (offgoing nurse). Report included the following information Nurse Handoff Report, Index, ED Encounter Summary, ED SBAR, Adult Overview, Surgery Report, Intake/Output, MAR, Recent Results, Med Rec Status, Cardiac Rhythm NSR, BBB, and Alarm Parameters.      Pt. Is A&O x2, mildly drowsy. Pt. Is unable to provide information for MRI screening. Pt. Refuse to answer admission database questions.    Jack Score 15. All of the following interventions have been implemented to prevent pressure injury:    SKIN ASSESSMENT (S)  Dual skin assessment completed at shift change: Yes  Name of second RN who completed Dual Skin Assessment: Merlin Lang, RN  Picture of wound uploaded to EMR: Yes  Venelex ordered and given per protocol: Yes  Wound care consulted if wounds present: Yes    SURFACE (S)  Alexsandra air pump or specialty bed ordered: Yes  Type of bed:   Only white chux used with specialty surfaces (no green chux used): Yes  Waffle cushion used for chair positioning: Yes    KEEP MOVING (K)  Mobility status (Bedrest, Chairbound, UWA x 1 assist , 2 assist, Max assist): 1  Q2 hour turns documented: Yes  Refusals to turn and education provided documented: Yes  Device used to float heels: Pillows  PT/OT consulted: Yes    INCONTINENCE (I)  Incontinence status assessed Q2 hours: Yes  External catheter in use: urenal  Barrier cream in use: yes    NUTRITION (N)  I/O's documented every 8 hours: Yes  Oral supplements ordered if appropriate: Yes  Nutrition services consulted: Yes    All concerns about new DTI's must be escalated directly to attending MD, charge nurse, CCL and nurse director.          
Bedside shift change report given to LEANDRO Hernandez.  
Bedside shift change report given to Lang, Merlin, RN (oncoming nurse) by Dior Castanon RN  (offgoing nurse). Report included the following information Nurse Handoff Report, Index, ED Encounter Summary, ED SBAR, Adult Overview, Surgery Report, Intake/Output, MAR, Recent Results, Med Rec Status, Cardiac Rhythm NSR, BBB, and Alarm Parameters.    
Chart reviewed.  Full consult to follow.  I suspect the Acinetobacter isolated from blood culture is a contaminant.  Treatment options are limited with patient's penicillin allergy.  No specific treatment at this time however if patient decompensates would start empiric cefiderocol and amikacin.  Will discuss with microbiology lab for further antibiotic sensitivities  
Comprehensive Nutrition Assessment    Type and Reason for Visit:  Reassess    Nutrition Recommendations/Plan:   Continue diet as tolerated  Please document % meals and supplements consumed in flowsheet I/O's under intake   Given prn bowel regimen, no BM in 4 days     Malnutrition Assessment:  Malnutrition Status:  No malnutrition (01/05/24 1122)        Nutrition Assessment:  Chart reviewed, case management working on disposition today.  Appetite is fairly good.  Labs reviewed, WNL.  No BM in several days, pt does have a prn bowel regimen available.  Please offer this to the pt.    Patient Vitals for the past 120 hrs:   PO Meals Eaten (%)   01/09/24 1546 51 - 75%   01/09/24 0948 51 - 75%   01/08/24 1533 51 - 75%   01/06/24 1700 51 - 75%   01/06/24 1426 51 - 75%   01/06/24 0900 1 - 25%            Nutrition Related Findings:    Meds: folvite, merrem, thiamine, vancomycin.    Edema: trace-BLE.    BM: 1/6   Wound Type: Skin Tears, Surgical Incision       Current Nutrition Intake & Therapies:    Average Meal Intake: 51-75%     ADULT DIET; Regular; Double Protein Portions    Anthropometric Measures:  Height: 175.3 cm (5' 9\")  Ideal Body Weight (IBW): 160 lbs (73 kg)       Current Body Weight: 61.8 kg (136 lb 3.9 oz),   IBW.    Current BMI (kg/m2): 20.1        Weight Adjustment For: Amputation  Total Adjusted Percentage (Calculated): 5.9           Adjusted BMI (kg/m2) (Calculated): 21.3  BMI Categories: Normal Weight (BMI 18.5-24.9)    Estimated Daily Nutrient Needs:  Energy Requirements Based On: Formula  Weight Used for Energy Requirements: Current  Energy (kcal/day): 1806 kcals (BMR x 1.3AF)  Weight Used for Protein Requirements: Current  Protein (g/day): 74-80g (1.2-1.3 g/kg bw)  Method Used for Fluid Requirements: 1 ml/kcal  Fluid (ml/day): 1800 mL    Nutrition Diagnosis:   No nutrition diagnosis at this time     Nutrition Interventions:   Food and/or Nutrient Delivery: Continue Current Diet  Nutrition 
END OF SHIFT NOTE    Assumed care of pt.   Bedside shift change report received from Briseyda (offgoing nurse). Report included the following information Nurse Handoff Report, Adult Overview, Surgery Report, and MAR          Shift worked:                          0476-5863     Shift summary and any significant changes:    11:30: Unable to draw blood for labs, including previous shifts blood cultures, and current Vancomycin trough. Holding Vancomycin IV until able to draw blood.  PICC team called and message left with them.     Pt is gaining an increased temperature 99.9, BP is getting soft 96/63 65MAP, and his O2 is decreased 90%. Also his L leg w the recent surgery feels like its getting hot   Concerns for physician to address:                 Bedside shift change report given to David (oncoming nurse) by Merlin Lang, RN (offgoing nurse). Report included the following information Nurse Handoff Report, Adult Overview, MAR, and Recent Results                          
END OF SHIFT NOTE    Assumed care of pt.   Bedside shift change report received from David (offgoing nurse). Report included the following information Nurse Handoff Report, ED SBAR, Adult Overview, Intake/Output, MAR, and Recent Results          Shift worked:                          4680-6147     Shift summary and any significant changes:     Podiatrist came by and changed dressing  of LLE.   Pt continues to receive IV antibiotics and sleep throughout the day,     Concerns for physician to address:                 Bedside shift change report given to David (oncoming nurse) by Merlin Lang, RN (offgoing nurse). Report included the following information Nurse Handoff Report, ED SBAR, Adult Overview, Intake/Output, MAR, and Event Log                          
END OF SHIFT NOTE    Assumed care of pt.   Bedside shift change report received from Dior (offgoing nurse). Report included the following information Nurse Handoff Report, Adult Overview, MAR, and Recent Results          Shift worked:                          5044-2406     Shift summary and any significant changes:    0800: Pt CIWA 4. Alert to only self. Uncooperative and lethargic, spends time sleeping.     11:00 Unable to consent for procedures, CM called and awaiting further information regarding possible emergency contacts or next of kin that are able to consent for pt.     Concerns for physician to address:                   Bedside shift change report given to Nikhil (oncoming nurse) by Merlin Lang, RN (offgoing nurse). Report included the following information Nurse Handoff Report, Adult Overview, Surgery Report, Intake/Output, and Event Log                         
END OF SHIFT NOTE    Assumed care of pt.   Bedside shift change report received from Nikhil (offgoing nurse). Report included the following information Nurse Handoff Report, Adult Overview, and Intake/Output          Shift worked:                          3538-4896     Shift summary and any significant changes:    12:00: Pt lost IV access. Several nurses on floor attempted to place new IV with no success. Unable to deliver due Abx, draw labs, or perform MRI.     Called Infectious Disease for consult. Left voice message requesting call back    Podiatry physician Dr. Salazar consulted.    Wounds cleaned and new dressings applied.     Scheduled for surgery afternoon 1/2/24- NPO after breakfast    Concerns for physician to address:                   Bedside shift change report given to Nikhil (oncoming nurse) by Merlin Lang, RN (offgoing nurse). Report included the following information Nurse Handoff Report, Adult Overview, Intake/Output, MAR, and Event Log                          
END OF SHIFT NOTE    Assumed care of pt.   Bedside shift change report received from Анна (offgoing nurse). Report included the following information Nurse Handoff Report and Adult Overview          Shift worked:                          2517-9126     Shift summary and any significant changes:     0930: Pt CIWA score of 13. Perfect served doctor and charge nurse notified.     Currently no orders or updated notes for pt.    10:40 CIWA 16. Calling vascular as requested by Christin     12:00 Given 3mg of ativan according to CIWA protocol.    12:55: Attempted getting labs, additional IV access. No success despite 3 nurses attempting.     14:00: Pt arousable, however not coherent enough to give proper responses to questions for MRI screening.   Concerns for physician to address:                 Bedside shift change report given to Dior (oncoming nurse) by Merlin Lang, RN (offgoing nurse). Report included the following information Nurse Handoff Report, Adult Overview, Intake/Output, and MAR                          
End of Shift Note    Bedside shift change report given to LEANDRO Headley (oncoming nurse) by Nikhil Diaz RN (offgoing nurse).  Report included the following information SBAR, MAR, Recent Results, and Cardiac Rhythm NS/ ST    Shift worked:  3612-6790     Shift summary and any significant changes:     Pt had a restful night.  Pt only scored 5 on CIWA.  Pt had no iv access at start of shift but now has ultrasound access.  Pt npo for upcomming procedure for debridement and amputation.     Concerns for physician to address:  See above.     Zone phone for oncoming shift:   [phone]       Activity:     Number times ambulated in hallways past shift: 0  Number of times OOB to chair past shift: 0    Cardiac:   Cardiac Monitoring: Yes           Access:  Current line(s): PIV     Genitourinary:   Urinary status: voiding    Respiratory:      Chronic home O2 use?: YES  Incentive spirometer at bedside: YES       GI:     Current diet:  Diet NPO Exceptions are: Sips of Water with Meds  Passing flatus: YES  Tolerating current diet: YES       Pain Management:   Patient states pain is manageable on current regimen: YES    Skin:     Interventions: turn team and float heels    Patient Safety:  Fall Score:    Interventions: bed/chair alarm       Length of Stay:  Expected LOS: 4  Actual LOS: 3      Nikhil Diaz RN                            
End of Shift Note    Bedside shift change report given to LEANDRO Portillo (oncoming nurse) by Kristyn Valadez LPN (offgoing nurse).  Report included the following information SBAR    Shift worked:  7a-7p     Shift summary and any significant changes:     Pt rested in bed today. Pt had some complaints of pain, prn pain medication given with positive effect. Pt tolerating diet. Uneventful day.      Concerns for physician to address:  none     Zone phone for oncoming shift:   [phone]             Kristyn Valadez LPN                           
End of Shift Note    Bedside shift change report given to LEANDRO Portillo (oncoming nurse) by Kristyn Valadez LPN (offgoing nurse).  Report included the following information SBAR    Shift worked:  7a-7p     Shift summary and any significant changes:     Pt rested in bed, pt had some complaints of pain. Prn pain medication given with positive effect. Uneventful day.      Concerns for physician to address:  none     Zone phone for oncoming shift:   [phone]           Kristyn Valadez LPN                            
End of Shift Note    Bedside shift change report given to LEANDRO Rome (oncoming nurse) by Danny Coffey RN (offgoing nurse).  Report included the following information SBAR, Kardex, ED Summary, Procedure Summary, Intake/Output, MAR, Recent Results, and Cardiac Rhythm SR & BB    Shift worked:  7p-7a     Shift summary and any significant changes:     Patient remains weak, no c/o pain or distress noted. Pt refused CT scan to right hand.     Concerns for physician to address:  Patient refused CT scan      Zone phone for oncoming shift:   [phone]       Activity:     Number times ambulated in hallways past shift: 0  Number of times OOB to chair past shift: 0    Cardiac:   Cardiac Monitoring: Yes           Access:  Current line(s): PIV     Genitourinary:   Urinary status: voiding    Respiratory:      Chronic home O2 use?: NO  Incentive spirometer at bedside: NO       GI:     Current diet:  ADULT DIET; Regular; 5 carb choices (75 gm/meal)  Passing flatus: YES  Tolerating current diet: YES       Pain Management:   Patient states pain is manageable on current regimen: YES    Skin:     Interventions: float heels, increase time out of bed, and PT/OT consult    Patient Safety:  Fall Score:    Interventions: bed/chair alarm, assistive device (walker, cane. etc), gripper socks, and pt to call before getting OOB       Length of Stay:  Expected LOS: 9  Actual LOS: 6      Danny Coffey RN                           
End of Shift Note    Bedside shift change report given to Merlin, RN (oncoming nurse) by Danny Coffey RN (offgoing nurse).  Report included the following information SBAR, Kardex, ED Summary, Procedure Summary, Intake/Output, MAR, Recent Results, and Cardiac Rhythm SR & BB    Shift worked:  7p-7a     Shift summary and any significant changes:    CT scan to rt hand shows cellulitis, antibiotics in progress. Medicated for pain with iv morphine x1. No further concern voiced.     Concerns for physician to address:  Patient refused CT scan      Zone phone for oncoming shift:   [phone]       Activity:     Number times ambulated in hallways past shift: 0  Number of times OOB to chair past shift: 0    Cardiac:   Cardiac Monitoring: Yes           Access:  Current line(s): PIV     Genitourinary:   Urinary status: voiding    Respiratory:      Chronic home O2 use?: NO  Incentive spirometer at bedside: NO       GI:     Current diet:  ADULT DIET; Regular; Double Protein Portions  Passing flatus: YES  Tolerating current diet: YES       Pain Management:   Patient states pain is manageable on current regimen: YES    Skin:     Interventions: float heels, increase time out of bed, and PT/OT consult    Patient Safety:  Fall Score:    Interventions: bed/chair alarm, assistive device (walker, cane. etc), gripper socks, and pt to call before getting OOB       Length of Stay:  Expected LOS: 9  Actual LOS: 7      Danny Coffey RN                           
End of Shift Note    Bedside shift change report given to Merlin, RN (oncoming nurse) by Nikhil Diaz RN (offgoing nurse).  Report included the following information SBAR, MAR, Recent Results, and Cardiac Rhythm NS    Shift worked:  1900-0730     Shift summary and any significant changes:     Pt had a restful night and did not complain of any pain or discomfort.  Pt taken down for MRI but refused to go into machine.  Pt stated he was not able to at that time.  Pt states will try again in the am after he is premedicated. Two attempts made by nursing staff to draw morning labs, but were unsuccessful due to edematous arms.  Pt refused to be stuck again and refused 3am vitals.  Pt wanted to be left alone to sleep.     Concerns for physician to address:  See above     Zone phone for oncoming shift:   [phone]       Activity:     Number times ambulated in hallways past shift: 0  Number of times OOB to chair past shift: 0    Cardiac:   Cardiac Monitoring: Yes           Access:  Current line(s): PIV     Genitourinary:   Urinary status: voiding    Respiratory:      Chronic home O2 use?: YES  Incentive spirometer at bedside: YES       GI:     Current diet:  ADULT DIET; Regular  DIET ONE TIME MESSAGE;  Passing flatus: YES  Tolerating current diet: YES       Pain Management:   Patient states pain is manageable on current regimen: YES    Skin:     Interventions: turn team and float heels    Patient Safety:  Fall Score:    Interventions: bed/chair alarm and gripper socks       Length of Stay:  Expected LOS: 4  Actual LOS: 2      Nikhil Diaz RN                            
Got Report about the refusal of blood draw and COVID test.  19.30 : He refused to draw his blood culture and COVID Test when asked. Also he refused to do assessment.  22.50: He was wet because of the misplacement of urinal. He refused to change his linen despite counselling.  23.40: His linen and gown changed. This time he showed cooperation.   00.36: Hydromorphone 1mg given for his foot pain.  03.35: Patient let me listen his lungs sounds but not other assessment.  End of Shift Note    Bedside shift change report given to Merlin RN(oncoming nurse) by Nikita Cross RN (offgoing nurse).  Report included the following information SBAR, MAR, and Cardiac Rhythm sinus rhythm    Shift worked:  7p-7a     Shift summary and any significant changes:    Patient refused care, assessments, labs - NP notified.     Concerns for physician to address:   No fever overnight, cultures and covid swab test not done - pt refused   Zone phone for oncoming shift:           Activity:     Number times ambulated in hallways past shift: 0  Number of times OOB to chair past shift: 0    Cardiac:   Cardiac Monitoring: Yes           Access:  Current line(s): PIV     Genitourinary:   Urinary status: voiding    Respiratory:      Chronic home O2 use?: NO  Incentive spirometer at bedside: NO       GI:     Current diet:  ADULT DIET; Regular; 5 carb choices (75 gm/meal)  Passing flatus: YES  Tolerating current diet: YES       Pain Management:   Patient states pain is manageable on current regimen: YES    Skin:     Interventions: float heels, limit briefs, and nutritional support    Patient Safety:  Fall Score:    Interventions: bed/chair alarm and pt to call before getting OOB       Length of Stay:  Expected LOS: 8  Actual LOS: 5      Nikita Cross RN                                  
MRI attempted. Pt unable to tolerate exam. Pt was moving significantly, began beating on sides of scanner. Pt did not want to continue exam at this time and would like to try at later date. Stated he was too uncomfortable and in pain. Pt transported back to floor by MRI staff and information relayed to nurse on floor  
Nurse to nurse report given. Elena Cerna RN    
Nursing notified patient not allowing them to obtain blood cultures or covid testing that was ordered by dayshift, nursing states pt is repeatedly asking them to leave the room, and is not wanting nursing to touch him for their assessment this evening. No other complaints reported, not reported to be combative or pulling at lines, no changes in mentation reported. VSS. Patient denies any further complaints or concerns. No acute distress reported. Nursing to notify Hospitalist for further/continued concerns. Will remain available overnight for further concerns if nursing/patient needs. Will defer further evaluation/management to the day shift team. Appreciate nursing assistance in the care of this pt.    Non-billable note.    
Occupational Therapy    Chart reviewed. Pt scheduled for L foot debridement with possible TMA this afternoon. Will defer and follow up.   
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 Noted orders for \"bed rest with bathroom privileges\" 4 days post op.    Interventions attempted. Pt in bed, awake, difficult to understand speech, except pt clear when stating \"leave me alone\". OT deferred. Will continue to follow.   
Operative findings noted  LIDIA/waveforms seem adequate for healing  We can see as outpt in follow up as needed  Will defer to Dr Salazar  
Ortho consult f/u note:    Mr. Reece's demeanor is remarkably improved today, talkative and interactive.    Hand discoloration 50% improved, and edema of left hand now gone, edema on right much improved. Tenderness only present over the dorsum of the hand in the area of remaining swelling (typical evacuation route of cellulitis, through hand dorsum). Still showing no localizing tenderness of the joints of hand or wrist to more exacting exam possible today.    There is minimal spotting of the dressings applied to the left foot, ulcerated with partial loss of forefoot.   The patient volunteered that he would 'give someone all my check if they would take me in.' He was able to see the complexity of care needed to try to save the left foot in this condition, which may have been the source of his sepsis, and he reports that he ambulates only in a wheelchair.   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    No need for hand surgery - please reconsult if ortho needed. Thanks  
Patient was transferred to medical surgical telemetry 2330, report given to receiving shift nurse via phone prior to transfer.  
Pharmacy Antimicrobial Kinetic Dosing    Indication for Antimicrobials: Bacteremia     Current Regimen of Each Antimicrobial:  Vancomycin - Pharmacy to Dose; Start Date ; Day # 1  Meropenem 1g IV Q8H; Start Date ; Day # 1  Previous Antimicrobial Therapy:       Goal Level: Vancomycin -600    Date Dose & Interval Measured (mcg/mL) Predicted AUC                       Significant Cultures:    Enterococcus faecalis and faecium, ABC complex, MRSE    Labs:  Recent Labs     Units 23  1211 23  1210   CREATININE MG/DL 0.5* 0.58*   BUN MG/DL  --  10   PROCAL ng/mL  --  <0.05   WBC K/uL  --  5.8     Temp (24hrs), Av.4 °F (36.9 °C), Min:98 °F (36.7 °C), Max:98.9 °F (37.2 °C)      Conditions for Dosing Consideration:  MS,amputations    Creatinine Clearance (mL/min): Estimated Creatinine Clearance: 140 mL/min (A) (based on SCr of 0.5 mg/dL (L)).       Impression/Plan:   Will dose conservatively at 1250 mg IV Q12H for estimated AUC of 455  Continue Meropenem  Antimicrobial stop date 7+ day     Pharmacy will follow daily and adjust medications as appropriate for renal function and/or serum levels.    Thank you,  Deonte Angulo [facility]    
Pharmacy Antimicrobial Kinetic Dosing    Indication for Antimicrobials: Bacteremia     Current Regimen of Each Antimicrobial:  Vancomycin - Pharmacy to Dose; Start Date ; Day # 3  Meropenem 1g IV Q8H; Start Date ; Day # 3    Previous Antimicrobial Therapy:  N/A     Goal Level: Vancomycin -600    Date Dose & Interval Measured (mcg/mL) Predicted AUC    1250 mg IV q12h 15 569                 Significant Cultures:    Blood: Enterococcus faecalis (vanc-sensitive), Enterococcus faecium, ABC complex, MRSE   Blood: ngtd, prelim     Labs:  Recent Labs     Units 23  0001 23  2353 23  1211 23  1210   CREATININE MG/DL 0.35*  --  0.5* 0.58*   BUN MG/DL 6  --   --  10   PROCAL ng/mL  --  <0.05  --  <0.05   WBC K/uL 2.3*  --   --  5.8     Temp (24hrs), Av °F (36.7 °C), Min:97.4 °F (36.3 °C), Max:99.1 °F (37.3 °C)      Conditions for Dosing Consideration:  MS, amputations    Creatinine Clearance (mL/min): Estimated Creatinine Clearance: 185 mL/min (A) (based on SCr of 0.35 mg/dL (L)).     Impression/Plan:   Continue vancomycin 1250 mg IV Q12H for estimated AUC of 508  Level scheduled for 1000  Continue Meropenem, follow sensitivities  Antimicrobial stop date 7+ day     Pharmacy will follow daily and adjust medications as appropriate for renal function and/or serum levels.    Thank you,  PORSCHE CHAVEZ [facility]  
Pharmacy Antimicrobial Kinetic Dosing    Indication for Antimicrobials: Bacteremia     Current Regimen of Each Antimicrobial:  Vancomycin - Pharmacy to Dose; Start Date ; Day # 4  Meropenem 1g IV Q8H; Start Date ; Day # 4    Previous Antimicrobial Therapy:  N/A     Goal Level: Vancomycin -600    Date Dose & Interval Measured (mcg/mL) Predicted AUC    1250 mg IV q12h 15 569   1/2 @0954 1250 mg q12h  12.9 556           Significant Cultures:    Blood: Enterococcus faecalis (vanc-sensitive), Enterococcus faecium, ABC complex, MRSE - Sensitivity pending    Blood: ngtd, prelim     Labs:  Recent Labs     Units 24  0239 24  2209 23  0001 23  2353   CREATININE MG/DL 0.55*  --  0.35*  --    BUN MG/DL 10  --  6  --    PROCAL ng/mL  --   --   --  <0.05   WBC K/uL 2.5* 2.2* 2.3*  --      Temp (24hrs), Av.8 °F (37.1 °C), Min:97.5 °F (36.4 °C), Max:99.5 °F (37.5 °C)      Conditions for Dosing Consideration:  MS, amputations    Creatinine Clearance (mL/min): Estimated Creatinine Clearance: 118 mL/min (A) (based on SCr of 0.55 mg/dL (L)).     Impression/Plan:   Continue vancomycin 1250 mg IV Q12H for estimated AUC of 556    Continue Meropenem,  sensitivities pending   Antimicrobial stop date 7+ day     Pharmacy will follow daily and adjust medications as appropriate for renal function and/or serum levels.    Thank you,  ASHLY STORY [facility]    
Pharmacy Antimicrobial Kinetic Dosing    Indication for Antimicrobials: Bacteremia     Current Regimen of Each Antimicrobial: ID Following   Vancomycin - Pharmacy to Dose; Start Date ; Day # 5  Meropenem 1g IV Q8H; Start Date ; Day # 5    Previous Antimicrobial Therapy:  N/A     Goal Level: Vancomycin -600    Date Dose & Interval Measured (mcg/mL) Predicted AUC    1250 mg IV q12h 15 569   1/2 @0954 1250 mg q12h  12.9 556   1/4 12 N 1250 mg IV q12H 14.4 565     Significant Cultures:    Blood: Enterococcus faecalis (vanc-sensitive), Enterococcus faecium, ABC complex, MRSE - Sensitivity pending   Carbapenem resistant Acinetobacter - Id suspects contamination.   Blood: ngtd, prelim     Labs:  Recent Labs     Units 24  0631 24  0239 24  2209   CREATININE MG/DL 0.60* 0.55*  --    BUN MG/DL 7 10  --    WBC K/uL 3.8* 2.5* 2.2*     Temp (24hrs), Av.7 °F (37.6 °C), Min:98.4 °F (36.9 °C), Max:102.1 °F (38.9 °C)      Conditions for Dosing Consideration:  MS, amputations    Creatinine Clearance (mL/min): Estimated Creatinine Clearance: 106 mL/min (A) (based on SCr of 0.6 mg/dL (L)).     Impression/Plan:   Continue vancomycin 1250 mg IV Q12H for estimated AUC of 565  Continue Meropenem  Antimicrobial stop date 7+ day     Pharmacy will follow daily and adjust medications as appropriate for renal function and/or serum levels.    Thank you,  Deonte Angulo [facility]        
Pharmacy Antimicrobial Kinetic Dosing    Indication for Antimicrobials: Foot infection    Current Regimen of Each Antimicrobial: ID Following   Vancomycin - Pharmacy to Dose; Start Date ; Day # 13  Meropenem 1g IV Q8H; Start Date ; Day # 12/15    Previous Antimicrobial Therapy:  N/A     Goal Level: Vancomycin -600    Date Dose & Interval Measured (mcg/mL) Predicted AUC    1250 mg IV q12h 15 569   1/ 1250 mg q12h  12.9 556    1250 mg IV q12H 14.4 565    1250mg IV q12h error error    1250 mg IV q12h 23.2 685     Significant Cultures:    Blood: Enterococcus faecalis (vanc-sensitive), Enterococcus faecium, ABC complex, MRSE - Sensitivity pending   Carbapenem resistant Acinetobacter - Id suspects contamination.   Blood: ngtd, prelim     Labs:  Recent Labs     Units 01/10/24  0123 24  0212   CREATININE MG/DL 0.62* 0.81   BUN MG/DL 23* 20     Temp (24hrs), Av.7 °F (37.1 °C), Min:98.2 °F (36.8 °C), Max:99 °F (37.2 °C)    Conditions for Dosing Consideration:  MS, amputations    Creatinine Clearance (mL/min): Estimated Creatinine Clearance: 102 mL/min (A) (based on SCr of 0.62 mg/dL (L)).     Impression/Plan:   ID consulted / following  Vancomycin level not true trough, ~9 hrs post dose; however estAUC per [employer] considers estAUC supratherapeutic at 685. Estimated Trough to be ~17 mg/L prior to next dose, which is still < 20.  Will reduce dose to Vanco 1000mg IV q12hr for estAUC 554 for last hospital dose. Will require careful monitoring while outpatient if he remains on Vancomycin 1250 mg IV q12hr  Continue Meropenem regimens  Antimicrobial stop date - 14 days     Pharmacy will follow daily and adjust medications as appropriate for renal function and/or serum levels.    Thank you,  Thom Menchaca, [facility]    
Physical Therapy  Chart reviewed in prep for PT session.  Patient is NPO for potential TMA later today.  Will defer and follow up post procedure for further mobility progression.  Patricia Hayward, PT    
Physical therapy    Chart reviewed. POD#1 for L foot I&D, 1st metatarsal head amputation and 5th amputation with skin graft application. Orders state bed rest with bathroom privileges for 4 days post-surgery. Patient cleared by nursing to sit EOB. Upon entering room, patient resting in bed, but woke up when therapy entered room. Pt initially agreed to sit on EOB, however then declined and stated \"leave me alone\" multiple times. Will defer today and continue to follow.    Yane Sethi, PT, DPT  Time spent: 8 minutes  
Post Discharge PICC and Antibiotic Orders    1.  Diagnosis: DFI  2.  Antibiotic: Vancomycin 1250 mg IV every 12 hours through 1/16/2024                        Meropenem 1 g IV every 8 hours through 1/16/2024  3.  Routine PICC/ Rima/ Portacath Care including PRN catheter flow management  4.  BiWeekly labs Monday and Thursday:   [x] CBC/diff/platelets   [x] BUN/Creatinine   [] CPK   [x] AST/TotalBilirubin/AlkalinePhosphatase   [x] CRP   [x]Trough Vancomycin level goal 15-20  5.  Fax lab to [phone]  Call Critical Lab Values to [phone]  6.  May send to IR for line evaluation or replacement [phone]  7.  Home Health to pull PIC line at end of therapy or send to IR for Rima removal  8.  Allergies:    Allergies   Allergen Reactions    Penicillins Anaphylaxis    Methadone Other (See Comments)     9. Pharmacy Consult for Vancomycin dosing      Bebeto Tran DO     
Pt refused lab draw, states he is \"tired of getting poked and his arms hurt\". Explained to the pt the importance of the lab draw but still refuses to get labs drawn.  
Pt refused to have vanc level drawn.   
[facility] Progress Note  Bebeto Tran DO  [phone] Office  [phone] Fax    1/7/2024      Assessment & Plan:     Polymicrobial bacteremia.  Blood cultures from 12/29 are growing Enterococcus faecalis, multiple colony types of CoNS, Enterococcus casseliflavus, Aerococcus viridans, and a carbapenem resistant Acinetobacter.  Blood cultures drawn 6 minutes later are sterile at 6 days.  Repeat blood cultures from 1/3 are also sterile.  Cultures from I&D of the foot are growing CoNS.   I suspect the positive blood cultures represent a contaminated sample.  I am reluctant to add coverage for the Acinetobacter as I am concerned that exposure to further antibiotics will induce further resistance.  At this point the risk of further antibiotic exposure outweighs any potential benefits.  I have asked the microbiology lab to check further sensitivities to the Acinetobacter in case treatment does become necessary.  Left foot infection.  Status post I&D with left first metatarsal head amputation and left fifth toe amputation 1/2/2024.  Cultures growing oxacillin resistant Staphylococcus simulans (CoNS) patient was on antibiotics 4 days prior to surgery which likely affected culture yield.  Pathology reveals clear margins.  Per discussion with podiatry we will plan 2-week course of IV antibiotics for the left foot cellulitis.  IV antibiotic orders are in the chart  Right hand cellulitis?  Patient unable to tolerate MRI.  CT negative for abscess or osteomyelitis.  No suspicion for septic wrist per orthopedic surgery.  Continue antibiotics as above  Medical noncompliance/homeless  History of EtOH and tobacco abuse  History of right BKA 2020  History of hepatitis C/cirrhosis  History of penicillin allergy.  This caused anaphylaxis in the past per chart records.  Currently tolerating meropenem    ID service will sign off.  Please contact provider on-call with any questions    
[facility] Progress Note  Bebeto Tran DO  [phone] Office  [phone] Fax    1/6/2024      Assessment & Plan:     Polymicrobial bacteremia.  Blood cultures from 12/29 are growing Enterococcus faecalis, multiple colony types of CoNS, Enterococcus casseliflavus, Aerococcus viridans, and a carbapenem resistant Acinetobacter.  Blood cultures drawn 6 minutes later are sterile at 6 days.  Repeat blood cultures from 1/3 are also sterile.  Preliminary cultures from I&D of the foot are growing CoNS.   I suspect the positive blood cultures represent a contaminated sample.  I am reluctant to add coverage for the Acinetobacter as I am concerned that exposure to further antibiotics will induce further resistance.  At this point the risk of further antibiotic exposure outweighs any potential benefits.  I have asked the microbiology lab to check further sensitivities to the Acinetobacter in case treatment does become necessary.  Left foot infection.  Status post I&D with left first metatarsal head amputation and left fifth toe amputation 1/2/2024.  Cultures growing oxacillin resistant Staphylococcus simulans (CoNS) patient was on antibiotics 4 days prior to surgery which likely affected culture yield.  Continue current antibiotics pending pathology results  Right hand cellulitis?  Patient unable to tolerate MRI.  CT negative for abscess or osteomyelitis.  No suspicion for septic wrist per orthopedic surgery.  Continue antibiotics as above  Medical noncompliance/homeless  History of EtOH and tobacco abuse  History of right BKA 2020  History of hepatitis C/cirrhosis  History of penicillin allergy.  This caused anaphylaxis in the past per chart records.  Currently tolerating meropenem          Subjective:     No complaints    Objective:     Vitals: /81   Pulse 69   Temp 97.6 °F (36.4 °C) (Oral)   Resp 19   Ht 1.753 m (5' 9\")   Wt 61.8 kg (136 lb 3.2 oz)   SpO2 96%   BMI 20.11 kg/m²  
Reviewed path report margins are clear    FINAL PATHOLOGIC DIAGNOSIS     1.  Metatarsal head, left 1st, excision:        Acute and chronic osteomyelitis and acute chondritis   Bone margin appears viable without obvious acute osteomyelitis     2.  Toe, left 5th, amputation:        Cutaneous and soft tissue ulcer with extension into bone   Acute and chronic osteomyelitis   Margin of resection shows viable bony trabeculae and marrow necrosis   No obvious acute osteomyelitis at margin     Can be discharged when medically appropriate to LTC facility with following instructions and recommendations    Skilled wound care and PT/OT rehab for 3 to 4 weeks   Partial weight bearing on the left heel with a post op shoe or forefoot offloading shoe if possible  Antibiotic treatment as per ID discussed with Dr. Tran  Wound care can call my office for orders  Wound care with adaptic or similar non adherent dressing, ABD, 4/4's, loosely applied kirlex, and ACE wrap 2 to 3 times per week    Can follow up in my office in 3 weeks from discharge       If need further assistance from me please reach out to me on perfect serve or my cell [phone]    
Scheduled for Sx debridement this evening at 6 pm  
Seen patient in the room for bandage change left foot post op  Doing well no acute pain  Bandage clean dry and intact   Evidence of hemorrhage moderate on the inner layers    Packing removed which showed mixed drainage mostly hemorrhagic mix with purulence  No odor, minimal edema no erythema in the foot or leg  Graft well incorporated sutures and staples all intact     No acute drainage   Replaced dressing with opticell. Padded ABD pad, 4/4's and kirlex and ACE wrap    Still awaiting pathology report  Patient will need rehab and off weight bearing on the forefoot skilled wound care nursing treatment   Since patient is homeless recommend inpatient rehab or LTC placement for the rest of the post op period till healed   If not there is definitive chance of the wound not healing resulting limb loss which would be devastating for the patient living conditions since he already has lost right leg    Will re eval after the path report        
Seen patient in the room getting his bandage applied  Large forefoot stump left foot ulcer with exposed met head and severely infected mal odorous   Also full thickness wound on the left medial ankle   S/p right BK and was told not a candidate for any proximal amputation since he is homeless  Patient states the wound is painful and does hurt     Non palpable pedal pulses but does have signs of some perfusion with granular tissue in the wound  Wound need surgical debridement ,most likely a TMA since there are multiple bones are exposed in the wound   Will schedule for surgery tomorrow afternoon   NPO after Breakfast on 1/2/24      Full note to follow  
Spiritual Care Assessment/Progress Note  [facility]    Name: Baldomero Reece MRN: [id number]    Age: 68 y.o.     Sex: male   Language: English     Date: 1/6/2024            Total Time Calculated: 13 min              Spiritual Assessment begun in [facility] 2 PROGRESSIVE CARE  Service Provided For:: Patient not available  Referral/Consult From:: Rounding  Encounter Overview/Reason : Attempted Encounter    Spiritual beliefs:      [] Involved in a baljinder tradition/spiritual practice:      [] Supported by a baljinder community:      [] Claims no spiritual orientation:      [] Seeking spiritual identity:           [] Adheres to an individual form of spirituality:      [x] Not able to assess:                Identified resources for coping and support system:   Support System: Unknown       [] Prayer                  [] Devotional reading               [] Music                  [] Guided Imagery     [] Pet visits                                        [] Other: (COMMENT)     Specific area/focus of visit   Encounter:    Crisis:    Spiritual/Emotional needs:    Ritual, Rites and Sacraments:    Grief, Loss, and Adjustments:    Ethics/Mediation:    Behavioral Health:    Palliative Care:    Advance Care Planning:      Plan/Referrals: Other (Comment)    Narrative:   Reviewed chart prior to visit on PCU for spiritual assessment.  No family/friends present. Room was darkened.  Mr. Reece appeared to be sleeping.  He did not stir when  called his name.  Unable to assess for spiritual needs or concerns at this time.   available upon referral by staff or by patient/family request.      Joann Shaffer MPS, BCC, Staff   [facility]     Paging Service  [phone]        
TRANSFER - IN REPORT:    Verbal report received from Carl REEVES on Baldomero Reece  being received from [facility] ED for urgent transfer      Report consisted of patient's Situation, Background, Assessment and   Recommendations(SBAR).     Information from the following report(s) Nurse Handoff Report, MAR, Recent Results, and Cardiac Rhythm NSR-ST  was reviewed with the receiving nurse.    Opportunity for questions and clarification was provided.      Assessment completed upon patient's arrival to unit and care assumed.     06:23 Pt arrived to unit via EMS. ED registration and Dr. Proctor notified that pt has arrived. Background report received from EMS, stating that pt gets all around [place], from near [place], to [facility] [place]. He usually has a 40oz beer in his hands. Per EMS when pt is inebriated, he can be physically and verbally aggressive, and has been known to spit at EMS. He often turns his wheelchair over to prop himself on it as a pillow when he is sleeping on the streets, but people think he's fallen and will call 911.     Vitals taken, and stable.     Pt at first agreeable to [facility] bath and gown change, however [facility] through bath, pt requested we let him sleep for at least a couple of hours, shut the door and turn off all the lights. Pt also refusing RN to put dressing on open foot wounds until after he has had pain meds and a nap.    07:30 Bedside shift change report given to Merlin RN (oncoming nurse) by Eden RN (offgoing nurse). Report included the following information Nurse Handoff Report, MAR, Recent Results, and Cardiac Rhythm NSR,BBB .     
Vascular Surgery  --blood cultures noted  --awaiting results of an arterial study  
at this time related to   as evidenced by      Nutrition Interventions:   Food and/or Nutrient Delivery: Modify Current Diet  Nutrition Education/Counseling: No recommendation at this time  Coordination of Nutrition Care: Continue to monitor while inpatient       Goals:     Goals: PO intake 75% or greater, by next RD assessment       Nutrition Monitoring and Evaluation:   Behavioral-Environmental Outcomes: None Identified  Food/Nutrient Intake Outcomes: Food and Nutrient Intake  Physical Signs/Symptoms Outcomes: Biochemical Data, Weight    Discharge Planning:    Continue current diet     Sunitha Fishman RD  Contact: ext [phone]    
right-sided BKA in 2020 due to osteomyelitis  -Continue statin         Medical Decision Making:   I personally reviewed labs: CBC, BMP  I personally reviewed imaging reports:   Toxic drug monitoring:   Discussed case with:   Code Status: Full Code       Subjective:  Assessment / Plan:    He is agreeable to go to SNF  Objective:      VITALS:   Last 24hrs VS reviewed since prior progress note. Most recent are:  Patient Vitals for the past 24 hrs:   BP Temp Temp src Pulse Resp SpO2   01/08/24 0806 125/83 97.3 °F (36.3 °C) -- 79 16 --   01/08/24 0327 122/64 97.7 °F (36.5 °C) Oral 59 16 96 %   01/07/24 2318 112/69 98.2 °F (36.8 °C) Oral 60 18 96 %   01/07/24 2030 -- -- -- -- 18 --   01/1955 105/75 98.4 °F (36.9 °C) Oral 76 18 95 %   01/07/24 1529 121/71 98.2 °F (36.8 °C) Oral 69 17 95 %           Intake/Output Summary (Last 24 hours) at 1/8/2024 1259  Last data filed at 1/8/2024 0431  Gross per 24 hour   Intake --   Output 800 ml   Net -800 ml          I had a face to face encounter and independently examined this patient, as outlined below:  PHYSICAL EXAM:  General:          No acute distress    EENT:              EOMI. Anicteric sclerae. MMM  Resp:               CTA bilaterally, no wheezing or rales.  No accessory muscle use  CV:                  Regular  rhythm,  No edema  GI/:             Soft, Non distended, Non tender.  +Bowel sounds  Neurologic:       Alert and awake, No facial asymmetry.  Psych:   Not anxious nor agitated  Skin/MSK:        Right BKA.  Left foot dressing present, right hand redness improved         Current Inpatient Medications:      Current Facility-Administered Medications:     Vancomoycin level draw reminder note, , Other, Once, Rashawn Iglesias MD    HYDROmorphone HCl PF (DILAUDID) injection 1 mg, 1 mg, IntraVENous, Q4H PRN, Ash Ferreira MD, 1 mg at 01/08/24 0937    sodium chloride flush 0.9 % injection 5-40 mL, 5-40 mL, IntraVENous, 2 times per day, Percy Salazar, NICHOLASM, 10 mL 
0.9 % injection 5-40 mL, 5-40 mL, IntraVENous, 2 times per day, Percy Salazar, DPM, 10 mL at 01/02/24 2152    sodium chloride flush 0.9 % injection 5-40 mL, 5-40 mL, IntraVENous, PRN, Percy Salazar, DPM    0.9 % sodium chloride infusion, , IntraVENous, PRN, Percy Salazar, DPM, Stopped at 01/02/24 0137    thiamine mononitrate tablet 100 mg, 100 mg, Oral, Daily, Percy Salazar, DPM, 100 mg at 01/03/24 0923    LORazepam (ATIVAN) tablet 1 mg, 1 mg, Oral, Q1H PRN, 1 mg at 01/03/24 1252 **OR** LORazepam (ATIVAN) tablet 0.5 mg, 0.5 mg, Oral, Q4H PRN, 0.5 mg at 12/31/23 1835 **OR** LORazepam (ATIVAN) tablet 2 mg, 2 mg, Oral, Q1H PRN, 2 mg at 12/31/23 0218 **OR** LORazepam (ATIVAN) tablet 0.5 mg, 0.5 mg, Oral, Q4H PRN **OR** LORazepam (ATIVAN) tablet 3 mg, 3 mg, Oral, Q1H PRN, 3 mg at 12/30/23 1206 **OR** diazePAM (VALIUM) injection 5 mg, 5 mg, IntraVENous, Q4H PRN, 5 mg at 01/02/24 1236 **OR** LORazepam (ATIVAN) tablet 4 mg, 4 mg, Oral, Q1H PRN **OR** diazePAM (VALIUM) injection 5 mg, 5 mg, IntraVENous, Q4H PRN, Percy Salazar, DPM, 5 mg at 01/03/24 0923    folic acid (FOLVITE) tablet 1 mg, 1 mg, Oral, Daily, Percy Salazar, DPM, 1 mg at 01/03/24 0923    ipratropium 0.5 mg-albuterol 2.5 mg (DUONEB) nebulizer solution 1 Dose, 1 Dose, Inhalation, Q4H PRN, Mahavadi, Percy, DPM    oxyCODONE (ROXICODONE) immediate release tablet 5 mg, 5 mg, Oral, Q4H PRN, Martin, Percy, DPM, 5 mg at 01/03/24 0636    atorvastatin (LIPITOR) tablet 40 mg, 40 mg, Oral, Nightly, Mahavadi, Percy, DPM, 40 mg at 01/02/24 2152    vancomycin (VANCOCIN) 1250 mg in sodium chloride 0.9% 250 mL IVPB, 1,250 mg, IntraVENous, Q12H, Melissa Salazarkanth, DPM, Stopped at 01/03/24 0150       LABS:    I reviewed today's most current labs and imaging studies.    Pertinent labs include:  Recent Labs     01/01/24  2209 01/02/24  0239 01/03/24  0631   WBC 2.2* 2.5* 3.8*   HGB 10.5* 11.0* 13.1   HCT 32.2* 34.7* 41.6   * 136* 
but were reviewed prior to creation of Plan.    Reviewed most current lab test results and cultures  YES  Reviewed most current radiology test results   YES  Review and summation of old records today    NO  Reviewed patient's current orders and MAR    YES  PMH/SH reviewed - no change compared to H&P    ________________________________________________________________________      Total NON critical care TIME:  55  Minutes      Total CRITICAL CARE TIME Spent:   Minutes non procedure based          Comments   >50% of visit spent in counseling and coordination of care x    ________________________________________________________________________        Signed: Ledy Waller MD              
performed using kelly Saritha SARS-CoV-2 and Influenza A/B nucleic acid assay.  This test is a multiplex Real-Time Reverse Transcriptase Polymerase Chain Reaction (RT-PCR) based in vitro diagnostic test intended for the qualitative detection of nucleic acids from SARS-CoV-2, Influenza A, and Influenza B in nasopharyngeal and nasal swab specimens for use under the FDA's Emergency Use Authorization (EUA) only.     Fact sheet for Patients: https://www.fda.gov/media/361177/download  Fact sheet for Healthcare Providers: https://www.fda.fov/media/380031/download               ECHO:   05/29/23    TRANSTHORACIC ECHOCARDIOGRAM (TTE) COMPLETE (CONTRAST/BUBBLE/3D PRN) 06/02/2023 12:41 PM (Final)    Interpretation Summary    Left Ventricle: Normal left ventricular systolic function with a visually estimated EF of 50 - 55%. Left ventricle size is normal. Normal wall thickness. Normal wall motion. Normal diastolic function.    Right Ventricle: Right ventricle is severely dilated. Severely reduced systolic function.    Mitral Valve: Mild regurgitation.    Left Atrium: Left atrium is mildly dilated.    Right Atrium: Right atrium is mildly dilated.    Technically difficult study to interpret    Signed by: Baldomero Carrillo MD on 6/2/2023 12:41 PM    Procedures: see electronic medical records for all procedures/Xrays and details which were not copied into this note but were reviewed prior to creation of Plan.    Reviewed most current lab test results and cultures  YES  Reviewed most current radiology test results   YES  Review and summation of old records today    NO  Reviewed patient's current orders and MAR    YES  PMH/ reviewed - no change compared to H&P    ________________________________________________________________________      Total NON critical care TIME:  Minutes      Total CRITICAL CARE TIME Spent:   Minutes non procedure based          Comments   >50% of visit spent in counseling and coordination of care x

## 2024-01-11 NOTE — DISCHARGE INSTRUCTIONS
HOSPITALIST DISCHARGE INSTRUCTIONS    NAME: Baldomero Reece   :  1955   MRN:  [id number]     Date/Time:  2024 12:31 PM    ADMIT DATE: 2023   DISCHARGE DATE: 2024     Attending Physician: Rashawn Iglesias MD    Polymicrobial bacteremia  L Foot infection/ischemia      Vancomycin 1250 mg IV every 12 hours through 2024                        Meropenem 1 g IV every 8 hours through 2024  Medications: Per above medication reconciliation.    Pain Management: per above medications    Recommended diet: regular diet    Recommended activity: Partial weight bearing on the left heel with a post op shoe or forefoot offloading shoe if possible     Wound care: Wound care with adaptic or similar non adherent dressing, ABD, 4/4's, loosely applied kirlex, and ACE wrap 2 to 3 times per week     Indwelling devices:  Planning to get Rima catheter     Supplemental Oxygen: None    Required Lab work: Weekly BMP, see ID orders    Glucose management:  None    Code status: Full code

## 2024-01-29 LAB
BACTERIA ISLT CULT: ABNORMAL
SPECIMEN SOURCE: ABNORMAL

## 2024-02-17 ENCOUNTER — APPOINTMENT (OUTPATIENT)
Facility: HOSPITAL | Age: 69
End: 2024-02-17
Payer: MEDICAID

## 2024-02-17 ENCOUNTER — HOSPITAL ENCOUNTER (INPATIENT)
Facility: HOSPITAL | Age: 69
LOS: 1 days | Discharge: HOME | End: 2024-02-19
Attending: STUDENT IN AN ORGANIZED HEALTH CARE EDUCATION/TRAINING PROGRAM | Admitting: STUDENT IN AN ORGANIZED HEALTH CARE EDUCATION/TRAINING PROGRAM
Payer: MEDICAID

## 2024-02-17 DIAGNOSIS — R47.01 APHASIA: Primary | ICD-10-CM

## 2024-02-17 DIAGNOSIS — R53.1 RIGHT SIDED WEAKNESS: ICD-10-CM

## 2024-02-17 LAB
ALBUMIN SERPL-MCNC: 3.4 G/DL (ref 3.5–5)
ALBUMIN/GLOB SERPL: 0.8 (ref 1.1–2.2)
ALP SERPL-CCNC: 92 U/L (ref 45–117)
ALT SERPL-CCNC: 32 U/L (ref 12–78)
ANION GAP SERPL CALC-SCNC: 6 MMOL/L (ref 5–15)
AST SERPL-CCNC: 38 U/L (ref 15–37)
BASOPHILS # BLD: 0.1 K/UL (ref 0–0.1)
BASOPHILS NFR BLD: 1 % (ref 0–1)
BILIRUB SERPL-MCNC: 0.2 MG/DL (ref 0.2–1)
BUN SERPL-MCNC: 9 MG/DL (ref 6–20)
BUN/CREAT SERPL: 15 (ref 12–20)
CALCIUM SERPL-MCNC: 9 MG/DL (ref 8.5–10.1)
CHLORIDE SERPL-SCNC: 109 MMOL/L (ref 97–108)
CO2 SERPL-SCNC: 29 MMOL/L (ref 21–32)
CREAT SERPL-MCNC: 0.62 MG/DL (ref 0.7–1.3)
DIFFERENTIAL METHOD BLD: NORMAL
EOSINOPHIL # BLD: 0.3 K/UL (ref 0–0.4)
EOSINOPHIL NFR BLD: 5 % (ref 0–7)
ERYTHROCYTE [DISTWIDTH] IN BLOOD BY AUTOMATED COUNT: 13.6 % (ref 11.5–14.5)
ETHANOL SERPL-MCNC: 243 MG/DL (ref 0–0.08)
GLOBULIN SER CALC-MCNC: 4.1 G/DL (ref 2–4)
GLUCOSE SERPL-MCNC: 82 MG/DL (ref 65–100)
HCT VFR BLD AUTO: 46.6 % (ref 36.6–50.3)
HGB BLD-MCNC: 14.5 G/DL (ref 12.1–17)
IMM GRANULOCYTES # BLD AUTO: 0 K/UL (ref 0–0.04)
IMM GRANULOCYTES NFR BLD AUTO: 0 % (ref 0–0.5)
INR PPP: 1 (ref 0.9–1.1)
LYMPHOCYTES # BLD: 2.2 K/UL (ref 0.8–3.5)
LYMPHOCYTES NFR BLD: 38 % (ref 12–49)
MCH RBC QN AUTO: 27.7 PG (ref 26–34)
MCHC RBC AUTO-ENTMCNC: 31.1 G/DL (ref 30–36.5)
MCV RBC AUTO: 89.1 FL (ref 80–99)
MONOCYTES # BLD: 0.4 K/UL (ref 0–1)
MONOCYTES NFR BLD: 7 % (ref 5–13)
NEUTS SEG # BLD: 2.8 K/UL (ref 1.8–8)
NEUTS SEG NFR BLD: 49 % (ref 32–75)
NRBC # BLD: 0 K/UL (ref 0–0.01)
NRBC BLD-RTO: 0 PER 100 WBC
PLATELET # BLD AUTO: 172 K/UL (ref 150–400)
PMV BLD AUTO: 11.1 FL (ref 8.9–12.9)
POTASSIUM SERPL-SCNC: 3.9 MMOL/L (ref 3.5–5.1)
PROT SERPL-MCNC: 7.5 G/DL (ref 6.4–8.2)
PROTHROMBIN TIME: 9.9 SEC (ref 9–11.1)
RBC # BLD AUTO: 5.23 M/UL (ref 4.1–5.7)
SODIUM SERPL-SCNC: 144 MMOL/L (ref 136–145)
TROPONIN I SERPL HS-MCNC: 28 NG/L (ref 0–76)
WBC # BLD AUTO: 5.9 K/UL (ref 4.1–11.1)

## 2024-02-17 PROCEDURE — 85025 COMPLETE CBC W/AUTO DIFF WBC: CPT

## 2024-02-17 PROCEDURE — 93005 ELECTROCARDIOGRAM TRACING: CPT | Performed by: STUDENT IN AN ORGANIZED HEALTH CARE EDUCATION/TRAINING PROGRAM

## 2024-02-17 PROCEDURE — 80053 COMPREHEN METABOLIC PANEL: CPT

## 2024-02-17 PROCEDURE — 71045 X-RAY EXAM CHEST 1 VIEW: CPT

## 2024-02-17 PROCEDURE — 6360000002 HC RX W HCPCS: Performed by: STUDENT IN AN ORGANIZED HEALTH CARE EDUCATION/TRAINING PROGRAM

## 2024-02-17 PROCEDURE — 99285 EMERGENCY DEPT VISIT HI MDM: CPT

## 2024-02-17 PROCEDURE — 2580000003 HC RX 258: Performed by: STUDENT IN AN ORGANIZED HEALTH CARE EDUCATION/TRAINING PROGRAM

## 2024-02-17 PROCEDURE — 70450 CT HEAD/BRAIN W/O DYE: CPT

## 2024-02-17 PROCEDURE — 2500000003 HC RX 250 WO HCPCS: Performed by: STUDENT IN AN ORGANIZED HEALTH CARE EDUCATION/TRAINING PROGRAM

## 2024-02-17 PROCEDURE — 0042T CT BRAIN PERFUSION: CPT

## 2024-02-17 PROCEDURE — 82077 ASSAY SPEC XCP UR&BREATH IA: CPT

## 2024-02-17 PROCEDURE — 96366 THER/PROPH/DIAG IV INF ADDON: CPT

## 2024-02-17 PROCEDURE — 84484 ASSAY OF TROPONIN QUANT: CPT

## 2024-02-17 PROCEDURE — 96365 THER/PROPH/DIAG IV INF INIT: CPT

## 2024-02-17 PROCEDURE — 36415 COLL VENOUS BLD VENIPUNCTURE: CPT

## 2024-02-17 PROCEDURE — 85610 PROTHROMBIN TIME: CPT

## 2024-02-17 PROCEDURE — 6360000004 HC RX CONTRAST MEDICATION: Performed by: RADIOLOGY

## 2024-02-17 PROCEDURE — 70496 CT ANGIOGRAPHY HEAD: CPT

## 2024-02-17 PROCEDURE — 96361 HYDRATE IV INFUSION ADD-ON: CPT

## 2024-02-17 RX ORDER — 0.9 % SODIUM CHLORIDE 0.9 %
1000 INTRAVENOUS SOLUTION INTRAVENOUS ONCE
Status: COMPLETED | OUTPATIENT
Start: 2024-02-17 | End: 2024-02-17

## 2024-02-17 RX ADMIN — FOLIC ACID: 5 INJECTION, SOLUTION INTRAMUSCULAR; INTRAVENOUS; SUBCUTANEOUS at 22:40

## 2024-02-17 RX ADMIN — IOPAMIDOL 100 ML: 755 INJECTION, SOLUTION INTRAVENOUS at 21:15

## 2024-02-17 RX ADMIN — SODIUM CHLORIDE 1000 ML: 9 INJECTION, SOLUTION INTRAVENOUS at 20:50

## 2024-02-17 ASSESSMENT — LIFESTYLE VARIABLES
HOW OFTEN DO YOU HAVE A DRINK CONTAINING ALCOHOL: PATIENT UNABLE TO ANSWER
HOW MANY STANDARD DRINKS CONTAINING ALCOHOL DO YOU HAVE ON A TYPICAL DAY: PATIENT UNABLE TO ANSWER

## 2024-02-18 ENCOUNTER — APPOINTMENT (OUTPATIENT)
Facility: HOSPITAL | Age: 69
End: 2024-02-18
Payer: MEDICAID

## 2024-02-18 PROBLEM — R47.01 APHASIA: Status: ACTIVE | Noted: 2024-02-18

## 2024-02-18 PROBLEM — R53.1 RIGHT SIDED WEAKNESS: Status: ACTIVE | Noted: 2024-02-18

## 2024-02-18 PROBLEM — I63.9 CEREBROVASCULAR ACCIDENT (CVA), UNSPECIFIED MECHANISM (HCC): Status: ACTIVE | Noted: 2024-02-18

## 2024-02-18 PROBLEM — F10.10 ALCOHOL ABUSE: Status: ACTIVE | Noted: 2024-02-18

## 2024-02-18 LAB
AMPHET UR QL SCN: NEGATIVE
ANION GAP SERPL CALC-SCNC: 4 MMOL/L (ref 5–15)
BARBITURATES UR QL SCN: NEGATIVE
BENZODIAZ UR QL: NEGATIVE
BUN SERPL-MCNC: 7 MG/DL (ref 6–20)
BUN/CREAT SERPL: 15 (ref 12–20)
CALCIUM SERPL-MCNC: 8.2 MG/DL (ref 8.5–10.1)
CANNABINOIDS UR QL SCN: NEGATIVE
CHLORIDE SERPL-SCNC: 116 MMOL/L (ref 97–108)
CHOLEST SERPL-MCNC: 104 MG/DL
CO2 SERPL-SCNC: 27 MMOL/L (ref 21–32)
COCAINE UR QL SCN: NEGATIVE
CREAT SERPL-MCNC: 0.46 MG/DL (ref 0.7–1.3)
ERYTHROCYTE [DISTWIDTH] IN BLOOD BY AUTOMATED COUNT: 13.9 % (ref 11.5–14.5)
EST. AVERAGE GLUCOSE BLD GHB EST-MCNC: 80 MG/DL
GLUCOSE SERPL-MCNC: 82 MG/DL (ref 65–100)
HBA1C MFR BLD: 4.4 % (ref 4–5.6)
HCT VFR BLD AUTO: 37.2 % (ref 36.6–50.3)
HDLC SERPL-MCNC: 49 MG/DL
HDLC SERPL: 2.1 (ref 0–5)
HGB BLD-MCNC: 11.7 G/DL (ref 12.1–17)
LDLC SERPL CALC-MCNC: 40.8 MG/DL (ref 0–100)
Lab: NORMAL
MCH RBC QN AUTO: 27.9 PG (ref 26–34)
MCHC RBC AUTO-ENTMCNC: 31.5 G/DL (ref 30–36.5)
MCV RBC AUTO: 88.8 FL (ref 80–99)
METHADONE UR QL: NEGATIVE
NRBC # BLD: 0 K/UL (ref 0–0.01)
NRBC BLD-RTO: 0 PER 100 WBC
OPIATES UR QL: NEGATIVE
PCP UR QL: NEGATIVE
PLATELET # BLD AUTO: 116 K/UL (ref 150–400)
PMV BLD AUTO: 11.5 FL (ref 8.9–12.9)
POTASSIUM SERPL-SCNC: 4.3 MMOL/L (ref 3.5–5.1)
RBC # BLD AUTO: 4.19 M/UL (ref 4.1–5.7)
SODIUM SERPL-SCNC: 147 MMOL/L (ref 136–145)
SPECIMEN HOLD: NORMAL
TRIGL SERPL-MCNC: 71 MG/DL
VLDLC SERPL CALC-MCNC: 14.2 MG/DL
WBC # BLD AUTO: 3.6 K/UL (ref 4.1–11.1)

## 2024-02-18 PROCEDURE — 1100000003 HC PRIVATE W/ TELEMETRY

## 2024-02-18 PROCEDURE — 2700000000 HC OXYGEN THERAPY PER DAY

## 2024-02-18 PROCEDURE — 6370000000 HC RX 637 (ALT 250 FOR IP): Performed by: STUDENT IN AN ORGANIZED HEALTH CARE EDUCATION/TRAINING PROGRAM

## 2024-02-18 PROCEDURE — 83036 HEMOGLOBIN GLYCOSYLATED A1C: CPT

## 2024-02-18 PROCEDURE — 80307 DRUG TEST PRSMV CHEM ANLYZR: CPT

## 2024-02-18 PROCEDURE — 80061 LIPID PANEL: CPT

## 2024-02-18 PROCEDURE — 97165 OT EVAL LOW COMPLEX 30 MIN: CPT

## 2024-02-18 PROCEDURE — 99223 1ST HOSP IP/OBS HIGH 75: CPT | Performed by: PSYCHIATRY & NEUROLOGY

## 2024-02-18 PROCEDURE — 2580000003 HC RX 258: Performed by: STUDENT IN AN ORGANIZED HEALTH CARE EDUCATION/TRAINING PROGRAM

## 2024-02-18 PROCEDURE — 6360000002 HC RX W HCPCS: Performed by: STUDENT IN AN ORGANIZED HEALTH CARE EDUCATION/TRAINING PROGRAM

## 2024-02-18 PROCEDURE — 36415 COLL VENOUS BLD VENIPUNCTURE: CPT

## 2024-02-18 PROCEDURE — 97535 SELF CARE MNGMENT TRAINING: CPT

## 2024-02-18 PROCEDURE — 80048 BASIC METABOLIC PNL TOTAL CA: CPT

## 2024-02-18 PROCEDURE — 70551 MRI BRAIN STEM W/O DYE: CPT

## 2024-02-18 PROCEDURE — 94760 N-INVAS EAR/PLS OXIMETRY 1: CPT

## 2024-02-18 PROCEDURE — 85027 COMPLETE CBC AUTOMATED: CPT

## 2024-02-18 RX ORDER — IPRATROPIUM BROMIDE AND ALBUTEROL SULFATE 2.5; .5 MG/3ML; MG/3ML
1 SOLUTION RESPIRATORY (INHALATION)
Status: DISCONTINUED | OUTPATIENT
Start: 2024-02-18 | End: 2024-02-18

## 2024-02-18 RX ORDER — LORAZEPAM 1 MG/1
1 TABLET ORAL
Status: DISCONTINUED | OUTPATIENT
Start: 2024-02-18 | End: 2024-02-19 | Stop reason: HOSPADM

## 2024-02-18 RX ORDER — DIAZEPAM 5 MG/ML
5 INJECTION, SOLUTION INTRAMUSCULAR; INTRAVENOUS EVERY 4 HOURS PRN
Status: DISCONTINUED | OUTPATIENT
Start: 2024-02-18 | End: 2024-02-19 | Stop reason: HOSPADM

## 2024-02-18 RX ORDER — POLYETHYLENE GLYCOL 3350 17 G/17G
17 POWDER, FOR SOLUTION ORAL DAILY PRN
Status: DISCONTINUED | OUTPATIENT
Start: 2024-02-18 | End: 2024-02-19 | Stop reason: HOSPADM

## 2024-02-18 RX ORDER — IPRATROPIUM BROMIDE AND ALBUTEROL SULFATE 2.5; .5 MG/3ML; MG/3ML
1 SOLUTION RESPIRATORY (INHALATION) EVERY 4 HOURS PRN
Status: DISCONTINUED | OUTPATIENT
Start: 2024-02-18 | End: 2024-02-19 | Stop reason: HOSPADM

## 2024-02-18 RX ORDER — LORAZEPAM 0.5 MG/1
0.5 TABLET ORAL EVERY 4 HOURS PRN
Status: DISCONTINUED | OUTPATIENT
Start: 2024-02-18 | End: 2024-02-19 | Stop reason: HOSPADM

## 2024-02-18 RX ORDER — ONDANSETRON 2 MG/ML
4 INJECTION INTRAMUSCULAR; INTRAVENOUS EVERY 6 HOURS PRN
Status: DISCONTINUED | OUTPATIENT
Start: 2024-02-18 | End: 2024-02-19 | Stop reason: HOSPADM

## 2024-02-18 RX ORDER — NICOTINE 21 MG/24HR
1 PATCH, TRANSDERMAL 24 HOURS TRANSDERMAL DAILY
Status: DISCONTINUED | OUTPATIENT
Start: 2024-02-18 | End: 2024-02-19 | Stop reason: HOSPADM

## 2024-02-18 RX ORDER — ACETAMINOPHEN 650 MG/1
650 SUPPOSITORY RECTAL EVERY 4 HOURS PRN
Status: DISCONTINUED | OUTPATIENT
Start: 2024-02-18 | End: 2024-02-19 | Stop reason: HOSPADM

## 2024-02-18 RX ORDER — LORAZEPAM 1 MG/1
3 TABLET ORAL
Status: DISCONTINUED | OUTPATIENT
Start: 2024-02-18 | End: 2024-02-19 | Stop reason: HOSPADM

## 2024-02-18 RX ORDER — SODIUM CHLORIDE 9 MG/ML
INJECTION, SOLUTION INTRAVENOUS PRN
Status: DISCONTINUED | OUTPATIENT
Start: 2024-02-18 | End: 2024-02-19 | Stop reason: HOSPADM

## 2024-02-18 RX ORDER — SODIUM CHLORIDE 0.9 % (FLUSH) 0.9 %
5-40 SYRINGE (ML) INJECTION PRN
Status: DISCONTINUED | OUTPATIENT
Start: 2024-02-18 | End: 2024-02-19 | Stop reason: HOSPADM

## 2024-02-18 RX ORDER — ONDANSETRON 4 MG/1
4 TABLET, ORALLY DISINTEGRATING ORAL EVERY 8 HOURS PRN
Status: DISCONTINUED | OUTPATIENT
Start: 2024-02-18 | End: 2024-02-19 | Stop reason: HOSPADM

## 2024-02-18 RX ORDER — ASPIRIN 300 MG/1
300 SUPPOSITORY RECTAL DAILY
Status: DISCONTINUED | OUTPATIENT
Start: 2024-02-18 | End: 2024-02-19 | Stop reason: HOSPADM

## 2024-02-18 RX ORDER — GAUZE BANDAGE 2" X 2"
100 BANDAGE TOPICAL DAILY
Status: DISCONTINUED | OUTPATIENT
Start: 2024-02-18 | End: 2024-02-19 | Stop reason: HOSPADM

## 2024-02-18 RX ORDER — ROSUVASTATIN CALCIUM 40 MG/1
40 TABLET, COATED ORAL NIGHTLY
Status: DISCONTINUED | OUTPATIENT
Start: 2024-02-18 | End: 2024-02-19 | Stop reason: HOSPADM

## 2024-02-18 RX ORDER — ENOXAPARIN SODIUM 100 MG/ML
40 INJECTION SUBCUTANEOUS DAILY
Status: DISCONTINUED | OUTPATIENT
Start: 2024-02-18 | End: 2024-02-19 | Stop reason: HOSPADM

## 2024-02-18 RX ORDER — ASPIRIN 81 MG/1
81 TABLET, CHEWABLE ORAL DAILY
Status: DISCONTINUED | OUTPATIENT
Start: 2024-02-18 | End: 2024-02-19 | Stop reason: HOSPADM

## 2024-02-18 RX ORDER — LORAZEPAM 1 MG/1
4 TABLET ORAL
Status: DISCONTINUED | OUTPATIENT
Start: 2024-02-18 | End: 2024-02-19 | Stop reason: HOSPADM

## 2024-02-18 RX ORDER — ACETAMINOPHEN 325 MG/1
650 TABLET ORAL EVERY 4 HOURS PRN
Status: DISCONTINUED | OUTPATIENT
Start: 2024-02-18 | End: 2024-02-19 | Stop reason: HOSPADM

## 2024-02-18 RX ORDER — LORAZEPAM 1 MG/1
2 TABLET ORAL
Status: DISCONTINUED | OUTPATIENT
Start: 2024-02-18 | End: 2024-02-19 | Stop reason: HOSPADM

## 2024-02-18 RX ORDER — SODIUM CHLORIDE 0.9 % (FLUSH) 0.9 %
5-40 SYRINGE (ML) INJECTION EVERY 12 HOURS SCHEDULED
Status: DISCONTINUED | OUTPATIENT
Start: 2024-02-18 | End: 2024-02-19 | Stop reason: HOSPADM

## 2024-02-18 RX ORDER — M-VIT,TX,IRON,MINS/CALC/FOLIC 27MG-0.4MG
1 TABLET ORAL DAILY
Status: DISCONTINUED | OUTPATIENT
Start: 2024-02-18 | End: 2024-02-19 | Stop reason: HOSPADM

## 2024-02-18 RX ADMIN — Medication 100 MG: at 09:34

## 2024-02-18 RX ADMIN — LORAZEPAM 4 MG: 1 TABLET ORAL at 17:39

## 2024-02-18 RX ADMIN — SODIUM CHLORIDE, PRESERVATIVE FREE 10 ML: 5 INJECTION INTRAVENOUS at 20:53

## 2024-02-18 RX ADMIN — SODIUM CHLORIDE, PRESERVATIVE FREE 10 ML: 5 INJECTION INTRAVENOUS at 09:36

## 2024-02-18 RX ADMIN — Medication 1 TABLET: at 09:33

## 2024-02-18 RX ADMIN — LORAZEPAM 2 MG: 1 TABLET ORAL at 05:30

## 2024-02-18 RX ADMIN — ACETAMINOPHEN 650 MG: 325 TABLET ORAL at 17:17

## 2024-02-18 RX ADMIN — ROSUVASTATIN 40 MG: 20 TABLET, FILM COATED ORAL at 05:22

## 2024-02-18 RX ADMIN — ENOXAPARIN SODIUM 40 MG: 100 INJECTION SUBCUTANEOUS at 09:35

## 2024-02-18 RX ADMIN — ASPIRIN 81 MG: 81 TABLET, CHEWABLE ORAL at 09:33

## 2024-02-18 ASSESSMENT — PAIN SCALES - GENERAL: PAINLEVEL_OUTOF10: 7

## 2024-02-18 NOTE — CONSULTS
multivitamin-minerals 1 tablet  1 tablet Oral Daily    acetaminophen (TYLENOL) tablet 650 mg  650 mg Oral Q4H PRN    Or    acetaminophen (TYLENOL) suppository 650 mg  650 mg Rectal Q4H PRN    rosuvastatin (CRESTOR) tablet 40 mg  40 mg Oral Nightly    aspirin chewable tablet 81 mg  81 mg Oral Daily    Or    aspirin suppository 300 mg  300 mg Rectal Daily    LORazepam (ATIVAN) tablet 1 mg  1 mg Oral Q1H PRN    Or    LORazepam (ATIVAN) tablet 0.5 mg  0.5 mg Oral Q4H PRN    Or    LORazepam (ATIVAN) tablet 2 mg  2 mg Oral Q1H PRN    Or    LORazepam (ATIVAN) tablet 0.5 mg  0.5 mg Oral Q4H PRN    Or    LORazepam (ATIVAN) tablet 3 mg  3 mg Oral Q1H PRN    Or    diazePAM (VALIUM) injection 5 mg  5 mg IntraVENous Q4H PRN    Or    LORazepam (ATIVAN) tablet 4 mg  4 mg Oral Q1H PRN    Or    diazePAM (VALIUM) injection 5 mg  5 mg IntraVENous Q4H PRN    ipratropium 0.5 mg-albuterol 2.5 mg (DUONEB) nebulizer solution 1 Dose  1 Dose Inhalation Q4H PRN       Review of Systems:    General, constitutional: shoulder pain  Eyes, vision: negative  Ears, nose, throat: negative  Cardiovascular, heart: negative  Respiratory: negative  Gastrointestinal: negative  Genitourinary: negative  Musculoskeletal: negative  Skin and integumentary: negative  Psychiatric: negative  Endocrine: negative  Neurological: negative, except for HPI  Hematologic/lymphatic: negative  Allergy/immunology: negative      Objective:     /79   Pulse 85   Temp 98.1 °F (36.7 °C) (Oral)   Resp 20   Wt 64 kg (141 lb 1.6 oz)   SpO2 97%     Physical Exam:      General:  appears well nourished in no acute distress  Neck: no carotid bruits  Lungs: clear to auscultation  Heart:  no murmurs, regular rate  Lower extremity: peripheral pulses palpable and no edema  Skin: intact    Neurological exam:    The patient is arousable.  He will follow simple one-step commands.  He did know the year.  He could name simple objects.  He has significant decreased attention and

## 2024-02-18 NOTE — H&P
Other:    N/A                           LAB DATA REVIEWED:    Recent Results (from the past 12 hour(s))   CBC with Auto Differential    Collection Time: 02/17/24  8:31 PM   Result Value Ref Range    WBC 5.9 4.1 - 11.1 K/uL    RBC 5.23 4.10 - 5.70 M/uL    Hemoglobin 14.5 12.1 - 17.0 g/dL    Hematocrit 46.6 36.6 - 50.3 %    MCV 89.1 80.0 - 99.0 FL    MCH 27.7 26.0 - 34.0 PG    MCHC 31.1 30.0 - 36.5 g/dL    RDW 13.6 11.5 - 14.5 %    Platelets 172 150 - 400 K/uL    MPV 11.1 8.9 - 12.9 FL    Nucleated RBCs 0.0 0  WBC    nRBC 0.00 0.00 - 0.01 K/uL    Neutrophils % 49 32 - 75 %    Lymphocytes % 38 12 - 49 %    Monocytes % 7 5 - 13 %    Eosinophils % 5 0 - 7 %    Basophils % 1 0 - 1 %    Immature Granulocytes 0 0.0 - 0.5 %    Neutrophils Absolute 2.8 1.8 - 8.0 K/UL    Lymphocytes Absolute 2.2 0.8 - 3.5 K/UL    Monocytes Absolute 0.4 0.0 - 1.0 K/UL    Eosinophils Absolute 0.3 0.0 - 0.4 K/UL    Basophils Absolute 0.1 0.0 - 0.1 K/UL    Absolute Immature Granulocyte 0.0 0.00 - 0.04 K/UL    Differential Type AUTOMATED     Comprehensive Metabolic Panel    Collection Time: 02/17/24  8:31 PM   Result Value Ref Range    Sodium 144 136 - 145 mmol/L    Potassium 3.9 3.5 - 5.1 mmol/L    Chloride 109 (H) 97 - 108 mmol/L    CO2 29 21 - 32 mmol/L    Anion Gap 6 5 - 15 mmol/L    Glucose 82 65 - 100 mg/dL    BUN 9 6 - 20 MG/DL    Creatinine 0.62 (L) 0.70 - 1.30 MG/DL    Bun/Cre Ratio 15 12 - 20      Est, Glom Filt Rate >60 >60 ml/min/1.73m2    Calcium 9.0 8.5 - 10.1 MG/DL    Total Bilirubin 0.2 0.2 - 1.0 MG/DL    ALT 32 12 - 78 U/L    AST 38 (H) 15 - 37 U/L    Alk Phosphatase 92 45 - 117 U/L    Total Protein 7.5 6.4 - 8.2 g/dL    Albumin 3.4 (L) 3.5 - 5.0 g/dL    Globulin 4.1 (H) 2.0 - 4.0 g/dL    Albumin/Globulin Ratio 0.8 (L) 1.1 - 2.2     Protime-INR    Collection Time: 02/17/24  8:31 PM   Result Value Ref Range    INR 1.0 0.9 - 1.1      Protime 9.9 9.0 - 11.1 sec   Troponin    Collection Time: 02/17/24  8:31 PM   Result Value

## 2024-02-18 NOTE — ED NOTES
Pt states he has to go to the bathroom. Assisted pt with using the urinal, unable to void at this time.

## 2024-02-18 NOTE — ED NOTES
Rounded on patient, noted that pt's linens were soaked with urine.  Upon changing pt's bed linens and cleaning him up, pt woke up and was able to have a conversation with this RN, speech still slurred, but intelligible.  Pt was also able to follow commands, rolling from side to side and moving all 4 extremities without apparent difficulty.  Hospitalist notified of pt's change in mentation, no further orders received.

## 2024-02-18 NOTE — ED PROVIDER NOTES
[facility] EMERGENCY DEPT  EMERGENCY DEPARTMENT ENCOUNTER       Pt Name: Baldomero Reece  MRN: [id number]  Birthdate 1955  Date of evaluation: 2024  Provider: Mayank Hicks DO   PCP: No primary care provider on file.  Note Started: 12:48 AM 24     CHIEF COMPLAINT       Chief Complaint   Patient presents with    Cerebrovascular Accident     Pt [place] gas station, report of aphasia, Rt side facial droop and Rt side weakness.  Pt is homeless and unable to provide any information about his condition, initially registered as Antonino Israel because pt couldn't tell us his name/.  Pt smells strongly of etoh, EMS also reports that they noted baggies of marijuana in pt's bag when they were looking for identification.  Pt evaluated by ER MD and directed to CT immediately upon arrival.        HISTORY OF PRESENT ILLNESS: 1 or more elements      History From: EMS  History limited by aphasia      Baldomero Reece is a 68 y.o. male who presents with cc of aphasia and right-sided upper and lower extremity weakness. He has a hx of etoh abuse. Patient is a very poor historian. Unknown last known well. Not his baseline per friend.      Nursing Notes were all reviewed and agreed with or any disagreements were addressed in the HPI.       PAST HISTORY     Past Medical History:  Past Medical History:   Diagnosis Date    Alcohol abuse     Below knee amputation (HCC)     Rt leg         Past Surgical History:  No past surgical history on file.    Family History:  No family history on file.    Social History:       Allergies:  Allergies   Allergen Reactions    Pcn [Penicillins] Anaphylaxis    Methadone Other (See Comments)       CURRENT MEDICATIONS      Previous Medications    No medications on file         PHYSICAL EXAM      ED Triage Vitals   Enc Vitals Group      BP       Pulse       Resp       Temp       Temp src       SpO2       Weight       Height       Head Circumference       Peak Flow       Pain Score       Pain Loc

## 2024-02-18 NOTE — ED NOTES
Pt in CT with RN staff at this time. Head CT has been completed. Unsuccessful IV attempts by CT staff and RN. ED tech called to place IV. Awaiting IV placement to obtain additional images.

## 2024-02-19 VITALS
WEIGHT: 124.78 LBS | OXYGEN SATURATION: 98 % | BODY MASS INDEX: 18.48 KG/M2 | DIASTOLIC BLOOD PRESSURE: 67 MMHG | TEMPERATURE: 98.4 F | HEART RATE: 96 BPM | RESPIRATION RATE: 20 BRPM | HEIGHT: 69 IN | SYSTOLIC BLOOD PRESSURE: 125 MMHG

## 2024-02-19 PROBLEM — R41.82 ALTERED MENTAL STATUS: Status: ACTIVE | Noted: 2024-02-19

## 2024-02-19 PROBLEM — R41.0 CONFUSION: Status: ACTIVE | Noted: 2024-02-19

## 2024-02-19 LAB
ANION GAP SERPL CALC-SCNC: 4 MMOL/L (ref 5–15)
BUN SERPL-MCNC: 14 MG/DL (ref 6–20)
BUN/CREAT SERPL: 32 (ref 12–20)
CALCIUM SERPL-MCNC: 8.6 MG/DL (ref 8.5–10.1)
CHLORIDE SERPL-SCNC: 112 MMOL/L (ref 97–108)
CHOLEST SERPL-MCNC: 99 MG/DL
CO2 SERPL-SCNC: 27 MMOL/L (ref 21–32)
CREAT SERPL-MCNC: 0.44 MG/DL (ref 0.7–1.3)
ERYTHROCYTE [DISTWIDTH] IN BLOOD BY AUTOMATED COUNT: 13.6 % (ref 11.5–14.5)
EST. AVERAGE GLUCOSE BLD GHB EST-MCNC: 85 MG/DL
GLUCOSE SERPL-MCNC: 90 MG/DL (ref 65–100)
HBA1C MFR BLD: 4.6 % (ref 4–5.6)
HCT VFR BLD AUTO: 39.7 % (ref 36.6–50.3)
HDLC SERPL-MCNC: 50 MG/DL
HDLC SERPL: 2 (ref 0–5)
HGB BLD-MCNC: 12.3 G/DL (ref 12.1–17)
LDLC SERPL CALC-MCNC: 31 MG/DL (ref 0–100)
MAGNESIUM SERPL-MCNC: 2 MG/DL (ref 1.6–2.4)
MCH RBC QN AUTO: 27.7 PG (ref 26–34)
MCHC RBC AUTO-ENTMCNC: 31 G/DL (ref 30–36.5)
MCV RBC AUTO: 89.4 FL (ref 80–99)
NRBC # BLD: 0 K/UL (ref 0–0.01)
NRBC BLD-RTO: 0 PER 100 WBC
PLATELET # BLD AUTO: 91 K/UL (ref 150–400)
PMV BLD AUTO: 11.3 FL (ref 8.9–12.9)
POTASSIUM SERPL-SCNC: 3.8 MMOL/L (ref 3.5–5.1)
RBC # BLD AUTO: 4.44 M/UL (ref 4.1–5.7)
SODIUM SERPL-SCNC: 143 MMOL/L (ref 136–145)
TRIGL SERPL-MCNC: 90 MG/DL
VLDLC SERPL CALC-MCNC: 18 MG/DL
WBC # BLD AUTO: 2.2 K/UL (ref 4.1–11.1)

## 2024-02-19 PROCEDURE — 83735 ASSAY OF MAGNESIUM: CPT

## 2024-02-19 PROCEDURE — 2580000003 HC RX 258: Performed by: STUDENT IN AN ORGANIZED HEALTH CARE EDUCATION/TRAINING PROGRAM

## 2024-02-19 PROCEDURE — 97161 PT EVAL LOW COMPLEX 20 MIN: CPT

## 2024-02-19 PROCEDURE — 80061 LIPID PANEL: CPT

## 2024-02-19 PROCEDURE — 95816 EEG AWAKE AND DROWSY: CPT

## 2024-02-19 PROCEDURE — 80048 BASIC METABOLIC PNL TOTAL CA: CPT

## 2024-02-19 PROCEDURE — 85027 COMPLETE CBC AUTOMATED: CPT

## 2024-02-19 PROCEDURE — 95816 EEG AWAKE AND DROWSY: CPT | Performed by: PSYCHIATRY & NEUROLOGY

## 2024-02-19 PROCEDURE — 6370000000 HC RX 637 (ALT 250 FOR IP): Performed by: STUDENT IN AN ORGANIZED HEALTH CARE EDUCATION/TRAINING PROGRAM

## 2024-02-19 PROCEDURE — 94760 N-INVAS EAR/PLS OXIMETRY 1: CPT

## 2024-02-19 PROCEDURE — 99233 SBSQ HOSP IP/OBS HIGH 50: CPT

## 2024-02-19 PROCEDURE — 36415 COLL VENOUS BLD VENIPUNCTURE: CPT

## 2024-02-19 PROCEDURE — 6360000002 HC RX W HCPCS: Performed by: STUDENT IN AN ORGANIZED HEALTH CARE EDUCATION/TRAINING PROGRAM

## 2024-02-19 PROCEDURE — 83036 HEMOGLOBIN GLYCOSYLATED A1C: CPT

## 2024-02-19 PROCEDURE — 97530 THERAPEUTIC ACTIVITIES: CPT

## 2024-02-19 RX ORDER — ROSUVASTATIN CALCIUM 40 MG/1
40 TABLET, COATED ORAL NIGHTLY
Qty: 30 TABLET | Refills: 3 | Status: SHIPPED | OUTPATIENT
Start: 2024-02-19

## 2024-02-19 RX ORDER — THIAMINE MONONITRATE (VIT B1) 100 MG
100 TABLET ORAL DAILY
Qty: 30 TABLET | Refills: 0 | Status: SHIPPED | OUTPATIENT
Start: 2024-02-20

## 2024-02-19 RX ADMIN — Medication 1 TABLET: at 08:54

## 2024-02-19 RX ADMIN — ENOXAPARIN SODIUM 40 MG: 100 INJECTION SUBCUTANEOUS at 08:55

## 2024-02-19 RX ADMIN — ACETAMINOPHEN 650 MG: 325 TABLET ORAL at 08:54

## 2024-02-19 RX ADMIN — LORAZEPAM 3 MG: 1 TABLET ORAL at 08:54

## 2024-02-19 RX ADMIN — Medication 100 MG: at 08:54

## 2024-02-19 RX ADMIN — ASPIRIN 81 MG: 81 TABLET, CHEWABLE ORAL at 08:54

## 2024-02-19 RX ADMIN — SODIUM CHLORIDE, PRESERVATIVE FREE 10 ML: 5 INJECTION INTRAVENOUS at 09:03

## 2024-02-19 ASSESSMENT — PAIN DESCRIPTION - LOCATION: LOCATION: HEAD

## 2024-02-19 ASSESSMENT — PAIN DESCRIPTION - DESCRIPTORS: DESCRIPTORS: ACHING

## 2024-02-19 ASSESSMENT — PAIN SCALES - GENERAL: PAINLEVEL_OUTOF10: 8

## 2024-02-19 ASSESSMENT — PAIN DESCRIPTION - ORIENTATION: ORIENTATION: ANTERIOR

## 2024-02-19 NOTE — WOUND CARE
Wound care nurse attempted to see this patient but he is leaving the hospital. He is dressed and ready to be wheeled out of here AMA (?).   Laurie Cage RN, BSN, CWON

## 2024-02-19 NOTE — DISCHARGE SUMMARY
foot  Tobacco abuse  Homelessness       -Patient was admitted for concern for slurred speech and altered mental status.  Patient had MRI brain done which showed no acute event, showed chronic hemorrhage and encephalomalacia left frontal lobe basal ganglia and insula.  He had a stroke June 2023.  ECG was negative.  He was strictly advised not to drink alcohol.  He is unsure if he is ready to quit.  He was seen by PT/OT.  No need for echocardiogram.  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  Pertinent Findings:  Patient Vitals for the past 24 hrs:   BP Temp Temp src Pulse Resp SpO2   02/19/24 1139 125/67 98.4 °F (36.9 °C) -- 96 20 98 %   02/19/24 0830 124/79 98.6 °F (37 °C) -- 88 20 97 %   02/19/24 0257 115/81 98 °F (36.7 °C) Oral 64 20 96 %   02/18/24 2336 (!) 108/59 98 °F (36.7 °C) Axillary 70 20 95 %   02/18/24 2050 (!) 117/58 98.2 °F (36.8 °C) Oral 64 20 99 %   02/18/24 1732 (!) 138/113 -- -- (!) 108 -- --   02/18/24 1527 106/78 98.1 °F (36.7 °C) Oral 98 18 99 %       Gen:    Not in distress  Chest: Clear lungs  CVS:   Regular rhythm.  No edema  Abd:  Soft, not distended, not tender  Neuro: awake, moving all exts    Discharge/Recent Laboratory Results:  Recent Labs     02/19/24  0126      K 3.8   *   CO2 27   BUN 14   CREATININE 0.44*   GLUCOSE 90   CALCIUM 8.6   MG 2.0     Recent Labs     02/19/24  0126   HGB 12.3   HCT 39.7   WBC 2.2*   PLT 91*       Discharge Medications:     Medication List        START

## 2024-02-19 NOTE — CARE COORDINATION
1132 - Per IDRs, pt likely to d/c today. Pt reportedly unhoused. CM will provide resources for homeless [facility] and cold weather shelter. CM will assist with transport if needed - pt has Medicaid. CM noted pt does not have PCP, CM advised to place info for [facility] on [facility]. CM attempted to meet with pt to clarify d/c needs. Pt sleeping at this time and did not wake to voice. CM will check-in with pt later.     1300 - Pt reports they would like to be d/c to the [employer] [place] station [place]. CM attempted to provide unhoused resources, pt declined. Pt educated on homeless [facility], cold weather shelter. Pt declined and only wants to d/c to [facility]. CM inquired which [facility]. Pt unsure and requesting a map. CM printed map. Appears pt wanting to d/c to [address] [facility] Brito working to confirm. [facility] approved. Pt reported they do not have pants, [facility] Downing able to provide a pair. Pt is clear for d/c from CM.     1449 - [id number] delivered to bedside.    Jahaira Gómez, MSW  Care Management

## 2024-02-19 NOTE — DISCHARGE INSTRUCTIONS
HOSPITALIST DISCHARGE INSTRUCTIONS    NAME: Baldomero Reece   :  1955   MRN:  [id number]     Date/Time:  2024 1:04 PM    ADMIT DATE: 2024   DISCHARGE DATE: 2024     Alcohol intoxication  CAD/PAD  Chronic alcoholism    It is important that you take the medication exactly as they are prescribed.   Keep your medication in the bottles provided by the pharmacist and keep a list of the medication names, dosages, and times to be taken in your wallet.   Do not take other medications without consulting your doctor.       What to do at Home    Recommended diet:  regular diet    Recommended activity: activity as tolerated      If you have questions regarding the hospital related prescriptions or hospital related issues please call [facility]' office at . You can always direct your questions to your primary care doctor if you are unable to reach your hospital physician; your PCP works as an extension of your hospital doctor just like your hospital doctor is an extension of your PCP for your time at the hospital ([facility])    If you experience any of the following symptoms then please call your primary care physician or return to the emergency room if you cannot get hold of your doctor:    Fever, chills, nausea, vomiting, or persistent diarrhea  Worsening weakness or new problems with your speech or balance  Dark stools or visible blood in your stools  New Leg swelling or shortness of breath as these could be signs of a clot    Additional Instructions:      Bring these papers with you to your follow up appointments. The papers will help your doctors be sure to continue the care plan from the hospital.              Information obtained by :  I understand that if any problems occur once I am at home I am to contact my physician.    I understand and acknowledge receipt of the instructions indicated above.

## 2024-02-19 NOTE — PLAN OF CARE
Problem: Discharge Planning  Goal: Discharge to home or other facility with appropriate resources  Outcome: Progressing     Problem: Pain  Goal: Verbalizes/displays adequate comfort level or baseline comfort level  Outcome: Progressing     Problem: Skin/Tissue Integrity  Goal: Absence of new skin breakdown  Description: 1.  Monitor for areas of redness and/or skin breakdown  2.  Assess vascular access sites hourly  3.  Every 4-6 hours minimum:  Change oxygen saturation probe site  4.  Every 4-6 hours:  If on nasal continuous positive airway pressure, respiratory therapy assess nares and determine need for appliance change or resting period.  Outcome: Progressing     Problem: Safety - Adult  Goal: Free from fall injury  Outcome: Progressing     Problem: Cognitive:  Goal: Knowledge of wound care  Description: Knowledge of wound care  Outcome: Progressing  Goal: Understands risk factors for wounds  Description: Understands risk factors for wounds  Outcome: Progressing     Problem: Wound:  Goal: Will show signs of wound healing; wound closure and no evidence of infection  Description: Will show signs of wound healing; wound closure and no evidence of infection  Outcome: Progressing     Problem: Pressure Ulcer:  Goal: Signs of wound healing will improve  Description: Signs of wound healing will improve  Outcome: Progressing  Goal: Absence of new pressure ulcer  Description: Absence of new pressure ulcer  Outcome: Progressing  Goal: Will show no infection signs and symptoms  Description: Will show no infection signs and symptoms  Outcome: Progressing     Problem: Arterial:  Goal: Optimize blood flow for wound healing  Description: Optimize blood flow for wound healing  Outcome: Progressing     Problem: Venous:  Goal: Signs of wound healing will improve  Description: Signs of wound healing will improve  Outcome: Progressing     Problem: Smoking cessation:  Goal: Ability to formulate a plan to maintain a tobacco-free life 
  Problem: Occupational Therapy - Adult  Goal: By Discharge: Performs self-care activities at highest level of function for planned discharge setting.  See evaluation for individualized goals.  Description: FUNCTIONAL STATUS PRIOR TO ADMISSION:  Patient is a limited historian / participant at time of evaluation. Note R BKA and L TMA. Patient reports being non-ambulatory, using wheelchair for functional mobility. Per chart, patient is homeless. He reports using [employer] restrooms for toileting, stating that he can transfer from wheelchair<>toilet at modified independent level using pivot. He endorses being independent to modified independent in ADLs. Patient is R-hand dominant.     Transfer Assistance: Independent (reports pivoting only b/w surfaces)      HOME SUPPORT: Patient is homeless.     Occupational Therapy Goals:  Initiated 2/18/2024  1.  Patient will perform upper-body ADLs with Modified Ashe within 7 day(s).  2.  Patient will perform lower-body ADLs with Modified Ashe within 7 day(s).  3.  Patient will participate in E Fugl Guadarrama assessment in prep for ADLs within 7 day(s).    4.  Patient will perform SPT toilet transfers with Modified Ashe  within 7 day(s).  5.  Patient will perform all aspects of toileting with Modified Ashe within 7 day(s).  6.  Patient will participate in upper extremity therapeutic exercise/activities with Supervision for 10 minutes within 7 day(s).    7.  Patient will utilize fall prevention techniques during functional activities with verbal cues within 7 day(s).  Outcome: Progressing   OCCUPATIONAL THERAPY EVALUATION    Patient: Baldomero Reece (68 y.o. male)  Date: 2/18/2024  Primary Diagnosis: Aphasia [R47.01]  Right sided weakness [R53.1]  Cerebrovascular accident (CVA), unspecified mechanism (HCC) [I63.9]         Precautions:                    ASSESSMENT :  The patient is limited by decreased functional mobility, independence in ADLs, ROM, strength, 
  Problem: Physical Therapy - Adult  Goal: By Discharge: Performs mobility at highest level of function for planned discharge setting.  See evaluation for individualized goals.  Description: FUNCTIONAL STATUS PRIOR TO ADMISSION: Patient was modified independent using a wheelchair for functional mobility.    HOME SUPPORT PRIOR TO ADMISSION: Pt is homeless.    Physical Therapy Goals  Initiated 2/19/2024  1.  Patient will transfer from bed to wheelchair and wheelchair to bed with independence using the least restrictive device within 7 day(s).      Outcome: Progressing   PHYSICAL THERAPY EVALUATION    Patient: Baldomero Reece (68 y.o. male)  Date: 2/19/2024  Primary Diagnosis: Aphasia [R47.01]  Right sided weakness [R53.1]  Cerebrovascular accident (CVA), unspecified mechanism (HCC) [I63.9]       Precautions: Restrictions/Precautions: Bed Alarm                      ASSESSMENT :   DEFICITS/IMPAIRMENTS:   The patient is limited by decreased safety awareness     Based on the impairments listed above pt is close to functional baseline. Pt was received in supine and cleared by nursing to mobilize. Pt had no difficulty with mobilizing and noted L foot was wrapped with gauze. He may benefit from a surgical shoe at discharge. He was returned to supine for nursing to administer pain medication and elevated LLE.    Patient will benefit from skilled intervention to address the above impairments.    Functional Outcome Measure:  The patient scored 17/24 on the [facility] outcome measure.           PLAN :  Recommendations and Planned Interventions:   transfer training, therapeutic exercises, patient and family training/education, and therapeutic activities    Frequency/Duration: Patient will be followed by physical therapy to address goals, PT Plan of Care: 2 times/week to address goals.    Recommendation for discharge: (in order for the patient to meet his/her long term goals): Likely no skilled physical therapy (may need a 
with Modified Adams  within 7 day(s).  5.  Patient will perform all aspects of toileting with Modified Adams within 7 day(s).  6.  Patient will participate in upper extremity therapeutic exercise/activities with Supervision for 10 minutes within 7 day(s).    7.  Patient will utilize fall prevention techniques during functional activities with verbal cues within 7 day(s).  2/18/2024 1226 by Danette Carrizales, DOC  Outcome: Progressing

## 2024-02-19 NOTE — PROCEDURES
EEG REPORT    Patient Name: Baldomero Reece  : 1955  Age: 68 y.o.    Ordering physician: Dr. Iglesias  Date of EE2024  7:24 - 7:45  Diagnosis: weakness, confusion  Interpreting physician: Tyrone Parks D.O. FAAN    Procedure: EEG    CLINICAL INDICATION: The patient is a 68 y.o. male who is being evaluated for baseline electro cerebral activities and to rule out seizure focus.      Current Facility-Administered Medications   Medication Dose Route Frequency    sodium chloride flush 0.9 % injection 5-40 mL  5-40 mL IntraVENous 2 times per day    sodium chloride flush 0.9 % injection 5-40 mL  5-40 mL IntraVENous PRN    0.9 % sodium chloride infusion   IntraVENous PRN    ondansetron (ZOFRAN-ODT) disintegrating tablet 4 mg  4 mg Oral Q8H PRN    Or    ondansetron (ZOFRAN) injection 4 mg  4 mg IntraVENous Q6H PRN    polyethylene glycol (GLYCOLAX) packet 17 g  17 g Oral Daily PRN    enoxaparin (LOVENOX) injection 40 mg  40 mg SubCUTAneous Daily    thiamine mononitrate tablet 100 mg  100 mg Oral Daily    nicotine (NICODERM CQ) 21 MG/24HR 1 patch  1 patch TransDERmal Daily    therapeutic multivitamin-minerals 1 tablet  1 tablet Oral Daily    acetaminophen (TYLENOL) tablet 650 mg  650 mg Oral Q4H PRN    Or    acetaminophen (TYLENOL) suppository 650 mg  650 mg Rectal Q4H PRN    rosuvastatin (CRESTOR) tablet 40 mg  40 mg Oral Nightly    aspirin chewable tablet 81 mg  81 mg Oral Daily    Or    aspirin suppository 300 mg  300 mg Rectal Daily    LORazepam (ATIVAN) tablet 1 mg  1 mg Oral Q1H PRN    Or    LORazepam (ATIVAN) tablet 0.5 mg  0.5 mg Oral Q4H PRN    Or    LORazepam (ATIVAN) tablet 2 mg  2 mg Oral Q1H PRN    Or    LORazepam (ATIVAN) tablet 0.5 mg  0.5 mg Oral Q4H PRN    Or    LORazepam (ATIVAN) tablet 3 mg  3 mg Oral Q1H PRN    Or    diazePAM (VALIUM) injection 5 mg  5 mg IntraVENous Q4H PRN    Or    LORazepam (ATIVAN) tablet 4 mg  4 mg Oral Q1H PRN    Or    diazePAM (VALIUM) injection 5 mg  5 mg IntraVENous

## 2024-02-20 LAB
EKG ATRIAL RATE: 89 BPM
EKG DIAGNOSIS: NORMAL
EKG P AXIS: 71 DEGREES
EKG P-R INTERVAL: 172 MS
EKG Q-T INTERVAL: 426 MS
EKG QRS DURATION: 142 MS
EKG QTC CALCULATION (BAZETT): 518 MS
EKG R AXIS: -88 DEGREES
EKG T AXIS: 57 DEGREES
EKG VENTRICULAR RATE: 89 BPM

## 2024-02-22 ENCOUNTER — APPOINTMENT (OUTPATIENT)
Facility: HOSPITAL | Age: 69
End: 2024-02-22
Payer: MEDICAID

## 2024-02-22 ENCOUNTER — HOSPITAL ENCOUNTER (EMERGENCY)
Facility: HOSPITAL | Age: 69
Discharge: HOME | End: 2024-02-22
Attending: EMERGENCY MEDICINE
Payer: MEDICAID

## 2024-02-22 VITALS
HEART RATE: 96 BPM | OXYGEN SATURATION: 97 % | DIASTOLIC BLOOD PRESSURE: 90 MMHG | RESPIRATION RATE: 18 BRPM | SYSTOLIC BLOOD PRESSURE: 141 MMHG | TEMPERATURE: 98.3 F

## 2024-02-22 DIAGNOSIS — S91.309A OPEN WOUND OF FOOT EXCLUDING TOES: Primary | ICD-10-CM

## 2024-02-22 PROCEDURE — 73630 X-RAY EXAM OF FOOT: CPT

## 2024-02-22 PROCEDURE — 99284 EMERGENCY DEPT VISIT MOD MDM: CPT

## 2024-02-22 PROCEDURE — 70450 CT HEAD/BRAIN W/O DYE: CPT

## 2024-02-22 ASSESSMENT — PAIN DESCRIPTION - ORIENTATION: ORIENTATION: LEFT

## 2024-02-22 ASSESSMENT — PAIN DESCRIPTION - DIRECTION: RADIATING_TOWARDS: LEFT FOOT PAIN

## 2024-02-22 ASSESSMENT — PAIN DESCRIPTION - FREQUENCY: FREQUENCY: CONTINUOUS

## 2024-02-22 ASSESSMENT — PAIN DESCRIPTION - LOCATION: LOCATION: FOOT

## 2024-02-22 ASSESSMENT — PAIN DESCRIPTION - DESCRIPTORS: DESCRIPTORS: DISCOMFORT

## 2024-02-22 ASSESSMENT — PAIN SCALES - GENERAL: PAINLEVEL_OUTOF10: 5

## 2024-02-22 ASSESSMENT — PAIN DESCRIPTION - ONSET: ONSET: ON-GOING

## 2024-02-22 ASSESSMENT — PAIN DESCRIPTION - PAIN TYPE: TYPE: ACUTE PAIN

## 2024-02-22 ASSESSMENT — PAIN - FUNCTIONAL ASSESSMENT: PAIN_FUNCTIONAL_ASSESSMENT: PREVENTS OR INTERFERES WITH MANY ACTIVE NOT PASSIVE ACTIVITIES

## 2024-02-22 NOTE — CARE COORDINATION
ED Care Management - Received call from patient's RN to request wheelchair transportation for patient. RN confirmed patient's address at [address]     Currently on [employer]. Received permission to use [facility] wheelchair van from CM .    Met with patient outside ED lobby doors. He confirmed above address and does not have a phone.    Called [facility] ([phone]) and spoke with Yonatan. ETA is 9:15 AM.     SHOLA RAMIREZ

## 2024-02-22 NOTE — ED NOTES
Cleaned left foot with saline and 4x4 guaze. Wrapped foot with non-adhering pads, Abel dressing, and ace bandage. Placed a yellow sock of left foot. Place placed in wheelchair and currently in waiting room for transportation arrangement through Case Management.

## 2024-02-22 NOTE — ED PROVIDER NOTES
required my urgent intervention.     CONSULTS:  None    PROCEDURES:  Unless otherwise noted below, none     Procedures    FINAL IMPRESSION    No diagnosis found.      DISPOSITION/PLAN   DISPOSITION      PATIENT REFERRED TO:  No follow-up provider specified.    DISCHARGE MEDICATIONS:  New Prescriptions    No medications on file     Controlled Substances Monitoring:          No data to display                (Please note that portions of this note were completed with a voice recognition program.  Efforts were made to edit the dictations but occasionally words are mis-transcribed.)    Mil West MD (electronically signed)  Attending Emergency Physician            Mil West MD  02/22/24 0637       Mil West MD  02/22/24 0651

## 2024-02-22 NOTE — ED TRIAGE NOTES
Patient arrived via EMS from [facility] on [facility] w/ c/o left foot pain. Per EMS, patient stated \"I've been down all night\".     Hx: Homeless, CVA (left sided deficits)

## 2024-02-23 NOTE — PROGRESS NOTES
-Please complete MRI History and Safety Screening Form.    - Patient cannot be scanned until this form is completed and reviewed in MRI to ensure patient is SAFE and eligible for MRI.  - CALL MRI when this has been successfully completed at [phone].  
Discharge information reviewed with patient. Patient verbalized understanding. Patient sent with his medications. [facility] patient.  
EEG completed  
End of Shift Note    Bedside shift change report given to CHAPO Villalta (oncoming nurse) by Sabino Ying RN (offgoing nurse).  Report included the following information Intake/Output, MAR, Recent Results, and Cardiac Rhythm        Shift worked:  7a-7p   Shift summary and any significant changes:     Slept most of the morning. Multiple wounds to L foot noted. Pictures in flowsheet. Wound care completed. Wound care consult placed. Had MRI today. Will have ECHO pending results of MRI. CIWA completed in the morning. CIWA in late afternoon noted in flowsheet and ativan given per protocol based off of score. Patient would benefit from lateral scoot transfers or sliding board transfers only to [facility] or his wheelchair to avoid increased pressure to L foot due to open wound at TMA.        Concerns for physician to address:  See above   Zone phone for oncoming shift:   [phone]     Patient Information  Baldomero Reece  68 y.o.  2/17/2024  7:43 PM by Cory Chavis DO. Baldomero Reece was admitted from Home (patient is homeless)found in gas station parking lot    Problem List  Patient Active Problem List    Diagnosis Date Noted    Cerebrovascular accident (CVA), unspecified mechanism (HCC) 02/18/2024    Aphasia 02/18/2024    Right sided weakness 02/18/2024    Alcohol abuse 02/18/2024     Past Medical History:   Diagnosis Date    Alcohol abuse     Below knee amputation (HCC)     Rt leg       Core Measures:  CVA: No Yes  CHF:No No  PNA:NoNo    Activity:  Activity: In bed  Number times ambulated in hallways past shift: 0  Number of times OOB to chair past shift: 0    Cardiac:   Cardiac Monitoring: Yes      Cardiac Rhythm: Sinus rhythm    Access:   Current line(s): PIV  Central Line? No Placement date  Reason Medically Necessary   PICC LINE? No Placement date Reason Medically Necessary     Genitourinary:   Urinary status: voiding   Urinary Catheter? No Placement Date  Reason Medically Necessary     Respiratory:   O2 Device: Nasal 
End of Shift Note    Bedside shift change report given to LEANDRO Rojo (oncoming nurse) by Ambar Welch LPN (offgoing nurse).  Report included the following information Intake/Output, MAR, Recent Results, and Cardiac Rhythm        Shift worked:  Nights   Shift summary and any significant changes:     Slept most of the night, labs done       Concerns for physician to address:  See above   Zone phone for oncoming shift:   [phone]     Patient Information  Baldomero Reece  68 y.o.  2/17/2024  7:43 PM by Cory Chavis DO. Baldomero Reece was admitted from Home (patient is homeless)found in gas station parking lot    Problem List  Patient Active Problem List    Diagnosis Date Noted    Cerebrovascular accident (CVA), unspecified mechanism (HCC) 02/18/2024    Aphasia 02/18/2024    Right sided weakness 02/18/2024    Alcohol abuse 02/18/2024     Past Medical History:   Diagnosis Date    Alcohol abuse     Below knee amputation (HCC)     Rt leg       Core Measures:  CVA: No Yes  CHF:No No  PNA:NoNo    Activity:  Activity: In bed  Number times ambulated in hallways past shift: 0  Number of times OOB to chair past shift: 0    Cardiac:   Cardiac Monitoring: Yes      Cardiac Rhythm: Sinus rhythm    Access:   Current line(s): PIV  Central Line? No Placement date  Reason Medically Necessary   PICC LINE? No Placement date Reason Medically Necessary     Genitourinary:   Urinary status: voiding   Urinary Catheter? No Placement Date  Reason Medically Necessary     Respiratory:   O2 Device: None (Room air)  Chronic home O2 use?: no  Incentive spirometer at bedside:        GI:     Current diet:  ADULT DIET; Regular; 4 carb choices (60 gm/meal); Low Fat/Low Chol/High Fiber/2 gm Na  Passing flatus: yes  Tolerating current diet: yes       Pain Management:   Patient states pain is manageable on current regimen: yes    Skin:  Jack Scale Score: 17  Interventions: float heels and nutritional support    Patient Safety:  Fall Score: [place] Total 
Orders received, chart reviewed and patient evaluated by physical therapy. Pending progression with skilled acute physical therapy, recommend:  No skilled physical therapy (pt is homeless and may needs a shelter, he is at his baseline for functional mobility)    Recommend with nursing OOB to daily with 1 assist and  pivot to wheelchair . Thank you for completing as able in order to maintain patient strength, endurance and independence.     Full evaluation to follow.     
Patient admitted overnight for concern for acute CVA versus alcohol intoxication.  MRI brain pending.  May need echocardiogram  See H&P for further details  
Reviewer    PROVIDER RESPONSE TEXT:    This patient has alcoholic encephalopathy.    Query created by: MELISSA SCOTT on 2/21/2024 1:42 PM      Electronically signed by:  Rashawn Iglesias MD 2/22/2024 9:04 PM          
02/17/2024 518  ms Preliminary    P Axis 02/17/2024 71  degrees Preliminary    R Axis 02/17/2024 -88  degrees Preliminary    T Axis 02/17/2024 57  degrees Preliminary    Diagnosis 02/17/2024    Preliminary                    Value:Normal sinus rhythm  Left axis deviation  Right bundle branch block  Septal infarct , age undetermined  No previous ECGs available      WBC 02/17/2024 5.9  4.1 - 11.1 K/uL Final    RBC 02/17/2024 5.23  4.10 - 5.70 M/uL Final    Hemoglobin 02/17/2024 14.5  12.1 - 17.0 g/dL Final    Hematocrit 02/17/2024 46.6  36.6 - 50.3 % Final    MCV 02/17/2024 89.1  80.0 - 99.0 FL Final    MCH 02/17/2024 27.7  26.0 - 34.0 PG Final    MCHC 02/17/2024 31.1  30.0 - 36.5 g/dL Final    RDW 02/17/2024 13.6  11.5 - 14.5 % Final    Platelets 02/17/2024 172  150 - 400 K/uL Final    MPV 02/17/2024 11.1  8.9 - 12.9 FL Final    Nucleated RBCs 02/17/2024 0.0  0  WBC Final    nRBC 02/17/2024 0.00  0.00 - 0.01 K/uL Final    Neutrophils % 02/17/2024 49  32 - 75 % Final    Lymphocytes % 02/17/2024 38  12 - 49 % Final    Monocytes % 02/17/2024 7  5 - 13 % Final    Eosinophils % 02/17/2024 5  0 - 7 % Final    Basophils % 02/17/2024 1  0 - 1 % Final    Immature Granulocytes 02/17/2024 0  0.0 - 0.5 % Final    Neutrophils Absolute 02/17/2024 2.8  1.8 - 8.0 K/UL Final    Lymphocytes Absolute 02/17/2024 2.2  0.8 - 3.5 K/UL Final    Monocytes Absolute 02/17/2024 0.4  0.0 - 1.0 K/UL Final    Eosinophils Absolute 02/17/2024 0.3  0.0 - 0.4 K/UL Final    Basophils Absolute 02/17/2024 0.1  0.0 - 0.1 K/UL Final    Absolute Immature Granulocyte 02/17/2024 0.0  0.00 - 0.04 K/UL Final    Differential Type 02/17/2024 AUTOMATED    Final    Sodium 02/17/2024 144  136 - 145 mmol/L Final    Potassium 02/17/2024 3.9  3.5 - 5.1 mmol/L Final    Chloride 02/17/2024 109 (H)  97 - 108 mmol/L Final    CO2 02/17/2024 29  21 - 32 mmol/L Final    Anion Gap 02/17/2024 6  5 - 15 mmol/L Final    Glucose 02/17/2024 82  65 - 100 mg/dL Final    BUN

## 2024-02-26 ENCOUNTER — APPOINTMENT (OUTPATIENT)
Facility: HOSPITAL | Age: 69
End: 2024-02-26
Payer: MEDICAID

## 2024-02-26 ENCOUNTER — HOSPITAL ENCOUNTER (EMERGENCY)
Facility: HOSPITAL | Age: 69
Discharge: HOME | End: 2024-02-26
Attending: STUDENT IN AN ORGANIZED HEALTH CARE EDUCATION/TRAINING PROGRAM
Payer: MEDICAID

## 2024-02-26 VITALS
TEMPERATURE: 97.7 F | DIASTOLIC BLOOD PRESSURE: 77 MMHG | OXYGEN SATURATION: 98 % | SYSTOLIC BLOOD PRESSURE: 130 MMHG | RESPIRATION RATE: 16 BRPM | HEIGHT: 69 IN | WEIGHT: 125 LBS | HEART RATE: 91 BPM | BODY MASS INDEX: 18.51 KG/M2

## 2024-02-26 DIAGNOSIS — F11.90 OPIATE USE: ICD-10-CM

## 2024-02-26 DIAGNOSIS — Z59.00 HOMELESSNESS: ICD-10-CM

## 2024-02-26 DIAGNOSIS — E86.0 DEHYDRATION: Primary | ICD-10-CM

## 2024-02-26 DIAGNOSIS — S91.302S OPEN WOUND OF LEFT FOOT, SEQUELA: ICD-10-CM

## 2024-02-26 LAB
ALBUMIN SERPL-MCNC: 3.3 G/DL (ref 3.5–5)
ALBUMIN/GLOB SERPL: 0.9 (ref 1.1–2.2)
ALP SERPL-CCNC: 133 U/L (ref 45–117)
ALT SERPL-CCNC: 25 U/L (ref 12–78)
ANION GAP SERPL CALC-SCNC: 14 MMOL/L (ref 5–15)
APPEARANCE UR: CLEAR
AST SERPL-CCNC: 42 U/L (ref 15–37)
BACTERIA URNS QL MICRO: NEGATIVE /HPF
BASOPHILS # BLD: 0.1 K/UL (ref 0–0.1)
BASOPHILS NFR BLD: 2 % (ref 0–1)
BILIRUB SERPL-MCNC: 1.2 MG/DL (ref 0.2–1)
BILIRUB UR QL: NEGATIVE
BUN SERPL-MCNC: 13 MG/DL (ref 6–20)
BUN/CREAT SERPL: 21 (ref 12–20)
CALCIUM SERPL-MCNC: 8.9 MG/DL (ref 8.5–10.1)
CHLORIDE SERPL-SCNC: 101 MMOL/L (ref 97–108)
CO2 SERPL-SCNC: 26 MMOL/L (ref 21–32)
COLOR UR: ABNORMAL
CREAT SERPL-MCNC: 0.62 MG/DL (ref 0.7–1.3)
DIFFERENTIAL METHOD BLD: ABNORMAL
EOSINOPHIL # BLD: 0.1 K/UL (ref 0–0.4)
EOSINOPHIL NFR BLD: 2 % (ref 0–7)
EPITH CASTS URNS QL MICRO: ABNORMAL /LPF
ERYTHROCYTE [DISTWIDTH] IN BLOOD BY AUTOMATED COUNT: 14.6 % (ref 11.5–14.5)
GLOBULIN SER CALC-MCNC: 3.7 G/DL (ref 2–4)
GLUCOSE SERPL-MCNC: 75 MG/DL (ref 65–100)
GLUCOSE UR STRIP.AUTO-MCNC: NEGATIVE MG/DL
HCT VFR BLD AUTO: 47.3 % (ref 36.6–50.3)
HGB BLD-MCNC: 15.3 G/DL (ref 12.1–17)
HGB UR QL STRIP: NEGATIVE
IMM GRANULOCYTES # BLD AUTO: 0 K/UL (ref 0–0.04)
IMM GRANULOCYTES NFR BLD AUTO: 0 % (ref 0–0.5)
KETONES UR QL STRIP.AUTO: NEGATIVE MG/DL
LEUKOCYTE ESTERASE UR QL STRIP.AUTO: NEGATIVE
LYMPHOCYTES # BLD: 2.1 K/UL (ref 0.8–3.5)
LYMPHOCYTES NFR BLD: 24 % (ref 12–49)
MAGNESIUM SERPL-MCNC: 2 MG/DL (ref 1.6–2.4)
MCH RBC QN AUTO: 28.1 PG (ref 26–34)
MCHC RBC AUTO-ENTMCNC: 32.3 G/DL (ref 30–36.5)
MCV RBC AUTO: 86.9 FL (ref 80–99)
MONOCYTES # BLD: 0.6 K/UL (ref 0–1)
MONOCYTES NFR BLD: 7 % (ref 5–13)
NEUTS SEG # BLD: 5.6 K/UL (ref 1.8–8)
NEUTS SEG NFR BLD: 65 % (ref 32–75)
NITRITE UR QL STRIP.AUTO: NEGATIVE
NRBC # BLD: 0 K/UL (ref 0–0.01)
NRBC BLD-RTO: 0 PER 100 WBC
PH UR STRIP: 6 (ref 5–8)
PLATELET # BLD AUTO: 159 K/UL (ref 150–400)
PMV BLD AUTO: 11.4 FL (ref 8.9–12.9)
POTASSIUM SERPL-SCNC: 4.4 MMOL/L (ref 3.5–5.1)
PROT SERPL-MCNC: 7 G/DL (ref 6.4–8.2)
PROT UR STRIP-MCNC: NEGATIVE MG/DL
RBC # BLD AUTO: 5.44 M/UL (ref 4.1–5.7)
RBC #/AREA URNS HPF: ABNORMAL /HPF (ref 0–5)
SODIUM SERPL-SCNC: 141 MMOL/L (ref 136–145)
SP GR UR REFRACTOMETRY: 1.01
URINE CULTURE IF INDICATED: ABNORMAL
UROBILINOGEN UR QL STRIP.AUTO: 2 EU/DL (ref 0.2–1)
WBC # BLD AUTO: 8.6 K/UL (ref 4.1–11.1)
WBC URNS QL MICRO: ABNORMAL /HPF (ref 0–4)

## 2024-02-26 PROCEDURE — 36415 COLL VENOUS BLD VENIPUNCTURE: CPT

## 2024-02-26 PROCEDURE — 6360000002 HC RX W HCPCS: Performed by: STUDENT IN AN ORGANIZED HEALTH CARE EDUCATION/TRAINING PROGRAM

## 2024-02-26 PROCEDURE — 96374 THER/PROPH/DIAG INJ IV PUSH: CPT

## 2024-02-26 PROCEDURE — 85025 COMPLETE CBC W/AUTO DIFF WBC: CPT

## 2024-02-26 PROCEDURE — 80053 COMPREHEN METABOLIC PANEL: CPT

## 2024-02-26 PROCEDURE — 6370000000 HC RX 637 (ALT 250 FOR IP): Performed by: STUDENT IN AN ORGANIZED HEALTH CARE EDUCATION/TRAINING PROGRAM

## 2024-02-26 PROCEDURE — 83735 ASSAY OF MAGNESIUM: CPT

## 2024-02-26 PROCEDURE — 99284 EMERGENCY DEPT VISIT MOD MDM: CPT

## 2024-02-26 PROCEDURE — 71045 X-RAY EXAM CHEST 1 VIEW: CPT

## 2024-02-26 PROCEDURE — 96361 HYDRATE IV INFUSION ADD-ON: CPT

## 2024-02-26 PROCEDURE — 2580000003 HC RX 258: Performed by: STUDENT IN AN ORGANIZED HEALTH CARE EDUCATION/TRAINING PROGRAM

## 2024-02-26 PROCEDURE — 96375 TX/PRO/DX INJ NEW DRUG ADDON: CPT

## 2024-02-26 PROCEDURE — 81001 URINALYSIS AUTO W/SCOPE: CPT

## 2024-02-26 RX ORDER — 0.9 % SODIUM CHLORIDE 0.9 %
1000 INTRAVENOUS SOLUTION INTRAVENOUS ONCE
Status: COMPLETED | OUTPATIENT
Start: 2024-02-26 | End: 2024-02-26

## 2024-02-26 RX ORDER — METHOCARBAMOL 750 MG/1
750 TABLET, FILM COATED ORAL 4 TIMES DAILY
Qty: 80 TABLET | Refills: 0 | Status: SHIPPED | OUTPATIENT
Start: 2024-02-26 | End: 2024-03-17

## 2024-02-26 RX ORDER — IBUPROFEN 600 MG/1
600 TABLET ORAL EVERY 6 HOURS PRN
Qty: 50 TABLET | Refills: 1 | Status: SHIPPED | OUTPATIENT
Start: 2024-02-26

## 2024-02-26 RX ORDER — ACETAMINOPHEN 500 MG
1000 TABLET ORAL 3 TIMES DAILY PRN
Qty: 100 TABLET | Refills: 0 | Status: SHIPPED | OUTPATIENT
Start: 2024-02-26

## 2024-02-26 RX ORDER — KETOROLAC TROMETHAMINE 30 MG/ML
30 INJECTION, SOLUTION INTRAMUSCULAR; INTRAVENOUS ONCE
Status: COMPLETED | OUTPATIENT
Start: 2024-02-26 | End: 2024-02-26

## 2024-02-26 RX ORDER — PEPPERMINT OIL
SPIRIT ORAL PRN
Status: DISCONTINUED | OUTPATIENT
Start: 2024-02-26 | End: 2024-02-26 | Stop reason: HOSPADM

## 2024-02-26 RX ADMIN — Medication: at 11:42

## 2024-02-26 RX ADMIN — CEFTRIAXONE SODIUM 1000 MG: 1 INJECTION, POWDER, FOR SOLUTION INTRAMUSCULAR; INTRAVENOUS at 11:42

## 2024-02-26 RX ADMIN — KETOROLAC TROMETHAMINE 30 MG: 30 INJECTION, SOLUTION INTRAMUSCULAR; INTRAVENOUS at 12:15

## 2024-02-26 RX ADMIN — SODIUM CHLORIDE 1000 ML: 9 INJECTION, SOLUTION INTRAVENOUS at 11:28

## 2024-02-26 SDOH — ECONOMIC STABILITY - HOUSING INSECURITY: HOMELESSNESS UNSPECIFIED: Z59.00

## 2024-02-26 ASSESSMENT — PAIN - FUNCTIONAL ASSESSMENT: PAIN_FUNCTIONAL_ASSESSMENT: 0-10

## 2024-02-26 ASSESSMENT — PAIN SCALES - GENERAL: PAINLEVEL_OUTOF10: 8

## 2024-02-26 ASSESSMENT — PAIN DESCRIPTION - ORIENTATION: ORIENTATION: LEFT

## 2024-02-26 ASSESSMENT — PAIN DESCRIPTION - LOCATION
LOCATION: FOOT;LEG;BACK
LOCATION: FOOT;LEG;BACK

## 2024-02-26 NOTE — ED NOTES
Discharge instructions given to patient by RN. Pt has been given counseling regarding at home treatment plan. Pt verbalizes understanding of need to seek further treatment if symptoms worsen. Pt used wheelchair to exit unit in no signs of distress.

## 2024-02-26 NOTE — ED NOTES
Pt requested shower. RN provided patient with shower and new pants prior to discharge. Pt wheeled to area near bus stop.

## 2024-02-26 NOTE — ED PROVIDER NOTES
Basophils % 2 (H) 0 - 1 %    Immature Granulocytes 0 0.0 - 0.5 %    Neutrophils Absolute 5.6 1.8 - 8.0 K/UL    Lymphocytes Absolute 2.1 0.8 - 3.5 K/UL    Monocytes Absolute 0.6 0.0 - 1.0 K/UL    Eosinophils Absolute 0.1 0.0 - 0.4 K/UL    Basophils Absolute 0.1 0.0 - 0.1 K/UL    Absolute Immature Granulocyte 0.0 0.00 - 0.04 K/UL    Differential Type AUTOMATED     Magnesium    Collection Time: 02/26/24 11:52 AM   Result Value Ref Range    Magnesium 2.0 1.6 - 2.4 mg/dL   Comprehensive Metabolic Panel    Collection Time: 02/26/24 11:52 AM   Result Value Ref Range    Sodium 141 136 - 145 mmol/L    Potassium 4.4 3.5 - 5.1 mmol/L    Chloride 101 97 - 108 mmol/L    CO2 26 21 - 32 mmol/L    Anion Gap 14 5 - 15 mmol/L    Glucose 75 65 - 100 mg/dL    BUN 13 6 - 20 MG/DL    Creatinine 0.62 (L) 0.70 - 1.30 MG/DL    Bun/Cre Ratio 21 (H) 12 - 20      Est, Glom Filt Rate >60 >60 ml/min/1.73m2    Calcium 8.9 8.5 - 10.1 MG/DL    Total Bilirubin 1.2 (H) 0.2 - 1.0 MG/DL    ALT 25 12 - 78 U/L    AST 42 (H) 15 - 37 U/L    Alk Phosphatase 133 (H) 45 - 117 U/L    Total Protein 7.0 6.4 - 8.2 g/dL    Albumin 3.3 (L) 3.5 - 5.0 g/dL    Globulin 3.7 2.0 - 4.0 g/dL    Albumin/Globulin Ratio 0.9 (L) 1.1 - 2.2     Urinalysis with Reflex to Culture    Collection Time: 02/26/24 12:41 PM    Specimen: Urine   Result Value Ref Range    Color, UA YELLOW/STRAW      Appearance CLEAR CLEAR      Specific Gravity, UA 1.010      pH, Urine 6.0 5.0 - 8.0      Protein, UA Negative NEG mg/dL    Glucose, UA Negative NEG mg/dL    Ketones, Urine Negative NEG mg/dL    Bilirubin Urine Negative NEG      Blood, Urine Negative NEG      Urobilinogen, Urine 2.0 (H) 0.2 - 1.0 EU/dL    Nitrite, Urine Negative NEG      Leukocyte Esterase, Urine Negative NEG      WBC, UA 0-4 0 - 4 /hpf    RBC, UA 0-5 0 - 5 /hpf    Epithelial Cells UA FEW FEW /lpf    BACTERIA, URINE Negative NEG /hpf    Urine Culture if Indicated CULTURE NOT INDICATED BY UA RESULT CNI

## 2024-02-26 NOTE — ED NOTES
Discharge instructions were given to the patient by LEANDRO Gee.  The patient left the Emergency Department alert and oriented and in no acute distress with 0 prescription(s). The patient was encouraged to call or return to the ED for worsening issues or problems and was encouraged to schedule a follow up appointment for continuing care.  The patient verbalized understanding of discharge instructions and prescriptions; all questions were answered. The patient has no further concerns at this time.

## 2024-02-26 NOTE — ED NOTES
Pt presents to ED via EMS who reports that pt was found unconscious at an [employer]. Pt states he took drugs intranasally and is also complaining of left foot pain from previous wound prior to arrival. Pt is alert and oriented x 4, RR even and unlabored, skin is warm and dry. Pt appears in NAD at this time. Assessment completed and pt updated on plan of care.  Call bell in reach.  Emergency Department Nursing Plan of Care  The Nursing Plan of Care is developed from the Nursing assessment and Emergency Department Attending provider initial evaluation.  The plan of care may be reviewed in the “ED Provider note”.  The Plan of Care was developed with the following considerations:  Patient / Family readiness to learn indicated by:Refer to Medical chart in [facility]  Persons(s) to be included in education: Refer to Medical chart in [facility]  Barriers to Learning/Limitations:Normal

## 2024-03-08 ENCOUNTER — APPOINTMENT (OUTPATIENT)
Facility: HOSPITAL | Age: 69
End: 2024-03-08
Payer: MEDICAID

## 2024-03-08 ENCOUNTER — HOSPITAL ENCOUNTER (INPATIENT)
Facility: HOSPITAL | Age: 69
LOS: 14 days | Discharge: LEFT BEFORE DISCHARGE | End: 2024-03-22
Attending: EMERGENCY MEDICINE | Admitting: INTERNAL MEDICINE
Payer: MEDICAID

## 2024-03-08 DIAGNOSIS — I63.9 CEREBROVASCULAR ACCIDENT (CVA), UNSPECIFIED MECHANISM (HCC): ICD-10-CM

## 2024-03-08 DIAGNOSIS — R09.89 SUSPECTED CEREBROVASCULAR ACCIDENT: Primary | ICD-10-CM

## 2024-03-08 LAB
ALBUMIN SERPL-MCNC: 3.4 G/DL (ref 3.5–5)
ALBUMIN/GLOB SERPL: 0.9 (ref 1.1–2.2)
ALP SERPL-CCNC: 123 U/L (ref 45–117)
ALT SERPL-CCNC: 21 U/L (ref 12–78)
AMPHET UR QL SCN: NEGATIVE
ANION GAP SERPL CALC-SCNC: 8 MMOL/L (ref 5–15)
APPEARANCE UR: CLEAR
AST SERPL-CCNC: 30 U/L (ref 15–37)
BACTERIA URNS QL MICRO: NEGATIVE /HPF
BARBITURATES UR QL SCN: NEGATIVE
BASOPHILS # BLD: 0.1 K/UL (ref 0–0.1)
BASOPHILS NFR BLD: 2 % (ref 0–1)
BENZODIAZ UR QL: NEGATIVE
BILIRUB SERPL-MCNC: 0.5 MG/DL (ref 0.2–1)
BILIRUB UR QL: NEGATIVE
BUN SERPL-MCNC: 10 MG/DL (ref 6–20)
BUN/CREAT SERPL: 14 (ref 12–20)
CALCIUM SERPL-MCNC: 8.8 MG/DL (ref 8.5–10.1)
CANNABINOIDS UR QL SCN: POSITIVE
CHLORIDE SERPL-SCNC: 102 MMOL/L (ref 97–108)
CO2 SERPL-SCNC: 29 MMOL/L (ref 21–32)
COCAINE UR QL SCN: POSITIVE
COLOR UR: NORMAL
CREAT SERPL-MCNC: 0.7 MG/DL (ref 0.7–1.3)
DIFFERENTIAL METHOD BLD: ABNORMAL
EOSINOPHIL # BLD: 0.2 K/UL (ref 0–0.4)
EOSINOPHIL NFR BLD: 6 % (ref 0–7)
EPITH CASTS URNS QL MICRO: NORMAL /LPF
ERYTHROCYTE [DISTWIDTH] IN BLOOD BY AUTOMATED COUNT: 16 % (ref 11.5–14.5)
ETHANOL SERPL-MCNC: 63 MG/DL (ref 0–0.08)
GLOBULIN SER CALC-MCNC: 4 G/DL (ref 2–4)
GLUCOSE BLD STRIP.AUTO-MCNC: 74 MG/DL (ref 65–117)
GLUCOSE BLD STRIP.AUTO-MCNC: 85 MG/DL (ref 65–117)
GLUCOSE SERPL-MCNC: 78 MG/DL (ref 65–100)
GLUCOSE UR STRIP.AUTO-MCNC: NEGATIVE MG/DL
HCT VFR BLD AUTO: 44.9 % (ref 36.6–50.3)
HGB BLD-MCNC: 13.7 G/DL (ref 12.1–17)
HGB UR QL STRIP: NEGATIVE
IMM GRANULOCYTES # BLD AUTO: 0 K/UL (ref 0–0.04)
IMM GRANULOCYTES NFR BLD AUTO: 0 % (ref 0–0.5)
INR PPP: 1 (ref 0.9–1.1)
KETONES UR QL STRIP.AUTO: NEGATIVE MG/DL
LACTATE SERPL-SCNC: 1.1 MMOL/L (ref 0.4–2)
LACTATE SERPL-SCNC: 2.4 MMOL/L (ref 0.4–2)
LEUKOCYTE ESTERASE UR QL STRIP.AUTO: NEGATIVE
LYMPHOCYTES # BLD: 1.1 K/UL (ref 0.8–3.5)
LYMPHOCYTES NFR BLD: 27 % (ref 12–49)
Lab: ABNORMAL
MCH RBC QN AUTO: 27.8 PG (ref 26–34)
MCHC RBC AUTO-ENTMCNC: 30.5 G/DL (ref 30–36.5)
MCV RBC AUTO: 91.3 FL (ref 80–99)
METHADONE UR QL: NEGATIVE
MONOCYTES # BLD: 0.4 K/UL (ref 0–1)
MONOCYTES NFR BLD: 10 % (ref 5–13)
NEUTS SEG # BLD: 2.2 K/UL (ref 1.8–8)
NEUTS SEG NFR BLD: 55 % (ref 32–75)
NITRITE UR QL STRIP.AUTO: NEGATIVE
NRBC # BLD: 0 K/UL (ref 0–0.01)
NRBC BLD-RTO: 0 PER 100 WBC
OPIATES UR QL: NEGATIVE
PCP UR QL: NEGATIVE
PH UR STRIP: 6.5 (ref 5–8)
PLATELET # BLD AUTO: 115 K/UL (ref 150–400)
PMV BLD AUTO: 10.7 FL (ref 8.9–12.9)
POTASSIUM SERPL-SCNC: 4.3 MMOL/L (ref 3.5–5.1)
PROT SERPL-MCNC: 7.4 G/DL (ref 6.4–8.2)
PROT UR STRIP-MCNC: NEGATIVE MG/DL
PROTHROMBIN TIME: 9.5 SEC (ref 9–11.1)
RBC # BLD AUTO: 4.92 M/UL (ref 4.1–5.7)
RBC #/AREA URNS HPF: NORMAL /HPF (ref 0–5)
SERVICE CMNT-IMP: NORMAL
SERVICE CMNT-IMP: NORMAL
SODIUM SERPL-SCNC: 139 MMOL/L (ref 136–145)
SP GR UR REFRACTOMETRY: 1.01
TROPONIN I SERPL HS-MCNC: 10 NG/L (ref 0–76)
UROBILINOGEN UR QL STRIP.AUTO: 1 EU/DL (ref 0.2–1)
WBC # BLD AUTO: 4 K/UL (ref 4.1–11.1)
WBC URNS QL MICRO: NORMAL /HPF (ref 0–4)

## 2024-03-08 PROCEDURE — 6360000004 HC RX CONTRAST MEDICATION: Performed by: EMERGENCY MEDICINE

## 2024-03-08 PROCEDURE — 87205 SMEAR GRAM STAIN: CPT

## 2024-03-08 PROCEDURE — 87070 CULTURE OTHR SPECIMN AEROBIC: CPT

## 2024-03-08 PROCEDURE — 81001 URINALYSIS AUTO W/SCOPE: CPT

## 2024-03-08 PROCEDURE — 2580000003 HC RX 258: Performed by: EMERGENCY MEDICINE

## 2024-03-08 PROCEDURE — 70450 CT HEAD/BRAIN W/O DYE: CPT

## 2024-03-08 PROCEDURE — 82077 ASSAY SPEC XCP UR&BREATH IA: CPT

## 2024-03-08 PROCEDURE — 71045 X-RAY EXAM CHEST 1 VIEW: CPT

## 2024-03-08 PROCEDURE — 6370000000 HC RX 637 (ALT 250 FOR IP): Performed by: INTERNAL MEDICINE

## 2024-03-08 PROCEDURE — 84484 ASSAY OF TROPONIN QUANT: CPT

## 2024-03-08 PROCEDURE — 83605 ASSAY OF LACTIC ACID: CPT

## 2024-03-08 PROCEDURE — 80053 COMPREHEN METABOLIC PANEL: CPT

## 2024-03-08 PROCEDURE — 87077 CULTURE AEROBIC IDENTIFY: CPT

## 2024-03-08 PROCEDURE — 87147 CULTURE TYPE IMMUNOLOGIC: CPT

## 2024-03-08 PROCEDURE — 36415 COLL VENOUS BLD VENIPUNCTURE: CPT

## 2024-03-08 PROCEDURE — 6360000002 HC RX W HCPCS: Performed by: INTERNAL MEDICINE

## 2024-03-08 PROCEDURE — 1200000000 HC SEMI PRIVATE

## 2024-03-08 PROCEDURE — 82962 GLUCOSE BLOOD TEST: CPT

## 2024-03-08 PROCEDURE — 80307 DRUG TEST PRSMV CHEM ANLYZR: CPT

## 2024-03-08 PROCEDURE — 85610 PROTHROMBIN TIME: CPT

## 2024-03-08 PROCEDURE — 87040 BLOOD CULTURE FOR BACTERIA: CPT

## 2024-03-08 PROCEDURE — 2580000003 HC RX 258: Performed by: INTERNAL MEDICINE

## 2024-03-08 PROCEDURE — 70498 CT ANGIOGRAPHY NECK: CPT

## 2024-03-08 PROCEDURE — 85025 COMPLETE CBC W/AUTO DIFF WBC: CPT

## 2024-03-08 PROCEDURE — 99285 EMERGENCY DEPT VISIT HI MDM: CPT

## 2024-03-08 PROCEDURE — 87075 CULTR BACTERIA EXCEPT BLOOD: CPT

## 2024-03-08 PROCEDURE — 87186 SC STD MICRODIL/AGAR DIL: CPT

## 2024-03-08 RX ORDER — SODIUM CHLORIDE 0.9 % (FLUSH) 0.9 %
5-40 SYRINGE (ML) INJECTION EVERY 12 HOURS SCHEDULED
Status: DISCONTINUED | OUTPATIENT
Start: 2024-03-08 | End: 2024-03-22 | Stop reason: HOSPADM

## 2024-03-08 RX ORDER — LORAZEPAM 2 MG/ML
2 INJECTION INTRAMUSCULAR
Status: DISCONTINUED | OUTPATIENT
Start: 2024-03-08 | End: 2024-03-12

## 2024-03-08 RX ORDER — ONDANSETRON 2 MG/ML
4 INJECTION INTRAMUSCULAR; INTRAVENOUS EVERY 6 HOURS PRN
Status: DISCONTINUED | OUTPATIENT
Start: 2024-03-08 | End: 2024-03-22 | Stop reason: HOSPADM

## 2024-03-08 RX ORDER — POLYETHYLENE GLYCOL 3350 17 G/17G
17 POWDER, FOR SOLUTION ORAL DAILY PRN
Status: DISCONTINUED | OUTPATIENT
Start: 2024-03-08 | End: 2024-03-22 | Stop reason: HOSPADM

## 2024-03-08 RX ORDER — ONDANSETRON 4 MG/1
4 TABLET, ORALLY DISINTEGRATING ORAL EVERY 8 HOURS PRN
Status: DISCONTINUED | OUTPATIENT
Start: 2024-03-08 | End: 2024-03-22 | Stop reason: HOSPADM

## 2024-03-08 RX ORDER — LANOLIN ALCOHOL/MO/W.PET/CERES
100 CREAM (GRAM) TOPICAL DAILY
Status: DISCONTINUED | OUTPATIENT
Start: 2024-03-08 | End: 2024-03-22 | Stop reason: HOSPADM

## 2024-03-08 RX ORDER — TRAMADOL HYDROCHLORIDE 50 MG/1
100 TABLET ORAL EVERY 6 HOURS PRN
Status: DISCONTINUED | OUTPATIENT
Start: 2024-03-08 | End: 2024-03-15

## 2024-03-08 RX ORDER — LORAZEPAM 1 MG/1
2 TABLET ORAL
Status: DISCONTINUED | OUTPATIENT
Start: 2024-03-08 | End: 2024-03-12

## 2024-03-08 RX ORDER — LORAZEPAM 2 MG/ML
3 INJECTION INTRAMUSCULAR
Status: DISCONTINUED | OUTPATIENT
Start: 2024-03-08 | End: 2024-03-12

## 2024-03-08 RX ORDER — ASPIRIN 81 MG/1
81 TABLET, CHEWABLE ORAL DAILY
Status: DISCONTINUED | OUTPATIENT
Start: 2024-03-08 | End: 2024-03-13

## 2024-03-08 RX ORDER — 0.9 % SODIUM CHLORIDE 0.9 %
1000 INTRAVENOUS SOLUTION INTRAVENOUS ONCE
Status: COMPLETED | OUTPATIENT
Start: 2024-03-08 | End: 2024-03-08

## 2024-03-08 RX ORDER — METRONIDAZOLE 500 MG/1
500 TABLET ORAL EVERY 8 HOURS SCHEDULED
Status: COMPLETED | OUTPATIENT
Start: 2024-03-08 | End: 2024-03-11

## 2024-03-08 RX ORDER — NALOXONE HYDROCHLORIDE 0.4 MG/ML
0.4 INJECTION, SOLUTION INTRAMUSCULAR; INTRAVENOUS; SUBCUTANEOUS PRN
Status: DISCONTINUED | OUTPATIENT
Start: 2024-03-08 | End: 2024-03-22 | Stop reason: HOSPADM

## 2024-03-08 RX ORDER — LORAZEPAM 1 MG/1
3 TABLET ORAL
Status: DISCONTINUED | OUTPATIENT
Start: 2024-03-08 | End: 2024-03-12

## 2024-03-08 RX ORDER — ENOXAPARIN SODIUM 100 MG/ML
40 INJECTION SUBCUTANEOUS DAILY
Status: DISCONTINUED | OUTPATIENT
Start: 2024-03-08 | End: 2024-03-09

## 2024-03-08 RX ORDER — FOLIC ACID 1 MG/1
1 TABLET ORAL DAILY
Status: DISCONTINUED | OUTPATIENT
Start: 2024-03-08 | End: 2024-03-22 | Stop reason: HOSPADM

## 2024-03-08 RX ORDER — SODIUM CHLORIDE 0.9 % (FLUSH) 0.9 %
5-40 SYRINGE (ML) INJECTION PRN
Status: DISCONTINUED | OUTPATIENT
Start: 2024-03-08 | End: 2024-03-22 | Stop reason: HOSPADM

## 2024-03-08 RX ORDER — LORAZEPAM 1 MG/1
1 TABLET ORAL
Status: DISCONTINUED | OUTPATIENT
Start: 2024-03-08 | End: 2024-03-12

## 2024-03-08 RX ORDER — NICOTINE 21 MG/24HR
1 PATCH, TRANSDERMAL 24 HOURS TRANSDERMAL DAILY
Status: DISCONTINUED | OUTPATIENT
Start: 2024-03-08 | End: 2024-03-22 | Stop reason: HOSPADM

## 2024-03-08 RX ORDER — CLOPIDOGREL BISULFATE 75 MG/1
75 TABLET ORAL DAILY
Status: DISCONTINUED | OUTPATIENT
Start: 2024-03-08 | End: 2024-03-10

## 2024-03-08 RX ORDER — MULTIVITAMIN WITH IRON
1 TABLET ORAL DAILY
Status: DISCONTINUED | OUTPATIENT
Start: 2024-03-08 | End: 2024-03-22 | Stop reason: HOSPADM

## 2024-03-08 RX ORDER — TRAMADOL HYDROCHLORIDE 50 MG/1
50 TABLET ORAL EVERY 6 HOURS PRN
Status: DISCONTINUED | OUTPATIENT
Start: 2024-03-08 | End: 2024-03-15

## 2024-03-08 RX ORDER — LORAZEPAM 2 MG/ML
1 INJECTION INTRAMUSCULAR
Status: DISCONTINUED | OUTPATIENT
Start: 2024-03-08 | End: 2024-03-12

## 2024-03-08 RX ORDER — LORAZEPAM 1 MG/1
4 TABLET ORAL
Status: DISCONTINUED | OUTPATIENT
Start: 2024-03-08 | End: 2024-03-12

## 2024-03-08 RX ORDER — ROSUVASTATIN CALCIUM 10 MG/1
40 TABLET, COATED ORAL NIGHTLY
Status: DISCONTINUED | OUTPATIENT
Start: 2024-03-08 | End: 2024-03-22 | Stop reason: HOSPADM

## 2024-03-08 RX ORDER — LORAZEPAM 2 MG/ML
4 INJECTION INTRAMUSCULAR
Status: DISCONTINUED | OUTPATIENT
Start: 2024-03-08 | End: 2024-03-12

## 2024-03-08 RX ORDER — SODIUM CHLORIDE 9 MG/ML
INJECTION, SOLUTION INTRAVENOUS PRN
Status: DISCONTINUED | OUTPATIENT
Start: 2024-03-08 | End: 2024-03-22 | Stop reason: HOSPADM

## 2024-03-08 RX ORDER — IPRATROPIUM BROMIDE AND ALBUTEROL SULFATE 2.5; .5 MG/3ML; MG/3ML
1 SOLUTION RESPIRATORY (INHALATION) EVERY 4 HOURS PRN
Status: DISCONTINUED | OUTPATIENT
Start: 2024-03-08 | End: 2024-03-22 | Stop reason: HOSPADM

## 2024-03-08 RX ADMIN — THERA TABS 1 TABLET: TAB at 19:51

## 2024-03-08 RX ADMIN — CEFEPIME 2000 MG: 2 INJECTION, POWDER, FOR SOLUTION INTRAVENOUS at 20:39

## 2024-03-08 RX ADMIN — SODIUM CHLORIDE 1000 ML: 900 INJECTION, SOLUTION INTRAVENOUS at 12:23

## 2024-03-08 RX ADMIN — TRAMADOL HYDROCHLORIDE 50 MG: 50 TABLET, COATED ORAL at 20:39

## 2024-03-08 RX ADMIN — IOPAMIDOL 100 ML: 755 INJECTION, SOLUTION INTRAVENOUS at 14:08

## 2024-03-08 RX ADMIN — Medication 100 MG: at 19:46

## 2024-03-08 RX ADMIN — SODIUM CHLORIDE: 9 INJECTION, SOLUTION INTRAVENOUS at 21:41

## 2024-03-08 RX ADMIN — SODIUM CHLORIDE 1000 ML: 9 INJECTION, SOLUTION INTRAVENOUS at 13:46

## 2024-03-08 RX ADMIN — ENOXAPARIN SODIUM 40 MG: 100 INJECTION SUBCUTANEOUS at 20:42

## 2024-03-08 RX ADMIN — VANCOMYCIN HYDROCHLORIDE 1500 MG: 1 INJECTION, POWDER, LYOPHILIZED, FOR SOLUTION INTRAVENOUS at 21:42

## 2024-03-08 RX ADMIN — SODIUM CHLORIDE: 9 INJECTION, SOLUTION INTRAVENOUS at 20:38

## 2024-03-08 RX ADMIN — METRONIDAZOLE 500 MG: 500 TABLET ORAL at 20:39

## 2024-03-08 RX ADMIN — ROSUVASTATIN CALCIUM 40 MG: 10 TABLET, COATED ORAL at 19:45

## 2024-03-08 RX ADMIN — FOLIC ACID 1 MG: 1 TABLET ORAL at 19:46

## 2024-03-08 ASSESSMENT — PAIN SCALES - GENERAL
PAINLEVEL_OUTOF10: 9
PAINLEVEL_OUTOF10: 0

## 2024-03-08 ASSESSMENT — PAIN DESCRIPTION - LOCATION
LOCATION: ARM;LEG
LOCATION: FOOT;HIP

## 2024-03-08 ASSESSMENT — PAIN DESCRIPTION - DESCRIPTORS: DESCRIPTORS: ACHING

## 2024-03-08 ASSESSMENT — PAIN - FUNCTIONAL ASSESSMENT: PAIN_FUNCTIONAL_ASSESSMENT: 0-10

## 2024-03-08 ASSESSMENT — PAIN DESCRIPTION - ORIENTATION: ORIENTATION: RIGHT;LEFT

## 2024-03-08 ASSESSMENT — PAIN SCALES - WONG BAKER: WONGBAKER_NUMERICALRESPONSE: HURTS LITTLE MORE

## 2024-03-08 NOTE — ED NOTES
TRANSFER - OUT REPORT:    Verbal report given to LEANDRO Herrera on Baldomero Reece  being transferred to PCU 3 for routine progression of patient care       Report consisted of patient's Situation, Background, Assessment and   Recommendations(SBAR).     Information from the following report(s) ED Encounter Summary and ED SBAR was reviewed with the receiving nurse.    [place] Fall Assessment:    Presents to emergency department  because of falls (Syncope, seizure, or loss of consciousness): Yes  Age > 70: No  Altered Mental Status, Intoxication with alcohol or substance confusion (Disorientation, impaired judgment, poor safety awaremess, or inability to follow instructions): Yes  Impaired Mobility: Ambulates or transfers with assistive devices or assistance; Unable to ambulate or transer.: Yes  Nursing Judgement: Yes          Lines:   Peripheral IV 03/08/24 Distal;Right;Anterior Cephalic (Active)   Site Assessment Clean, dry & intact 03/08/24 1223   Line Status Blood return noted;Flushed 03/08/24 1223   Phlebitis Assessment No symptoms 03/08/24 1223   Infiltration Assessment 0 03/08/24 1223   Dressing Status New dressing applied;Clean, dry & intact 03/08/24 1223   Dressing Type Transparent 03/08/24 1223        Opportunity for questions and clarification was provided.      Patient transported with:  Registered Nurse

## 2024-03-08 NOTE — ED PROVIDER NOTES
[facility] CARE  EMERGENCY DEPARTMENT ENCOUNTER       Pt Name: Baldomero Reece  MRN: [id number]  Birthdate 1955  Date of evaluation: 3/8/2024  Provider: Archana Briceno MD   PCP: None, None  Note Started: 8:23 AM 3/8/24     (Please note that parts of this dictation were completed with voice recognition software. Quite often unanticipated grammatical, syntax, homophones, and other interpretive errors are inadvertently transcribed by the computer software. Please disregards these errors. Please excuse any errors that have escaped final proofreading.)    CHIEF COMPLAINT       Chief Complaint   Patient presents with    Alcohol Problem        HISTORY OF PRESENT ILLNESS: 1 or more elements      History From: patient, History limited by:  none     Baldomero Reece is a 68 y.o. 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  EMS brought him here because he was found laying beside his wheelchair.  Patient has a history of prior CVA with, per chart, prior right-sided weakness.  Patient states he awoke with left-sided weakness.  Patient is a difficult historian  Evidently last known normal was last night before going to sleep and he states he woke around 7 AM which is when he noticed he was not moving the left side the way he normally does.     Nursing Notes were all reviewed and agreed with or any disagreements were addressed in the HPI.     REVIEW OF SYSTEMS        Positives and Pertinent negatives as per HPI.    PAST HISTORY     Past Medical History:  Past Medical History:   Diagnosis Date    Alcohol abuse     Below knee amputation (HCC)     Rt leg    Diabetes mellitus (HCC)      Reviewed  Labs: ordered. Decision-making details documented in ED Course.  Radiology: ordered.  ECG/medicine tests: ordered.    Risk  OTC drugs.  Prescription drug management.  Decision regarding hospitalization.        **PLEASE SEE ED COURSE BELOW FOR FURTHER MDM DETAILS:    ED Course as of 03/10/24 0823   Fri Mar 08, 2024   1131 Spoke with teleneruologist again after he assessed patient. Recommends aspirin, plavix, admission for CVA work-up and re-consult if there is a positive finding on CTA. [SS]   1200 EKG done at 11:16 AM: Normal sinus rhythm, rate 90, right bundle branch block, left anterior fascicular block, LVH, nonspecific ST segments without obvious ischemia. Unchanged from prev EKG done 2/17/24.  Independently interpreted by me. [SS]   1304 Lactic Acid, Plasma(!!): 2.4  Will give ivf. No acidosis or leukocytosis.    [SS]   1550 Sleeping.  [SS]   1553 Delay in CTA turn around time. CTA neg.    Patient sleeping. Will admit for MRI. Unclear if malingering. (When I awoke him he jumped and moved his left arm, then did not move it when I asked him to.)  [SS]      ED Course User Index  [SS] Archana Briceno MD         FINAL IMPRESSION     1. Suspected cerebrovascular accident    2. Cerebrovascular accident (CVA), unspecified mechanism (HCC)          DISPOSITION/PLAN   Baldomero Caseman's  results have been reviewed with him.  He has been counseled regarding his diagnosis, treatment, and plan.  He verbally conveys understanding and agreement of the signs, symptoms, diagnosis, treatment and prognosis and additionally agrees to follow up as discussed.  He also agrees with the care-plan and conveys that all of his questions have been answered.  I have also provided discharge instructions for him that include: educational information regarding their diagnosis and treatment, and list of reasons why they would want to return to the ED prior to their follow-up appointment, should his condition change.        PATIENT

## 2024-03-08 NOTE — ED NOTES
Emergency Department Nursing Plan of Care      The Nursing Plan of Care is developed from the Nursing assessment and Emergency Department Attending provider initial evaluation.  The plan of care may be reviewed in the “ED Provider note”.    The Plan of Care was developed with the following considerations:  Patient / Family readiness to learn indicated by:verbalized understanding  Persons(s) to be included in education: patient  Barriers to Learning/Limitations:yes;  cognitive    Signed    JUAN JACQUES RN    3/8/2024   1:31 PM

## 2024-03-08 NOTE — ED TRIAGE NOTES
Pt presents via EMS. Pt was found next to his wheelchair. Pt reports ETOH use, known to get out of wheelchair on own

## 2024-03-08 NOTE — ED NOTES
Pt noted to be covered in fecal matter, appears to be like that more than one day. Pt cleaned, placed in brief, linens changed, all clothing removed from pt. Pt needs x 2 assist with cleaning as his bilateral weakness prevents him from helping. Pt only able to use urinal with assistance.  Pt noted to have excoriation to penis, perineum, and buttocks.  Pt reports he does not know how long he has been like that. Pt noted to have some independent movement returning to left side and that his speech is becoming a little less garbled.

## 2024-03-09 LAB
CHOLEST SERPL-MCNC: 124 MG/DL
CREAT SERPL-MCNC: 0.62 MG/DL (ref 0.7–1.3)
ERYTHROCYTE [DISTWIDTH] IN BLOOD BY AUTOMATED COUNT: 15.9 % (ref 11.5–14.5)
EST. AVERAGE GLUCOSE BLD GHB EST-MCNC: 85 MG/DL
HBA1C MFR BLD: 4.6 % (ref 4–5.6)
HCT VFR BLD AUTO: 37.4 % (ref 36.6–50.3)
HDLC SERPL-MCNC: 64 MG/DL
HDLC SERPL: 1.9 (ref 0–5)
HGB BLD-MCNC: 11.5 G/DL (ref 12.1–17)
HIV1 P24 AG SERPL QL IA: NONREACTIVE
HIV1+2 AB SERPL QL IA: NONREACTIVE
LDLC SERPL CALC-MCNC: 40.4 MG/DL (ref 0–100)
MCH RBC QN AUTO: 28 PG (ref 26–34)
MCHC RBC AUTO-ENTMCNC: 30.7 G/DL (ref 30–36.5)
MCV RBC AUTO: 91.2 FL (ref 80–99)
NRBC # BLD: 0 K/UL (ref 0–0.01)
NRBC BLD-RTO: 0 PER 100 WBC
PLATELET # BLD AUTO: 66 K/UL (ref 150–400)
PMV BLD AUTO: 11.3 FL (ref 8.9–12.9)
RBC # BLD AUTO: 4.1 M/UL (ref 4.1–5.7)
TRIGL SERPL-MCNC: 98 MG/DL
VLDLC SERPL CALC-MCNC: 19.6 MG/DL
WBC # BLD AUTO: 2.6 K/UL (ref 4.1–11.1)

## 2024-03-09 PROCEDURE — 6370000000 HC RX 637 (ALT 250 FOR IP): Performed by: INTERNAL MEDICINE

## 2024-03-09 PROCEDURE — 83036 HEMOGLOBIN GLYCOSYLATED A1C: CPT

## 2024-03-09 PROCEDURE — 1200000000 HC SEMI PRIVATE

## 2024-03-09 PROCEDURE — 36415 COLL VENOUS BLD VENIPUNCTURE: CPT

## 2024-03-09 PROCEDURE — 85027 COMPLETE CBC AUTOMATED: CPT

## 2024-03-09 PROCEDURE — 80061 LIPID PANEL: CPT

## 2024-03-09 PROCEDURE — 2580000003 HC RX 258: Performed by: INTERNAL MEDICINE

## 2024-03-09 PROCEDURE — 6370000000 HC RX 637 (ALT 250 FOR IP): Performed by: EMERGENCY MEDICINE

## 2024-03-09 PROCEDURE — 6360000002 HC RX W HCPCS: Performed by: INTERNAL MEDICINE

## 2024-03-09 PROCEDURE — 82565 ASSAY OF CREATININE: CPT

## 2024-03-09 PROCEDURE — 87389 HIV-1 AG W/HIV-1&-2 AB AG IA: CPT

## 2024-03-09 RX ADMIN — SODIUM CHLORIDE: 9 INJECTION, SOLUTION INTRAVENOUS at 19:47

## 2024-03-09 RX ADMIN — CLOPIDOGREL BISULFATE 75 MG: 75 TABLET, FILM COATED ORAL at 08:32

## 2024-03-09 RX ADMIN — SODIUM CHLORIDE, PRESERVATIVE FREE 10 ML: 5 INJECTION INTRAVENOUS at 08:46

## 2024-03-09 RX ADMIN — METRONIDAZOLE 500 MG: 500 TABLET ORAL at 21:07

## 2024-03-09 RX ADMIN — CEFEPIME 2000 MG: 2 INJECTION, POWDER, FOR SOLUTION INTRAVENOUS at 03:14

## 2024-03-09 RX ADMIN — SODIUM CHLORIDE, PRESERVATIVE FREE 10 ML: 5 INJECTION INTRAVENOUS at 19:48

## 2024-03-09 RX ADMIN — LORAZEPAM 3 MG: 1 TABLET ORAL at 18:24

## 2024-03-09 RX ADMIN — ROSUVASTATIN CALCIUM 40 MG: 10 TABLET, COATED ORAL at 19:49

## 2024-03-09 RX ADMIN — SODIUM CHLORIDE: 9 INJECTION, SOLUTION INTRAVENOUS at 11:48

## 2024-03-09 RX ADMIN — LORAZEPAM 2 MG: 1 TABLET ORAL at 19:48

## 2024-03-09 RX ADMIN — CEFEPIME 2000 MG: 2 INJECTION, POWDER, FOR SOLUTION INTRAVENOUS at 11:48

## 2024-03-09 RX ADMIN — ASPIRIN 81 MG CHEWABLE TABLET 81 MG: 81 TABLET CHEWABLE at 08:33

## 2024-03-09 RX ADMIN — VANCOMYCIN HYDROCHLORIDE 1000 MG: 1 INJECTION, POWDER, LYOPHILIZED, FOR SOLUTION INTRAVENOUS at 08:44

## 2024-03-09 RX ADMIN — FOLIC ACID 1 MG: 1 TABLET ORAL at 08:32

## 2024-03-09 RX ADMIN — VANCOMYCIN HYDROCHLORIDE 1000 MG: 1 INJECTION, POWDER, LYOPHILIZED, FOR SOLUTION INTRAVENOUS at 21:07

## 2024-03-09 RX ADMIN — SODIUM CHLORIDE: 9 INJECTION, SOLUTION INTRAVENOUS at 21:06

## 2024-03-09 RX ADMIN — CEFEPIME 2000 MG: 2 INJECTION, POWDER, FOR SOLUTION INTRAVENOUS at 19:48

## 2024-03-09 RX ADMIN — SODIUM CHLORIDE: 9 INJECTION, SOLUTION INTRAVENOUS at 08:44

## 2024-03-09 RX ADMIN — THERA TABS 1 TABLET: TAB at 08:32

## 2024-03-09 RX ADMIN — Medication 100 MG: at 08:32

## 2024-03-09 RX ADMIN — METRONIDAZOLE 500 MG: 500 TABLET ORAL at 13:58

## 2024-03-09 RX ADMIN — LORAZEPAM 2 MG: 1 TABLET ORAL at 14:02

## 2024-03-09 RX ADMIN — LORAZEPAM 4 MG: 2 INJECTION INTRAMUSCULAR; INTRAVENOUS at 01:05

## 2024-03-09 RX ADMIN — SODIUM CHLORIDE: 9 INJECTION, SOLUTION INTRAVENOUS at 03:13

## 2024-03-09 RX ADMIN — METRONIDAZOLE 500 MG: 500 TABLET ORAL at 05:59

## 2024-03-09 NOTE — WOUND CARE
WOUND Note:     New consult for Wounds on Left foot and left medial malleolus    Chart shows:  Admitted on 3/8/24 for suspected CVA  History of Confusion, AMS; Ischemia of foot, MDRO; Osteomyelitis; bacteremia; alcoholic cirrhosis w/out ascitis; Hep C; COPD; Right BKA; Multiple sclerosis; Impaired ambulation;  HTN; Polysubstance abuse    Patient with multiple recent admissions. Homeless.    Assessment:   A&O x  2 and reports chronic back pain and left foot pain. Difficult to understand what patient is saying at times due to slurred speech. Notes \"I had a few beers this morning.\"  Ambulates via wheelchair, not in patient's room at this time. Able to turn from side to side independently. Incontinent of bowel, able to use urinal. Bilat hands edematous patient/chart notes this is a chronic condition.  Surface:  [employer] mattress  WBC- 4.0    Wound cultures aerobic/anaerobic collected.  Podiatry consulted.      2020- R BKA  2023- Left toe amputations from frostbite  1/2/24- Procedure(s): Per Dr. Salazar, DPM  LEFT FOOT INCISION AND DRAINAGE with misonix , First metatarsal head amputation and left fifth toe amputation, skin graft application left foot left 5th toe amputation to MPJ Excisional Debridement left ankle (3x2x0.2cm) and application of Theraskin Graft     Buttocks, bilat ischium, right lateral thigh with hyperpigmentation and discoloration, non blanchable. No open areas noted. Patient s/p fall, unable to ascertain how long patient was on ground. Possible development of pressure injuries. Will continue to monitor.     1. POA wounds to left foot   Left upper plantar 3.5 x 3.0 x 0.1 cm; pink/red wound base; closed wound edges; dry/flaky periwound. Scant serosang exudate.      Left medial foot 2.5 x 2.0 x 0.1 cm. Pink/red wound base; open wound edges; dry/flaky periwound. Scant serosang exudate.    Left medial malleolus - 4.5 x 4.0 x 0.0 cm. Dry eschar, nonfluctuant, no exudate. Periwound with slight edema and

## 2024-03-09 NOTE — PROGRESS NOTES
[facility] Dosing Services: Vancomycin Dosing Progress Note  Consult for dosing of vancomycin by Dr. Kitchen  Indication: SSTI  Day of Therapy: 1    Ht Readings from Last 1 Encounters:   02/26/24 1.753 m (5' 9\")     Wt Readings from Last 1 Encounters:   03/08/24 58.3 kg (128 lb 8.5 oz)       Plan:   Start with loading dose of vancomycin 1500 mg    Follow with maintenance dose of vancomycin 1000 mg IV every 12 hours for a predicted . Daily SCr ordered. Noted R sided BKA.   Dose calculated to approximate a target AUC/JERARDO of 400-600    For Antifungals, Metronidazole, and Nafcillin:  ALT:    21    AST:   30   Alk Phos:   123   T Bili: 0.5    Other Antimicrobial  (not dosed by pharmacist)   Cefepime - Day 1  Metronidazole - Day 1   Cultures     3/8 anaerobic: In process  3/8 wound: In process  3/8 blood: In process   Serum Creatinine     Lab Results   Component Value Date/Time    BUN 10 03/08/2024 12:01 PM    CREATININE 0.70 03/08/2024 12:01 PM      Creatinine Clearance Estimated Creatinine Clearance: 86 mL/min (based on SCr of 0.7 mg/dL).   Procalcitonin    Lab Results   Component Value Date/Time    PROCAL <0.05 12/30/2023 11:53 PM      Temp 99.8 °F (37.7 °C) (Oral)     WBC Lab Results   Component Value Date/Time    WBC 4.0 03/08/2024 12:01 PM        Pharmacy will follow the patient on a daily basis and make adjustments based on patient's clinical status.    Thank you,  Jaycee Singh, PharmD, BCPS

## 2024-03-09 NOTE — PLAN OF CARE
Care plan reviewed  Problem: Safety - Adult  Goal: Free from fall injury  Outcome: Progressing     Problem: Discharge Planning  Goal: Discharge to home or other facility with appropriate resources  Outcome: Progressing     Problem: Pain  Goal: Verbalizes/displays adequate comfort level or baseline comfort level  Outcome: Progressing     Problem: Chronic Conditions and Co-morbidities  Goal: Patient's chronic conditions and co-morbidity symptoms are monitored and maintained or improved  Outcome: Progressing     Problem: Skin/Tissue Integrity  Goal: Absence of new skin breakdown  Description: 1.  Monitor for areas of redness and/or skin breakdown  2.  Assess vascular access sites hourly  3.  Every 4-6 hours minimum:  Change oxygen saturation probe site  4.  Every 4-6 hours:  If on nasal continuous positive airway pressure, respiratory therapy assess nares and determine need for appliance change or resting period.  Outcome: Progressing

## 2024-03-09 NOTE — PROGRESS NOTES
[facility] Adult  Hospitalist Group                                                                                          Hospitalist Progress Note  Hema Kitchen MD  Office Phone: [phone]        Date of Service:  3/9/2024  NAME:  Baldomero Reece  :  1955  MRN:  [id number]       Admission Summary:   Baldomero Reece is a 68 y.o. 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  Patient recalls drinking until he passed out. Last known was on 2024 before patient went to sleep.  Patient woke up around 3 AM and noticed left-sided weakness.  He recalls drinking more but is unclear on what he drank and how much.  Patient denies using illicit drugs but is open to trying anything.     At the ED patient arrived tachycardic with a heart rate of 116 and he medically stable.  Per ED exam patient had a NIHSS score of 20 which later went down to 13 after patient began moving his left arm.  Significant labs include lactic acid 2.4, alkaline phosphatase 123, WBC 4.0, RDW 16, platelet count 115, ethanol level 63.  Lactic acid down trended to 1.1 after patient was given 1 L of fluids.  UA negative for infection, UDS positive for cocaine and marijuana use.  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  Teleneurologist recommended stroke evaluation including giving aspirin, Plavix which was initiated in the ED.     On presentation patient was eating his meal using both of his hands.  However once patient acknowledged writer's presence left arm drooped.  On physical exam patient showed left arm was unable to move however with encouragement patient

## 2024-03-09 NOTE — CARE COORDINATION
RUR: 21%       CM opened case for assessment of D/C planning needs, CM reviewed chart.  Attempt to meet with patient at bedside patient asleep CM called name 3x snoring.    Per chart: patient 68 years-old only Medicaid no Medicare.    Per last CM note on 24 patient was discharge to address [address] per [employer] that is a [employer] address.    Per CM note on  24 LNOK search completed per note \"CM received outcome of legal NOK search. Pt's parents were from [place], [place], however, are now . The only other blood relative detailed on record is pt's grandmother, who's also . \" CM contacted Per JUAN OLIVAS  pt has capacity to make his own decisions at this time. MD reiterated that pt is agreeable to LTC placement. [facility] referral sent for LTC.     Per CM note on 24 patient was discharge to [facility], [phone] for LTC, not sure what happened and reason patient was discharge from facility CM to follow up.     Per CM note on 24  patient receive SSI of roughly 945.00 per month but forgot his SS number and has not been using his checks. Has an emergency contact listed as a  (Sarika Hernandez: [phone])  Per CM note patient was adamant and declined for CM to make contact with her. Pt reported he's not spoken to Ms. Hernandez in over a year & does not plan on speaking with her again. Pt was observed becoming frustrated/irritable upon CM discussing Ms. Hernandez's involvement in his care, reiterating that he's not interested in her being contacted under any circumstance.     CM following for discharge planning.      Yannick MENJIVAR

## 2024-03-09 NOTE — PROGRESS NOTES
[facility]: MED RECONCILIATION    Comments/Recommendations:   Source: Patient  Patient reports not taking the medications he was recently prescribed  Confirmed allergies    Medications removed:  Acetaminophen  Atorvastatin  Folic acid  Ibuprofen  Methocarbamol  Rosuvastatin  thiamine    Prior to Admission Medications: None    Thank you,  Jaycee Singh, PharmD, BCPS

## 2024-03-09 NOTE — H&P
History and Physical    Date of Service:  3/8/2024  Primary Care Provider: None, None  Source of information: patient    Chief Complaint: Alcohol Problem      History of Presenting Illness:   Baldomero Reece is a 68 y.o. 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  Patient recalls drinking until he passed out. Last known was on 03/07/2024 before patient went to sleep.  Patient woke up around 3 AM and noticed left-sided weakness.  He recalls drinking more but is unclear on what he drank and how much.  Patient denies using illicit drugs but is open to trying anything.    At the ED patient arrived tachycardic with a heart rate of 116 and he medically stable.  Per ED exam patient had a NIHSS score of 20 which later went down to 13 after patient began moving his left arm.  Significant labs include lactic acid 2.4, alkaline phosphatase 123, WBC 4.0, RDW 16, platelet count 115, ethanol level 63.  Lactic acid down trended to 1.1 after patient was given 1 L of fluids.  UA negative for infection, UDS positive for cocaine and marijuana use.  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  Teleneurologist recommended stroke evaluation including giving aspirin, Plavix which was initiated in the ED.    On presentation patient was eating his meal using both of his hands.  However once patient acknowledged writer's presence left arm drooped.  On physical exam patient showed left arm was unable to move however with encouragement patient was able to raise arm against gravity and shake writers hand. Currently, patient denies vision or hearing changes, fever, chills, weakness, chest pain,  03/07/2024 before patient went to sleep  -out of TPA and MT window  -CT brain wo contrast: no acute pathology  -CTA brain showed no evidence of significant stenosis however did redemonstrate a small AV malformation in the left basal ganglia.  -BP out of permissive  hypertension window (first 24 hrs)  -tele neuorlogy rec: work up, start aspirin 81 and plavix 75, MRI  -PT/OT, fall precautions, echo  -formal swallow stroke eval    Cellulitis in left foot  -Left foot multiple wounds including medial left foot,  left malleolus, and plantar, no fluctuance, has warmth, no cyanosis  -podiatry consult, may need MRI  -vanc/cefepime/metronidazole    Cytopenia  -possibly 2/2 to alcohol use  -continue to monitor    Lactic acidosis (resolved)  -possibly 2/2 to chronic alcohol intake vs unlikely Type A  -afebrile, No signs of shock, patient hemodynamically stable, warm skin, normal urine output, aox3, glucose 78, bicarb 29,   -lactic acid 2.4 --> 1.1    Alcohol use disorder  -\"drink whatever I can get\", last drink prior to passing out, between 3am and being picked up by EMS  -hx of withdrawal seizures  -CIWA score 3  -c/w Multivitamins, Thiamine, Folic acid  -withdrawal precautions, CIWA monitorin    Hepatitis C  History of cirrhosis of the liver  -needs to follow up with GI for hepatitis C treatment     PVD  -S/p right-sided BKA in 2020 due to osteomyelitis  -Continue statin, asa    Hx of COPD  -duonebs    History of left extremity DVT  -unclear when it occurred, patient not taking any medication  -on lovenox prophylactic     Nicotine Use  Patient was counseled extensively on the need to abstain from tobacco, its addictive tendencies, its deleterious effects on the lungs, cardiovascular  as well as its financial & social sequelae      DIET: Diet NPO   ISOLATION PRECAUTIONS: Contact  CODE STATUS: [unfilled]   DVT PROPHYLAXIS: lovenox  FUNCTIONAL STATUS PRIOR TO HOSPITALIZATION: N/A  Ambulatory status/function: wheelchair  EARLY

## 2024-03-09 NOTE — PROGRESS NOTES
Comprehensive Nutrition Assessment    Type and Reason for Visit:  Initial    Nutrition Recommendations/Plan:   CC diet with 5 CHO choices per meal  Glucerna BID     Malnutrition Assessment:  Malnutrition Status:  Moderate malnutrition (03/09/24 1210)    Context:  Chronic Illness     Findings of the 6 clinical characteristics of malnutrition:  Energy Intake:  75% or less estimated energy requirements for 1 month or longer  Weight Loss:  5% over 1 month     Body Fat Loss:  Severe body fat loss Orbital, Buccal region, Fat Overlying Ribs   Muscle Mass Loss:  Mild muscle mass loss Temples (temporalis), Clavicles (pectoralis & deltoids), Hand (interosseous)  Fluid Accumulation:  No significant fluid accumulation     Strength:  Not Performed    Nutrition Assessment:    Pt admitted with suspected CVA, hx notable for previous CVA (2021),COPD, PVD, DVT, HTN, DM, s/p R BKA 2' osteomyelitis, s/p L toes amp 2' frostbite, polysubstance abuse (alcohol, tobacco, meth).  Notes he'd rather drink than eat, can't recall what last meal was pta.  Adjusted BMI of 20.1 (2' BKA) still underweight for pt's age    Wt Readings from Last 3 Encounters:   03/08/24 58.3 kg (128 lb 8.5 oz)   02/26/24 56.7 kg (125 lb)   02/18/24 56.6 kg (124 lb 12.5 oz)      Moderate pro/kcal malnutrition r/t inadequate oral intake pt reports ~ 5% unintentional wt loss past three months with dietary recall meeting < 50% of needs > 5 days PTA 2' ETOH abuse and decreased appetite.  NPE reveals severe fat loss (orbital, buccal, overlying ribs);  moderate muscle wasting (clavicles, interosseous regions).    Nutrition Related Findings:    Pt tolerating diet Wound Type: Surgical Incision remote      Current Nutrition Intake & Therapies:    Average Meal Intake: 51-75%  Average Supplements Intake: None Ordered  ADULT DIET; Regular; 3 carb choices (45 gm/meal); Low Fat/Low Chol/High Fiber/2 gm Na; No Added Salt (3-4 gm); Double Protein Portions    Anthropometric  Measures:  Height: 175.3 cm (5' 9\")  Ideal Body Weight (IBW): 160 lbs (73 kg)       Current Body Weight: 58.3 kg (128 lb 8.5 oz), 80.3 % IBW. Weight Source: Bed Scale  Current BMI (kg/m2): 19        Weight Adjustment For: Amputation  Total Adjusted Percentage (Calculated): 5.9  Adjusted Ideal Body Weight (lbs) (Calculated): 150.6 lbs  Adjusted Ideal Body Weight (kg) (Calculated): 68.45 kg  Adjusted % Ideal Body Weight (Calculated): 85.3  Adjusted BMI (kg/m2) (Calculated): 20.1  BMI Categories: Underweight (BMI less than 22) age over 65    Estimated Daily Nutrient Needs:  Energy Requirements Based On: Formula  Weight Used for Energy Requirements: Ideal  Energy (kcal/day): 2250  Weight Used for Protein Requirements: Ideal  Protein (g/day): 87  Method Used for Fluid Requirements: 1 ml/kcal  Fluid (ml/day): 2250    Nutrition Diagnosis:   Inadequate protein-energy intake, Increased nutrient needs, Moderate malnutrition, In context of chronic illness, Underweight, Unintended weight loss, Food & Nutrition-related knowledge deficit, Limited adherence to nutrition-related recommendations related to cognitive or neurological impairment, lack or limited access to food, inadequate protein-energy intake as evidenced by intake 51-75%, poor intake prior to admission, BMI, weight loss, weight loss greater than or equal to 5% in 1 month, moderate loss of subcutaneous fat, moderate muscle loss, constipation    Nutrition Interventions:   Food and/or Nutrient Delivery: Modify Current Diet, Start Oral Nutrition Supplement  Nutrition Education/Counseling: No recommendation at this time  Coordination of Nutrition Care: Continue to monitor while inpatient       Goals:  Previous Goal Met: Progressing toward Goal(s)  Goals: Meet at least 75% of estimated needs, prior to discharge       Nutrition Monitoring and Evaluation:   Behavioral-Environmental Outcomes: Knowledge or Skill, Readiness for Change  Food/Nutrient Intake Outcomes: Food and

## 2024-03-10 LAB
BACTERIA SPEC CULT: NORMAL
BASOPHILS # BLD: 0 K/UL (ref 0–0.1)
BASOPHILS NFR BLD: 0 % (ref 0–1)
CREAT SERPL-MCNC: 0.62 MG/DL (ref 0.7–1.3)
DIFFERENTIAL METHOD BLD: ABNORMAL
EOSINOPHIL # BLD: 0 K/UL (ref 0–0.4)
EOSINOPHIL NFR BLD: 2 % (ref 0–7)
ERYTHROCYTE [DISTWIDTH] IN BLOOD BY AUTOMATED COUNT: 15.1 % (ref 11.5–14.5)
HCT VFR BLD AUTO: 36.5 % (ref 36.6–50.3)
HGB BLD-MCNC: 11.3 G/DL (ref 12.1–17)
IMM GRANULOCYTES # BLD AUTO: 0 K/UL
IMM GRANULOCYTES NFR BLD AUTO: 0 %
LYMPHOCYTES # BLD: 0.6 K/UL (ref 0.8–3.5)
LYMPHOCYTES NFR BLD: 38 % (ref 12–49)
MCH RBC QN AUTO: 28.2 PG (ref 26–34)
MCHC RBC AUTO-ENTMCNC: 31 G/DL (ref 30–36.5)
MCV RBC AUTO: 91 FL (ref 80–99)
MONOCYTES # BLD: 0.1 K/UL (ref 0–1)
MONOCYTES NFR BLD: 6 % (ref 5–13)
NEUTS BAND NFR BLD MANUAL: 2 % (ref 0–6)
NEUTS SEG # BLD: 1 K/UL (ref 1.8–8)
NEUTS SEG NFR BLD: 52 % (ref 32–75)
NRBC # BLD: 0 K/UL (ref 0–0.01)
NRBC BLD-RTO: 0 PER 100 WBC
PLATELET # BLD AUTO: 80 K/UL (ref 150–400)
PMV BLD AUTO: 11 FL (ref 8.9–12.9)
RBC # BLD AUTO: 4.01 M/UL (ref 4.1–5.7)
RBC MORPH BLD: ABNORMAL
SERVICE CMNT-IMP: NORMAL
TOTAL CELLS COUNTED SPEC: 50
WBC # BLD AUTO: 1.7 K/UL (ref 4.1–11.1)

## 2024-03-10 PROCEDURE — 97161 PT EVAL LOW COMPLEX 20 MIN: CPT | Performed by: PHYSICAL THERAPIST

## 2024-03-10 PROCEDURE — 36415 COLL VENOUS BLD VENIPUNCTURE: CPT

## 2024-03-10 PROCEDURE — 6370000000 HC RX 637 (ALT 250 FOR IP): Performed by: EMERGENCY MEDICINE

## 2024-03-10 PROCEDURE — 82746 ASSAY OF FOLIC ACID SERUM: CPT

## 2024-03-10 PROCEDURE — 85025 COMPLETE CBC W/AUTO DIFF WBC: CPT

## 2024-03-10 PROCEDURE — 82607 VITAMIN B-12: CPT

## 2024-03-10 PROCEDURE — 1200000000 HC SEMI PRIVATE

## 2024-03-10 PROCEDURE — 6360000002 HC RX W HCPCS: Performed by: INTERNAL MEDICINE

## 2024-03-10 PROCEDURE — 2580000003 HC RX 258: Performed by: INTERNAL MEDICINE

## 2024-03-10 PROCEDURE — 6370000000 HC RX 637 (ALT 250 FOR IP): Performed by: INTERNAL MEDICINE

## 2024-03-10 PROCEDURE — 82565 ASSAY OF CREATININE: CPT

## 2024-03-10 RX ADMIN — METRONIDAZOLE 500 MG: 500 TABLET ORAL at 13:03

## 2024-03-10 RX ADMIN — ASPIRIN 81 MG CHEWABLE TABLET 81 MG: 81 TABLET CHEWABLE at 08:27

## 2024-03-10 RX ADMIN — VANCOMYCIN HYDROCHLORIDE 1000 MG: 1 INJECTION, POWDER, LYOPHILIZED, FOR SOLUTION INTRAVENOUS at 20:54

## 2024-03-10 RX ADMIN — LORAZEPAM 2 MG: 1 TABLET ORAL at 19:50

## 2024-03-10 RX ADMIN — TRAMADOL HYDROCHLORIDE 100 MG: 50 TABLET ORAL at 14:20

## 2024-03-10 RX ADMIN — SODIUM CHLORIDE, PRESERVATIVE FREE 10 ML: 5 INJECTION INTRAVENOUS at 19:42

## 2024-03-10 RX ADMIN — FOLIC ACID 1 MG: 1 TABLET ORAL at 08:27

## 2024-03-10 RX ADMIN — SODIUM CHLORIDE: 9 INJECTION, SOLUTION INTRAVENOUS at 20:54

## 2024-03-10 RX ADMIN — CLOPIDOGREL BISULFATE 75 MG: 75 TABLET, FILM COATED ORAL at 08:27

## 2024-03-10 RX ADMIN — SODIUM CHLORIDE: 9 INJECTION, SOLUTION INTRAVENOUS at 19:47

## 2024-03-10 RX ADMIN — VANCOMYCIN HYDROCHLORIDE 1000 MG: 1 INJECTION, POWDER, LYOPHILIZED, FOR SOLUTION INTRAVENOUS at 08:32

## 2024-03-10 RX ADMIN — Medication 100 MG: at 08:27

## 2024-03-10 RX ADMIN — CEFEPIME 2000 MG: 2 INJECTION, POWDER, FOR SOLUTION INTRAVENOUS at 13:02

## 2024-03-10 RX ADMIN — METRONIDAZOLE 500 MG: 500 TABLET ORAL at 04:59

## 2024-03-10 RX ADMIN — TRAMADOL HYDROCHLORIDE 100 MG: 50 TABLET ORAL at 05:34

## 2024-03-10 RX ADMIN — LORAZEPAM 2 MG: 1 TABLET ORAL at 04:59

## 2024-03-10 RX ADMIN — CEFEPIME 2000 MG: 2 INJECTION, POWDER, FOR SOLUTION INTRAVENOUS at 19:48

## 2024-03-10 RX ADMIN — METRONIDAZOLE 500 MG: 500 TABLET ORAL at 19:41

## 2024-03-10 RX ADMIN — THERA TABS 1 TABLET: TAB at 08:27

## 2024-03-10 RX ADMIN — LORAZEPAM 2 MG: 1 TABLET ORAL at 10:49

## 2024-03-10 RX ADMIN — ROSUVASTATIN CALCIUM 40 MG: 10 TABLET, COATED ORAL at 19:41

## 2024-03-10 RX ADMIN — SODIUM CHLORIDE, PRESERVATIVE FREE 10 ML: 5 INJECTION INTRAVENOUS at 08:34

## 2024-03-10 RX ADMIN — CEFEPIME 2000 MG: 2 INJECTION, POWDER, FOR SOLUTION INTRAVENOUS at 03:12

## 2024-03-10 ASSESSMENT — PAIN DESCRIPTION - ORIENTATION
ORIENTATION: LEFT
ORIENTATION: LEFT

## 2024-03-10 ASSESSMENT — PAIN SCALES - GENERAL: PAINLEVEL_OUTOF10: 10

## 2024-03-10 ASSESSMENT — PAIN - FUNCTIONAL ASSESSMENT: PAIN_FUNCTIONAL_ASSESSMENT: ACTIVITIES ARE NOT PREVENTED

## 2024-03-10 ASSESSMENT — PAIN DESCRIPTION - LOCATION
LOCATION: FOOT
LOCATION: ARM;LEG

## 2024-03-10 ASSESSMENT — PAIN DESCRIPTION - DESCRIPTORS: DESCRIPTORS: ACHING

## 2024-03-10 NOTE — PROGRESS NOTES
[facility] Adult  Hospitalist Group                                                                                          Hospitalist Progress Note  Hema Kitchen MD  Office Phone: [phone]        Date of Service:  3/10/2024  NAME:  Baldomero Reece  :  1955  MRN:  [id number]       Admission Summary:   Baldomero Reece is a 68 y.o. 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  Patient recalls drinking until he passed out. Last known was on 2024 before patient went to sleep.  Patient woke up around 3 AM and noticed left-sided weakness.  He recalls drinking more but is unclear on what he drank and how much.  Patient denies using illicit drugs but is open to trying anything.     At the ED patient arrived tachycardic with a heart rate of 116 and he medically stable.  Per ED exam patient had a NIHSS score of 20 which later went down to 13 after patient began moving his left arm.  Significant labs include lactic acid 2.4, alkaline phosphatase 123, WBC 4.0, RDW 16, platelet count 115, ethanol level 63.  Lactic acid down trended to 1.1 after patient was given 1 L of fluids.  UA negative for infection, UDS positive for cocaine and marijuana use.  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  Teleneurologist recommended stroke evaluation including giving aspirin, Plavix which was initiated in the ED.     On presentation patient was eating his meal using both of his hands.  However once patient acknowledged writer's presence left arm drooped.  On physical exam patient showed left arm was unable to move however with encouragement patient  Route is PO if not otherwise noted.      Admission Status:[id number]:::1}      Medications Reviewed:     Current Facility-Administered Medications   Medication Dose Route Frequency    aspirin chewable tablet 81 mg  81 mg Oral Daily    sodium chloride flush 0.9 % injection 5-40 mL  5-40 mL IntraVENous 2 times per day    sodium chloride flush 0.9 % injection 5-40 mL  5-40 mL IntraVENous PRN    0.9 % sodium chloride infusion   IntraVENous PRN    ondansetron (ZOFRAN-ODT) disintegrating tablet 4 mg  4 mg Oral Q8H PRN    Or    ondansetron (ZOFRAN) injection 4 mg  4 mg IntraVENous Q6H PRN    polyethylene glycol (GLYCOLAX) packet 17 g  17 g Oral Daily PRN    rosuvastatin (CRESTOR) tablet 40 mg  40 mg Oral Nightly    thiamine tablet 100 mg  100 mg Oral Daily    multivitamin 1 tablet  1 tablet Oral Daily    folic acid (FOLVITE) tablet 1 mg  1 mg Oral Daily    nicotine (NICODERM CQ) 21 MG/24HR 1 patch  1 patch TransDERmal Daily    LORazepam (ATIVAN) tablet 1 mg  1 mg Oral Q1H PRN    Or    LORazepam (ATIVAN) injection 1 mg  1 mg IntraVENous Q1H PRN    Or    LORazepam (ATIVAN) tablet 2 mg  2 mg Oral Q1H PRN    Or    LORazepam (ATIVAN) injection 2 mg  2 mg IntraVENous Q1H PRN    Or    LORazepam (ATIVAN) tablet 3 mg  3 mg Oral Q1H PRN    Or    LORazepam (ATIVAN) injection 3 mg  3 mg IntraVENous Q1H PRN    Or    LORazepam (ATIVAN) tablet 4 mg  4 mg Oral Q1H PRN    Or    LORazepam (ATIVAN) injection 4 mg  4 mg IntraVENous Q1H PRN    ipratropium 0.5 mg-albuterol 2.5 mg (DUONEB) nebulizer solution 1 Dose  1 Dose Inhalation Q4H PRN    ceFEPIme (MAXIPIME) 2,000 mg in sodium chloride 0.9 % 100 mL IVPB (mini-bag)  2,000 mg IntraVENous Q8H    metroNIDAZOLE (FLAGYL) tablet 500 mg  500 mg Oral 3 times per day    vancomycin (VANCOCIN) 1,000 mg in sodium chloride 0.9 % 250 mL (vial-mate) IVPB  1,000 mg IntraVENous Q12H    traMADol (ULTRAM) tablet 50 mg  50 mg Oral Q6H PRN    Or    traMADol (ULTRAM) tablet 100 mg  100 mg Oral Q6H PRN    naloxone  (NARCAN) injection 0.4 mg  0.4 mg IntraVENous PRN       ______________________________________________________________________  EXPECTED LENGTH OF STAY: 2  ACTUAL LENGTH OF STAY:          2                 Hema Kitchen MD

## 2024-03-10 NOTE — PROGRESS NOTES
A system downtime occurred on March 10, 2024 at [id number] (60 minutes) due to Daylight Savings Eastern Standard Time.  During this downtime, paper charting was completed and will be scanned into the patient's electronic medical record.     ---

## 2024-03-10 NOTE — PROGRESS NOTES
Problem: Physical Therapy - Adult  Goal: By Discharge: Performs mobility at highest level of function for planned discharge setting.  See evaluation for individualized goals.  Description: FUNCTIONAL STATUS PRIOR TO ADMISSION: Patient was modified independent using a wheelchair for functional mobility.    HOME SUPPORT PRIOR TO ADMISSION: The patient is suspected to be homeless, last known living arrangement were LT facility with discharge in Jan. 2024    Physical Therapy Goals  Initiated 3/10/2024  1.  Patient will move from supine to sit and sit to supine  in bed with independence within 7 day(s).    2.  Patient will transfer from bed to chair and chair to bed with independence using the least restrictive device within 7 day(s).  3.  Patient will utilize wheelchair for mobility out of bed up to 500ft independently.     Outcome: Progressing   PHYSICAL THERAPY EVALUATION    Patient: Baldomero Reece (68 y.o. male)  Date: 3/10/2024  Primary Diagnosis: Suspected cerebrovascular accident [R09.89]  Cerebrovascular accident (CVA), unspecified mechanism (HCC) [I63.9]       Precautions:                        ASSESSMENT :   DEFICITS/IMPAIRMENTS:   The patient is limited by decreased functional mobility, ROM, strength, safety awareness, increased pain levels     Based on the impairments listed above the patent is below their baseline level of function. Patient typically utilizes manual wheelchair as primary means of ambulation and has his personal chair in room with him. Patient declined mobilizing out of bed today, but was able to laterally scoot at edge of bed without physical assistance. Patient notes pain \"everywhere\" when weight bearing thorough LUE, but continued to use LUE for bed mobility tasks throughout session. During AROM screen patient was unable to move LUE.     Patient will benefit from skilled intervention to address the above impairments.    Functional Outcome Measure:  The patient scored 14/24 on the  functional limits (R BKA, LUE AROM decreased when asked to volitionally move UE)  PROM: Within functional limits  Functional Mobility:  Bed Mobility:  Bed Mobility Training  Bed Mobility Training: Yes  Overall Level of Assistance: Supervision  Rolling: Independent  Supine to Sit: Supervision  Sit to Supine: Supervision  Scooting: Supervision  Transfers:  Transfer Training  Transfer Training: Yes  Balance:   Balance  Sitting: Intact                                                                                                                                                                                                                                             [facility] AM-PAC®      Basic Mobility Inpatient Short Form (6-Clicks) Version 2    How much help is needed turning from your back to your side while in a flat bed without using bedrails?: None  How much help is needed moving from lying on your back to sitting on the side of a flat bed without using bedrails?: None  How much help is needed moving to and from a bed to a chair?: A Lot  How much help is needed standing up from a chair using your arms?: A Lot  How much help is needed walking in hospital room?: Total  How much help is needed climbing 3-5 steps with a railing?: Total    -PAC Inpatient Mobility Raw Score : 14  AM-PAC Inpatient T-Scale Score : 38.1     Cutoff score ?171,2,3 had higher odds of discharging home with home health or need of SNF/IPR.    1. Alka Emery, Ruchi Genao, Teodoro Barr, Archana Ramos, Howard Fischer, Ivan Emery.  Validity of the AM-PAC “6-Clicks” Inpatient Daily Activity and Basic Mobility Short Forms. Physical Therapy Mar 2014, 94 (3) 379-391; DOI: 10.2522/ptj.23972983  2. Clarence HESS, Morro RUTHERFORD, Shanell RUTHERFORD, Chandrakant RUTHERFORD. Association of AM-PAC \"6-Clicks\" Basic Mobility and Daily Activity Scores With Discharge Destination. Phys Ther. 2021 Apr 4;101(4):hyqd778. doi: 10.1093/ptj/rlce038. PMID:

## 2024-03-10 NOTE — PROGRESS NOTES
Foot and Ankle Progress Note    Admit Date: 3/8/2024  Hospital day 2    Subjective:     I was consulted to eval and treat multiple wounds left foot of unknown duration    Allergies   Allergen Reactions    Penicillins Swelling     Swelling of hands and feet    Methadone Other (See Comments)     Does not like the way it makes him feel        History:     History reviewed. No pertinent family history.   Past Medical History:   Diagnosis Date    Alcohol abuse     Below knee amputation (HCC)     Rt leg    Diabetes mellitus (HCC)     Hypertension       Social History     Substance and Sexual Activity   Alcohol Use Yes      Social History     Substance and Sexual Activity   Drug Use Yes    Types: Marijuana (Weed), Methamphetamines (Crystal Meth)      Past Surgical History:   Procedure Laterality Date    FOOT DEBRIDEMENT Left 1/2/2024    LEFT FOOT INCISION AND DRAINAGE with misonix , First metatarsal head amputation and left fifth toe amputation, skin graft application left foot performed by Percy Salazar DPM at [facility] MAIN OR    LEG AMPUTATION BELOW KNEE Right     LOWER EXTREMITY AMPUTATION Left     transmetatarsal      Social History     Tobacco Use   Smoking Status Every Day    Current packs/day: 2.00    Average packs/day: 2.0 packs/day for 52.2 years (104.4 ttl pk-yrs)    Types: Cigarettes    Start date: 1972   Smokeless Tobacco Never        Current Facility-Administered Medications   Medication Dose Route Frequency    aspirin chewable tablet 81 mg  81 mg Oral Daily    sodium chloride flush 0.9 % injection 5-40 mL  5-40 mL IntraVENous 2 times per day    sodium chloride flush 0.9 % injection 5-40 mL  5-40 mL IntraVENous PRN    0.9 % sodium chloride infusion   IntraVENous PRN    ondansetron (ZOFRAN-ODT) disintegrating tablet 4 mg  4 mg Oral Q8H PRN    Or    ondansetron (ZOFRAN) injection 4 mg  4 mg IntraVENous Q6H PRN    polyethylene glycol (GLYCOLAX) packet 17 g  17 g Oral Daily PRN    rosuvastatin (CRESTOR) tablet 40

## 2024-03-11 ENCOUNTER — APPOINTMENT (OUTPATIENT)
Facility: HOSPITAL | Age: 69
End: 2024-03-11
Attending: INTERNAL MEDICINE
Payer: MEDICAID

## 2024-03-11 ENCOUNTER — APPOINTMENT (OUTPATIENT)
Facility: HOSPITAL | Age: 69
End: 2024-03-11
Payer: MEDICAID

## 2024-03-11 LAB
ANION GAP SERPL CALC-SCNC: 7 MMOL/L (ref 5–15)
BACTERIA SPEC CULT: ABNORMAL
BACTERIA SPEC CULT: ABNORMAL
BUN SERPL-MCNC: 13 MG/DL (ref 6–20)
BUN/CREAT SERPL: 15 (ref 12–20)
CALCIUM SERPL-MCNC: 8.4 MG/DL (ref 8.5–10.1)
CHLORIDE SERPL-SCNC: 105 MMOL/L (ref 97–108)
CO2 SERPL-SCNC: 27 MMOL/L (ref 21–32)
CREAT SERPL-MCNC: 0.66 MG/DL (ref 0.7–1.3)
CREAT SERPL-MCNC: 0.89 MG/DL (ref 0.7–1.3)
ECHO AO ROOT DIAM: 2.8 CM
ECHO AO ROOT INDEX: 1.64 CM/M2
ECHO AV AREA PLAN/BSA: 1.29 CM2/M2
ECHO AV AREA PLAN: 2.2 CM2
ECHO BSA: 1.68 M2
ECHO LA DIAMETER INDEX: 1.75 CM/M2
ECHO LA DIAMETER: 3 CM
ECHO LA TO AORTIC ROOT RATIO: 1.07
ECHO LA VOL A-L A4C: 19 ML (ref 18–58)
ECHO LA VOL MOD A4C: 15 ML (ref 18–58)
ECHO LA VOLUME INDEX A-L A4C: 11 ML/M2 (ref 16–34)
ECHO LA VOLUME INDEX MOD A4C: 9 ML/M2 (ref 16–34)
ECHO LV EDV A4C: 98 ML
ECHO LV EDV INDEX A4C: 57 ML/M2
ECHO LV EJECTION FRACTION A4C: 71 %
ECHO LV ESV A4C: 28 ML
ECHO LV ESV INDEX A4C: 16 ML/M2
ECHO LV FRACTIONAL SHORTENING: 43 % (ref 28–44)
ECHO LV INTERNAL DIMENSION DIASTOLE INDEX: 2.75 CM/M2
ECHO LV INTERNAL DIMENSION DIASTOLIC: 4.7 CM (ref 4.2–5.9)
ECHO LV INTERNAL DIMENSION SYSTOLIC INDEX: 1.58 CM/M2
ECHO LV INTERNAL DIMENSION SYSTOLIC: 2.7 CM
ECHO LV IVSD: 1 CM (ref 0.6–1)
ECHO LV MASS 2D: 164.5 G (ref 88–224)
ECHO LV MASS INDEX 2D: 96.2 G/M2 (ref 49–115)
ECHO LV POSTERIOR WALL DIASTOLIC: 1 CM (ref 0.6–1)
ECHO LV RELATIVE WALL THICKNESS RATIO: 0.43
ECHO LVOT AREA: 4.2 CM2
ECHO LVOT DIAM: 2.3 CM
ECHO LVOT PEAK GRADIENT: 2 MMHG
ECHO LVOT PEAK VELOCITY: 0.7 M/S
ECHO MV A VELOCITY: 0.67 M/S
ECHO MV AREA PHT: 5.4 CM2
ECHO MV E DECELERATION TIME (DT): 108.1 MS
ECHO MV E VELOCITY: 0.46 M/S
ECHO MV E/A RATIO: 0.69
ECHO MV MAX VELOCITY: 0.7 M/S
ECHO MV MEAN GRADIENT: 1 MMHG
ECHO MV MEAN VELOCITY: 0.4 M/S
ECHO MV PEAK GRADIENT: 2 MMHG
ECHO MV PRESSURE HALF TIME (PHT): 40.5 MS
ECHO MV VTI: 17.3 CM
ECHO TV REGURGITANT MAX VELOCITY: 2.22 M/S
ECHO TV REGURGITANT PEAK GRADIENT: 20 MMHG
FOLATE SERPL-MCNC: NORMAL NG/ML (ref 5–21)
GLUCOSE SERPL-MCNC: 115 MG/DL (ref 65–100)
GRAM STN SPEC: ABNORMAL
GRAM STN SPEC: ABNORMAL
POTASSIUM SERPL-SCNC: 4.1 MMOL/L (ref 3.5–5.1)
SERVICE CMNT-IMP: ABNORMAL
SODIUM SERPL-SCNC: 139 MMOL/L (ref 136–145)
VANCOMYCIN SERPL-MCNC: 19.3 UG/ML
VIT B12 SERPL-MCNC: 271 PG/ML (ref 193–986)

## 2024-03-11 PROCEDURE — 36415 COLL VENOUS BLD VENIPUNCTURE: CPT

## 2024-03-11 PROCEDURE — 97535 SELF CARE MNGMENT TRAINING: CPT

## 2024-03-11 PROCEDURE — 6370000000 HC RX 637 (ALT 250 FOR IP): Performed by: INTERNAL MEDICINE

## 2024-03-11 PROCEDURE — 70551 MRI BRAIN STEM W/O DYE: CPT

## 2024-03-11 PROCEDURE — 1200000000 HC SEMI PRIVATE

## 2024-03-11 PROCEDURE — 6370000000 HC RX 637 (ALT 250 FOR IP): Performed by: EMERGENCY MEDICINE

## 2024-03-11 PROCEDURE — 93321 DOPPLER ECHO F-UP/LMTD STD: CPT | Performed by: SPECIALIST

## 2024-03-11 PROCEDURE — 93325 DOPPLER ECHO COLOR FLOW MAPG: CPT | Performed by: SPECIALIST

## 2024-03-11 PROCEDURE — 92610 EVALUATE SWALLOWING FUNCTION: CPT

## 2024-03-11 PROCEDURE — 6360000002 HC RX W HCPCS: Performed by: INTERNAL MEDICINE

## 2024-03-11 PROCEDURE — 93308 TTE F-UP OR LMTD: CPT

## 2024-03-11 PROCEDURE — 93308 TTE F-UP OR LMTD: CPT | Performed by: SPECIALIST

## 2024-03-11 PROCEDURE — 99222 1ST HOSP IP/OBS MODERATE 55: CPT | Performed by: STUDENT IN AN ORGANIZED HEALTH CARE EDUCATION/TRAINING PROGRAM

## 2024-03-11 PROCEDURE — 2580000003 HC RX 258: Performed by: INTERNAL MEDICINE

## 2024-03-11 PROCEDURE — 80048 BASIC METABOLIC PNL TOTAL CA: CPT

## 2024-03-11 PROCEDURE — 80202 ASSAY OF VANCOMYCIN: CPT

## 2024-03-11 PROCEDURE — 97165 OT EVAL LOW COMPLEX 30 MIN: CPT

## 2024-03-11 PROCEDURE — 97530 THERAPEUTIC ACTIVITIES: CPT | Performed by: PHYSICAL THERAPIST

## 2024-03-11 RX ORDER — SODIUM CHLORIDE 0.9 % (FLUSH) 0.9 %
10 SYRINGE (ML) INJECTION ONCE
Status: COMPLETED | OUTPATIENT
Start: 2024-03-11 | End: 2024-03-11

## 2024-03-11 RX ORDER — 0.9 % SODIUM CHLORIDE 0.9 %
500 INTRAVENOUS SOLUTION INTRAVENOUS ONCE
Status: DISCONTINUED | OUTPATIENT
Start: 2024-03-11 | End: 2024-03-11

## 2024-03-11 RX ORDER — DOXYCYCLINE HYCLATE 100 MG
100 TABLET ORAL EVERY 12 HOURS SCHEDULED
Status: COMPLETED | OUTPATIENT
Start: 2024-03-12 | End: 2024-03-19

## 2024-03-11 RX ORDER — AMOXICILLIN AND CLAVULANATE POTASSIUM 875; 125 MG/1; MG/1
1 TABLET, FILM COATED ORAL EVERY 12 HOURS SCHEDULED
Status: COMPLETED | OUTPATIENT
Start: 2024-03-12 | End: 2024-03-19

## 2024-03-11 RX ADMIN — ASPIRIN 81 MG CHEWABLE TABLET 81 MG: 81 TABLET CHEWABLE at 08:36

## 2024-03-11 RX ADMIN — SODIUM CHLORIDE, PRESERVATIVE FREE 10 ML: 5 INJECTION INTRAVENOUS at 19:55

## 2024-03-11 RX ADMIN — LORAZEPAM 1 MG: 1 TABLET ORAL at 16:12

## 2024-03-11 RX ADMIN — CEFEPIME 2000 MG: 2 INJECTION, POWDER, FOR SOLUTION INTRAVENOUS at 03:01

## 2024-03-11 RX ADMIN — FOLIC ACID 1 MG: 1 TABLET ORAL at 08:35

## 2024-03-11 RX ADMIN — LORAZEPAM 2 MG: 1 TABLET ORAL at 00:51

## 2024-03-11 RX ADMIN — ROSUVASTATIN CALCIUM 40 MG: 10 TABLET, COATED ORAL at 19:55

## 2024-03-11 RX ADMIN — LORAZEPAM 1 MG: 1 TABLET ORAL at 08:35

## 2024-03-11 RX ADMIN — SODIUM CHLORIDE: 9 INJECTION, SOLUTION INTRAVENOUS at 19:52

## 2024-03-11 RX ADMIN — SODIUM CHLORIDE: 9 INJECTION, SOLUTION INTRAVENOUS at 08:41

## 2024-03-11 RX ADMIN — VANCOMYCIN HYDROCHLORIDE 750 MG: 10 INJECTION, POWDER, LYOPHILIZED, FOR SOLUTION INTRAVENOUS at 16:05

## 2024-03-11 RX ADMIN — LORAZEPAM 3 MG: 1 TABLET ORAL at 21:53

## 2024-03-11 RX ADMIN — Medication 100 MG: at 08:36

## 2024-03-11 RX ADMIN — SODIUM CHLORIDE, PRESERVATIVE FREE 10 ML: 5 INJECTION INTRAVENOUS at 16:19

## 2024-03-11 RX ADMIN — SODIUM CHLORIDE: 9 INJECTION, SOLUTION INTRAVENOUS at 11:45

## 2024-03-11 RX ADMIN — SODIUM CHLORIDE, PRESERVATIVE FREE 10 ML: 5 INJECTION INTRAVENOUS at 08:35

## 2024-03-11 RX ADMIN — CEFEPIME 2000 MG: 2 INJECTION, POWDER, FOR SOLUTION INTRAVENOUS at 11:46

## 2024-03-11 RX ADMIN — METRONIDAZOLE 500 MG: 500 TABLET ORAL at 05:14

## 2024-03-11 RX ADMIN — LORAZEPAM 1 MG: 1 TABLET ORAL at 12:15

## 2024-03-11 RX ADMIN — METRONIDAZOLE 500 MG: 500 TABLET ORAL at 13:28

## 2024-03-11 RX ADMIN — SODIUM CHLORIDE: 9 INJECTION, SOLUTION INTRAVENOUS at 16:05

## 2024-03-11 RX ADMIN — SODIUM CHLORIDE, PRESERVATIVE FREE 10 ML: 5 INJECTION INTRAVENOUS at 16:06

## 2024-03-11 RX ADMIN — METRONIDAZOLE 500 MG: 500 TABLET ORAL at 19:55

## 2024-03-11 RX ADMIN — CEFEPIME 2000 MG: 2 INJECTION, POWDER, FOR SOLUTION INTRAVENOUS at 19:54

## 2024-03-11 RX ADMIN — VANCOMYCIN HYDROCHLORIDE 1000 MG: 1 INJECTION, POWDER, LYOPHILIZED, FOR SOLUTION INTRAVENOUS at 08:41

## 2024-03-11 RX ADMIN — THERA TABS 1 TABLET: TAB at 08:35

## 2024-03-11 ASSESSMENT — PAIN DESCRIPTION - DESCRIPTORS: DESCRIPTORS: ACHING

## 2024-03-11 ASSESSMENT — PAIN SCALES - GENERAL
PAINLEVEL_OUTOF10: 8

## 2024-03-11 ASSESSMENT — PAIN DESCRIPTION - ORIENTATION
ORIENTATION: LEFT
ORIENTATION: LEFT
ORIENTATION: ANTERIOR

## 2024-03-11 ASSESSMENT — PAIN DESCRIPTION - LOCATION
LOCATION: LEG
LOCATION: HEAD
LOCATION: LEG

## 2024-03-11 ASSESSMENT — PAIN SCALES - WONG BAKER: WONGBAKER_NUMERICALRESPONSE: HURTS WHOLE LOT

## 2024-03-11 NOTE — CARE COORDINATION
Kae, if it is not before the end of the day today.     03/11/24 1215   Service Assessment   Patient Orientation Alert and Oriented   Cognition Alert   History Provided By Patient   Primary Caregiver Self   Accompanied By/Relationship None   Support Systems None   Patient's Healthcare Decision Maker is: Legal Next of Kin  (Patient declined to list an emergency contact)   PCP Verified by CM Yes  (None)   Last Visit to PCP   (N/A)   Prior Functional Level Assistance with the following:;Mobility  (patient uses manual wheelchair)   Current Functional Level Assistance with the following:;Mobility;Bathing;Dressing;Housework;Shopping  (Patient currently requires assistance with mobility and transfers. Patient uses manual wheelchair.)   Can patient return to prior living arrangement Unknown at present  (Patient is currently homeless)   Ability to make needs known: Poor   Family able to assist with home care needs: No   Would you like for me to discuss the discharge plan with any other family members/significant others, and if so, who? No   Financial Resources Medicaid   Community Resources None   CM/SW Referral No PCP;Homeless requesting intervention;Other (see comment);ADLs/IADLs  (Discharge planning: disposition planning, outpatient follow up)   Social/Functional History   Lives With Alone   Type of Home Homeless   Discharge Planning   Type of Residence Homeless   Living Arrangements Alone   Current Services Prior To Admission Durable Medical Equipment   Current DME Prior to Arrival Wheelchair   Potential Assistance Needed Other (Comment)  (Discharge planning: disposition planning (LTC vs Inpatient Substance Use Rehab vs [facility]), outpatient follow up)   DME Ordered? No   Potential Assistance Purchasing Medications No   Type of Home Care Services None  (PT recommends therapy 3x per week)   Patient expects to be discharged to: Unknown   Follow Up Appointment: Best Day/Time    (Any day, any time)   One/Two Story  Residence Other (comment)  (N/A)   History of falls? 1   Services At/After Discharge   Transition of Care Consult (CM Consult) Transportation Assistance;Long Term Care;Discharge Planning  (Discharge planning: LTC vs Inpatient Substance Use Rehab vs [facility])   Services At/After Discharge Outpatient;Long term care;Community Resources  (TBD)    Resource Information Provided? No   Mode of Transport at Discharge WC Van  (Will need assistance with transportation upon discharge)   Confirm Follow Up Transport   (Medicaid vs LTC)   Condition of Participation: Discharge Planning   The Plan for Transition of Care is related to the following treatment goals: Discharge planning: disposition planning and outpatient follow up   The Patient and/or Patient Representative was provided with a Choice of Provider? Patient   The Patient and/Or Patient Representative agree with the Discharge Plan? Yes  (TBD)   Freedom of Choice list was provided with basic dialogue that supports the patient's individualized plan of care/goals, treatment preferences, and shares the quality data associated with the providers?  Yes  (verbal discussion - pt does not want to return to [facility])     Advance Care Planning     General Advance Care Planning (ACP) Conversation    Date of Conversation: 3/11/2024  Conducted with: Patient with Decision Making Capacity    Healthcare Decision Maker: Pt declined to list any contacts at this time  No healthcare decision makers have been documented.  Click here to complete HealthCare Decision Makers including selection of the Healthcare Decision Maker Relationship (ie \"Primary\")     Length of Voluntary ACP Conversation in minutes:  <16 minutes (Non-Billable)    Indira Smith LMSW,   [phone]

## 2024-03-11 NOTE — CONSULTS
[facility] [place]  [address] [address], [place] [address]  W: [phone] F: [phone]    Reason for Visit:   Baldomero Reece is a 68 y.o. male who is seen in consultation at the request of Dr. Kitchen for evaluation of pancytopenia.      Hematology / Oncology Treatment Information:     Hematological/Oncological Diagnosis: pancytopenia     Date of Diagnosis: 3/10/24     Oncology/Hematology Treatment Course:   Treatment course:   1) pending      Pathology and Molecular Testing:       Initial Presentation  (HPI):     68 year old with alcohol dependence and abuse, DM2, HTN presented with frostbite and suspected stroke.  Workup showed pancytopenia.     Lab Results   Component Value Date    HGB 11.3 (L) 03/10/2024     Lab Results   Component Value Date    WBC 1.7 (LL) 03/10/2024     Lab Results   Component Value Date    PLT 80 (L) 03/10/2024     Chart review shows waxing and waning episodes of pancytopenia that correlate with heavy alcohol abuse.                  Review of Systems: A complete review of systems was obtained, negative except as described above.    Past Medical History:   Diagnosis Date    Alcohol abuse     Below knee amputation (HCC)     Rt leg    Diabetes mellitus (HCC)     Hypertension        Past Surgical History:   Procedure Laterality Date    FOOT DEBRIDEMENT Left 1/2/2024    LEFT FOOT INCISION AND DRAINAGE with misonix , First metatarsal head amputation and left fifth toe amputation, skin graft application left foot performed by Percy Salazar DPM at \[facility]\"" MAIN OR    LEG AMPUTATION BELOW KNEE Right     LOWER EXTREMITY AMPUTATION Left     transmetatarsal       Social History     Socioeconomic History    Marital status: Unknown     Spouse name: None    Number of children: None    Years of education: None    Highest education level: None   Tobacco Use    Smoking status: Every Day     Current packs/day: 2.00     Average  packs/day: 2.0 packs/day for 52.2 years (104.4 ttl pk-yrs)     Types: Cigarettes     Start date: 1972    Smokeless tobacco: Never   Substance and Sexual Activity    Alcohol use: Yes    Drug use: Yes     Types: Marijuana (Weed), Methamphetamines (Crystal Meth)   Social History Narrative    ** Merged History Encounter **          Social Determinants of Health     Food Insecurity: Food Insecurity Present (3/8/2024)    Hunger Vital Sign     Worried About Running Out of Food in the Last Year: Often true     Ran Out of Food in the Last Year: Often true   Transportation Needs: Unmet Transportation Needs (3/8/2024)    PRAPARE - Transportation     Lack of Transportation (Medical): Yes     Lack of Transportation (Non-Medical): Yes   Housing Stability: High Risk (3/8/2024)    Housing Stability Vital Sign     Unable to Pay for Housing in the Last Year: Yes     Number of Places Lived in the Last Year: 2     Unstable Housing in the Last Year: Yes       Current Facility-Administered Medications   Medication Dose Route Frequency Provider Last Rate Last Admin    vancomycin (VANCOCIN) 750 mg in sodium chloride 0.9 % 250 mL IVPB  750 mg IntraVENous Q8H Hema Kitchen MD   Stopped at 03/11/24 1705    aspirin chewable tablet 81 mg  81 mg Oral Daily Archana Briceno MD   81 mg at 03/11/24 0836    sodium chloride flush 0.9 % injection 5-40 mL  5-40 mL IntraVENous 2 times per day Hema Kitchen MD   10 mL at 03/11/24 0835    sodium chloride flush 0.9 % injection 5-40 mL  5-40 mL IntraVENous PRN Hema Kitchen MD        0.9 % sodium chloride infusion   IntraVENous PRN Hema Kitchen MD   Stopped at 03/11/24 1713    ondansetron (ZOFRAN-ODT) disintegrating tablet 4 mg  4 mg Oral Q8H PRN Hema Kitchen MD        Or    ondansetron (ZOFRAN) injection 4 mg  4 mg IntraVENous Q6H PRN Hema Kitchen MD        polyethylene glycol (GLYCOLAX) packet 17 g  17 g Oral Daily PRN Hema Kitchen MD        rosuvastatin (CRESTOR) tablet 40 mg  40 mg Oral  Penicillins Swelling     Swelling of hands and feet    Methadone Other (See Comments)     Does not like the way it makes him feel             Physical Exam:   /65   Pulse 88   Temp 98 °F (36.7 °C) (Oral)   Resp 20   Ht 1.753 m (5' 9\")   Wt 58.3 kg (128 lb 8.5 oz)   SpO2 95%   BMI 18.98 kg/m²   ECOG PS: 3  General: alert, cooperative, no distress   Mental  status: normal mood, behavior, speech, dress, motor activity, and thought processes, able to follow commands   HENT: NCAT   Neck: no visualized mass   Resp: no respiratory distress   Neuro: no gross deficits   Skin: no discoloration or lesions of concern on visible areas   Psychiatric: normal affect, consistent with stated mood, no evidence of hallucinations         Results:     Lab Results   Component Value Date    WBC 1.7 (LL) 03/10/2024    HGB 11.3 (L) 03/10/2024    HCT 36.5 (L) 03/10/2024    MCV 91.0 03/10/2024    PLT 80 (L) 03/10/2024     Lab Results   Component Value Date     03/11/2024    K 4.1 03/11/2024     03/11/2024    CO2 27 03/11/2024    BUN 13 03/11/2024    CREATININE 0.89 03/11/2024    GLUCOSE 115 (H) 03/11/2024    CALCIUM 8.4 (L) 03/11/2024    PROT 7.4 03/08/2024    LABALBU 3.4 (L) 03/08/2024    BILITOT 0.5 03/08/2024    ALKPHOS 123 (H) 03/08/2024    AST 30 03/08/2024    ALT 21 03/08/2024    LABGLOM >60 03/11/2024    AGRATIO 0.9 (L) 03/08/2024    GLOB 4.0 03/08/2024           Assessment:     # Multifactorial pancyotpenia    Overall findings most consistent with pancytopenia from chronic alcohol abuse and dependence.   - replete B12/folate if low    Trend over next week.  Can be followed by his PCP as OP.      - low suspicion for intrinsic bone marrow pathology/failure.        Signed By: Felicity Hawthorne MD      Attending Medical Oncologist   [facility]

## 2024-03-11 NOTE — PLAN OF CARE
Problem: Occupational Therapy - Adult  Goal: By Discharge: Performs self-care activities at highest level of function for planned discharge setting.  See evaluation for individualized goals.  Description: FUNCTIONAL STATUS PRIOR TO ADMISSION:  Patient is homeless. He has history of R BKA and toe amputations of L foot. He has history of CVA with R sided weakness.    , ADL Assistance: Independent,  ,  ,  ,  ,  ,  , Ambulation Assistance: Non-ambulatory (independent w/c use)     Occupational Therapy Goals:  Initiated 3/11/2024  1.  Patient will perform bathing with Modified [place] within 7 day(s).  2.  Patient will perform upper body dressing with Modified [place] within 7 day(s).  3.  Patient will perform lower body dressing with Modified [place] within 7 day(s).  4.  Patient will perform BSC transfers with Modified [place]  within 7 day(s).  5.  Patient will perform all aspects of toileting with Modified [place] within 7 day(s).  Outcome: Progressing   OCCUPATIONAL THERAPY EVALUATION    Patient: Baldomero Reece (68 y.o. male)  Date: 3/11/2024  Primary Diagnosis: Suspected cerebrovascular accident [R09.89]  Cerebrovascular accident (CVA), unspecified mechanism (HCC) [I63.9]         Precautions: Contact Precautions, Other (comment) (LLE surgical shoe)                  ASSESSMENT :  The patient is limited by decreased functional mobility, independence in ADLs, ROM, strength, body mechanics, activity tolerance, endurance, safety awareness, cognition, attention/concentration, coordination, balance, fine-motor control, increased pain levels. Patient relied heavily on LUE during bed mobility, though unable and not willing to attempt isolated movements for testing due to pain. RUE weak from previous CVA. Patient refused transfer trials in spite of education on importance of OOB activity to reduce risk of deconditioning. EOB sitting balance intact, and tolerance good with duration > 10 min noted  risk of deconditioning. Patient not in agreement and refuses OOB activity           Unable to complete Fugyl Mary outcome measure due to L shoulder pain, patient refuses to attempt    [facility]CT[facility] \"6 Clicks\" Daily Activity Inpatient Short Form  How much help from another person does the patient currently need... Total; A Lot A Little None   1.  Putting on and taking off regular lower body clothing? []  1 [x]  2 []  3 []  4   2.  Bathing (including washing, rinsing, drying)? []  1 []  2 [x]  3 []  4   3.  Toileting, which includes using toilet, bedpan or urinal? [] 1 [x]  2 []  3 []  4   4.  Putting on and taking off regular upper body clothing? []  1 []  2 [x]  3 []  4   5.  Taking care of personal grooming such as brushing teeth? []  1 []  2 [x]  3 []  4   6.  Eating meals? []  1 []  2 []  3 [x]  4   © 2007, Trustees of [facility], under license to [employer]. All rights reserved     Score: 17/24     Interpretation of Tool:  Represents clinically-significant functional categories (i.e. Activities of daily living).    Cutoff score 39.4 (19) correlates to a good likelihood of discharging home versus a facility  Alka Emery, Ruchi Genao, Teodoro Barr, Archana Ramos, Howard Fischer, Ivan Emery, -PAC “6-Clicks” Functional Assessment Scores Predict Acute Care Hospital Discharge Destination, Physical Therapy, Volume 94, Issue 9, 1 September 2014, Pages 7571-8703, [email or web address]    Pain Rating:  10/10  L shoulder  Pain Intervention(s):   rest and repositioning    Activity Tolerance:   Fair  HR elevated with activity     After treatment:   Patient left seated EOB to eat and Call bell within reach. Unable to alarm bed due to patient position. Nurse aware    COMMUNICATION/EDUCATION:   The patient's plan of care was discussed with: physical therapist and registered nurse    Patient Education  Education Given To: Patient  Education Provided: ADL Adaptive  Strategies  Education Method: Verbal  Barriers to Learning: Cognition  Education Outcome: Continued education needed    Thank you for this referral.  Carmenza Zavala OT  Minutes: 29    Occupational Therapy Evaluation Charge Determination   History Examination Decision-Making   MEDIUM Complexity : Expanded review of history including physical, cognitive and psychocial history  LOW Complexity: 1-3 Performance deficits relating to physical, cognitive, or psychosocial skills that result in activity limitations and/or participation restrictions LOW Complexity: No comorbidities that affect functional and  no verbal  or physical assist needed to complete eval tasks   Based on the above components, the patient evaluation is determined to be of the following complexity level: Low

## 2024-03-11 NOTE — PROGRESS NOTES
Speech LAnguage Pathology EVALUATION/DISCHARGE    Patient: Baldomero Reece (68 y.o. male)  Date: 3/11/2024  Primary Diagnosis: Suspected cerebrovascular accident [R09.89]  Cerebrovascular accident (CVA), unspecified mechanism (HCC) [I63.9]    Precautions: Fall, Contact    ASSESSMENT:  Patient presents with suspected grossly functional oropharyngeal swallow based on bedside assessment. Patient edentulous but states he eats without dentition without difficulty. He does endorse ~1 week of swallowing difficulty but is unable to elaborate on specific functional changes. Note R oral-motor weakness including labial asymmetry/weakness. Patient given both thin liquids and solids for swallow assessment. Timely and efficient oral phase of swallow for consistencies assessed. No overt s/s of aspiration including with system stress via sequential boluses. Motor speech deficits present and consistent with dysarthria. Speech intelligibility improved with verbal cue to repeat or speak slow/over-articulate. Recommend PO diet of regular texture with thin liquids with aspiration precautions as listed below. MRI Brain is pending but will be helpful in differentiating acuity of oral-motor deficits and dysarthria and assist with plan of care for more subtle dysphagia risk factors.     Will follow for MRI Brain results and determine if further SLP evaluation indicated. Otherwise, patient will be discharged from skilled speech-language pathology services based on results of this evaluation.     PLAN :  Recommendations and Planned Interventions:  Diet: Regular and thin liquids  Oral medications whole with liquids/as tolerated  Aspiration precautions: Upright position, small bites, slow rate, alternate bites/sips  Routine oral care    Acute SLP Services: No, patient will be discharged from acute skilled speech-language pathology at this time. (As per MRI results)    Discharge Recommendations: Continue to assess pending progress

## 2024-03-11 NOTE — PROGRESS NOTES
Patient’s case reviewed during interdisciplinary team meeting in Med Surg/Tele Unit 2.  Rev. Tara Sanchez MDiv, [facility]

## 2024-03-11 NOTE — WOUND CARE
WOUND Note:      Follow up for  Wounds on Left foot and left medial malleolus     Chart shows:  Admitted on 3/8/24 for suspected CVA  History of Confusion, AMS; Ischemia of foot, MDRO; Osteomyelitis; bacteremia; alcoholic cirrhosis w/out ascitis; Hep C; COPD; Right BKA; Multiple sclerosis; Impaired ambulation;  HTN; Polysubstance abuse     Patient with multiple recent admissions.   Homeless.    3/11/24 Wound culture final result MRSA - patient on contact precautions     Assessment:   A&O x  2, patient sleepy, awakes to voice and answers questions. Calm and cooperative. Patient laying on right side. Able to turn independently.  Incontinent of bowel, able to use urinal. Bilat hands edematous patient/chart notes this is a chronic condition.  Surface:  EPS mattress      2020- R BKA  2023- Left toe amputations from frostbite  1/2/24- Procedure(s): Per Dr. Salazar, DPM  LEFT FOOT INCISION AND DRAINAGE with misonix , First metatarsal head amputation and left fifth toe amputation, skin graft application left foot left 5th toe amputation to MPJ Excisional Debridement left ankle (3x2x0.2cm) and application of [place]in Graft      Buttocks, bilat ischium, right lateral thigh with hyperpigmentation and discoloration, non blanchable. No open areas noted. Patient s/p fall, unable to ascertain how long patient was on ground. Possible development of pressure injuries. Will continue to monitor.      1. POA wounds to left foot   Left upper plantar 3.5 x 3.0 x 0.1 cm; pink/red wound base; closed wound edges; dry/flaky periwound. Scant serosang exudate.    Left medial foot 2.5 x 2.0 x 0.1 cm. Pink/red wound base; open wound edges; dry/flaky periwound. Scant serosang exudate.     Left medial malleolus - 4.5 x 4.0 x 0.0 cm. Eschar is coming off, leaving a superficial pink base. Some eschar left around wound edges. Scant serous exudate. Periwound without erythema.      Left medial upper foot - 2.0 x 1.5 x 0.1 cm. Pink/red wound

## 2024-03-11 NOTE — PROGRESS NOTES
Patient placed on 2 liters nasal cannula oxygen for decreased oxygen saturation of 88% on room air.

## 2024-03-11 NOTE — PLAN OF CARE
Problem: Physical Therapy - Adult  Goal: By Discharge: Performs mobility at highest level of function for planned discharge setting.  See evaluation for individualized goals.  Description: FUNCTIONAL STATUS PRIOR TO ADMISSION: Patient was modified independent using a wheelchair for functional mobility.    HOME SUPPORT PRIOR TO ADMISSION: The patient is suspected to be homeless, last known living arrangement were LT facility with discharge in Jan. 2024    Physical Therapy Goals  Initiated 3/10/2024  1.  Patient will move from supine to sit and sit to supine  in bed with independence within 7 day(s).    2.  Patient will transfer from bed to chair and chair to bed with independence using the least restrictive device within 7 day(s).  3.  Patient will utilize wheelchair for mobility out of bed up to 500ft independently.     Outcome: Progressing   PHYSICAL THERAPY TREATMENT    Patient: Baldomero Reece (68 y.o. male)  Date: 3/11/2024  Diagnosis: Suspected cerebrovascular accident [R09.89]  Cerebrovascular accident (CVA), unspecified mechanism (HCC) [I63.9] Suspected cerebrovascular accident      Precautions: Contact Precautions, Other (comment) (LLE surgical shoe)       ASSESSMENT:  Patient continues to benefit from skilled PT services and is slowly progressing towards goals. Patient showed decreased need for assistnace to sit up at edge of bed today. Patient declined PT/OT recommendations to sit out of bed in chair or attmept standing in surgical shoe. Patient received meal tray while working with PT/OT and showed good sitting balance while readjusting position at edge of bed to prepare for meal and to perform grooming tasks.           PLAN:  Patient continues to benefit from skilled intervention to address the above impairments.  Continue treatment per established plan of care.    Recommendation for discharge: (in order for the patient to meet his/her long term goals): Continue to assess pending progress    Other  factors to consider for discharge: no support system, poor safety awareness, and impaired cognition    IF patient discharges home will need the following DME: continuing to assess with progress, patient owns his own wheelchair and it is in his room with his personal belongings        SUBJECTIVE:   Patient stated, \"I can do all this stuff, but I'm telling you my left arm is hurting more and that's why I am here.\"    OBJECTIVE DATA SUMMARY:   Critical Behavior:  Cognition  Overall Cognitive Status: Exceptions  Attention Span: Attends with cues to redirect    Functional Mobility Training:  Bed Mobility:  Bed Mobility Training  Supine to Sit: Supervision  Sit to Supine: Supervision  Scooting: Supervision  Balance:  Balance  Sitting: Intact    Pain Rating:  10/10 L shoulder  Pain Intervention(s):   nursing notified and addressing, rest, and repositioning    Activity Tolerance:   Fair  and HR elevated with activity    After treatment:   Call bell within reach and patient at edge of bed with meal tray. RN aware      COMMUNICATION/EDUCATION:   The patient's plan of care was discussed with: occupational therapist, registered nurse, physician, and            Alvarez Patel PT  Minutes: 19

## 2024-03-11 NOTE — PLAN OF CARE
Problem: Safety - Adult  Goal: Free from fall injury  Outcome: Progressing     Problem: Discharge Planning  Goal: Discharge to home or other facility with appropriate resources  Outcome: Progressing     Problem: Pain  Goal: Verbalizes/displays adequate comfort level or baseline comfort level  Outcome: Progressing     Problem: Chronic Conditions and Co-morbidities  Goal: Patient's chronic conditions and co-morbidity symptoms are monitored and maintained or improved  Outcome: Progressing     Problem: Skin/Tissue Integrity  Goal: Absence of new skin breakdown  Description: 1.  Monitor for areas of redness and/or skin breakdown  2.  Assess vascular access sites hourly  3.  Every 4-6 hours minimum:  Change oxygen saturation probe site  4.  Every 4-6 hours:  If on nasal continuous positive airway pressure, respiratory therapy assess nares and determine need for appliance change or resting period.  Outcome: Progressing

## 2024-03-11 NOTE — PROGRESS NOTES
[facility] Dosing Services: Vancomycin Daily Dosing Note  Consult for dosing of vancomycin by Dr. Kitchen  Indication: SSTI  Day of Therapy: 4    Ht Readings from Last 1 Encounters:   03/09/24 1.753 m (5' 9\")     Wt Readings from Last 1 Encounters:   03/08/24 58.3 kg (128 lb 8.5 oz)       Vancomycin:   Current maintenance dose: vancomycin 1000 mg IV every 12 hours    Last level 19.3 mcg/mL corresponds to an    Dose calculated to approximate a target AUC/JERARDO of 400-600   Plan: SCr stable. Vancomycin level this morning corresponds to an AUC at the lower end of the desired range. Will increase to vancomycin 750 mg IV every 8 hours. Follow for de-escalation to oral therapy.   Dose administration notes:  Doses given appropriately as scheduled    Date Dose & Interval Measured level AUC   3/11/24 1000 mg IV q12h 19.3 438                 For Antifungals, Metronidazole, and Nafcillin: (3/8/24) ALT:    21    AST:   30   Alk Phos:   123   T Bili: 0.5    Other Antimicrobial   (not dosed by pharmacist) Cefepime - Day 4  Metronidazole - Day 4   Cultures 3/8 anaerobic: Negative @ Final  3/8 wound: Moderate MRSA, Rare mixed skin kristin @ Final  3/8 blood: NG @ 2 days  3/8 blood: NG @ 2 days   Serum Creatinine Lab Results   Component Value Date/Time    BUN 10 03/08/2024 12:01 PM    CREATININE 0.66 03/11/2024 12:39 AM      Creatinine Clearance Estimated Creatinine Clearance: 91 mL/min (A) (based on SCr of 0.66 mg/dL (L)).   Temp Temp: 98.1 °F (36.7 °C) (Oral)     WBC Lab Results   Component Value Date/Time    WBC 1.7 03/10/2024 03:16 PM      Procalcitonin  Lab Results   Component Value Date/Time    PROCAL <0.05 12/30/2023 11:53 PM        Pharmacy will follow the patient on a daily basis and make adjustments based on patient's clinical status.    Thank you,  Jaycee Singh, PharmD, BCPS

## 2024-03-12 LAB
ANION GAP SERPL CALC-SCNC: 9 MMOL/L (ref 5–15)
BASOPHILS # BLD: 0.1 K/UL (ref 0–0.1)
BASOPHILS NFR BLD: 3 % (ref 0–1)
BUN SERPL-MCNC: 18 MG/DL (ref 6–20)
BUN/CREAT SERPL: 27 (ref 12–20)
CALCIUM SERPL-MCNC: 8.6 MG/DL (ref 8.5–10.1)
CHLORIDE SERPL-SCNC: 106 MMOL/L (ref 97–108)
CO2 SERPL-SCNC: 24 MMOL/L (ref 21–32)
CREAT SERPL-MCNC: 0.66 MG/DL (ref 0.7–1.3)
DIFFERENTIAL METHOD BLD: ABNORMAL
EOSINOPHIL # BLD: 0.1 K/UL (ref 0–0.4)
EOSINOPHIL NFR BLD: 5 % (ref 0–7)
ERYTHROCYTE [DISTWIDTH] IN BLOOD BY AUTOMATED COUNT: 15.3 % (ref 11.5–14.5)
GLUCOSE SERPL-MCNC: 90 MG/DL (ref 65–100)
HCT VFR BLD AUTO: 40 % (ref 36.6–50.3)
HGB BLD-MCNC: 12.4 G/DL (ref 12.1–17)
IMM GRANULOCYTES # BLD AUTO: 0 K/UL (ref 0–0.04)
IMM GRANULOCYTES NFR BLD AUTO: 0 % (ref 0–0.5)
LYMPHOCYTES # BLD: 0.8 K/UL (ref 0.8–3.5)
LYMPHOCYTES NFR BLD: 26 % (ref 12–49)
MCH RBC QN AUTO: 28 PG (ref 26–34)
MCHC RBC AUTO-ENTMCNC: 31 G/DL (ref 30–36.5)
MCV RBC AUTO: 90.3 FL (ref 80–99)
MONOCYTES # BLD: 0.2 K/UL (ref 0–1)
MONOCYTES NFR BLD: 8 % (ref 5–13)
NEUTS SEG # BLD: 1.7 K/UL (ref 1.8–8)
NEUTS SEG NFR BLD: 58 % (ref 32–75)
NRBC # BLD: 0 K/UL (ref 0–0.01)
NRBC BLD-RTO: 0 PER 100 WBC
PLATELET # BLD AUTO: 81 K/UL (ref 150–400)
PMV BLD AUTO: 11.6 FL (ref 8.9–12.9)
POTASSIUM SERPL-SCNC: 4.5 MMOL/L (ref 3.5–5.1)
RBC # BLD AUTO: 4.43 M/UL (ref 4.1–5.7)
RBC MORPH BLD: ABNORMAL
SODIUM SERPL-SCNC: 139 MMOL/L (ref 136–145)
WBC # BLD AUTO: 2.9 K/UL (ref 4.1–11.1)

## 2024-03-12 PROCEDURE — 1200000000 HC SEMI PRIVATE

## 2024-03-12 PROCEDURE — 6370000000 HC RX 637 (ALT 250 FOR IP): Performed by: INTERNAL MEDICINE

## 2024-03-12 PROCEDURE — 6370000000 HC RX 637 (ALT 250 FOR IP): Performed by: EMERGENCY MEDICINE

## 2024-03-12 PROCEDURE — 36415 COLL VENOUS BLD VENIPUNCTURE: CPT

## 2024-03-12 PROCEDURE — 2580000003 HC RX 258: Performed by: INTERNAL MEDICINE

## 2024-03-12 PROCEDURE — 85025 COMPLETE CBC W/AUTO DIFF WBC: CPT

## 2024-03-12 PROCEDURE — 80048 BASIC METABOLIC PNL TOTAL CA: CPT

## 2024-03-12 RX ADMIN — THERA TABS 1 TABLET: TAB at 08:12

## 2024-03-12 RX ADMIN — AMOXICILLIN AND CLAVULANATE POTASSIUM 1 TABLET: 875; 125 TABLET, FILM COATED ORAL at 20:07

## 2024-03-12 RX ADMIN — DOXYCYCLINE HYCLATE 100 MG: 100 TABLET, COATED ORAL at 08:14

## 2024-03-12 RX ADMIN — AMOXICILLIN AND CLAVULANATE POTASSIUM 1 TABLET: 875; 125 TABLET, FILM COATED ORAL at 08:14

## 2024-03-12 RX ADMIN — FOLIC ACID 1 MG: 1 TABLET ORAL at 08:12

## 2024-03-12 RX ADMIN — Medication 100 MG: at 08:12

## 2024-03-12 RX ADMIN — ASPIRIN 81 MG CHEWABLE TABLET 81 MG: 81 TABLET CHEWABLE at 08:12

## 2024-03-12 RX ADMIN — SODIUM CHLORIDE, PRESERVATIVE FREE 10 ML: 5 INJECTION INTRAVENOUS at 20:08

## 2024-03-12 RX ADMIN — ROSUVASTATIN CALCIUM 40 MG: 10 TABLET, COATED ORAL at 20:06

## 2024-03-12 RX ADMIN — SODIUM CHLORIDE, PRESERVATIVE FREE 10 ML: 5 INJECTION INTRAVENOUS at 08:12

## 2024-03-12 RX ADMIN — LORAZEPAM 1 MG: 1 TABLET ORAL at 08:33

## 2024-03-12 RX ADMIN — DOXYCYCLINE HYCLATE 100 MG: 100 TABLET, COATED ORAL at 20:07

## 2024-03-12 ASSESSMENT — PAIN SCALES - GENERAL
PAINLEVEL_OUTOF10: 8
PAINLEVEL_OUTOF10: 7

## 2024-03-12 ASSESSMENT — PAIN DESCRIPTION - PAIN TYPE: TYPE: CHRONIC PAIN

## 2024-03-12 ASSESSMENT — PAIN DESCRIPTION - DESCRIPTORS: DESCRIPTORS: ACHING

## 2024-03-12 ASSESSMENT — PAIN DESCRIPTION - ORIENTATION
ORIENTATION: LEFT
ORIENTATION: LEFT

## 2024-03-12 ASSESSMENT — PAIN DESCRIPTION - FREQUENCY: FREQUENCY: CONTINUOUS

## 2024-03-12 ASSESSMENT — PAIN DESCRIPTION - LOCATION
LOCATION: LEG
LOCATION: FOOT

## 2024-03-12 ASSESSMENT — PAIN DESCRIPTION - ONSET: ONSET: ON-GOING

## 2024-03-12 NOTE — PROGRESS NOTES
[facility]  Hospitalist Group                                                                                          Hospitalist Progress Note  Hema Kitchen MD  Office Phone: [phone]        Date of Service:  3/11/2024  NAME:  Baldomero Recee  :  1955  MRN:  [id number]       Admission Summary:   Baldomero Reece is a 68 y.o. 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  Patient recalls drinking until he passed out. Last known was on 2024 before patient went to sleep.  Patient woke up around 3 AM and noticed left-sided weakness.  He recalls drinking more but is unclear on what he drank and how much.  Patient denies using illicit drugs but is open to trying anything.     At the ED patient arrived tachycardic with a heart rate of 116 and he medically stable.  Per ED exam patient had a NIHSS score of 20 which later went down to 13 after patient began moving his left arm.  Significant labs include lactic acid 2.4, alkaline phosphatase 123, WBC 4.0, RDW 16, platelet count 115, ethanol level 63.  Lactic acid down trended to 1.1 after patient was given 1 L of fluids.  UA negative for infection, UDS positive for cocaine and marijuana use.  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  Teleneurologist recommended stroke evaluation including giving aspirin, Plavix which was initiated in the ED.     On presentation patient was eating his meal using both of his hands.  However once patient acknowledged writer's presence left arm drooped.  On physical exam patient showed left arm was unable to move however with encouragement patient  (Non-Medical): Yes   Physical Activity: Not on file   Stress: Not on file   Social Connections: Not on file   Intimate Partner Violence: Not on file   Depression: Not on file   Housing Stability: High Risk (3/8/2024)    Housing Stability Vital Sign     Unable to Pay for Housing in the Last Year: Yes     Number of Places Lived in the Last Year: 2     Unstable Housing in the Last Year: Yes   Interpersonal Safety: Not At Risk (3/8/2024)    Interpersonal Safety ([facility] HRSN)     How often does anyone, including family and friends, physically hurt you?: Never     How often does anyone, including family and friends, scream or curse at you?: Not on file     How often does anyone, including family and friends, insult or talk down to you?: Not on file     How often does anyone, including family and friends, threaten you with harm?: Not on file   Utilities: Not At Risk (3/8/2024)    [facility] Utilities     Threatened with loss of utilities: No       Review of Systems:   Refer to subjective    Vital Signs:    Last 24hrs VS reviewed since prior progress note. Most recent are:  Vitals:    03/11/24 1903   BP: 114/79   Pulse: 82   Resp: 16   Temp: 98.2 °F (36.8 °C)   SpO2: 95%         Intake/Output Summary (Last 24 hours) at 3/11/2024 2226  Last data filed at 3/11/2024 1714  Gross per 24 hour   Intake 1061.41 ml   Output 750 ml   Net 311.41 ml        Physical Examination:     I had a face to face encounter with this patient and independently examined them on 3/11/2024 as outlined below:          General: No acute distress. Appeared disheveled.   HEENT: Eyes: perrl, no discharge or icterus. Dry mucus membranes  Cardiovascular: S1, S2, rrr, no mrg  Respiratory: expiratory wheezing on all lung fields.  Abdomen: active bowel sounds on 4q, abdomen soft, epigastric tenderness with no guarding or rigidity, no peritoneal signs  Extremities: No lower edema, R BKA, Left foot multiple wounds including medial left foot,  left malleolus, and plantar,

## 2024-03-12 NOTE — PLAN OF CARE
Care plan reviewed  Problem: Safety - Adult  Goal: Free from fall injury  3/11/2024 2212 by Christa Marc RN  Outcome: Progressing  3/11/2024 0952 by Ruchi Dunham RN  Outcome: Progressing     Problem: Discharge Planning  Goal: Discharge to home or other facility with appropriate resources  3/11/2024 2212 by Christa Marc RN  Outcome: Progressing  3/11/2024 0952 by Ruchi Dunham RN  Outcome: Progressing     Problem: Pain  Goal: Verbalizes/displays adequate comfort level or baseline comfort level  3/11/2024 2212 by Christa Marc RN  Outcome: Progressing  3/11/2024 0952 by Ruchi Dunham RN  Outcome: Progressing     Problem: Chronic Conditions and Co-morbidities  Goal: Patient's chronic conditions and co-morbidity symptoms are monitored and maintained or improved  3/11/2024 2212 by Christa Marc RN  Outcome: Progressing  3/11/2024 0952 by Ruchi Dunham RN  Outcome: Progressing     Problem: Skin/Tissue Integrity  Goal: Absence of new skin breakdown  Description: 1.  Monitor for areas of redness and/or skin breakdown  2.  Assess vascular access sites hourly  3.  Every 4-6 hours minimum:  Change oxygen saturation probe site  4.  Every 4-6 hours:  If on nasal continuous positive airway pressure, respiratory therapy assess nares and determine need for appliance change or resting period.  3/11/2024 2212 by Christa Marc RN  Outcome: Progressing  3/11/2024 0952 by Ruchi Dunham RN  Outcome: Progressing     Problem: Physical Therapy - Adult  Goal: By Discharge: Performs mobility at highest level of function for planned discharge setting.  See evaluation for individualized goals.  Description: FUNCTIONAL STATUS PRIOR TO ADMISSION: Patient was modified independent using a wheelchair for functional mobility.    HOME SUPPORT PRIOR TO ADMISSION: The patient is suspected to be homeless, last known living arrangement were LTC facility with discharge in Jan. 2024    Physical Therapy Goals  Initiated 3/10/2024  1.

## 2024-03-12 NOTE — PROGRESS NOTES
Patient’s case reviewed during interdisciplinary team meeting in Med Surg/Tele Unit 2.  Rev. Tara Sanchez MDiv, [facility]

## 2024-03-12 NOTE — CARE COORDINATION
NADIA     RUR 20%     IDR Rounds this am with MD and team.   Continue plan of care   Barrier to disposition due to Hx, Social issues and compliance.     Received VM from Insurance CM for follow-up for disposition  Andre [facility]ok [place]  [phone] - Called and left VM for call back to me or CM Melina Agarwal.     Review of the chart -     Received call from Saeid Chang from [facility] .  Patient was at their facility 1-11-24 until  2-16-24. Left AMA.  He left the facility -went to local Pharmacy- without notifying them.  Unable to accept patient back.     Patient has been in the ER At another facility Twice before this admit to the [facility].  Requested transportation to [address] [facility] at that time.       Angelina JOLLEY RN    [phone]     Addendum:     Checking with the [facility] to see if they have any respite beds. Waiting response back.     Addendum: 6:00PM   Received email back from the [facility]  No respite care beds at this time.

## 2024-03-12 NOTE — PLAN OF CARE
Problem: Safety - Adult  Goal: Free from fall injury  3/12/2024 0755 by Ruchi Dunham RN  Outcome: Progressing  3/11/2024 2212 by Christa Marc RN  Outcome: Progressing     Problem: Discharge Planning  Goal: Discharge to home or other facility with appropriate resources  3/12/2024 0755 by Ruchi Dunham RN  Outcome: Progressing  3/11/2024 2212 by Christa Marc RN  Outcome: Progressing     Problem: Pain  Goal: Verbalizes/displays adequate comfort level or baseline comfort level  3/12/2024 0755 by Ruchi Dunham RN  Outcome: Progressing  3/11/2024 2212 by Christa Mrac RN  Outcome: Progressing     Problem: Chronic Conditions and Co-morbidities  Goal: Patient's chronic conditions and co-morbidity symptoms are monitored and maintained or improved  3/12/2024 0755 by Ruchi Dunham RN  Outcome: Progressing  3/11/2024 2212 by Christa Marc RN  Outcome: Progressing     Problem: Skin/Tissue Integrity  Goal: Absence of new skin breakdown  Description: 1.  Monitor for areas of redness and/or skin breakdown  2.  Assess vascular access sites hourly  3.  Every 4-6 hours minimum:  Change oxygen saturation probe site  4.  Every 4-6 hours:  If on nasal continuous positive airway pressure, respiratory therapy assess nares and determine need for appliance change or resting period.  3/12/2024 0755 by Ruchi Dunham RN  Outcome: Progressing  3/11/2024 2212 by Christa Marc RN  Outcome: Progressing

## 2024-03-12 NOTE — PROGRESS NOTES
[facility]  Hospitalist Group                                                                                          Hospitalist Progress Note  Maegan Trevino MD  Office Phone: [phone]        Date of Service:  3/12/2024  NAME:  Baldomero Reece  :  1955  MRN:  [id number]       Admission Summary:   Baldomero Reece is a 68 y.o. 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  Patient recalls drinking until he passed out. Last known was on 2024 before patient went to sleep.  Patient woke up around 3 AM and noticed left-sided weakness.  He recalls drinking more but is unclear on what he drank and how much.  Patient denies using illicit drugs but is open to trying anything.     At the ED patient arrived tachycardic with a heart rate of 116 and he medically stable.  Per ED exam patient had a NIHSS score of 20 which later went down to 13 after patient began moving his left arm.  Significant labs include lactic acid 2.4, alkaline phosphatase 123, WBC 4.0, RDW 16, platelet count 115, ethanol level 63.  Lactic acid down trended to 1.1 after patient was given 1 L of fluids.  UA negative for infection, UDS positive for cocaine and marijuana use.  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  Teleneurologist recommended stroke evaluation including giving aspirin, Plavix which was initiated in the ED.     On presentation patient was eating his meal using both of his hands.  However once patient acknowledged writer's presence left arm drooped.  On physical exam patient showed left arm was unable to move however with encouragement patient

## 2024-03-13 LAB
BACTERIA SPEC CULT: NORMAL
BACTERIA SPEC CULT: NORMAL
CREAT SERPL-MCNC: 0.69 MG/DL (ref 0.7–1.3)
FOLATE SERPL-MCNC: >20 NG/ML
GLUCOSE BLD STRIP.AUTO-MCNC: 102 MG/DL (ref 65–117)
SERVICE CMNT-IMP: NORMAL

## 2024-03-13 PROCEDURE — 2580000003 HC RX 258: Performed by: INTERNAL MEDICINE

## 2024-03-13 PROCEDURE — 6370000000 HC RX 637 (ALT 250 FOR IP): Performed by: INTERNAL MEDICINE

## 2024-03-13 PROCEDURE — 97530 THERAPEUTIC ACTIVITIES: CPT

## 2024-03-13 PROCEDURE — 6370000000 HC RX 637 (ALT 250 FOR IP): Performed by: EMERGENCY MEDICINE

## 2024-03-13 PROCEDURE — 82565 ASSAY OF CREATININE: CPT

## 2024-03-13 PROCEDURE — G0425 INPT/ED TELECONSULT30: HCPCS | Performed by: PSYCHIATRY & NEUROLOGY

## 2024-03-13 PROCEDURE — 82962 GLUCOSE BLOOD TEST: CPT

## 2024-03-13 PROCEDURE — 1200000000 HC SEMI PRIVATE

## 2024-03-13 RX ORDER — ASPIRIN 325 MG
325 TABLET ORAL DAILY
Status: DISCONTINUED | OUTPATIENT
Start: 2024-03-14 | End: 2024-03-22 | Stop reason: HOSPADM

## 2024-03-13 RX ORDER — CLOPIDOGREL BISULFATE 75 MG/1
75 TABLET ORAL DAILY
Status: DISCONTINUED | OUTPATIENT
Start: 2024-03-13 | End: 2024-03-22 | Stop reason: HOSPADM

## 2024-03-13 RX ADMIN — DOXYCYCLINE HYCLATE 100 MG: 100 TABLET, COATED ORAL at 21:34

## 2024-03-13 RX ADMIN — AMOXICILLIN AND CLAVULANATE POTASSIUM 1 TABLET: 875; 125 TABLET, FILM COATED ORAL at 21:34

## 2024-03-13 RX ADMIN — SODIUM CHLORIDE, PRESERVATIVE FREE 10 ML: 5 INJECTION INTRAVENOUS at 08:51

## 2024-03-13 RX ADMIN — ASPIRIN 81 MG CHEWABLE TABLET 81 MG: 81 TABLET CHEWABLE at 08:50

## 2024-03-13 RX ADMIN — Medication 100 MG: at 08:50

## 2024-03-13 RX ADMIN — AMOXICILLIN AND CLAVULANATE POTASSIUM 1 TABLET: 875; 125 TABLET, FILM COATED ORAL at 08:50

## 2024-03-13 RX ADMIN — THERA TABS 1 TABLET: TAB at 08:50

## 2024-03-13 RX ADMIN — SODIUM CHLORIDE, PRESERVATIVE FREE 10 ML: 5 INJECTION INTRAVENOUS at 21:34

## 2024-03-13 RX ADMIN — ROSUVASTATIN CALCIUM 40 MG: 10 TABLET, COATED ORAL at 21:34

## 2024-03-13 RX ADMIN — CLOPIDOGREL BISULFATE 75 MG: 75 TABLET ORAL at 13:48

## 2024-03-13 RX ADMIN — DOXYCYCLINE HYCLATE 100 MG: 100 TABLET, COATED ORAL at 08:50

## 2024-03-13 RX ADMIN — TRAMADOL HYDROCHLORIDE 100 MG: 50 TABLET ORAL at 16:21

## 2024-03-13 RX ADMIN — FOLIC ACID 1 MG: 1 TABLET ORAL at 08:50

## 2024-03-13 ASSESSMENT — PAIN SCALES - GENERAL
PAINLEVEL_OUTOF10: 9
PAINLEVEL_OUTOF10: 8
PAINLEVEL_OUTOF10: 7

## 2024-03-13 ASSESSMENT — PAIN DESCRIPTION - ORIENTATION
ORIENTATION: LEFT
ORIENTATION: LEFT

## 2024-03-13 ASSESSMENT — PAIN DESCRIPTION - DESCRIPTORS
DESCRIPTORS: ACHING;THROBBING;SHARP;SHOOTING
DESCRIPTORS: SHARP;SHOOTING

## 2024-03-13 ASSESSMENT — PAIN DESCRIPTION - LOCATION
LOCATION: FOOT
LOCATION: FOOT

## 2024-03-13 NOTE — PROGRESS NOTES
1429) Pt removed telemetry leads and transferred himself to wheelchair.   1430) Message to Dr. Trevino, pt is refusing telemetry.  Can he leave AMA?  1430) Dr. Trevino discussed risks of leaving.  Pt agree to stay for now.

## 2024-03-13 NOTE — PROGRESS NOTES
9332) Dr. De Santiago, teleneurologist, at bedside via robot.  MRI negative. Echo done.  Recommend physical therapy.

## 2024-03-13 NOTE — PLAN OF CARE
Problem: Occupational Therapy - Adult  Goal: By Discharge: Performs self-care activities at highest level of function for planned discharge setting.  See evaluation for individualized goals.  Description: FUNCTIONAL STATUS PRIOR TO ADMISSION:  Patient is homeless. He has history of R BKA and toe amputations of L foot. He has history of CVA with R sided weakness.    , ADL Assistance: Independent,  ,  ,  ,  ,  ,  , Ambulation Assistance: Non-ambulatory (independent w/c use)     Occupational Therapy Goals:  Initiated 3/11/2024  1.  Patient will perform bathing with Modified [place] within 7 day(s).  2.  Patient will perform upper body dressing with Modified [place] within 7 day(s).  3.  Patient will perform lower body dressing with Modified [place] within 7 day(s).  4.  Patient will perform BSC transfers with Modified [place]  within 7 day(s).  5.  Patient will perform all aspects of toileting with Modified [place] within 7 day(s).  Outcome: Progressing  OCCUPATIONAL THERAPY TREATMENT  Patient: Baldomero Reece (68 y.o. male)  Date: 3/13/2024  Primary Diagnosis: Suspected cerebrovascular accident [R09.89]  Cerebrovascular accident (CVA), unspecified mechanism (HCC) [I63.9]       Precautions: Contact Precautions, Other (comment) (LLE surgical shoe)                Chart, occupational therapy assessment, plan of care, and goals were reviewed.    ASSESSMENT  Patient continues to benefit from skilled OT services and is slowly progressing towards goals. Pt received sidelying in bed, agreeable to participate. He required frequent cueing for safety awareness and to wait for assistance. Pt transferred supine>sit with supervision and demo'd good sitting balance EOB. He declined performing any ADLs or additional mobility at that time despite encouragement, and returned himself to bed with supervision. He had no complaints regarding LUE throughout session. Pt remained in bed at end of session with alarm  set. He continues to benefit from skilled intervention to address the above impairments.          PLAN :  Patient continues to benefit from skilled intervention to address the above impairments.  Continue treatment per established plan of care to address goals.    Recommendation for discharge: (in order for the patient to meet his/her long term goals): Continue to assess pending progress    Other factors to consider for discharge: no support system, poor safety awareness, and impaired cognition    IF patient discharges home will need the following DME: continuing to assess with progress       SUBJECTIVE:   Patient stated “I can do all of that, just not right now.\"    OBJECTIVE DATA SUMMARY:   Cognitive/Behavioral Status:   Alert       Functional Mobility and Transfers for ADLs:  Bed Mobility:  Bed Mobility Training  Supine to Sit: Supervision  Sit to Supine: Supervision  Scooting: Supervision            Balance:     Balance  Sitting: Intact      ADL Intervention:    Skin Care: Chlorhexidine wipes    Pain Rating:  Pt reported no pain during session    Activity Tolerance:   Fair   Please refer to the flowsheet for vital signs taken during this treatment.    After treatment:   Patient left in no apparent distress in bed, Call bell within reach, Bed/ chair alarm activated, and Side rails x3    COMMUNICATION/EDUCATION:   The patient's plan of care was discussed with: physical therapist and registered nurse         Thank you for this referral.  Patricia Cantu, OT  Minutes: 11

## 2024-03-13 NOTE — WOUND CARE
WOUND Note:      Follow up for  Wounds on Left foot and left medial malleolus     Chart shows:  Admitted on 3/8/24 for suspected CVA  History of Confusion, AMS; Ischemia of foot, MDRO; Osteomyelitis; bacteremia; alcoholic cirrhosis w/out ascitis; Hep C; COPD; Right BKA; Multiple sclerosis; Impaired ambulation;  HTN; Polysubstance abuse     Patient with multiple recent admissions.   Homeless.     3/11/24 Wound culture final result MRSA - patient on contact precautions     Assessment:   A&O x  3 calm and cooperative. Patient notes has 8/10 pain on Left foot; primary nurse medicated patient. Able to turn independently.  Continent now since he is more alert. Using the BSC and urinal. Hands less edematous and patient has better hand dexterity.  Surface:  [employer] mattress      2020- R BKA  2023- Left toe amputations from frostbite  1/2/24- Procedure(s): Per Dr. Salazar, DPM  LEFT FOOT INCISION AND DRAINAGE with misonix , First metatarsal head amputation and left fifth toe amputation, skin graft application left foot left 5th toe amputation to MPJ Excisional Debridement left ankle (3x2x0.2cm) and application of Theraskin Graft      Buttocks, bilat ischium, right lateral thigh with hyperpigmentation and discoloration, non blanchable. No open areas noted. Patient s/p fall, unable to ascertain how long patient was on ground. Possible development of pressure injuries. Will continue to monitor.      1. POA wounds to left foot   Left upper plantar 3.5 x 3.0 x 0.1 cm; pink/red wound base; closed wound edges; dry/flaky periwound. Scant serosang exudate.         Left medial foot 2.5 x 2.0 x 0.1 cm. Pink/red wound base; open wound edges; dry/flaky periwound. Scant serosang exudate.         Left medial malleolus - 4.5 x 4.0 x 0.0 cm. Eschar is mostly off, leaving a superficial pink base. Some eschar left around wound edges. Scant serous exudate. Periwound without erythema. Hyperpigmented.     Left medial upper foot - 2.0 x 1.5 x

## 2024-03-13 NOTE — PROGRESS NOTES
Physician Progress Note      PATIENT:               LILIA BOX  CSN #:                  [id number]  :                       1955  ADMIT DATE:       3/8/2024 10:24 AM  DISCH DATE:  RESPONDING  PROVIDER #:        Cierra Trevino MD          QUERY TEXT:    Dear attending,    Patient has moderate malnutrition documented per dietary on 3/9/24.    If possible, please document in the progress notes and discharge summary if   you are evaluating and/or treating any of the following:        The medical record reflects the following:  Risk Factors: hx CVA, polysubstance abuse    Clinical Indicators: 3/9--Per dietary-    Malnutrition Assessment:  Malnutrition Status:  Moderate malnutrition (24 1210)  Context:  Chronic Illness  Findings of the 6 clinical characteristics of malnutrition:  Energy Intake:  75% or less estimated energy requirements for 1 month or   longer  Weight Loss:  5% over 1 month  Body Fat Loss:  Severe body fat loss Orbital, Buccal region, Fat Overlying   Ribs  Muscle Mass Loss:  Mild muscle mass loss Temples (temporalis), Clavicles   (pectoralis & deltoids), Hand (interosseous)  Fluid Accumulation:  No significant fluid accumulation   Strength:  Not Performed    Nutrition Diagnosis:  Inadequate protein-energy intake, Increased nutrient needs, Moderate   malnutrition, In context of chronic illness, Underweight, Unintended weight   loss, Food & Nutrition-related knowledge deficit, Limited adherence to   nutrition-related recommendations related to cognitive or neurological   impairment, lack or limited access to food, inadequate protein-energy intake   as evidenced by intake 51-75%, poor intake prior to admission, BMI, weight   loss, weight loss greater than or equal to 5% in 1 month, moderate loss of   subcutaneous fat, moderate muscle loss, constipation    Treatment: Dietary consult, monitor weight, labwork    ASPEN Criteria:

## 2024-03-13 NOTE — CARE COORDINATION
NADIA     RUR  20 %     IDR rounds again this am.   See previous CM note     Met with patient in the room.   Discussed disposition. He indicates he was living on the streets since he left [facility]. Prior to this was on the street.  Talked about reason for leaving- he indicates he informed them he was leaving and coming back. Discussed rules and regulations for this type of setting.     Asked about Recovery Programs he said he has not been in treatment before.  Asked about wound care- he indicates he is not able to do himself.     Provided him a list of recovery places -inpatient- Nursing working on getting phone in his room to make calls.  Questions if he will call. He has to do the intake for these places.  Most of these places are not an option unless he is (I) in all of his care.     Patient indicates willingness for us to continue to look for resources for him. Aware of the difficulty in finding placement due to his history and medical needs.     He talked about leaving - going back to the streets.       Called Mr. Goodrich  [phone] Assisted Living accept all males- reviewed history pm [address].  Indicates this may not be a good fit for patient-  he may have another resource -for me to call him back to lyman.     Sent information back out to [facility].  About 27 facilities to see if they may be an option.     Will talk to Wound Care Nurse later today and get an assessment of his wound.     Angelina JOLLEY RN    [phone]

## 2024-03-13 NOTE — PROGRESS NOTES
Physical Therapy    Chart reviewed and appreciated. Patient received side lying in bed following OT treatment. Patient declined PT session. PT offered to assist patient out of bed to wheelchair or drop arm chair, but patient declined stating that he would do that when he felt like it. PT will follow up with patient as able.     Alvarez Patel, PT

## 2024-03-13 NOTE — CONSULTS
Formerly [facility], later [facility] NEUROLOGY CONSULTATION          Chief Complaint/Admission Diagnosis: Suspected cerebrovascular accident [R09.89]  Cerebrovascular accident (CVA), unspecified mechanism (HCC) [I63.9]     I have been asked to see this 68 y.o. male in neurological consultation by Cierra Peres MD  to render advice and opinion regarding antiplatelet therapy     ?     Impression/Recommendations:   Baldomero Reece is a 68 y.o. male with PMHx  of COPD,alcohol use disorder, Hep c, liver cirrhosis, LLE DVT, CVA in 2021 with right sided leg weakness, right BKA in 2020 due to osteomyelitis, left toe amputations from frostbite,  was brought in by EMS due to being found on the floor from his wheel chair. UDS positive for cocaine and marijuana use. Patient has hx of a seizure last year with unknown details. MRI brain showed No acute infarct or intracranial hemorrhage. Sequelae of remote left hemispheric intraparenchymal hematoma with subjacent  chronic left frontal cortical and gangliocapsular infarctions. Echo showed normal EF and negative bubble study. EKG shows NSR.    DAPT for 30 days (Aspirin 325 mg qd plus Plavix 75 mg qd) then switch to Plavix 75 mg qd  Crestor 40 mg qd- patient does not take it at home.  PT/OT recommended  cardiac telemetry  Patient needs to be invested in his health. He does not take any prescribed medication such as aspirin or Crestor.   Needs to get a PCP.            Ana M De Santiago MD  Teleneurologist    HPI:   History was obtained from a review of the electronic record and from the patient and family.??   Baldomero Reece is a 68 y.o. 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  Patient recalls drinking until he passed out. Last known was on 03/07/2024 before patient

## 2024-03-13 NOTE — PROGRESS NOTES
Patient continues to try to get out of bed without nurse or tech help. The patient threatened to leave AMA if I continue to turn on the bed alarm. The patient was educated on being a fall risk and the risk of injury if he continues to get out of bed without help. He said he does it every day. I asked the patient if he would be willing to sign a form refusing bed alarm and he said yes.

## 2024-03-13 NOTE — PLAN OF CARE
Problem: Safety - Adult  Goal: Free from fall injury  3/13/2024 1022 by Jocelyn Granda RN  Outcome: Progressing  3/12/2024 2236 by Christa Marc RN  Outcome: Progressing     Problem: Discharge Planning  Goal: Discharge to home or other facility with appropriate resources  3/13/2024 1022 by Jocelyn Granda RN  Outcome: Progressing  3/12/2024 2236 by Christa Marc RN  Outcome: Progressing     Problem: Pain  Goal: Verbalizes/displays adequate comfort level or baseline comfort level  3/13/2024 1022 by Jocelyn Granda RN  Outcome: Progressing  3/12/2024 2236 by Christa Marc RN  Outcome: Progressing     Problem: Chronic Conditions and Co-morbidities  Goal: Patient's chronic conditions and co-morbidity symptoms are monitored and maintained or improved  3/13/2024 1022 by Jocelyn Granda RN  Outcome: Progressing  3/12/2024 2236 by Christa Marc RN  Outcome: Progressing     Problem: Skin/Tissue Integrity  Goal: Absence of new skin breakdown  Description: 1.  Monitor for areas of redness and/or skin breakdown  2.  Assess vascular access sites hourly  3.  Every 4-6 hours minimum:  Change oxygen saturation probe site  4.  Every 4-6 hours:  If on nasal continuous positive airway pressure, respiratory therapy assess nares and determine need for appliance change or resting period.  3/13/2024 1022 by Jocelyn Granda RN  Outcome: Progressing  3/12/2024 2236 by Christa Marc RN  Outcome: Progressing

## 2024-03-14 LAB
EKG ATRIAL RATE: 90 BPM
EKG DIAGNOSIS: NORMAL
EKG P AXIS: 80 DEGREES
EKG P-R INTERVAL: 164 MS
EKG Q-T INTERVAL: 400 MS
EKG QRS DURATION: 140 MS
EKG QTC CALCULATION (BAZETT): 489 MS
EKG R AXIS: -79 DEGREES
EKG T AXIS: 200 DEGREES
EKG VENTRICULAR RATE: 90 BPM

## 2024-03-14 PROCEDURE — 6370000000 HC RX 637 (ALT 250 FOR IP): Performed by: INTERNAL MEDICINE

## 2024-03-14 PROCEDURE — 1200000000 HC SEMI PRIVATE

## 2024-03-14 PROCEDURE — 2580000003 HC RX 258: Performed by: INTERNAL MEDICINE

## 2024-03-14 RX ADMIN — AMOXICILLIN AND CLAVULANATE POTASSIUM 1 TABLET: 875; 125 TABLET, FILM COATED ORAL at 09:16

## 2024-03-14 RX ADMIN — SODIUM CHLORIDE, PRESERVATIVE FREE 10 ML: 5 INJECTION INTRAVENOUS at 20:45

## 2024-03-14 RX ADMIN — Medication 100 MG: at 09:16

## 2024-03-14 RX ADMIN — SODIUM CHLORIDE, PRESERVATIVE FREE 10 ML: 5 INJECTION INTRAVENOUS at 09:16

## 2024-03-14 RX ADMIN — FOLIC ACID 1 MG: 1 TABLET ORAL at 09:15

## 2024-03-14 RX ADMIN — TRAMADOL HYDROCHLORIDE 100 MG: 50 TABLET ORAL at 20:45

## 2024-03-14 RX ADMIN — ASPIRIN 325 MG: 325 TABLET ORAL at 09:17

## 2024-03-14 RX ADMIN — DOXYCYCLINE HYCLATE 100 MG: 100 TABLET, COATED ORAL at 09:15

## 2024-03-14 RX ADMIN — DOXYCYCLINE HYCLATE 100 MG: 100 TABLET, COATED ORAL at 20:45

## 2024-03-14 RX ADMIN — AMOXICILLIN AND CLAVULANATE POTASSIUM 1 TABLET: 875; 125 TABLET, FILM COATED ORAL at 20:45

## 2024-03-14 RX ADMIN — TRAMADOL HYDROCHLORIDE 100 MG: 50 TABLET ORAL at 09:23

## 2024-03-14 RX ADMIN — ROSUVASTATIN CALCIUM 40 MG: 10 TABLET, COATED ORAL at 20:45

## 2024-03-14 RX ADMIN — THERA TABS 1 TABLET: TAB at 09:15

## 2024-03-14 RX ADMIN — CLOPIDOGREL BISULFATE 75 MG: 75 TABLET ORAL at 09:15

## 2024-03-14 ASSESSMENT — PAIN SCALES - GENERAL
PAINLEVEL_OUTOF10: 7
PAINLEVEL_OUTOF10: 8
PAINLEVEL_OUTOF10: 9
PAINLEVEL_OUTOF10: 9
PAINLEVEL_OUTOF10: 4

## 2024-03-14 ASSESSMENT — PAIN DESCRIPTION - LOCATION
LOCATION: FOOT

## 2024-03-14 ASSESSMENT — PAIN DESCRIPTION - ORIENTATION: ORIENTATION: LEFT;DISTAL

## 2024-03-14 ASSESSMENT — PAIN DESCRIPTION - DESCRIPTORS: DESCRIPTORS: ACHING;SHARP;THROBBING

## 2024-03-14 ASSESSMENT — PAIN - FUNCTIONAL ASSESSMENT: PAIN_FUNCTIONAL_ASSESSMENT: ACTIVITIES ARE NOT PREVENTED

## 2024-03-14 NOTE — CARE COORDINATION
NADIA    RUR-20%      CM received VM from patients Insurance Care coordinator concerning SNF's CM phoned CM and left VM with call back number.    Melina Agarwal RN CM

## 2024-03-14 NOTE — CARE COORDINATION
NADIA    RUR  20 %     IDR Rounds this am with MD and team   JEFRY ready for discharge- barrier with placement due to Dx/History,  Payor Source,.   Patient has wheelchair- (1)    Plan  Wound Care Center on AVS  Podiatry on AVS  PCP  due not having a specific address information for DP on AVS  Transportation - He has Medicaid - he can utilize. Offer to arrange - my contact on AVS.   Recovery Resource on AVS.  Nursing to give Wound Care Supplies     See CM previous notes for details     Made appointment for Wound Care Center and Podiatry Office. My contact on AVS if he wants me to arrange transportation to these visits.     Received calls from a few nursing facilities asking for additional information. Sent information to [place].   For any Nursing Home Placement in-order for them to receive payment through Medicaid for LTC- he has to be there a min of one Month- His Social Security/Monthly income- goes to the facility min small fee- unless he is going Short-Term then the he may be able to receive additional funds back.     Called Ms. Matthews  [phone] who has several Assisted Living homes- She indicates she cannot accept anyone in a wheelchair.     Angelina Wakefield MSW RN   [phone]

## 2024-03-14 NOTE — PROGRESS NOTES
[facility] Adult  Hospitalist Group                                                                                          Hospitalist Progress Note  Maegan Trevino MD  Office Phone: [phone]        Date of Service:  3/14/2024  NAME:  Baldomero Reece  :  1955  MRN:  [id number]       Admission Summary:   Baldomero Reece is a 68 y.o. 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  Patient recalls drinking until he passed out. Last known was on 2024 before patient went to sleep.  Patient woke up around 3 AM and noticed left-sided weakness.  He recalls drinking more but is unclear on what he drank and how much.  Patient denies using illicit drugs but is open to trying anything.     At the ED patient arrived tachycardic with a heart rate of 116 and he medically stable.  Per ED exam patient had a NIHSS score of 20 which later went down to 13 after patient began moving his left arm.  Significant labs include lactic acid 2.4, alkaline phosphatase 123, WBC 4.0, RDW 16, platelet count 115, ethanol level 63.  Lactic acid down trended to 1.1 after patient was given 1 L of fluids.  UA negative for infection, UDS positive for cocaine and marijuana use.  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  Teleneurologist recommended stroke evaluation including giving aspirin, Plavix which was initiated in the ED.     On presentation patient was eating his meal using both of his hands.  However once patient acknowledged writer's presence left arm drooped.  On physical exam patient showed left arm was unable to move however with encouragement patient  alcohol use.  -Heme-onc consultation done.  As per heme-onc,\"Overall findings most consistent with pancytopenia from chronic alcohol abuse and dependence. - replete B12/folate if low  Trend over next week.  Can be followed by his PCP as OP.  - low suspicion for intrinsic bone marrow pathology/failure.\"    -HIV: negative,   -B12:271  -Folic acid:>20.      Lactic acidosis (resolved)     Alcohol use disorder  -\"drink whatever I can get\", last drink prior to passing out, between 3am and being picked up by EMS  -hx of withdrawal seizures  -c/w Multivitamins, Thiamine, Folic acid     Hepatitis C  History of cirrhosis of the liver  -Needs to follow up with GI for hepatitis C treatment     PVD  -S/p right-sided BKA in 2020 due to osteomyelitis  -Continue statin, asa     Hx of COPD  -duonebs     History of left extremity DVT  -unclear when it occurred, patient not taking any medication  -on lovenox prophylactic      Nicotine Use  Patient was counseled extensively on the need to abstain from tobacco, its addictive tendencies, its deleterious effects on the lungs, cardiovascular  as well as its financial & social sequelae     Code status: Full  Prophylaxis: Lovenox  Central Line:   none  Care Plan discussed with: patient  Appointments after discharge:  pending podaitry/pt/ot  Anticipated Disposition: pending podiatry and pt/ot  Inpatient  Cardiac monitoring: Telemetry         Social Determinants of Health     Tobacco Use: High Risk (3/9/2024)    Patient History     Smoking Tobacco Use: Every Day     Smokeless Tobacco Use: Never     Passive Exposure: Not on file   Alcohol Use: Patient Unable To Answer (2/17/2024)    AUDIT-C     Frequency of Alcohol Consumption: Patient unable to answer     Average Number of Drinks: Patient unable to answer     Frequency of Binge Drinking: Patient unable to answer   Financial Resource Strain: Not on file   Food Insecurity: Food Insecurity Present (3/8/2024)    Hunger Vital Sign     Worried About

## 2024-03-14 NOTE — PROGRESS NOTES
Patient’s case reviewed during interdisciplinary team meeting in Med Surg/Tele Unit 2.  Rev. Tara Sanchez MDiv, [facility]

## 2024-03-15 PROCEDURE — 6370000000 HC RX 637 (ALT 250 FOR IP): Performed by: INTERNAL MEDICINE

## 2024-03-15 PROCEDURE — 2580000003 HC RX 258: Performed by: INTERNAL MEDICINE

## 2024-03-15 PROCEDURE — 1200000000 HC SEMI PRIVATE

## 2024-03-15 PROCEDURE — 6360000002 HC RX W HCPCS: Performed by: INTERNAL MEDICINE

## 2024-03-15 RX ORDER — TRAMADOL HYDROCHLORIDE 50 MG/1
50 TABLET ORAL EVERY 8 HOURS PRN
Status: DISCONTINUED | OUTPATIENT
Start: 2024-03-15 | End: 2024-03-22 | Stop reason: HOSPADM

## 2024-03-15 RX ORDER — ENOXAPARIN SODIUM 100 MG/ML
40 INJECTION SUBCUTANEOUS DAILY
Status: DISCONTINUED | OUTPATIENT
Start: 2024-03-15 | End: 2024-03-22 | Stop reason: HOSPADM

## 2024-03-15 RX ADMIN — ROSUVASTATIN CALCIUM 40 MG: 10 TABLET, COATED ORAL at 21:13

## 2024-03-15 RX ADMIN — AMOXICILLIN AND CLAVULANATE POTASSIUM 1 TABLET: 875; 125 TABLET, FILM COATED ORAL at 21:13

## 2024-03-15 RX ADMIN — FOLIC ACID 1 MG: 1 TABLET ORAL at 08:35

## 2024-03-15 RX ADMIN — ASPIRIN 325 MG: 325 TABLET ORAL at 08:35

## 2024-03-15 RX ADMIN — THERA TABS 1 TABLET: TAB at 08:35

## 2024-03-15 RX ADMIN — SODIUM CHLORIDE, PRESERVATIVE FREE 10 ML: 5 INJECTION INTRAVENOUS at 08:35

## 2024-03-15 RX ADMIN — AMOXICILLIN AND CLAVULANATE POTASSIUM 1 TABLET: 875; 125 TABLET, FILM COATED ORAL at 08:35

## 2024-03-15 RX ADMIN — ENOXAPARIN SODIUM 40 MG: 100 INJECTION SUBCUTANEOUS at 12:49

## 2024-03-15 RX ADMIN — TRAMADOL HYDROCHLORIDE 100 MG: 50 TABLET ORAL at 08:35

## 2024-03-15 RX ADMIN — DOXYCYCLINE HYCLATE 100 MG: 100 TABLET, COATED ORAL at 21:14

## 2024-03-15 RX ADMIN — TRAMADOL HYDROCHLORIDE 50 MG: 50 TABLET ORAL at 21:17

## 2024-03-15 RX ADMIN — CLOPIDOGREL BISULFATE 75 MG: 75 TABLET ORAL at 08:34

## 2024-03-15 RX ADMIN — DOXYCYCLINE HYCLATE 100 MG: 100 TABLET, COATED ORAL at 08:35

## 2024-03-15 RX ADMIN — Medication 100 MG: at 08:35

## 2024-03-15 RX ADMIN — SODIUM CHLORIDE, PRESERVATIVE FREE 10 ML: 5 INJECTION INTRAVENOUS at 21:13

## 2024-03-15 ASSESSMENT — PAIN DESCRIPTION - LOCATION
LOCATION: FOOT

## 2024-03-15 ASSESSMENT — PAIN - FUNCTIONAL ASSESSMENT: PAIN_FUNCTIONAL_ASSESSMENT: PREVENTS OR INTERFERES WITH MANY ACTIVE NOT PASSIVE ACTIVITIES

## 2024-03-15 ASSESSMENT — PAIN DESCRIPTION - ORIENTATION: ORIENTATION: LEFT;DISTAL

## 2024-03-15 ASSESSMENT — PAIN SCALES - GENERAL
PAINLEVEL_OUTOF10: 6
PAINLEVEL_OUTOF10: 0
PAINLEVEL_OUTOF10: 5
PAINLEVEL_OUTOF10: 8
PAINLEVEL_OUTOF10: 6

## 2024-03-15 ASSESSMENT — PAIN DESCRIPTION - DESCRIPTORS: DESCRIPTORS: ACHING

## 2024-03-15 ASSESSMENT — PAIN SCALES - WONG BAKER: WONGBAKER_NUMERICALRESPONSE: NO HURT

## 2024-03-15 NOTE — PROGRESS NOTES
[facility] Adult  Hospitalist Group                                                                                          Hospitalist Progress Note  Maegan Trevino MD  Office Phone: [phone]        Date of Service:  3/15/2024  NAME:  Baldomero Reece  :  1955  MRN:  [id number]       Admission Summary:   Baldomero Reece is a 68 y.o. 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  Patient recalls drinking until he passed out. Last known was on 2024 before patient went to sleep.  Patient woke up around 3 AM and noticed left-sided weakness.  He recalls drinking more but is unclear on what he drank and how much.  Patient denies using illicit drugs but is open to trying anything.     At the ED patient arrived tachycardic with a heart rate of 116 and he medically stable.  Per ED exam patient had a NIHSS score of 20 which later went down to 13 after patient began moving his left arm.  Significant labs include lactic acid 2.4, alkaline phosphatase 123, WBC 4.0, RDW 16, platelet count 115, ethanol level 63.  Lactic acid down trended to 1.1 after patient was given 1 L of fluids.  UA negative for infection, UDS positive for cocaine and marijuana use.  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  Teleneurologist recommended stroke evaluation including giving aspirin, Plavix which was initiated in the ED.     On presentation patient was eating his meal using both of his hands.  However once patient acknowledged writer's presence left arm drooped.  On physical exam patient showed left arm was unable to move however with encouragement patient  the best of my ability given all available resources at the time of admission. Route is PO if not otherwise noted.      Admission Status:[id number]:::1}      Medications Reviewed:     Current Facility-Administered Medications   Medication Dose Route Frequency    aspirin tablet 325 mg  325 mg Oral Daily    clopidogrel (PLAVIX) tablet 75 mg  75 mg Oral Daily    amoxicillin-clavulanate (AUGMENTIN) 875-125 MG per tablet 1 tablet  1 tablet Oral 2 times per day    doxycycline hyclate (VIBRA-TABS) tablet 100 mg  100 mg Oral 2 times per day    sodium chloride flush 0.9 % injection 5-40 mL  5-40 mL IntraVENous 2 times per day    sodium chloride flush 0.9 % injection 5-40 mL  5-40 mL IntraVENous PRN    0.9 % sodium chloride infusion   IntraVENous PRN    ondansetron (ZOFRAN-ODT) disintegrating tablet 4 mg  4 mg Oral Q8H PRN    Or    ondansetron (ZOFRAN) injection 4 mg  4 mg IntraVENous Q6H PRN    polyethylene glycol (GLYCOLAX) packet 17 g  17 g Oral Daily PRN    rosuvastatin (CRESTOR) tablet 40 mg  40 mg Oral Nightly    thiamine tablet 100 mg  100 mg Oral Daily    multivitamin 1 tablet  1 tablet Oral Daily    folic acid (FOLVITE) tablet 1 mg  1 mg Oral Daily    nicotine (NICODERM CQ) 21 MG/24HR 1 patch  1 patch TransDERmal Daily    ipratropium 0.5 mg-albuterol 2.5 mg (DUONEB) nebulizer solution 1 Dose  1 Dose Inhalation Q4H PRN    traMADol (ULTRAM) tablet 50 mg  50 mg Oral Q6H PRN    Or    traMADol (ULTRAM) tablet 100 mg  100 mg Oral Q6H PRN    naloxone (NARCAN) injection 0.4 mg  0.4 mg IntraVENous PRN       ______________________________________________________________________  EXPECTED LENGTH OF STAY: 11  ACTUAL LENGTH OF STAY:          7                 Maegan Trevino MD

## 2024-03-15 NOTE — PLAN OF CARE
Care plan reviewed.    Problem: Safety - Adult  Goal: Free from fall injury  Outcome: Progressing     Problem: Discharge Planning  Goal: Discharge to home or other facility with appropriate resources  Outcome: Progressing     Problem: Pain  Goal: Verbalizes/displays adequate comfort level or baseline comfort level  Outcome: Progressing     Problem: Chronic Conditions and Co-morbidities  Goal: Patient's chronic conditions and co-morbidity symptoms are monitored and maintained or improved  Outcome: Progressing     Problem: Skin/Tissue Integrity  Goal: Absence of new skin breakdown  Description: 1.  Monitor for areas of redness and/or skin breakdown  2.  Assess vascular access sites hourly  3.  Every 4-6 hours minimum:  Change oxygen saturation probe site  4.  Every 4-6 hours:  If on nasal continuous positive airway pressure, respiratory therapy assess nares and determine need for appliance change or resting period.  Outcome: Progressing

## 2024-03-16 PROCEDURE — 1200000000 HC SEMI PRIVATE

## 2024-03-16 PROCEDURE — 6370000000 HC RX 637 (ALT 250 FOR IP): Performed by: INTERNAL MEDICINE

## 2024-03-16 PROCEDURE — 6360000002 HC RX W HCPCS: Performed by: INTERNAL MEDICINE

## 2024-03-16 PROCEDURE — 2580000003 HC RX 258: Performed by: INTERNAL MEDICINE

## 2024-03-16 RX ADMIN — SODIUM CHLORIDE, PRESERVATIVE FREE 10 ML: 5 INJECTION INTRAVENOUS at 20:45

## 2024-03-16 RX ADMIN — TRAMADOL HYDROCHLORIDE 50 MG: 50 TABLET ORAL at 21:16

## 2024-03-16 RX ADMIN — ASPIRIN 325 MG: 325 TABLET ORAL at 08:51

## 2024-03-16 RX ADMIN — ENOXAPARIN SODIUM 40 MG: 100 INJECTION SUBCUTANEOUS at 08:52

## 2024-03-16 RX ADMIN — AMOXICILLIN AND CLAVULANATE POTASSIUM 1 TABLET: 875; 125 TABLET, FILM COATED ORAL at 08:51

## 2024-03-16 RX ADMIN — AMOXICILLIN AND CLAVULANATE POTASSIUM 1 TABLET: 875; 125 TABLET, FILM COATED ORAL at 21:16

## 2024-03-16 RX ADMIN — Medication 100 MG: at 08:51

## 2024-03-16 RX ADMIN — DOXYCYCLINE HYCLATE 100 MG: 100 TABLET, COATED ORAL at 21:16

## 2024-03-16 RX ADMIN — FOLIC ACID 1 MG: 1 TABLET ORAL at 08:51

## 2024-03-16 RX ADMIN — ROSUVASTATIN CALCIUM 40 MG: 10 TABLET, COATED ORAL at 21:16

## 2024-03-16 RX ADMIN — THERA TABS 1 TABLET: TAB at 08:51

## 2024-03-16 RX ADMIN — DOXYCYCLINE HYCLATE 100 MG: 100 TABLET, COATED ORAL at 08:51

## 2024-03-16 RX ADMIN — SODIUM CHLORIDE, PRESERVATIVE FREE 10 ML: 5 INJECTION INTRAVENOUS at 08:52

## 2024-03-16 RX ADMIN — CLOPIDOGREL BISULFATE 75 MG: 75 TABLET ORAL at 08:51

## 2024-03-16 ASSESSMENT — PAIN SCALES - GENERAL
PAINLEVEL_OUTOF10: 0
PAINLEVEL_OUTOF10: 5

## 2024-03-16 ASSESSMENT — PAIN DESCRIPTION - ORIENTATION: ORIENTATION: LEFT;DISTAL

## 2024-03-16 ASSESSMENT — PAIN - FUNCTIONAL ASSESSMENT: PAIN_FUNCTIONAL_ASSESSMENT: ACTIVITIES ARE NOT PREVENTED

## 2024-03-16 ASSESSMENT — PAIN DESCRIPTION - DESCRIPTORS: DESCRIPTORS: ACHING;SHARP

## 2024-03-16 ASSESSMENT — PAIN DESCRIPTION - LOCATION: LOCATION: FOOT

## 2024-03-16 NOTE — PROGRESS NOTES
[facility] Adult  Hospitalist Group                                                                                          Hospitalist Progress Note  Maegan Trevino MD  Office Phone: [phone]        Date of Service:  3/16/2024  NAME:  Baldomero Reece  :  1955  MRN:  [id number]       Admission Summary:   Baldomero Reece is a 68 y.o. 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  Patient recalls drinking until he passed out. Last known was on 2024 before patient went to sleep.  Patient woke up around 3 AM and noticed left-sided weakness.  He recalls drinking more but is unclear on what he drank and how much.  Patient denies using illicit drugs but is open to trying anything.     At the ED patient arrived tachycardic with a heart rate of 116 and he medically stable.  Per ED exam patient had a NIHSS score of 20 which later went down to 13 after patient began moving his left arm.  Significant labs include lactic acid 2.4, alkaline phosphatase 123, WBC 4.0, RDW 16, platelet count 115, ethanol level 63.  Lactic acid down trended to 1.1 after patient was given 1 L of fluids.  UA negative for infection, UDS positive for cocaine and marijuana use.  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  Teleneurologist recommended stroke evaluation including giving aspirin, Plavix which was initiated in the ED.     On presentation patient was eating his meal using both of his hands.  However once patient acknowledged writer's presence left arm drooped.  On physical exam patient showed left arm was unable to move however with encouragement patient  neutropenia  Pancytopenia  -Possibly 2/2 to alcohol use.  -Heme-onc consultation done.  As per heme-onc,\"Overall findings most consistent with pancytopenia from chronic alcohol abuse and dependence. - replete B12/folate if low  Trend over next week.  Can be followed by his PCP as OP.  - low suspicion for intrinsic bone marrow pathology/failure.\"    -HIV: negative,   -B12:271  -Folic acid:>20.      Lactic acidosis (resolved)     Alcohol use disorder  -\"drink whatever I can get\", last drink prior to passing out, between 3am and being picked up by EMS  -hx of withdrawal seizures  -c/w Multivitamins, Thiamine, Folic acid     Hepatitis C  History of cirrhosis of the liver  -Needs to follow up with GI for hepatitis C treatment     PVD  -S/p right-sided BKA in 2020 due to osteomyelitis  -Continue statin, asa     Hx of COPD  -duonebs     History of left extremity DVT  -Unclear when it occurred, patient not taking any medication  -On lovenox prophylactic      Nicotine Use  Patient was counseled extensively on the need to abstain from tobacco, its addictive tendencies, its deleterious effects on the lungs, cardiovascular  as well as its financial & social sequelae     Code status: Full  Prophylaxis: Lovenox  Central Line:   none  Care Plan discussed with: patient  Appointments after discharge:  pending podaitry/pt/ot  Anticipated Disposition: pending podiatry and pt/ot  Inpatient  Cardiac monitoring: Telemetry         Social Determinants of Health     Tobacco Use: High Risk (3/9/2024)    Patient History     Smoking Tobacco Use: Every Day     Smokeless Tobacco Use: Never     Passive Exposure: Not on file   Alcohol Use: Patient Unable To Answer (2/17/2024)    AUDIT-C     Frequency of Alcohol Consumption: Patient unable to answer     Average Number of Drinks: Patient unable to answer     Frequency of Binge Drinking: Patient unable to answer   Financial Resource Strain: Not on file   Food Insecurity: Food Insecurity Present  clinical condition today.      Home Medications were reconciled to the best of my ability given all available resources at the time of admission. Route is PO if not otherwise noted.      Admission Status:[id number]:::1}      Medications Reviewed:     Current Facility-Administered Medications   Medication Dose Route Frequency    enoxaparin (LOVENOX) injection 40 mg  40 mg SubCUTAneous Daily    traMADol (ULTRAM) tablet 50 mg  50 mg Oral Q8H PRN    aspirin tablet 325 mg  325 mg Oral Daily    clopidogrel (PLAVIX) tablet 75 mg  75 mg Oral Daily    amoxicillin-clavulanate (AUGMENTIN) 875-125 MG per tablet 1 tablet  1 tablet Oral 2 times per day    doxycycline hyclate (VIBRA-TABS) tablet 100 mg  100 mg Oral 2 times per day    sodium chloride flush 0.9 % injection 5-40 mL  5-40 mL IntraVENous 2 times per day    sodium chloride flush 0.9 % injection 5-40 mL  5-40 mL IntraVENous PRN    0.9 % sodium chloride infusion   IntraVENous PRN    ondansetron (ZOFRAN-ODT) disintegrating tablet 4 mg  4 mg Oral Q8H PRN    Or    ondansetron (ZOFRAN) injection 4 mg  4 mg IntraVENous Q6H PRN    polyethylene glycol (GLYCOLAX) packet 17 g  17 g Oral Daily PRN    rosuvastatin (CRESTOR) tablet 40 mg  40 mg Oral Nightly    thiamine tablet 100 mg  100 mg Oral Daily    multivitamin 1 tablet  1 tablet Oral Daily    folic acid (FOLVITE) tablet 1 mg  1 mg Oral Daily    nicotine (NICODERM CQ) 21 MG/24HR 1 patch  1 patch TransDERmal Daily    ipratropium 0.5 mg-albuterol 2.5 mg (DUONEB) nebulizer solution 1 Dose  1 Dose Inhalation Q4H PRN    naloxone (NARCAN) injection 0.4 mg  0.4 mg IntraVENous PRN       ______________________________________________________________________  EXPECTED LENGTH OF STAY: 11  ACTUAL LENGTH OF STAY:          8                 Maegan Trevino MD

## 2024-03-16 NOTE — PLAN OF CARE
Problem: Safety - Adult  Goal: Free from fall injury  3/15/2024 2037 by Devora Varghese RN  Outcome: Progressing  Flowsheets (Taken 3/15/2024 1950)  Free From Fall Injury:   Based on caregiver fall risk screen, instruct family/caregiver to ask for assistance with transferring infant if caregiver noted to have fall risk factors   Instruct family/caregiver on patient safety  3/15/2024 2037 by Devora Varghese, RN  Outcome: Progressing  Flowsheets (Taken 3/15/2024 1950)  Free From Fall Injury:   Based on caregiver fall risk screen, instruct family/caregiver to ask for assistance with transferring infant if caregiver noted to have fall risk factors   Instruct family/caregiver on patient safety     Problem: Discharge Planning  Goal: Discharge to home or other facility with appropriate resources  3/15/2024 2037 by Devora Varghese, RN  Outcome: Progressing  Flowsheets (Taken 3/15/2024 1950)  Discharge to home or other facility with appropriate resources:   Identify barriers to discharge with patient and caregiver   Arrange for needed discharge resources and transportation as appropriate   Identify discharge learning needs (meds, wound care, etc)   Arrange for interpreters to assist at discharge as needed   Refer to discharge planning if patient needs post-hospital services based on physician order or complex needs related to functional status, cognitive ability or social support system  3/15/2024 2037 by Devora Varghese, RN  Outcome: Progressing  Flowsheets (Taken 3/15/2024 1950)  Discharge to home or other facility with appropriate resources:   Identify barriers to discharge with patient and caregiver   Arrange for needed discharge resources and transportation as appropriate   Identify discharge learning needs (meds, wound care, etc)   Arrange for interpreters to assist at discharge as needed   Refer to discharge planning if patient needs post-hospital services based on physician order or complex needs related to

## 2024-03-17 LAB
ANION GAP SERPL CALC-SCNC: 8 MMOL/L (ref 5–15)
BASOPHILS # BLD: 0.1 K/UL (ref 0–0.1)
BASOPHILS NFR BLD: 3 % (ref 0–1)
BUN SERPL-MCNC: 24 MG/DL (ref 6–20)
BUN/CREAT SERPL: 32 (ref 12–20)
CALCIUM SERPL-MCNC: 9.1 MG/DL (ref 8.5–10.1)
CHLORIDE SERPL-SCNC: 101 MMOL/L (ref 97–108)
CO2 SERPL-SCNC: 29 MMOL/L (ref 21–32)
CREAT SERPL-MCNC: 0.74 MG/DL (ref 0.7–1.3)
DIFFERENTIAL METHOD BLD: ABNORMAL
EOSINOPHIL # BLD: 0.2 K/UL (ref 0–0.4)
EOSINOPHIL NFR BLD: 4 % (ref 0–7)
ERYTHROCYTE [DISTWIDTH] IN BLOOD BY AUTOMATED COUNT: 14.7 % (ref 11.5–14.5)
GLUCOSE SERPL-MCNC: 85 MG/DL (ref 65–100)
HCT VFR BLD AUTO: 45.7 % (ref 36.6–50.3)
HGB BLD-MCNC: 14.2 G/DL (ref 12.1–17)
IMM GRANULOCYTES # BLD AUTO: 0 K/UL (ref 0–0.04)
IMM GRANULOCYTES NFR BLD AUTO: 0 % (ref 0–0.5)
LYMPHOCYTES # BLD: 1.7 K/UL (ref 0.8–3.5)
LYMPHOCYTES NFR BLD: 43 % (ref 12–49)
MCH RBC QN AUTO: 28 PG (ref 26–34)
MCHC RBC AUTO-ENTMCNC: 31.1 G/DL (ref 30–36.5)
MCV RBC AUTO: 90 FL (ref 80–99)
MONOCYTES # BLD: 0.4 K/UL (ref 0–1)
MONOCYTES NFR BLD: 9 % (ref 5–13)
NEUTS SEG # BLD: 1.6 K/UL (ref 1.8–8)
NEUTS SEG NFR BLD: 41 % (ref 32–75)
NRBC # BLD: 0 K/UL (ref 0–0.01)
NRBC BLD-RTO: 0 PER 100 WBC
PLATELET # BLD AUTO: 101 K/UL (ref 150–400)
PMV BLD AUTO: 11.6 FL (ref 8.9–12.9)
POTASSIUM SERPL-SCNC: 4.2 MMOL/L (ref 3.5–5.1)
RBC # BLD AUTO: 5.08 M/UL (ref 4.1–5.7)
SODIUM SERPL-SCNC: 138 MMOL/L (ref 136–145)
WBC # BLD AUTO: 3.9 K/UL (ref 4.1–11.1)

## 2024-03-17 PROCEDURE — 1200000000 HC SEMI PRIVATE

## 2024-03-17 PROCEDURE — 80048 BASIC METABOLIC PNL TOTAL CA: CPT

## 2024-03-17 PROCEDURE — 6370000000 HC RX 637 (ALT 250 FOR IP): Performed by: INTERNAL MEDICINE

## 2024-03-17 PROCEDURE — 6360000002 HC RX W HCPCS: Performed by: INTERNAL MEDICINE

## 2024-03-17 PROCEDURE — 36415 COLL VENOUS BLD VENIPUNCTURE: CPT

## 2024-03-17 PROCEDURE — 85025 COMPLETE CBC W/AUTO DIFF WBC: CPT

## 2024-03-17 RX ADMIN — AMOXICILLIN AND CLAVULANATE POTASSIUM 1 TABLET: 875; 125 TABLET, FILM COATED ORAL at 22:30

## 2024-03-17 RX ADMIN — DOXYCYCLINE HYCLATE 100 MG: 100 TABLET, COATED ORAL at 22:30

## 2024-03-17 RX ADMIN — FOLIC ACID 1 MG: 1 TABLET ORAL at 09:04

## 2024-03-17 RX ADMIN — DOXYCYCLINE HYCLATE 100 MG: 100 TABLET, COATED ORAL at 09:04

## 2024-03-17 RX ADMIN — CLOPIDOGREL BISULFATE 75 MG: 75 TABLET ORAL at 09:04

## 2024-03-17 RX ADMIN — THERA TABS 1 TABLET: TAB at 09:04

## 2024-03-17 RX ADMIN — Medication 100 MG: at 09:04

## 2024-03-17 RX ADMIN — ENOXAPARIN SODIUM 40 MG: 100 INJECTION SUBCUTANEOUS at 09:03

## 2024-03-17 RX ADMIN — ASPIRIN 325 MG: 325 TABLET ORAL at 09:04

## 2024-03-17 RX ADMIN — AMOXICILLIN AND CLAVULANATE POTASSIUM 1 TABLET: 875; 125 TABLET, FILM COATED ORAL at 09:04

## 2024-03-17 RX ADMIN — TRAMADOL HYDROCHLORIDE 50 MG: 50 TABLET ORAL at 22:30

## 2024-03-17 RX ADMIN — ROSUVASTATIN CALCIUM 40 MG: 10 TABLET, COATED ORAL at 22:30

## 2024-03-17 ASSESSMENT — PAIN DESCRIPTION - DESCRIPTORS: DESCRIPTORS: ACHING

## 2024-03-17 ASSESSMENT — PAIN DESCRIPTION - ORIENTATION: ORIENTATION: LEFT;DISTAL

## 2024-03-17 ASSESSMENT — PAIN SCALES - GENERAL
PAINLEVEL_OUTOF10: 5
PAINLEVEL_OUTOF10: 0

## 2024-03-17 ASSESSMENT — PAIN DESCRIPTION - LOCATION: LOCATION: FOOT

## 2024-03-17 ASSESSMENT — PAIN - FUNCTIONAL ASSESSMENT: PAIN_FUNCTIONAL_ASSESSMENT: ACTIVITIES ARE NOT PREVENTED

## 2024-03-17 NOTE — PLAN OF CARE
Care plan reviewed.    Problem: Safety - Adult  Goal: Free from fall injury  Outcome: Progressing     Problem: Discharge Planning  Goal: Discharge to home or other facility with appropriate resources  Outcome: Progressing  Flowsheets (Taken 3/16/2024 2044)  Discharge to home or other facility with appropriate resources:   Identify barriers to discharge with patient and caregiver   Arrange for needed discharge resources and transportation as appropriate   Identify discharge learning needs (meds, wound care, etc)   Refer to discharge planning if patient needs post-hospital services based on physician order or complex needs related to functional status, cognitive ability or social support system     Problem: Pain  Goal: Verbalizes/displays adequate comfort level or baseline comfort level  Outcome: Progressing     Problem: Chronic Conditions and Co-morbidities  Goal: Patient's chronic conditions and co-morbidity symptoms are monitored and maintained or improved  Outcome: Progressing  Flowsheets (Taken 3/16/2024 2044)  Care Plan - Patient's Chronic Conditions and Co-Morbidity Symptoms are Monitored and Maintained or Improved:   Monitor and assess patient's chronic conditions and comorbid symptoms for stability, deterioration, or improvement   Collaborate with multidisciplinary team to address chronic and comorbid conditions and prevent exacerbation or deterioration   Update acute care plan with appropriate goals if chronic or comorbid symptoms are exacerbated and prevent overall improvement and discharge     Problem: Skin/Tissue Integrity  Goal: Absence of new skin breakdown  Description: 1.  Monitor for areas of redness and/or skin breakdown  2.  Assess vascular access sites hourly  3.  Every 4-6 hours minimum:  Change oxygen saturation probe site  4.  Every 4-6 hours:  If on nasal continuous positive airway pressure, respiratory therapy assess nares and determine need for appliance change or resting period.  Outcome:  Progressing

## 2024-03-18 PROCEDURE — 6360000002 HC RX W HCPCS: Performed by: INTERNAL MEDICINE

## 2024-03-18 PROCEDURE — 1200000000 HC SEMI PRIVATE

## 2024-03-18 PROCEDURE — 6370000000 HC RX 637 (ALT 250 FOR IP): Performed by: INTERNAL MEDICINE

## 2024-03-18 RX ORDER — HALOPERIDOL 2 MG/ML
2 SOLUTION ORAL ONCE
Status: COMPLETED | OUTPATIENT
Start: 2024-03-18 | End: 2024-03-18

## 2024-03-18 RX ADMIN — ASPIRIN 325 MG: 325 TABLET ORAL at 09:40

## 2024-03-18 RX ADMIN — DOXYCYCLINE HYCLATE 100 MG: 100 TABLET, COATED ORAL at 09:42

## 2024-03-18 RX ADMIN — AMOXICILLIN AND CLAVULANATE POTASSIUM 1 TABLET: 875; 125 TABLET, FILM COATED ORAL at 20:30

## 2024-03-18 RX ADMIN — ROSUVASTATIN CALCIUM 40 MG: 10 TABLET, COATED ORAL at 20:30

## 2024-03-18 RX ADMIN — ENOXAPARIN SODIUM 40 MG: 100 INJECTION SUBCUTANEOUS at 09:39

## 2024-03-18 RX ADMIN — AMOXICILLIN AND CLAVULANATE POTASSIUM 1 TABLET: 875; 125 TABLET, FILM COATED ORAL at 09:40

## 2024-03-18 RX ADMIN — DOXYCYCLINE HYCLATE 100 MG: 100 TABLET, COATED ORAL at 20:30

## 2024-03-18 RX ADMIN — HALOPERIDOL 2 MG: 2 SOLUTION ORAL at 04:28

## 2024-03-18 RX ADMIN — Medication 100 MG: at 09:42

## 2024-03-18 RX ADMIN — FOLIC ACID 1 MG: 1 TABLET ORAL at 09:43

## 2024-03-18 RX ADMIN — THERA TABS 1 TABLET: TAB at 09:41

## 2024-03-18 RX ADMIN — TRAMADOL HYDROCHLORIDE 50 MG: 50 TABLET ORAL at 20:29

## 2024-03-18 RX ADMIN — CLOPIDOGREL BISULFATE 75 MG: 75 TABLET ORAL at 09:42

## 2024-03-18 RX ADMIN — TRAMADOL HYDROCHLORIDE 50 MG: 50 TABLET ORAL at 09:41

## 2024-03-18 ASSESSMENT — PAIN SCALES - GENERAL
PAINLEVEL_OUTOF10: 1
PAINLEVEL_OUTOF10: 6
PAINLEVEL_OUTOF10: 6

## 2024-03-18 ASSESSMENT — PAIN DESCRIPTION - LOCATION
LOCATION: FOOT
LOCATION: FOOT

## 2024-03-18 ASSESSMENT — PAIN SCALES - WONG BAKER: WONGBAKER_NUMERICALRESPONSE: NO HURT

## 2024-03-18 ASSESSMENT — PAIN DESCRIPTION - DESCRIPTORS: DESCRIPTORS: ACHING

## 2024-03-18 ASSESSMENT — PAIN DESCRIPTION - ORIENTATION: ORIENTATION: LEFT

## 2024-03-18 ASSESSMENT — PAIN - FUNCTIONAL ASSESSMENT: PAIN_FUNCTIONAL_ASSESSMENT: ACTIVITIES ARE NOT PREVENTED

## 2024-03-18 NOTE — PROGRESS NOTES
Physical Therapy    Chart reviewed and appreciated. Patient received sidelying in bed, sleeping. Did not arouse to voice. Notes indicate patient has independently transferred to wheelchair and mobilized in and out of room. Based on chart review patient is likely at his functional baseline. PT will follow up with patient as able.     Alvarez Patel, PT

## 2024-03-18 NOTE — PROGRESS NOTES
OT services attempted today- patient sleeping very soundly upon attempt. Did not arouse to voice. Patient reportedly up overnight. No further attempts at arousal to allow for patient's rest. OT POC remains unchanged.   Carmenza Zavala, OTR/L

## 2024-03-18 NOTE — PROGRESS NOTES
Pt dressed in street shirt and jacket then began to exit pt room stating \"I just want to roll around and maybe go outside.\"  Pt informed of isolation protocol and rationale.  Pt began to get agitated saying, \"I've been a patient before and I can go out in the halls and up and down the neighborhood streets.\"  Pt again informed of in pt isolation protocol and pt compliance expectations.  Pt then stated that he wanted to leave AMA.  Paperwork prepared and safety concerns reviewed with pt.  Pt is homeless and when asked where he would go and how would he get there at this time of night if he chooses to leave AMA, pt reminded that we do not set up transport if a pt leaves AMA.  Ruchi Jacobson, RNS and security rounding on unit at bedside to discuss AMA with pt and this RN.  Pt then decided to return to his room and stated, \"I'll stay then.  But I want to talk to the doctor tomorrow about leaving the room.\"  Pt informed that day hospitalist will be informed upon his arrival to facility in AM and pt to discuss with provider in am.  Pt agreeable to remaining in assigned room for remainder of night.

## 2024-03-18 NOTE — WOUND CARE
Follow up for  Wounds on Left foot and left medial malleolus     Chart shows:  Admitted on 3/8/24 for suspected CVA  History of Confusion, AMS; Ischemia of foot, MDRO; Osteomyelitis; bacteremia; alcoholic cirrhosis w/out ascitis; Hep C; COPD; Right BKA; Multiple sclerosis; Impaired ambulation;  HTN; Polysubstance abuse     Patient with multiple recent admissions.   Homeless.     3/11/24 Wound culture final result MRSA - patient on contact precautions     Assessment:   A&O x 3 calm and cooperative. Patient currently comfortable and not complaining of any pain. Continent, using wheelchair to go to BR and using urinal. Hands are no longer edematous and patient has better hand dexterity.  Surface:  [place] [employer] mattress      2020- R BKA  2023- Left toe amputations from frostbite  1/2/24- Procedure(s): Per Dr. Salazar, DPM  LEFT FOOT INCISION AND DRAINAGE with misonix , First metatarsal head amputation and left fifth toe amputation, skin graft application left foot left 5th toe amputation to MPJ Excisional Debridement left ankle (3x2x0.2cm) and application of Theraskin Graft      Buttocks, bilat ischium, right lateral thigh with hyperpigmentation and discoloration, non blanchable. No open areas noted. Patient s/p fall, unable to ascertain how long patient was on ground. Possible development of pressure injuries. Will continue to monitor.      1. POA wounds to left foot   Left upper plantar 2.5 x 2.0 x 0.1 cm; pink/red wound base; closed wound edges; dry/flaky periwound. Scant serosang exudate.     Left medial foot 2.0 x 1.5 x 0.1 cm. Pink/red wound base; open wound edges; dry/flaky periwound. Scant serosang exudate.     Left medial malleolus - 3.5 x 3.5 x 0.1 cm. Pink wound base with eschar at wound edges. Scant serous exudate. Periwound without erythema. Hyperpigmented.    Left medial upper foot - 2.0 x 1.5 x 0.1 cm. Pink/red wound base; open wound edges; dry/flaky periwound. Scant serosang exudate     Palpable, but faint  pedal pulse.     Wound Recommendations:    Daily to left foot - cleanse with saline. Apply non-adherent dressing moistened with betadine to all wounds; cover with dry dressing and wrap loosely with rolled gauze and ace. Per Dr. Cesar     PI Prevention:  Turn/reposition approximately every 2 hours - patient is independent with turning; encourage and remind patient  Offload heel with left heel hanging off end of pillow at all times while in bed.  Sacral Foam dressing: lift to assess regularly; change as needed. Discontinue if incontinence is frequently soiling dressing.     Zinc ointment to buttocks and sacrum daily and as needed with incontinence care  Low Air Loss mattress not ordered at this time. Patient turns independently.      Transition of Care: Plan to follow weekly and as needed while admitted to hospital.       Miryam Aparicio RN, BSN  Wound Care Nurse, [facility]  [phone]

## 2024-03-18 NOTE — PROGRESS NOTES
[id number]:  Code Scipio called d/t pt becoming confused, agitated and verbally aggressive d/t being told he cannot roam the hallways in his wheelchair or go outside at his leisure.  Pt not responsive to verbal re-direction.  Nursing supervisor and security arrived to unit to assist with pt.    0421:  Provider paged:  Pt admitted originally for fall and stroke work-up complete.  Pt is now here for wound care to left foot and CM is looking for placement.  Earlier in night pt was agitated about not being able to roll around hallway in w.c or go outside then requested to leave AMA but was re-directed and calmed without medication.  Pt now agitated and verbally aggressive again for same reasons.  Pt refusing to return to room c/o \"no bathroom\" in his room that he \"pays rent for.\"  Pt confused about being a pt in the hospital and disoriented to place time and situation.  Pt threatening staff, yell and cussing.  Could please get orders for meds to subdue him.  Please review and advise.     0422:  order for 1 time dose of 2mg of Haldol po entered by MD    [id number]:  pt agreeable to take meds intended to calm his agitation, pt informed that in order to take the meds he has to return to his room.  Pt returned to room independently.  Pt more subdued in interaction with this RN upon returning to room.  Pt re-oriented to place, time and situation and all pt questions answered.  Pt took po Haldol 2mg at 0428 without issue and while this RN cleaning pt room the pt removed his street clothes and returned to bed.  Pt given fresh ice water, soda, snack and encouraged to allow medicine to help him relax.  Pt denies any additional needs. Room door shut per pt request at this time.

## 2024-03-18 NOTE — PROGRESS NOTES
[facility] Adult  Hospitalist Group                                                                                          Hospitalist Progress Note  Maegan Trevino MD  Office Phone: [phone]        Date of Service:  3/18/2024  NAME:  Baldomero Reece  :  1955  MRN:  [id number]       Admission Summary:   Baldomero Reece is a 68 y.o. 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  Patient recalls drinking until he passed out. Last known was on 2024 before patient went to sleep.  Patient woke up around 3 AM and noticed left-sided weakness.  He recalls drinking more but is unclear on what he drank and how much.  Patient denies using illicit drugs but is open to trying anything.     At the ED patient arrived tachycardic with a heart rate of 116 and he medically stable.  Per ED exam patient had a NIHSS score of 20 which later went down to 13 after patient began moving his left arm.  Significant labs include lactic acid 2.4, alkaline phosphatase 123, WBC 4.0, RDW 16, platelet count 115, ethanol level 63.  Lactic acid down trended to 1.1 after patient was given 1 L of fluids.  UA negative for infection, UDS positive for cocaine and marijuana use.  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  Teleneurologist recommended stroke evaluation including giving aspirin, Plavix which was initiated in the ED.     On presentation patient was eating his meal using both of his hands.  However once patient acknowledged writer's presence left arm drooped.  On physical exam patient showed left arm was unable to move however with encouragement patient  of Binge Drinking: Patient unable to answer   Financial Resource Strain: Not on file   Food Insecurity: Food Insecurity Present (3/8/2024)    Hunger Vital Sign     Worried About Running Out of Food in the Last Year: Often true     Ran Out of Food in the Last Year: Often true   Transportation Needs: Unmet Transportation Needs (3/8/2024)    PRAPARE - Transportation     Lack of Transportation (Medical): Yes     Lack of Transportation (Non-Medical): Yes   Physical Activity: Not on file   Stress: Not on file   Social Connections: Not on file   Intimate Partner Violence: Not on file   Depression: Not on file   Housing Stability: High Risk (3/8/2024)    Housing Stability Vital Sign     Unable to Pay for Housing in the Last Year: Yes     Number of Places Lived in the Last Year: 2     Unstable Housing in the Last Year: Yes   Interpersonal Safety: Not At Risk (3/8/2024)    Interpersonal Safety ([facility] HRSN)     How often does anyone, including family and friends, physically hurt you?: Never     How often does anyone, including family and friends, scream or curse at you?: Not on file     How often does anyone, including family and friends, insult or talk down to you?: Not on file     How often does anyone, including family and friends, threaten you with harm?: Not on file   Utilities: Not At Risk (3/8/2024)    [facility] Utilities     Threatened with loss of utilities: No       Review of Systems:   Refer to subjective    Vital Signs:    Last 24hrs VS reviewed since prior progress note. Most recent are:  Vitals:    03/18/24 0305   BP:    Pulse:    Resp: 16   SpO2:          Intake/Output Summary (Last 24 hours) at 3/18/2024 0906  Last data filed at 3/17/2024 1450  Gross per 24 hour   Intake --   Output 550 ml   Net -550 ml          Physical Examination:     I had a face to face encounter with this patient and independently examined them on 3/18/2024 as outlined below:          General: No acute distress.   HEENT: Eyes: perrl, no discharge

## 2024-03-19 PROCEDURE — 6360000002 HC RX W HCPCS: Performed by: INTERNAL MEDICINE

## 2024-03-19 PROCEDURE — 1200000000 HC SEMI PRIVATE

## 2024-03-19 PROCEDURE — 6370000000 HC RX 637 (ALT 250 FOR IP): Performed by: INTERNAL MEDICINE

## 2024-03-19 RX ADMIN — ASPIRIN 325 MG: 325 TABLET ORAL at 08:40

## 2024-03-19 RX ADMIN — AMOXICILLIN AND CLAVULANATE POTASSIUM 1 TABLET: 875; 125 TABLET, FILM COATED ORAL at 08:39

## 2024-03-19 RX ADMIN — DOXYCYCLINE HYCLATE 100 MG: 100 TABLET, COATED ORAL at 08:40

## 2024-03-19 RX ADMIN — FOLIC ACID 1 MG: 1 TABLET ORAL at 08:41

## 2024-03-19 RX ADMIN — TRAMADOL HYDROCHLORIDE 50 MG: 50 TABLET ORAL at 21:01

## 2024-03-19 RX ADMIN — THERA TABS 1 TABLET: TAB at 08:40

## 2024-03-19 RX ADMIN — Medication 100 MG: at 08:41

## 2024-03-19 RX ADMIN — ROSUVASTATIN CALCIUM 40 MG: 10 TABLET, COATED ORAL at 21:01

## 2024-03-19 RX ADMIN — TRAMADOL HYDROCHLORIDE 50 MG: 50 TABLET ORAL at 08:41

## 2024-03-19 RX ADMIN — CLOPIDOGREL BISULFATE 75 MG: 75 TABLET ORAL at 08:40

## 2024-03-19 ASSESSMENT — PAIN SCALES - GENERAL
PAINLEVEL_OUTOF10: 6
PAINLEVEL_OUTOF10: 2
PAINLEVEL_OUTOF10: 9

## 2024-03-19 ASSESSMENT — PAIN DESCRIPTION - LOCATION
LOCATION: LEG
LOCATION: FOOT

## 2024-03-19 ASSESSMENT — PAIN DESCRIPTION - ORIENTATION
ORIENTATION: LEFT
ORIENTATION: LEFT

## 2024-03-19 ASSESSMENT — PAIN - FUNCTIONAL ASSESSMENT: PAIN_FUNCTIONAL_ASSESSMENT: ACTIVITIES ARE NOT PREVENTED

## 2024-03-19 ASSESSMENT — PAIN DESCRIPTION - DESCRIPTORS: DESCRIPTORS: ACHING;CRAMPING

## 2024-03-19 NOTE — PLAN OF CARE
Problem: Safety - Adult  Goal: Free from fall injury  3/18/2024 2047 by Devora Varghese RN  Outcome: Progressing  Flowsheets (Taken 3/18/2024 2035)  Free From Fall Injury:   Instruct family/caregiver on patient safety   Based on caregiver fall risk screen, instruct family/caregiver to ask for assistance with transferring infant if caregiver noted to have fall risk factors  3/18/2024 0706 by Zia Majano RN  Outcome: Progressing     Problem: Discharge Planning  Goal: Discharge to home or other facility with appropriate resources  3/18/2024 2047 by Devora Varghese RN  Outcome: Progressing  Flowsheets (Taken 3/18/2024 2035)  Discharge to home or other facility with appropriate resources:   Identify barriers to discharge with patient and caregiver   Arrange for needed discharge resources and transportation as appropriate   Identify discharge learning needs (meds, wound care, etc)   Arrange for interpreters to assist at discharge as needed   Refer to discharge planning if patient needs post-hospital services based on physician order or complex needs related to functional status, cognitive ability or social support system  3/18/2024 0706 by Zia Majano RN  Outcome: Progressing     Problem: Pain  Goal: Verbalizes/displays adequate comfort level or baseline comfort level  3/18/2024 2047 by Devora Varghese RN  Outcome: Progressing  3/18/2024 0706 by Zia Majano RN  Outcome: Progressing     Problem: Chronic Conditions and Co-morbidities  Goal: Patient's chronic conditions and co-morbidity symptoms are monitored and maintained or improved  3/18/2024 2047 by Devora Varghese RN  Outcome: Progressing  Flowsheets (Taken 3/18/2024 2035)  Care Plan - Patient's Chronic Conditions and Co-Morbidity Symptoms are Monitored and Maintained or Improved:   Monitor and assess patient's chronic conditions and comorbid symptoms for stability, deterioration, or improvement   Collaborate with multidisciplinary team to address

## 2024-03-19 NOTE — PROGRESS NOTES
Patient’s case reviewed during interdisciplinary team meeting in Med Surg/Tele Unit 2.  Rev. Tara Sanchez MDiv, [facility]

## 2024-03-20 PROCEDURE — 6370000000 HC RX 637 (ALT 250 FOR IP): Performed by: INTERNAL MEDICINE

## 2024-03-20 PROCEDURE — 1200000000 HC SEMI PRIVATE

## 2024-03-20 RX ADMIN — TRAMADOL HYDROCHLORIDE 50 MG: 50 TABLET ORAL at 21:26

## 2024-03-20 RX ADMIN — Medication 100 MG: at 09:13

## 2024-03-20 RX ADMIN — THERA TABS 1 TABLET: TAB at 09:13

## 2024-03-20 RX ADMIN — CLOPIDOGREL BISULFATE 75 MG: 75 TABLET ORAL at 09:13

## 2024-03-20 RX ADMIN — ASPIRIN 325 MG: 325 TABLET ORAL at 09:13

## 2024-03-20 RX ADMIN — FOLIC ACID 1 MG: 1 TABLET ORAL at 09:13

## 2024-03-20 RX ADMIN — ROSUVASTATIN CALCIUM 40 MG: 10 TABLET, COATED ORAL at 21:26

## 2024-03-20 ASSESSMENT — PAIN SCALES - GENERAL: PAINLEVEL_OUTOF10: 0

## 2024-03-20 NOTE — PROGRESS NOTES
[facility] Adult  Hospitalist Group                                                                                          Hospitalist Progress Note  Hema Kitchen MD  Office Phone: [phone]        Date of Service:  3/20/2024  NAME:  Baldomero Reece  :  1955  MRN:  [id number]       Admission Summary:   Baldomero Reece is a 68 y.o. 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  Patient recalls drinking until he passed out. Last known was on 2024 before patient went to sleep.  Patient woke up around 3 AM and noticed left-sided weakness.  He recalls drinking more but is unclear on what he drank and how much.  Patient denies using illicit drugs but is open to trying anything.     At the ED patient arrived tachycardic with a heart rate of 116 and he medically stable.  Per ED exam patient had a NIHSS score of 20 which later went down to 13 after patient began moving his left arm.  Significant labs include lactic acid 2.4, alkaline phosphatase 123, WBC 4.0, RDW 16, platelet count 115, ethanol level 63.  Lactic acid down trended to 1.1 after patient was given 1 L of fluids.  UA negative for infection, UDS positive for cocaine and marijuana use.  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  Teleneurologist recommended stroke evaluation including giving aspirin, Plavix which was initiated in the ED.     On presentation patient was eating his meal using both of his hands.  However once patient acknowledged writer's presence left arm drooped.  On physical exam patient showed left arm was unable to move however with encouragement patient  was able to raise arm against gravity and shake writers hand. Currently, patient denies vision or hearing changes, fever, chills, weakness, chest pain, dyspnea, N,V,D,C, blood in stool, melena, blood in urine, LE edema, numbness or tingling in extremities.          Interval history / Subjective:   Patient laying in bed, preferred not speak initially. Discussed him needing rest and people waking him up. Has no complaints.   Per nurse, denied wound care.     Currently, patient denies  fever, chills, weakness, chest pain, blood in stool, melena, blood in urine, LE edema       Assessment & Plan:           Recent left basal ganglia bleed due to AV malformation rupture in June 2023  CVA 2021 with residual R sided weakness  Stroke work up negative  -Last known on 03/07/2024 before patient went to sleep(out of TPA and MT window)  -CT brain wo contrast: no acute pathology  -CTA brain showed no evidence of significant stenosis however did redemonstrate a small AV malformation in the left basal ganglia.  -BP out of permissive  hypertension window (first 24 hrs)  -Echo: EF of 55 - 60%. Interatrial septum:Agitated saline study was negative with and without provocation.  -MRI:No acute infarct or intracranial hemorrhage.  Neuro Recommendation: DAPT for 30 days (Aspirin 325 mg qd plus Plavix 75 mg qd) then switch to Plavix 75 mg qd. Crestor 40 mg qd-  -PT/OT, Fall precautions  -NSGY follow-up for AV malformation.  -Awaiting disposition.  Patient keeps on stating that he will leave AGAINST MEDICAL ADVICE.  I have counseled him on multiple occasions that leaving AGAINST MEDICAL ADVICE can lead to temporary/permanent disability or death.      Cellulitis in left foot  -Left foot multiple wounds including medial left foot,  left malleolus, and plantar.  -Wound cx:MRSA.  -Podiatrist evaluation: No need for MRI for osteo, c/w wound and antibiotics  -IV vanc/cefepime/metronidazole de-escalated augmentin + doxycycline  -Follow-up podiatrist

## 2024-03-20 NOTE — PROGRESS NOTES
Pt provided with food and hydration through out shift. Refused wound care x2. Slept for most of shift. Vitals stable.

## 2024-03-20 NOTE — PROGRESS NOTES
[facility] Adult  Hospitalist Group                                                                                          Hospitalist Progress Note  Hema Kitchen MD  Office Phone: [phone]        Date of Service:  3/19/2024  NAME:  Baldomero Reece  :  1955  MRN:  [id number]       Admission Summary:   Baldomero Reece is a 68 y.o. 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  Patient recalls drinking until he passed out. Last known was on 2024 before patient went to sleep.  Patient woke up around 3 AM and noticed left-sided weakness.  He recalls drinking more but is unclear on what he drank and how much.  Patient denies using illicit drugs but is open to trying anything.     At the ED patient arrived tachycardic with a heart rate of 116 and he medically stable.  Per ED exam patient had a NIHSS score of 20 which later went down to 13 after patient began moving his left arm.  Significant labs include lactic acid 2.4, alkaline phosphatase 123, WBC 4.0, RDW 16, platelet count 115, ethanol level 63.  Lactic acid down trended to 1.1 after patient was given 1 L of fluids.  UA negative for infection, UDS positive for cocaine and marijuana use.  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  Teleneurologist recommended stroke evaluation including giving aspirin, Plavix which was initiated in the ED.     On presentation patient was eating his meal using both of his hands.  However once patient acknowledged writer's presence left arm drooped.  On physical exam patient showed left arm was unable to move however with encouragement patient  the best of my ability given all available resources at the time of admission. Route is PO if not otherwise noted.      Admission Status:[id number]:::1}      Medications Reviewed:     Current Facility-Administered Medications   Medication Dose Route Frequency    enoxaparin (LOVENOX) injection 40 mg  40 mg SubCUTAneous Daily    traMADol (ULTRAM) tablet 50 mg  50 mg Oral Q8H PRN    aspirin tablet 325 mg  325 mg Oral Daily    clopidogrel (PLAVIX) tablet 75 mg  75 mg Oral Daily    sodium chloride flush 0.9 % injection 5-40 mL  5-40 mL IntraVENous 2 times per day    sodium chloride flush 0.9 % injection 5-40 mL  5-40 mL IntraVENous PRN    0.9 % sodium chloride infusion   IntraVENous PRN    ondansetron (ZOFRAN-ODT) disintegrating tablet 4 mg  4 mg Oral Q8H PRN    Or    ondansetron (ZOFRAN) injection 4 mg  4 mg IntraVENous Q6H PRN    polyethylene glycol (GLYCOLAX) packet 17 g  17 g Oral Daily PRN    rosuvastatin (CRESTOR) tablet 40 mg  40 mg Oral Nightly    thiamine tablet 100 mg  100 mg Oral Daily    multivitamin 1 tablet  1 tablet Oral Daily    folic acid (FOLVITE) tablet 1 mg  1 mg Oral Daily    nicotine (NICODERM CQ) 21 MG/24HR 1 patch  1 patch TransDERmal Daily    ipratropium 0.5 mg-albuterol 2.5 mg (DUONEB) nebulizer solution 1 Dose  1 Dose Inhalation Q4H PRN    naloxone (NARCAN) injection 0.4 mg  0.4 mg IntraVENous PRN       ______________________________________________________________________  EXPECTED LENGTH OF STAY: 14  ACTUAL LENGTH OF STAY:          11                 Hema Kitchen MD

## 2024-03-21 PROCEDURE — 6370000000 HC RX 637 (ALT 250 FOR IP): Performed by: INTERNAL MEDICINE

## 2024-03-21 PROCEDURE — 1200000000 HC SEMI PRIVATE

## 2024-03-21 RX ADMIN — ROSUVASTATIN CALCIUM 40 MG: 10 TABLET, COATED ORAL at 19:26

## 2024-03-21 RX ADMIN — THERA TABS 1 TABLET: TAB at 08:47

## 2024-03-21 RX ADMIN — Medication 100 MG: at 08:47

## 2024-03-21 RX ADMIN — CLOPIDOGREL BISULFATE 75 MG: 75 TABLET ORAL at 08:47

## 2024-03-21 RX ADMIN — ASPIRIN 325 MG: 325 TABLET ORAL at 08:47

## 2024-03-21 RX ADMIN — FOLIC ACID 1 MG: 1 TABLET ORAL at 08:47

## 2024-03-21 ASSESSMENT — PAIN SCALES - GENERAL: PAINLEVEL_OUTOF10: 0

## 2024-03-21 NOTE — PROGRESS NOTES
Comprehensive Nutrition Assessment     Type and Reason for Visit:  Initial     Nutrition Recommendations/Plan:   CC diet with 5 CHO choices per meal  Glucerna BID      Malnutrition Assessment:  Malnutrition Status:  Moderate malnutrition (03/09/24 1210)    Context:  Chronic Illness     Findings of the 6 clinical characteristics of malnutrition:  Energy Intake:  75% or less estimated energy requirements for 1 month or longer  Weight Loss:  5% over 1 month     Body Fat Loss:  Severe body fat loss Orbital, Buccal region, Fat Overlying Ribs   Muscle Mass Loss:  Mild muscle mass loss Temples (temporalis), Clavicles (pectoralis & deltoids), Hand (interosseous)  Fluid Accumulation:  No significant fluid accumulation     Strength:  Not Performed     Nutrition Assessment:    Pt admitted with suspected CVA, hx notable for previous CVA (2021),COPD, PVD, DVT, HTN, DM, s/p R BKA 2' osteomyelitis, s/p L toes amp 2' frostbite, polysubstance abuse (alcohol, tobacco, meth).   Adjusted BMI of 20.1 (2' BKA) still underweight for pt's age.  Pt awaiting placement.  Conts to threaten to leave AMA.         Wt Readings from Last 3 Encounters:   03/08/24 58.3 kg (128 lb 8.5 oz)   02/26/24 56.7 kg (125 lb)   02/18/24 56.6 kg (124 lb 12.5 oz)      Moderate pro/kcal malnutrition r/t inadequate oral intake pt reports ~ 5% unintentional wt loss past three months with dietary recall meeting < 50% of needs > 5 days PTA 2' ETOH abuse and decreased appetite.  NPE reveals severe fat loss (orbital, buccal, overlying ribs);  moderate muscle wasting (clavicles, interosseous regions).     Nutrition Related Findings:    Pt tolerating diet Wound Type: Surgical Incision remote       Current Nutrition Intake & Therapies:    Average Meal Intake: 51-75%  Average Supplements Intake: None Ordered  ADULT DIET; Regular; 3 carb choices (45 gm/meal); Low Fat/Low Chol/High Fiber/2 gm Na; No Added Salt (3-4 gm); Double Protein Portions     Anthropometric  Intake, Supplement Intake  Physical Signs/Symptoms Outcomes: Meal Time Behavior, Nutrition Focused Physical Findings, Weight     Discharge Planning:    Too soon to determine      Fazal Lynch RD  Contact: [phone]

## 2024-03-21 NOTE — PROGRESS NOTES
[facility] Adult  Hospitalist Group                                                                                          Hospitalist Progress Note  Hema Kitchen MD  Office Phone: [phone]        Date of Service:  3/21/2024  NAME:  Baldomero Reece  :  1955  MRN:  [id number]       Admission Summary:   Baldomero Reece is a 68 y.o. 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  Patient recalls drinking until he passed out. Last known was on 2024 before patient went to sleep.  Patient woke up around 3 AM and noticed left-sided weakness.  He recalls drinking more but is unclear on what he drank and how much.  Patient denies using illicit drugs but is open to trying anything.     At the ED patient arrived tachycardic with a heart rate of 116 and he medically stable.  Per ED exam patient had a NIHSS score of 20 which later went down to 13 after patient began moving his left arm.  Significant labs include lactic acid 2.4, alkaline phosphatase 123, WBC 4.0, RDW 16, platelet count 115, ethanol level 63.  Lactic acid down trended to 1.1 after patient was given 1 L of fluids.  UA negative for infection, UDS positive for cocaine and marijuana use.  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  Teleneurologist recommended stroke evaluation including giving aspirin, Plavix which was initiated in the ED.     On presentation patient was eating his meal using both of his hands.  However once patient acknowledged writer's presence left arm drooped.  On physical exam patient showed left arm was unable to move however with encouragement patient

## 2024-03-21 NOTE — PROGRESS NOTES
Physical Therapy    Chart reviewed and appreciated. Patient received sitting in wheelchair declining PT services. Per chart review and discussion with nursing staff patient has been able to independently transfer to wheelchair over the last few days and appears to be mobilizing at his baseline. PT is completing orders at this time due to patient's lack of participation with PT intervention and return to baseline level of function.     Alvarez Patel, PT

## 2024-03-21 NOTE — PLAN OF CARE
Care plan reviewed    Problem: Safety - Adult  Goal: Free from fall injury  Outcome: Progressing     Problem: Discharge Planning  Goal: Discharge to home or other facility with appropriate resources  Outcome: Progressing  Flowsheets (Taken 3/21/2024 0911 by Ethan Vivar, RN)  Discharge to home or other facility with appropriate resources:   Identify barriers to discharge with patient and caregiver   Arrange for needed discharge resources and transportation as appropriate     Problem: Pain  Goal: Verbalizes/displays adequate comfort level or baseline comfort level  Outcome: Progressing  Flowsheets (Taken 3/20/2024 0821 by Lobo Chavarria RN)  Verbalizes/displays adequate comfort level or baseline comfort level:   Encourage patient to monitor pain and request assistance   Assess pain using appropriate pain scale     Problem: Chronic Conditions and Co-morbidities  Goal: Patient's chronic conditions and co-morbidity symptoms are monitored and maintained or improved  Outcome: Progressing  Flowsheets (Taken 3/21/2024 0911 by Ethan Vivar RN)  Care Plan - Patient's Chronic Conditions and Co-Morbidity Symptoms are Monitored and Maintained or Improved:   Monitor and assess patient's chronic conditions and comorbid symptoms for stability, deterioration, or improvement   Collaborate with multidisciplinary team to address chronic and comorbid conditions and prevent exacerbation or deterioration     Problem: Skin/Tissue Integrity  Goal: Absence of new skin breakdown  Description: 1.  Monitor for areas of redness and/or skin breakdown  2.  Assess vascular access sites hourly  3.  Every 4-6 hours minimum:  Change oxygen saturation probe site  4.  Every 4-6 hours:  If on nasal continuous positive airway pressure, respiratory therapy assess nares and determine need for appliance change or resting period.  Outcome: Progressing

## 2024-03-21 NOTE — PROGRESS NOTES
Patient’s case reviewed during interdisciplinary team meeting in Med Surg/Tele Unit 2.  Rev. Tara Sanchez MDiv, [facility]

## 2024-03-22 VITALS
TEMPERATURE: 98.5 F | DIASTOLIC BLOOD PRESSURE: 53 MMHG | HEART RATE: 65 BPM | WEIGHT: 128.53 LBS | RESPIRATION RATE: 18 BRPM | OXYGEN SATURATION: 96 % | SYSTOLIC BLOOD PRESSURE: 105 MMHG | BODY MASS INDEX: 19.04 KG/M2 | HEIGHT: 69 IN

## 2024-03-22 PROCEDURE — 6370000000 HC RX 637 (ALT 250 FOR IP): Performed by: INTERNAL MEDICINE

## 2024-03-22 RX ORDER — ASPIRIN 325 MG
325 TABLET ORAL DAILY
Qty: 15 TABLET | Refills: 0 | Status: SHIPPED | OUTPATIENT
Start: 2024-03-23 | End: 2024-04-22

## 2024-03-22 RX ORDER — NICOTINE 21 MG/24HR
1 PATCH, TRANSDERMAL 24 HOURS TRANSDERMAL DAILY
Qty: 30 PATCH | Refills: 3 | Status: SHIPPED | OUTPATIENT
Start: 2024-03-23

## 2024-03-22 RX ORDER — MULTIVITAMIN WITH IRON
1 TABLET ORAL DAILY
Qty: 30 TABLET | Refills: 0 | Status: SHIPPED | OUTPATIENT
Start: 2024-03-23

## 2024-03-22 RX ORDER — FOLIC ACID 1 MG/1
1 TABLET ORAL DAILY
Qty: 30 TABLET | Refills: 0 | Status: SHIPPED | OUTPATIENT
Start: 2024-03-23

## 2024-03-22 RX ORDER — CLOPIDOGREL BISULFATE 75 MG/1
75 TABLET ORAL DAILY
Qty: 30 TABLET | Refills: 0 | Status: SHIPPED | OUTPATIENT
Start: 2024-03-23

## 2024-03-22 RX ORDER — BUDESONIDE AND FORMOTEROL FUMARATE DIHYDRATE 160; 4.5 UG/1; UG/1
2 AEROSOL RESPIRATORY (INHALATION) 2 TIMES DAILY
Qty: 10.2 G | Refills: 0 | Status: SHIPPED | OUTPATIENT
Start: 2024-03-22

## 2024-03-22 RX ORDER — ALBUTEROL SULFATE 90 UG/1
2 AEROSOL, METERED RESPIRATORY (INHALATION) 4 TIMES DAILY PRN
Qty: 18 G | Refills: 0 | Status: SHIPPED | OUTPATIENT
Start: 2024-03-22

## 2024-03-22 RX ORDER — ROSUVASTATIN CALCIUM 40 MG/1
40 TABLET, COATED ORAL NIGHTLY
Qty: 30 TABLET | Refills: 0 | Status: SHIPPED | OUTPATIENT
Start: 2024-03-22

## 2024-03-22 RX ORDER — LANOLIN ALCOHOL/MO/W.PET/CERES
100 CREAM (GRAM) TOPICAL DAILY
Qty: 30 TABLET | Refills: 3 | Status: SHIPPED | OUTPATIENT
Start: 2024-03-23

## 2024-03-22 RX ADMIN — FOLIC ACID 1 MG: 1 TABLET ORAL at 08:47

## 2024-03-22 RX ADMIN — CLOPIDOGREL BISULFATE 75 MG: 75 TABLET ORAL at 08:47

## 2024-03-22 RX ADMIN — Medication 100 MG: at 08:47

## 2024-03-22 RX ADMIN — THERA TABS 1 TABLET: TAB at 08:47

## 2024-03-22 RX ADMIN — ASPIRIN 325 MG: 325 TABLET ORAL at 08:47

## 2024-03-22 NOTE — PROGRESS NOTES
Patient left against medical advice.  MD in at bedside to discuss the risks of discharging against medical advice as well as the benefits of of staying.  Patient voiced understanding, still insistent on leaving.  Patient signed AMA acknowledgement.  Witnessed by two nurses.  Patient left in personal wheelchair.

## 2024-03-22 NOTE — DISCHARGE SUMMARY
Discharge Summary   Please note that this dictation was completed with [employer], the computer voice recognition software.  Quite often unanticipated grammatical, syntax, homophones, and other interpretive errors are inadvertently transcribed by the computer software.  Please disregard these errors.  Please excuse any errors that have escaped final proofreading.    PATIENT ID: Baldomero Reece  MRN: [id number]   YOB: 1955    DATE OF ADMISSION: 3/8/2024 10:24 AM    DATE OF DISCHARGE: 3/22/2024  PRIMARY CARE PROVIDER: None, None         ATTENDING PHYSICIAN: Hema Kitchen MD  DISCHARGING PROVIDER: Hema Kitchen MD       CONSULTATIONS: IP CONSULT TO TELE-NEUROLOGY  IP CONSULT TO NEUROLOGY  IP CONSULT TO SOCIAL WORK  IP CONSULT TO PODIATRY  IP CONSULT TO PHARMACY  IP CONSULT TO SOCIAL WORK  IP CONSULT TO CASE MANAGEMENT  IP CONSULT TO HEMATOLOGY  IP CONSULT TO NEUROLOGY    PROCEDURES/SURGERIES: * No surgery found *    ADMITTING HPI from excerpted H&P   Baldomero Reece is a 68 y.o. 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  Patient recalls drinking until he passed out. Last known was on 03/07/2024 before patient went to sleep.  Patient woke up around 3 AM and noticed left-sided weakness.  He recalls drinking more but is unclear on what he drank and how much.  Patient denies using illicit drugs but is open to trying anything.     At the ED patient arrived tachycardic with a heart rate of 116 and he medically stable.  Per ED exam patient had a NIHSS score of 20 which later went down to 13 after patient began moving his left arm.  Significant labs include lactic acid 2.4, alkaline phosphatase 123, WBC 4.0, RDW 16, platelet count 115, ethanol level 63.  Lactic acid down trended to 1.1  fever, chills, nausea, vomiting, diarrhea, change in mentation, falling, weakness, bleeding. Severe pain or pain not relieved by medications.  Or, any other signs or symptoms that you may have questions about.    DISPOSITION:    Home With:   OT  PT  HH  RN       Long term SNF/Inpatient Rehab    Independent/assisted living    Hospice   x Other: AMA       PATIENT CONDITION AT DISCHARGE:     Functional status    Poor    x Deconditioned     Independent      Cognition    x Lucid     Forgetful     Dementia      Catheters/lines (plus indication)    [facility]     PICC     PEG    x None      Code status    x Full code     DNR      PHYSICAL EXAMINATION AT DISCHARGE:    General: No acute distress.   HEENT: Eyes: perrl, no discharge or icterus. Dry mucus membranes  Cardiovascular: S1, S2, rrr, no mrg  Respiratory: expiratory wheezing on all lung fields.  Abdomen: active bowel sounds on 4q, abdomen soft, epigastric tenderness with no guarding or rigidity, no peritoneal signs  Extremities: No lower edema, R BKA, Left foot multiple wounds including medial left foot,  left malleolus, and plantar, no fluctuance, has warmth, no cyanosis  Neurologic: No gross focal abnormalities. PERRLA, CN II-XII are intact,   Psych: calm, pleasant, cooperative.          CHRONIC MEDICAL DIAGNOSES:      Greater than 31 minutes were spent with the patient on counseling and coordination of care    Signed:   Hema Kitchen MD  3/22/2024  2:08 PM

## 2024-03-22 NOTE — CARE COORDINATION
NADIA       RUR  18 %      Patient left AMA - he did not wait until I was able to print his appointment for Dr. Cesar Next week.   He told MD he would come back to the Outpatient Pharmacy to  his medication/ABX.     I took Face Sheet to the pharmacy  Left in an envelop his appointment , my contact #. Still willing to assist with transportation.     He has his own wheel chair and (I) with this.   There is no immediate solution for placement due to his history, Payor source.     If comes to ER and no medical reason to admit.  Asked nursing to give some wound care supplies.     Angelina Wakefield MSW RN    [phone]      these medications at [place] [place] [facility] - P [phone] - F [phone]  albuterol sulfate HFA  aspirin  budesonide-formoterol  clopidogrel  folic acid  multivitamin  nicotine  rosuvastatin  thiamine  Address: [address] [place] [address]   Phone: [phone]         Paulette Cesar DPM  Podiatry [phone] [address] [place] [address] [address] [place] [id number]      Next Steps: Follow up on 3/27/2024  Instructions: Your Appointment is on  March 27 @9:15AM. Please keep this appointmrent regarding your Foot and Wound.  Office is at [facility] in the Medical Building    [facility]   [phone] [address] [place] [address]     Next Steps: Follow up  Instructions: Please follow-up for Medical Care-  they also have Recovery Resources Monday-Friday.  8:00AM-4:00PM.  you can do walk-in to request for services    [facility] OP WOUND CARE  Wound Ostomy [phone] [address] [facility] [address], [address] [facility] [address]     Next Steps: Follow up on 3/22/2024  Instructions: Wound Care Center March 22,  @ 2PM.  please keep this appointment. Please call me   2-3 days before your appointment if need help with Transportation  [phone] Leave   where you are  located-( Address).    Housing Crisis Line     Homeless Connection Line [phone]     Next Steps: Follow up    Recovery Resource     [facility] Warm[facility] 7 days a week 8:00AM-12MN. [phone]     Next Steps: Follow up  Instructions: Confidential Peer to Peer Support.  Please call for help.    [facility] - [facility] and [facility]     - [phone]- : [address], [place] [address] open during the week only - for services     Next Steps: Follow up  Instructions: Not a Shelter but a good place to get help Monday 8:30?AM-5?PM Tuesday 8:30?AM-2?PM Wednesday 8:30?AM-5?PM Thursday 8:30?AM-2?PM Friday 8:30?AM-5?PM        Close Previous  Next

## 2024-03-22 NOTE — CARE COORDINATION
NADIA     RUR 18 %  high -Risk for Readmit     Discussed in IER again this week  with MD and team.   Continue Plan of care.  Wound care.  JEFRY undetermined.- Barrier  Medical hx, social hx, Payor source ,Placement options     See CM previous notes     Continue to follow-up with possible placement options   LTC no accepting facility at this time   [facility]  no beds at this time     Angelina JOLLEY -[phone]

## 2024-03-25 ENCOUNTER — FOLLOWUP TELEPHONE ENCOUNTER (OUTPATIENT)
Facility: HOSPITAL | Age: 69
End: 2024-03-25

## 2024-03-25 NOTE — TELEPHONE ENCOUNTER
CM called patient by telephone to perform post discharge assessment and for the purpose of follow up call from inpatient discharge to check on environmental challenges/medications/appointment follow up/and questions/concerns.     CM attempt to reach patient at the numbers provided.    [phone] this is [facility]   [phone] this is [facility] in [place]    No working number for patient     Encounter closed.     This was CM last attempt will close case if patient or family call back will assist with questions or concerns.    .    Yannick Garcia    [phone]

## 2024-06-20 ENCOUNTER — APPOINTMENT (OUTPATIENT)
Facility: HOSPITAL | Age: 69
End: 2024-06-20
Payer: MEDICAID

## 2024-06-20 ENCOUNTER — HOSPITAL ENCOUNTER (EMERGENCY)
Facility: HOSPITAL | Age: 69
Discharge: HOME | End: 2024-06-20
Payer: MEDICAID

## 2024-06-20 VITALS
TEMPERATURE: 98.1 F | OXYGEN SATURATION: 93 % | SYSTOLIC BLOOD PRESSURE: 92 MMHG | HEART RATE: 86 BPM | RESPIRATION RATE: 14 BRPM | DIASTOLIC BLOOD PRESSURE: 58 MMHG

## 2024-06-20 DIAGNOSIS — F10.920 ALCOHOLIC INTOXICATION WITHOUT COMPLICATION (HCC): Primary | ICD-10-CM

## 2024-06-20 LAB
ALBUMIN SERPL-MCNC: 3.3 G/DL (ref 3.5–5)
ALBUMIN/GLOB SERPL: 0.9 (ref 1.1–2.2)
ALP SERPL-CCNC: 96 U/L (ref 45–117)
ALT SERPL-CCNC: 21 U/L (ref 12–78)
AMPHET UR QL SCN: NEGATIVE
ANION GAP SERPL CALC-SCNC: 8 MMOL/L (ref 5–15)
APPEARANCE UR: CLEAR
AST SERPL-CCNC: 28 U/L (ref 15–37)
BACTERIA URNS QL MICRO: NEGATIVE /HPF
BARBITURATES UR QL SCN: NEGATIVE
BENZODIAZ UR QL: NEGATIVE
BILIRUB SERPL-MCNC: 0.4 MG/DL (ref 0.2–1)
BILIRUB UR QL: NEGATIVE
BUN SERPL-MCNC: 9 MG/DL (ref 6–20)
BUN/CREAT SERPL: 12 (ref 12–20)
CALCIUM SERPL-MCNC: 8.6 MG/DL (ref 8.5–10.1)
CANNABINOIDS UR QL SCN: POSITIVE
CHLORIDE SERPL-SCNC: 102 MMOL/L (ref 97–108)
CO2 SERPL-SCNC: 29 MMOL/L (ref 21–32)
COCAINE UR QL SCN: NEGATIVE
COLOR UR: NORMAL
CREAT SERPL-MCNC: 0.73 MG/DL (ref 0.7–1.3)
EKG ATRIAL RATE: 75 BPM
EKG DIAGNOSIS: NORMAL
EKG P AXIS: 72 DEGREES
EKG P-R INTERVAL: 170 MS
EKG Q-T INTERVAL: 418 MS
EKG QRS DURATION: 144 MS
EKG QTC CALCULATION (BAZETT): 466 MS
EKG R AXIS: -87 DEGREES
EKG T AXIS: 40 DEGREES
EKG VENTRICULAR RATE: 75 BPM
EPITH CASTS URNS QL MICRO: NORMAL /LPF
ERYTHROCYTE [DISTWIDTH] IN BLOOD BY AUTOMATED COUNT: 14.5 % (ref 11.5–14.5)
ETHANOL SERPL-MCNC: 143 MG/DL (ref 0–0.08)
GLOBULIN SER CALC-MCNC: 3.6 G/DL (ref 2–4)
GLUCOSE BLD STRIP.AUTO-MCNC: 89 MG/DL (ref 65–117)
GLUCOSE SERPL-MCNC: 87 MG/DL (ref 65–100)
GLUCOSE UR STRIP.AUTO-MCNC: NEGATIVE MG/DL
HCT VFR BLD AUTO: 42.4 % (ref 36.6–50.3)
HGB BLD-MCNC: 13 G/DL (ref 12.1–17)
HGB UR QL STRIP: NEGATIVE
KETONES UR QL STRIP.AUTO: NEGATIVE MG/DL
LEUKOCYTE ESTERASE UR QL STRIP.AUTO: NEGATIVE
Lab: ABNORMAL
MCH RBC QN AUTO: 28 PG (ref 26–34)
MCHC RBC AUTO-ENTMCNC: 30.7 G/DL (ref 30–36.5)
MCV RBC AUTO: 91.4 FL (ref 80–99)
METHADONE UR QL: NEGATIVE
NITRITE UR QL STRIP.AUTO: NEGATIVE
NRBC # BLD: 0 K/UL (ref 0–0.01)
NRBC BLD-RTO: 0 PER 100 WBC
OPIATES UR QL: NEGATIVE
PCP UR QL: NEGATIVE
PH UR STRIP: 5.5 (ref 5–8)
PLATELET # BLD AUTO: 156 K/UL (ref 150–400)
PMV BLD AUTO: 10.4 FL (ref 8.9–12.9)
POTASSIUM SERPL-SCNC: 4.6 MMOL/L (ref 3.5–5.1)
PROT SERPL-MCNC: 6.9 G/DL (ref 6.4–8.2)
PROT UR STRIP-MCNC: NEGATIVE MG/DL
RBC # BLD AUTO: 4.64 M/UL (ref 4.1–5.7)
RBC #/AREA URNS HPF: NORMAL /HPF (ref 0–5)
SERVICE CMNT-IMP: NORMAL
SODIUM SERPL-SCNC: 139 MMOL/L (ref 136–145)
SP GR UR REFRACTOMETRY: 1.01
TROPONIN I SERPL HS-MCNC: 7 NG/L (ref 0–76)
UROBILINOGEN UR QL STRIP.AUTO: 1 EU/DL (ref 0.2–1)
WBC # BLD AUTO: 3.4 K/UL (ref 4.1–11.1)
WBC URNS QL MICRO: NORMAL /HPF (ref 0–4)

## 2024-06-20 PROCEDURE — 84484 ASSAY OF TROPONIN QUANT: CPT

## 2024-06-20 PROCEDURE — 82962 GLUCOSE BLOOD TEST: CPT

## 2024-06-20 PROCEDURE — 2500000003 HC RX 250 WO HCPCS: Performed by: PHYSICIAN ASSISTANT

## 2024-06-20 PROCEDURE — 80053 COMPREHEN METABOLIC PANEL: CPT

## 2024-06-20 PROCEDURE — 6360000002 HC RX W HCPCS: Performed by: PHYSICIAN ASSISTANT

## 2024-06-20 PROCEDURE — 36415 COLL VENOUS BLD VENIPUNCTURE: CPT

## 2024-06-20 PROCEDURE — 81001 URINALYSIS AUTO W/SCOPE: CPT

## 2024-06-20 PROCEDURE — 96365 THER/PROPH/DIAG IV INF INIT: CPT

## 2024-06-20 PROCEDURE — 2580000003 HC RX 258: Performed by: PHYSICIAN ASSISTANT

## 2024-06-20 PROCEDURE — 82077 ASSAY SPEC XCP UR&BREATH IA: CPT

## 2024-06-20 PROCEDURE — 71045 X-RAY EXAM CHEST 1 VIEW: CPT

## 2024-06-20 PROCEDURE — 85027 COMPLETE CBC AUTOMATED: CPT

## 2024-06-20 PROCEDURE — 99285 EMERGENCY DEPT VISIT HI MDM: CPT

## 2024-06-20 PROCEDURE — 80307 DRUG TEST PRSMV CHEM ANLYZR: CPT

## 2024-06-20 RX ADMIN — THIAMINE HYDROCHLORIDE: 100 INJECTION, SOLUTION INTRAMUSCULAR; INTRAVENOUS at 15:10

## 2024-06-20 ASSESSMENT — PAIN - FUNCTIONAL ASSESSMENT: PAIN_FUNCTIONAL_ASSESSMENT: NONE - DENIES PAIN

## 2024-06-20 NOTE — ED NOTES
Verbal discharge instructions were given to the patient by AMANDO Freire  .  The patient left the Emergency Department prior to paper instructions could be given alert and oriented and in no acute distress with 0 prescription(s). The patient was encouraged to call or return to the ED for worsening issues or problems and was encouraged to schedule a follow up appointment for continuing care.  The patient verbalized understanding of discharge instructions and prescriptions; all questions were answered. The patient has no further concerns at this time.

## 2024-06-20 NOTE — ED TRIAGE NOTES
Pt presents via EMS who state pt was found down on a corner difficult to arouse after endorsing alcohol use earlier today. EMS states pt was in sun, but denies fever. Pt noncompliant with EMS questions.

## 2024-06-20 NOTE — ED PROVIDER NOTES
[facility] EMERGENCY DEPT  EMERGENCY DEPARTMENT ENCOUNTER         Pt Name: Baldomero Reece  MRN: [id number]  Birthdate 1955  Date of evaluation: 6/20/2024  Provider: Tani Xiong PA-C   PCP: None, None  Note Started: 3:58 PM EDT 6/20/24     CHIEF COMPLAINT       Chief Complaint   Patient presents with    Alcohol Intoxication        HISTORY OF PRESENT ILLNESS: 1 or more elements      History From: EMS  HPI Limitations: Altered Mental Status     Baldomero Reece is a 69 y.o. male who presents via EMS after being found on the ground and difficult to arouse.  He reportedly admitted to drinking alcohol today and most days.     Nursing Notes were all reviewed and agreed with or any disagreements were addressed in the HPI.  Please see MDM for additional details of HPI and ROS     REVIEW OF SYSTEMS      Review of Systems     Positives and Pertinent negatives as per HPI.    PAST HISTORY     Past Medical History:  Past Medical History:   Diagnosis Date    Alcohol abuse     Below knee amputation (HCC)     Rt leg    Cerebral artery occlusion with cerebral infarction (HCC)     Diabetes mellitus (HCC)     Hypertension        Past Surgical History:  Past Surgical History:   Procedure Laterality Date    FOOT DEBRIDEMENT Left 1/2/2024    LEFT FOOT INCISION AND DRAINAGE with misonix , First metatarsal head amputation and left fifth toe amputation, skin graft application left foot performed by Percy Salazar DPM at [facility] MAIN OR    LEG AMPUTATION BELOW KNEE Right     LOWER EXTREMITY AMPUTATION Left     transmetatarsal       Family History:  No family history on file.    Social History:  Social History     Tobacco Use    Smoking status: Every Day     Current packs/day: 2.00     Average packs/day: 2.0 packs/day for 52.5 years (104.9 ttl pk-yrs)     Types: Cigarettes     Start date: 1972    Smokeless tobacco: Never   Substance Use Topics    Alcohol use: Yes    Drug use: Yes     Types: Marijuana (Weed), Methamphetamines (Crystal 
55 to 60%.    MDM (CC/HPI Summary, DDx, ED Course, and Reassessment, Disposition Considerations (Tests not done, Shared Decision Making, Pt Expectation of Test or Tx.)::    Patient is a 69-year-old male who was brought in by EMS after being found on the corner difficult to arouse.  He is a known alcohol drinker.  He obeys some commands but no comprehensible speech.  On exam he is hypotensive and O2 93% on room air.  Lungs sounds with mild expiratory wheezing bilaterally. Abdomen soft.  DDX: ETOH intoxication, PNA, electrolyte imbalance, dehydration.  Will get EKG, CXR, and labs to start.         CLINICAL MANAGEMENT TOOLS:  {CMT List:65981::\"Not Applicable\"}    FINAL IMPRESSION   No diagnosis found.      DISPOSITION/PLAN   DISPOSITION        {Disposition (Optional):51325}     PATIENT REFERRED TO:  No follow-up provider specified.     DISCHARGE MEDICATIONS:     Medication List        ASK your doctor about these medications      albuterol sulfate  (90 Base) MCG/ACT inhaler  Commonly known as: Ventolin HFA  Inhale 2 puffs into the lungs 4 times daily as needed for Wheezing     aspirin 325 MG tablet  Take 1 tablet by mouth daily for 30 doses     budesonide-formoterol 160-4.5 MCG/ACT Aero  Commonly known as: Symbicort  Inhale 2 puffs into the lungs 2 times daily     clopidogrel 75 MG tablet  Commonly known as: PLAVIX  Take 1 tablet by mouth daily     folic acid 1 MG tablet  Commonly known as: FOLVITE  Take 1 tablet by mouth daily     multivitamin Tabs tablet  Take 1 tablet by mouth daily     nicotine 21 MG/24HR  Commonly known as: NICODERM CQ  Place 1 patch onto the skin daily     rosuvastatin 40 MG tablet  Commonly known as: CRESTOR  Take 1 tablet by mouth nightly     thiamine 100 MG tablet  Take 1 tablet by mouth daily                DISCONTINUED MEDICATIONS:  Current Discharge Medication List          {ED Attending Involvement (Optional):29168}    I am the Primary Clinician of Record.   Tani Xiong PA-C

## 2024-06-24 LAB
EKG ATRIAL RATE: 75 BPM
EKG DIAGNOSIS: NORMAL
EKG P AXIS: 72 DEGREES
EKG P-R INTERVAL: 170 MS
EKG Q-T INTERVAL: 418 MS
EKG QRS DURATION: 144 MS
EKG QTC CALCULATION (BAZETT): 466 MS
EKG R AXIS: -87 DEGREES
EKG T AXIS: 40 DEGREES
EKG VENTRICULAR RATE: 75 BPM

## 2024-08-04 ENCOUNTER — APPOINTMENT (OUTPATIENT)
Facility: HOSPITAL | Age: 69
End: 2024-08-04
Payer: MEDICAID

## 2024-08-04 ENCOUNTER — HOSPITAL ENCOUNTER (INPATIENT)
Facility: HOSPITAL | Age: 69
LOS: 3 days | Discharge: HOME | End: 2024-08-07
Attending: STUDENT IN AN ORGANIZED HEALTH CARE EDUCATION/TRAINING PROGRAM | Admitting: INTERNAL MEDICINE
Payer: MEDICAID

## 2024-08-04 DIAGNOSIS — R41.82 ALTERED MENTAL STATUS, UNSPECIFIED ALTERED MENTAL STATUS TYPE: ICD-10-CM

## 2024-08-04 DIAGNOSIS — I63.9 ACUTE CVA (CEREBROVASCULAR ACCIDENT) (HCC): Primary | ICD-10-CM

## 2024-08-04 DIAGNOSIS — I63.9 ACUTE CEREBROVASCULAR ACCIDENT (CVA) (HCC): ICD-10-CM

## 2024-08-04 LAB
ALBUMIN SERPL-MCNC: 3.4 G/DL (ref 3.5–5)
ALBUMIN/GLOB SERPL: 0.9 (ref 1.1–2.2)
ALP SERPL-CCNC: 108 U/L (ref 45–117)
ALT SERPL-CCNC: 22 U/L (ref 12–78)
AMMONIA PLAS-SCNC: 18 UMOL/L
AMPHET UR QL SCN: NEGATIVE
ANION GAP SERPL CALC-SCNC: 5 MMOL/L (ref 5–15)
APAP SERPL-MCNC: <2 UG/ML (ref 10–30)
APPEARANCE UR: CLEAR
AST SERPL-CCNC: 32 U/L (ref 15–37)
BACTERIA URNS QL MICRO: NEGATIVE /HPF
BARBITURATES UR QL SCN: NEGATIVE
BASOPHILS # BLD: 0.1 K/UL (ref 0–0.1)
BASOPHILS NFR BLD: 2 % (ref 0–1)
BENZODIAZ UR QL: NEGATIVE
BILIRUB SERPL-MCNC: 0.4 MG/DL (ref 0.2–1)
BILIRUB UR QL: NEGATIVE
BUN SERPL-MCNC: 7 MG/DL (ref 6–20)
BUN/CREAT SERPL: 10 (ref 12–20)
CALCIUM SERPL-MCNC: 8.9 MG/DL (ref 8.5–10.1)
CANNABINOIDS UR QL SCN: NEGATIVE
CHLORIDE SERPL-SCNC: 103 MMOL/L (ref 97–108)
CO2 SERPL-SCNC: 28 MMOL/L (ref 21–32)
COCAINE UR QL SCN: NEGATIVE
COLOR UR: NORMAL
COMMENT:: NORMAL
CREAT SERPL-MCNC: 0.7 MG/DL (ref 0.7–1.3)
CRP SERPL-MCNC: <0.29 MG/DL (ref 0–0.3)
DIFFERENTIAL METHOD BLD: ABNORMAL
EKG ATRIAL RATE: 88 BPM
EKG DIAGNOSIS: NORMAL
EKG P AXIS: 82 DEGREES
EKG P-R INTERVAL: 170 MS
EKG Q-T INTERVAL: 388 MS
EKG QRS DURATION: 148 MS
EKG QTC CALCULATION (BAZETT): 469 MS
EKG R AXIS: -86 DEGREES
EKG T AXIS: 75 DEGREES
EKG VENTRICULAR RATE: 88 BPM
EOSINOPHIL # BLD: 0.4 K/UL (ref 0–0.4)
EOSINOPHIL NFR BLD: 7 % (ref 0–7)
EPITH CASTS URNS QL MICRO: NORMAL /LPF
ERYTHROCYTE [DISTWIDTH] IN BLOOD BY AUTOMATED COUNT: 13.3 % (ref 11.5–14.5)
ETHANOL SERPL-MCNC: 214 MG/DL (ref 0–0.08)
FOLATE SERPL-MCNC: 18.7 NG/ML (ref 5–21)
GLOBULIN SER CALC-MCNC: 4 G/DL (ref 2–4)
GLUCOSE SERPL-MCNC: 87 MG/DL (ref 65–100)
GLUCOSE UR STRIP.AUTO-MCNC: NEGATIVE MG/DL
HCT VFR BLD AUTO: 47.9 % (ref 36.6–50.3)
HGB BLD-MCNC: 15 G/DL (ref 12.1–17)
HGB UR QL STRIP: NEGATIVE
HYALINE CASTS URNS QL MICRO: NORMAL /LPF (ref 0–5)
IMM GRANULOCYTES # BLD AUTO: 0 K/UL (ref 0–0.04)
IMM GRANULOCYTES NFR BLD AUTO: 0 % (ref 0–0.5)
KETONES UR QL STRIP.AUTO: NEGATIVE MG/DL
LEUKOCYTE ESTERASE UR QL STRIP.AUTO: NEGATIVE
LIPASE SERPL-CCNC: 58 U/L (ref 13–75)
LYMPHOCYTES # BLD: 1.7 K/UL (ref 0.8–3.5)
LYMPHOCYTES NFR BLD: 28 % (ref 12–49)
Lab: NORMAL
MAGNESIUM SERPL-MCNC: 1.7 MG/DL (ref 1.6–2.4)
MCH RBC QN AUTO: 27.5 PG (ref 26–34)
MCHC RBC AUTO-ENTMCNC: 31.3 G/DL (ref 30–36.5)
MCV RBC AUTO: 87.7 FL (ref 80–99)
METHADONE UR QL: NEGATIVE
MONOCYTES # BLD: 0.5 K/UL (ref 0–1)
MONOCYTES NFR BLD: 8 % (ref 5–13)
NEUTS SEG # BLD: 3.4 K/UL (ref 1.8–8)
NEUTS SEG NFR BLD: 55 % (ref 32–75)
NITRITE UR QL STRIP.AUTO: NEGATIVE
NRBC # BLD: 0 K/UL (ref 0–0.01)
NRBC BLD-RTO: 0 PER 100 WBC
OPIATES UR QL: NEGATIVE
PCP UR QL: NEGATIVE
PH UR STRIP: 5.5 (ref 5–8)
PHOSPHATE SERPL-MCNC: 2.9 MG/DL (ref 2.6–4.7)
PLATELET # BLD AUTO: 176 K/UL (ref 150–400)
PMV BLD AUTO: 10.5 FL (ref 8.9–12.9)
POTASSIUM SERPL-SCNC: 4.2 MMOL/L (ref 3.5–5.1)
PROT SERPL-MCNC: 7.4 G/DL (ref 6.4–8.2)
PROT UR STRIP-MCNC: NEGATIVE MG/DL
RBC # BLD AUTO: 5.46 M/UL (ref 4.1–5.7)
RBC #/AREA URNS HPF: NORMAL /HPF (ref 0–5)
SALICYLATES SERPL-MCNC: <1.7 MG/DL (ref 2.8–20)
SODIUM SERPL-SCNC: 136 MMOL/L (ref 136–145)
SP GR UR REFRACTOMETRY: 1.01 (ref 1–1.03)
SPECIMEN HOLD: NORMAL
TROPONIN I SERPL HS-MCNC: 7 NG/L (ref 0–76)
TSH SERPL DL<=0.05 MIU/L-ACNC: 2.68 UIU/ML (ref 0.36–3.74)
UROBILINOGEN UR QL STRIP.AUTO: 0.2 EU/DL (ref 0.2–1)
VIT B12 SERPL-MCNC: 286 PG/ML (ref 193–986)
WBC # BLD AUTO: 6.2 K/UL (ref 4.1–11.1)
WBC URNS QL MICRO: NORMAL /HPF (ref 0–4)

## 2024-08-04 PROCEDURE — 82140 ASSAY OF AMMONIA: CPT

## 2024-08-04 PROCEDURE — 82746 ASSAY OF FOLIC ACID SERUM: CPT

## 2024-08-04 PROCEDURE — 82077 ASSAY SPEC XCP UR&BREATH IA: CPT

## 2024-08-04 PROCEDURE — 6360000004 HC RX CONTRAST MEDICATION: Performed by: RADIOLOGY

## 2024-08-04 PROCEDURE — 2060000000 HC ICU INTERMEDIATE R&B

## 2024-08-04 PROCEDURE — 83690 ASSAY OF LIPASE: CPT

## 2024-08-04 PROCEDURE — 84443 ASSAY THYROID STIM HORMONE: CPT

## 2024-08-04 PROCEDURE — 6360000002 HC RX W HCPCS: Performed by: INTERNAL MEDICINE

## 2024-08-04 PROCEDURE — 2580000003 HC RX 258: Performed by: STUDENT IN AN ORGANIZED HEALTH CARE EDUCATION/TRAINING PROGRAM

## 2024-08-04 PROCEDURE — 86140 C-REACTIVE PROTEIN: CPT

## 2024-08-04 PROCEDURE — 81001 URINALYSIS AUTO W/SCOPE: CPT

## 2024-08-04 PROCEDURE — 6370000000 HC RX 637 (ALT 250 FOR IP): Performed by: INTERNAL MEDICINE

## 2024-08-04 PROCEDURE — 84484 ASSAY OF TROPONIN QUANT: CPT

## 2024-08-04 PROCEDURE — 80143 DRUG ASSAY ACETAMINOPHEN: CPT

## 2024-08-04 PROCEDURE — 94640 AIRWAY INHALATION TREATMENT: CPT

## 2024-08-04 PROCEDURE — 71045 X-RAY EXAM CHEST 1 VIEW: CPT

## 2024-08-04 PROCEDURE — 80053 COMPREHEN METABOLIC PANEL: CPT

## 2024-08-04 PROCEDURE — 70450 CT HEAD/BRAIN W/O DYE: CPT

## 2024-08-04 PROCEDURE — 83735 ASSAY OF MAGNESIUM: CPT

## 2024-08-04 PROCEDURE — 70496 CT ANGIOGRAPHY HEAD: CPT

## 2024-08-04 PROCEDURE — 85025 COMPLETE CBC W/AUTO DIFF WBC: CPT

## 2024-08-04 PROCEDURE — 36415 COLL VENOUS BLD VENIPUNCTURE: CPT

## 2024-08-04 PROCEDURE — 82607 VITAMIN B-12: CPT

## 2024-08-04 PROCEDURE — 80179 DRUG ASSAY SALICYLATE: CPT

## 2024-08-04 PROCEDURE — 99285 EMERGENCY DEPT VISIT HI MDM: CPT

## 2024-08-04 PROCEDURE — 2500000003 HC RX 250 WO HCPCS: Performed by: INTERNAL MEDICINE

## 2024-08-04 PROCEDURE — 93005 ELECTROCARDIOGRAM TRACING: CPT | Performed by: STUDENT IN AN ORGANIZED HEALTH CARE EDUCATION/TRAINING PROGRAM

## 2024-08-04 PROCEDURE — 80307 DRUG TEST PRSMV CHEM ANLYZR: CPT

## 2024-08-04 PROCEDURE — 2580000003 HC RX 258: Performed by: INTERNAL MEDICINE

## 2024-08-04 PROCEDURE — 84100 ASSAY OF PHOSPHORUS: CPT

## 2024-08-04 RX ORDER — NICOTINE 21 MG/24HR
1 PATCH, TRANSDERMAL 24 HOURS TRANSDERMAL DAILY
Status: DISCONTINUED | OUTPATIENT
Start: 2024-08-04 | End: 2024-08-07 | Stop reason: HOSPADM

## 2024-08-04 RX ORDER — 0.9 % SODIUM CHLORIDE 0.9 %
1000 INTRAVENOUS SOLUTION INTRAVENOUS ONCE
Status: COMPLETED | OUTPATIENT
Start: 2024-08-04 | End: 2024-08-04

## 2024-08-04 RX ORDER — CLOPIDOGREL BISULFATE 75 MG/1
75 TABLET ORAL DAILY
Status: DISCONTINUED | OUTPATIENT
Start: 2024-08-04 | End: 2024-08-05

## 2024-08-04 RX ORDER — ASPIRIN 300 MG/1
300 SUPPOSITORY RECTAL DAILY
Status: DISCONTINUED | OUTPATIENT
Start: 2024-08-04 | End: 2024-08-07

## 2024-08-04 RX ORDER — SODIUM CHLORIDE 0.9 % (FLUSH) 0.9 %
5-40 SYRINGE (ML) INJECTION EVERY 12 HOURS SCHEDULED
Status: DISCONTINUED | OUTPATIENT
Start: 2024-08-04 | End: 2024-08-07 | Stop reason: HOSPADM

## 2024-08-04 RX ORDER — LEVOFLOXACIN 5 MG/ML
750 INJECTION, SOLUTION INTRAVENOUS EVERY 24 HOURS
Status: DISCONTINUED | OUTPATIENT
Start: 2024-08-04 | End: 2024-08-07 | Stop reason: CLARIF

## 2024-08-04 RX ORDER — SODIUM CHLORIDE, SODIUM LACTATE, POTASSIUM CHLORIDE, CALCIUM CHLORIDE 600; 310; 30; 20 MG/100ML; MG/100ML; MG/100ML; MG/100ML
INJECTION, SOLUTION INTRAVENOUS CONTINUOUS
Status: DISCONTINUED | OUTPATIENT
Start: 2024-08-04 | End: 2024-08-06

## 2024-08-04 RX ORDER — ASPIRIN 325 MG
325 TABLET ORAL DAILY
Status: DISCONTINUED | OUTPATIENT
Start: 2024-08-04 | End: 2024-08-07

## 2024-08-04 RX ORDER — POLYETHYLENE GLYCOL 3350 17 G/17G
17 POWDER, FOR SOLUTION ORAL DAILY PRN
Status: DISCONTINUED | OUTPATIENT
Start: 2024-08-04 | End: 2024-08-07 | Stop reason: HOSPADM

## 2024-08-04 RX ORDER — ROSUVASTATIN CALCIUM 40 MG/1
40 TABLET, COATED ORAL NIGHTLY
Status: DISCONTINUED | OUTPATIENT
Start: 2024-08-04 | End: 2024-08-05

## 2024-08-04 RX ORDER — SODIUM CHLORIDE 9 MG/ML
INJECTION, SOLUTION INTRAVENOUS PRN
Status: DISCONTINUED | OUTPATIENT
Start: 2024-08-04 | End: 2024-08-07 | Stop reason: HOSPADM

## 2024-08-04 RX ORDER — ENOXAPARIN SODIUM 100 MG/ML
40 INJECTION SUBCUTANEOUS DAILY
Status: DISCONTINUED | OUTPATIENT
Start: 2024-08-04 | End: 2024-08-07 | Stop reason: HOSPADM

## 2024-08-04 RX ORDER — SODIUM CHLORIDE 0.9 % (FLUSH) 0.9 %
5-40 SYRINGE (ML) INJECTION PRN
Status: DISCONTINUED | OUTPATIENT
Start: 2024-08-04 | End: 2024-08-07 | Stop reason: HOSPADM

## 2024-08-04 RX ORDER — ONDANSETRON 4 MG/1
4 TABLET, ORALLY DISINTEGRATING ORAL EVERY 8 HOURS PRN
Status: DISCONTINUED | OUTPATIENT
Start: 2024-08-04 | End: 2024-08-07 | Stop reason: HOSPADM

## 2024-08-04 RX ORDER — ONDANSETRON 2 MG/ML
4 INJECTION INTRAMUSCULAR; INTRAVENOUS EVERY 6 HOURS PRN
Status: DISCONTINUED | OUTPATIENT
Start: 2024-08-04 | End: 2024-08-07 | Stop reason: HOSPADM

## 2024-08-04 RX ORDER — IPRATROPIUM BROMIDE AND ALBUTEROL SULFATE 2.5; .5 MG/3ML; MG/3ML
1 SOLUTION RESPIRATORY (INHALATION)
Status: DISCONTINUED | OUTPATIENT
Start: 2024-08-04 | End: 2024-08-06

## 2024-08-04 RX ADMIN — LEVOFLOXACIN 750 MG: 750 INJECTION, SOLUTION INTRAVENOUS at 19:31

## 2024-08-04 RX ADMIN — THIAMINE HYDROCHLORIDE: 100 INJECTION, SOLUTION INTRAMUSCULAR; INTRAVENOUS at 21:17

## 2024-08-04 RX ADMIN — ASPIRIN 300 MG: 300 SUPPOSITORY RECTAL at 19:22

## 2024-08-04 RX ADMIN — SODIUM CHLORIDE 1000 ML: 9 INJECTION, SOLUTION INTRAVENOUS at 12:54

## 2024-08-04 RX ADMIN — ENOXAPARIN SODIUM 40 MG: 100 INJECTION SUBCUTANEOUS at 19:21

## 2024-08-04 RX ADMIN — SODIUM CHLORIDE, POTASSIUM CHLORIDE, SODIUM LACTATE AND CALCIUM CHLORIDE: 600; 310; 30; 20 INJECTION, SOLUTION INTRAVENOUS at 19:28

## 2024-08-04 RX ADMIN — IPRATROPIUM BROMIDE AND ALBUTEROL SULFATE 1 DOSE: .5; 3 SOLUTION RESPIRATORY (INHALATION) at 21:10

## 2024-08-04 RX ADMIN — IOPAMIDOL 100 ML: 755 INJECTION, SOLUTION INTRAVENOUS at 13:39

## 2024-08-04 ASSESSMENT — PAIN - FUNCTIONAL ASSESSMENT: PAIN_FUNCTIONAL_ASSESSMENT: NONE - DENIES PAIN

## 2024-08-04 NOTE — ED PROVIDER NOTES
[facility] EMERGENCY DEP  EMERGENCY DEPARTMENT ENCOUNTER      Pt Name: Baldomero Reece  MRN: [id number]  Birthdate 1955  Date of evaluation: 8/4/2024  Provider: Errol Shelton MD    CHIEF COMPLAINT       Chief Complaint   Patient presents with    Aphasia       HISTORY OF PRESENT ILLNESS    HPI    69-year-old male history of COPD, alcohol abuse, liver cirrhosis, prior CVA with chronic right-sided weakness, right BKA from osteomyelitis, brought in for altered mental status.  Patient found by neighbors laying on the ground, he was not speaking clearly and not acting normally so EMS was called.  Unknown last well, it was sometime last week.  He admits to drinking some alcohol today.  He does not know how much he drank or exactly when.  He denies any other drug use.    Patient arrives with slurred and stuttering dysarthric speech, he makes mostly moaning sounds and can occasionally make some comprehensible words.  NIHSS 17.   Nursing notes reviewed.    REVIEW OF SYSTEMS     Review of Systems  Unless otherwise stated, a complete review of systems was asked of the patient. Pertinent positives are noted in the HPI section.    PAST MEDICAL HISTORY     Past Medical History:   Diagnosis Date    Alcohol abuse     Below knee amputation (HCC)     Rt leg    Cerebral artery occlusion with cerebral infarction (HCC)     Diabetes mellitus (HCC)     Hypertension        SURGICAL HISTORY       Past Surgical History:   Procedure Laterality Date    FOOT DEBRIDEMENT Left 1/2/2024    LEFT FOOT INCISION AND DRAINAGE with misonix , First metatarsal head amputation and left fifth toe amputation, skin graft application left foot performed by Percy Salazar DPM at [facility] MAIN OR    LEG AMPUTATION BELOW KNEE Right     LOWER EXTREMITY AMPUTATION Left     transmetatarsal       CURRENT MEDICATIONS       Previous Medications    ALBUTEROL SULFATE HFA (VENTOLIN HFA) 108 (90 BASE) MCG/ACT INHALER    Inhale 2 puffs into the lungs 4 times daily as

## 2024-08-04 NOTE — ED NOTES
Patient asked if he would like security to come and get his cash and belongings to lock up. Patient states he does not want his belongings locked up at this time

## 2024-08-04 NOTE — H&P
History and Physical    Date of Service:  8/4/2024  Primary Care Provider: None, None  Source of information: Chart review and review of EMR    Chief Complaint: Aphasia      History of Presenting Illness:   Baldomero Reece is a 69 y.o. male with past medical history significant for COPD, dyslipidemia, peripheral artery disease with resultant right BKA, left transmetatarsal amputation presented at the emergency room with right facial droop as well as right-sided weakness and aphasia.  The patient was found by neighbors, he was laying on the ground with incoherent speech and EMS was called.  It was reported that patient drank some alcohol today.  He has history of alcohol abuse.  Patient is unable to provide  history because of the acuity of his condition.  He presented at the emergency room with serum alcohol level of 214.  CT of the head was negative for acute pathology.  CTA of the head and neck did not show large vessel occlusion.  Patient was observed in the emergency room and when he did not return to normal baseline mental status he was referred to the hospitalist service for admission for stroke workup.           REVIEW OF SYSTEMS:  Review of systems not obtained due to patient factors.     Past Medical History:   Diagnosis Date    Alcohol abuse     Below knee amputation (HCC)     Rt leg    Cerebral artery occlusion with cerebral infarction (HCC)     Diabetes mellitus (HCC)     Hypertension       Past Surgical History:   Procedure Laterality Date    FOOT DEBRIDEMENT Left 1/2/2024    LEFT FOOT INCISION AND DRAINAGE with misonix , First metatarsal head amputation and left fifth toe amputation, skin graft application left foot performed by Percy Salazar DPM at [facility] MAIN OR    LEG AMPUTATION BELOW KNEE Right     LOWER EXTREMITY AMPUTATION Left     transmetatarsal     Prior to Admission medications    Medication Sig Start Date End Date Taking? Authorizing Provider   rosuvastatin (CRESTOR) 40 MG

## 2024-08-04 NOTE — ED TRIAGE NOTES
Patient presents from home with complaints of right side facial droop, weakness to the right side, severe dysphagia and aphasia. LKW sometime last week

## 2024-08-04 NOTE — ED NOTES
Bedside and Verbal shift change report given to Patricia RN (oncoming nurse) by Patricia RN (offgoing nurse). Report included the following information ED Encounter Summary, ED SBAR, MAR, and Recent Results.

## 2024-08-05 ENCOUNTER — APPOINTMENT (OUTPATIENT)
Facility: HOSPITAL | Age: 69
End: 2024-08-05
Payer: MEDICAID

## 2024-08-05 LAB
ALBUMIN SERPL-MCNC: 3 G/DL (ref 3.5–5)
ALBUMIN/GLOB SERPL: 0.9 (ref 1.1–2.2)
ALP SERPL-CCNC: 99 U/L (ref 45–117)
ALT SERPL-CCNC: 20 U/L (ref 12–78)
ANION GAP SERPL CALC-SCNC: 3 MMOL/L (ref 5–15)
AST SERPL-CCNC: 30 U/L (ref 15–37)
BASOPHILS # BLD: 0.1 K/UL (ref 0–0.1)
BASOPHILS NFR BLD: 2 % (ref 0–1)
BILIRUB SERPL-MCNC: 0.9 MG/DL (ref 0.2–1)
BUN SERPL-MCNC: 10 MG/DL (ref 6–20)
BUN/CREAT SERPL: 14 (ref 12–20)
CALCIUM SERPL-MCNC: 8.5 MG/DL (ref 8.5–10.1)
CHLORIDE SERPL-SCNC: 107 MMOL/L (ref 97–108)
CHOLEST SERPL-MCNC: 126 MG/DL
CO2 SERPL-SCNC: 29 MMOL/L (ref 21–32)
CREAT SERPL-MCNC: 0.7 MG/DL (ref 0.7–1.3)
DIFFERENTIAL METHOD BLD: ABNORMAL
EOSINOPHIL # BLD: 0.2 K/UL (ref 0–0.4)
EOSINOPHIL NFR BLD: 6 % (ref 0–7)
ERYTHROCYTE [DISTWIDTH] IN BLOOD BY AUTOMATED COUNT: 13.4 % (ref 11.5–14.5)
EST. AVERAGE GLUCOSE BLD GHB EST-MCNC: 97 MG/DL
GLOBULIN SER CALC-MCNC: 3.4 G/DL (ref 2–4)
GLUCOSE SERPL-MCNC: 84 MG/DL (ref 65–100)
HBA1C MFR BLD: 5 % (ref 4–5.6)
HCT VFR BLD AUTO: 42.3 % (ref 36.6–50.3)
HDLC SERPL-MCNC: 70 MG/DL
HDLC SERPL: 1.8 (ref 0–5)
HGB BLD-MCNC: 13.2 G/DL (ref 12.1–17)
IMM GRANULOCYTES # BLD AUTO: 0 K/UL (ref 0–0.04)
IMM GRANULOCYTES NFR BLD AUTO: 0 % (ref 0–0.5)
LDLC SERPL CALC-MCNC: 38.4 MG/DL (ref 0–100)
LYMPHOCYTES # BLD: 0.7 K/UL (ref 0.8–3.5)
LYMPHOCYTES NFR BLD: 22 % (ref 12–49)
MCH RBC QN AUTO: 27.2 PG (ref 26–34)
MCHC RBC AUTO-ENTMCNC: 31.2 G/DL (ref 30–36.5)
MCV RBC AUTO: 87.2 FL (ref 80–99)
MONOCYTES # BLD: 0.2 K/UL (ref 0–1)
MONOCYTES NFR BLD: 7 % (ref 5–13)
NEUTS SEG # BLD: 1.9 K/UL (ref 1.8–8)
NEUTS SEG NFR BLD: 63 % (ref 32–75)
NRBC # BLD: 0 K/UL (ref 0–0.01)
NRBC BLD-RTO: 0 PER 100 WBC
PLATELET # BLD AUTO: 104 K/UL (ref 150–400)
PMV BLD AUTO: 10.5 FL (ref 8.9–12.9)
POTASSIUM SERPL-SCNC: 4.8 MMOL/L (ref 3.5–5.1)
PROT SERPL-MCNC: 6.4 G/DL (ref 6.4–8.2)
RBC # BLD AUTO: 4.85 M/UL (ref 4.1–5.7)
RBC MORPH BLD: ABNORMAL
SODIUM SERPL-SCNC: 139 MMOL/L (ref 136–145)
TRIGL SERPL-MCNC: 88 MG/DL
TROPONIN I SERPL HS-MCNC: 5 NG/L (ref 0–76)
TROPONIN I SERPL HS-MCNC: 7 NG/L (ref 0–76)
VLDLC SERPL CALC-MCNC: 17.6 MG/DL
WBC # BLD AUTO: 3.1 K/UL (ref 4.1–11.1)

## 2024-08-05 PROCEDURE — 2060000000 HC ICU INTERMEDIATE R&B

## 2024-08-05 PROCEDURE — 92522 EVALUATE SPEECH PRODUCTION: CPT

## 2024-08-05 PROCEDURE — 92610 EVALUATE SWALLOWING FUNCTION: CPT

## 2024-08-05 PROCEDURE — 6370000000 HC RX 637 (ALT 250 FOR IP): Performed by: NURSE PRACTITIONER

## 2024-08-05 PROCEDURE — 85025 COMPLETE CBC W/AUTO DIFF WBC: CPT

## 2024-08-05 PROCEDURE — 99223 1ST HOSP IP/OBS HIGH 75: CPT | Performed by: NURSE PRACTITIONER

## 2024-08-05 PROCEDURE — 74018 RADEX ABDOMEN 1 VIEW: CPT

## 2024-08-05 PROCEDURE — 83036 HEMOGLOBIN GLYCOSYLATED A1C: CPT

## 2024-08-05 PROCEDURE — 2580000003 HC RX 258: Performed by: INTERNAL MEDICINE

## 2024-08-05 PROCEDURE — 97530 THERAPEUTIC ACTIVITIES: CPT

## 2024-08-05 PROCEDURE — 94640 AIRWAY INHALATION TREATMENT: CPT

## 2024-08-05 PROCEDURE — 80061 LIPID PANEL: CPT

## 2024-08-05 PROCEDURE — 6360000002 HC RX W HCPCS: Performed by: INTERNAL MEDICINE

## 2024-08-05 PROCEDURE — 2500000003 HC RX 250 WO HCPCS: Performed by: INTERNAL MEDICINE

## 2024-08-05 PROCEDURE — 6370000000 HC RX 637 (ALT 250 FOR IP): Performed by: INTERNAL MEDICINE

## 2024-08-05 PROCEDURE — 97161 PT EVAL LOW COMPLEX 20 MIN: CPT

## 2024-08-05 PROCEDURE — 84484 ASSAY OF TROPONIN QUANT: CPT

## 2024-08-05 PROCEDURE — 80053 COMPREHEN METABOLIC PANEL: CPT

## 2024-08-05 PROCEDURE — 36415 COLL VENOUS BLD VENIPUNCTURE: CPT

## 2024-08-05 PROCEDURE — 97165 OT EVAL LOW COMPLEX 30 MIN: CPT

## 2024-08-05 RX ORDER — LORAZEPAM 2 MG/ML
3 INJECTION INTRAMUSCULAR
Status: DISCONTINUED | OUTPATIENT
Start: 2024-08-05 | End: 2024-08-07 | Stop reason: HOSPADM

## 2024-08-05 RX ORDER — LORAZEPAM 2 MG/1
3 TABLET ORAL
Status: DISCONTINUED | OUTPATIENT
Start: 2024-08-05 | End: 2024-08-07 | Stop reason: HOSPADM

## 2024-08-05 RX ORDER — LORAZEPAM 2 MG/ML
2 INJECTION INTRAMUSCULAR
Status: DISCONTINUED | OUTPATIENT
Start: 2024-08-05 | End: 2024-08-07 | Stop reason: HOSPADM

## 2024-08-05 RX ORDER — PHENOBARBITAL 32.4 MG/1
16.2 TABLET ORAL EVERY 6 HOURS PRN
Status: DISCONTINUED | OUTPATIENT
Start: 2024-08-07 | End: 2024-08-07 | Stop reason: HOSPADM

## 2024-08-05 RX ORDER — SODIUM CHLORIDE 0.9 % (FLUSH) 0.9 %
5-40 SYRINGE (ML) INJECTION PRN
Status: DISCONTINUED | OUTPATIENT
Start: 2024-08-05 | End: 2024-08-07 | Stop reason: HOSPADM

## 2024-08-05 RX ORDER — LORAZEPAM 2 MG/1
1 TABLET ORAL
Status: DISCONTINUED | OUTPATIENT
Start: 2024-08-05 | End: 2024-08-07 | Stop reason: HOSPADM

## 2024-08-05 RX ORDER — ATORVASTATIN CALCIUM 40 MG/1
40 TABLET, FILM COATED ORAL NIGHTLY
Status: DISCONTINUED | OUTPATIENT
Start: 2024-08-05 | End: 2024-08-07 | Stop reason: HOSPADM

## 2024-08-05 RX ORDER — PHENOBARBITAL 32.4 MG/1
32.4 TABLET ORAL 4 TIMES DAILY
Status: COMPLETED | OUTPATIENT
Start: 2024-08-05 | End: 2024-08-06

## 2024-08-05 RX ORDER — SODIUM CHLORIDE 9 MG/ML
INJECTION, SOLUTION INTRAVENOUS PRN
Status: DISCONTINUED | OUTPATIENT
Start: 2024-08-05 | End: 2024-08-07 | Stop reason: HOSPADM

## 2024-08-05 RX ORDER — MULTIVITAMIN WITH IRON
1 TABLET ORAL DAILY
Status: DISCONTINUED | OUTPATIENT
Start: 2024-08-05 | End: 2024-08-07 | Stop reason: HOSPADM

## 2024-08-05 RX ORDER — FOLIC ACID 1 MG/1
1 TABLET ORAL DAILY
Status: DISCONTINUED | OUTPATIENT
Start: 2024-08-05 | End: 2024-08-07 | Stop reason: HOSPADM

## 2024-08-05 RX ORDER — LORAZEPAM 2 MG/1
2 TABLET ORAL
Status: DISCONTINUED | OUTPATIENT
Start: 2024-08-05 | End: 2024-08-07 | Stop reason: HOSPADM

## 2024-08-05 RX ORDER — PHENOBARBITAL 32.4 MG/1
32.4 TABLET ORAL 2 TIMES DAILY
Status: COMPLETED | OUTPATIENT
Start: 2024-08-06 | End: 2024-08-07

## 2024-08-05 RX ORDER — LORAZEPAM 2 MG/ML
4 INJECTION INTRAMUSCULAR
Status: DISCONTINUED | OUTPATIENT
Start: 2024-08-05 | End: 2024-08-07 | Stop reason: HOSPADM

## 2024-08-05 RX ORDER — LORAZEPAM 2 MG/ML
1 INJECTION INTRAMUSCULAR
Status: DISCONTINUED | OUTPATIENT
Start: 2024-08-05 | End: 2024-08-07 | Stop reason: HOSPADM

## 2024-08-05 RX ORDER — PHENOBARBITAL 32.4 MG/1
32.4 TABLET ORAL EVERY 6 HOURS PRN
Status: DISCONTINUED | OUTPATIENT
Start: 2024-08-05 | End: 2024-08-07 | Stop reason: HOSPADM

## 2024-08-05 RX ORDER — SODIUM CHLORIDE 0.9 % (FLUSH) 0.9 %
5-40 SYRINGE (ML) INJECTION EVERY 12 HOURS SCHEDULED
Status: DISCONTINUED | OUTPATIENT
Start: 2024-08-05 | End: 2024-08-07 | Stop reason: HOSPADM

## 2024-08-05 RX ORDER — PHENOBARBITAL 32.4 MG/1
16.2 TABLET ORAL 2 TIMES DAILY
Status: DISCONTINUED | OUTPATIENT
Start: 2024-08-07 | End: 2024-08-07 | Stop reason: HOSPADM

## 2024-08-05 RX ORDER — LANOLIN ALCOHOL/MO/W.PET/CERES
100 CREAM (GRAM) TOPICAL DAILY
Status: DISCONTINUED | OUTPATIENT
Start: 2024-08-05 | End: 2024-08-07 | Stop reason: HOSPADM

## 2024-08-05 RX ORDER — LORAZEPAM 2 MG/1
4 TABLET ORAL
Status: DISCONTINUED | OUTPATIENT
Start: 2024-08-05 | End: 2024-08-07 | Stop reason: HOSPADM

## 2024-08-05 RX ADMIN — ENOXAPARIN SODIUM 40 MG: 100 INJECTION SUBCUTANEOUS at 18:39

## 2024-08-05 RX ADMIN — LORAZEPAM 1 MG: 2 INJECTION INTRAMUSCULAR; INTRAVENOUS at 16:22

## 2024-08-05 RX ADMIN — IPRATROPIUM BROMIDE AND ALBUTEROL SULFATE 1 DOSE: .5; 3 SOLUTION RESPIRATORY (INHALATION) at 14:31

## 2024-08-05 RX ADMIN — IPRATROPIUM BROMIDE AND ALBUTEROL SULFATE 1 DOSE: .5; 3 SOLUTION RESPIRATORY (INHALATION) at 09:15

## 2024-08-05 RX ADMIN — PHENOBARBITAL 32.4 MG: 32.4 TABLET ORAL at 16:22

## 2024-08-05 RX ADMIN — ATORVASTATIN CALCIUM 40 MG: 40 TABLET, FILM COATED ORAL at 21:04

## 2024-08-05 RX ADMIN — THERA TABS 1 TABLET: TAB at 16:22

## 2024-08-05 RX ADMIN — IPRATROPIUM BROMIDE AND ALBUTEROL SULFATE 1 DOSE: .5; 3 SOLUTION RESPIRATORY (INHALATION) at 20:07

## 2024-08-05 RX ADMIN — Medication 100 MG: at 16:22

## 2024-08-05 RX ADMIN — SODIUM CHLORIDE, PRESERVATIVE FREE 10 ML: 5 INJECTION INTRAVENOUS at 21:05

## 2024-08-05 RX ADMIN — LEVOFLOXACIN 750 MG: 750 INJECTION, SOLUTION INTRAVENOUS at 18:46

## 2024-08-05 RX ADMIN — SODIUM CHLORIDE, POTASSIUM CHLORIDE, SODIUM LACTATE AND CALCIUM CHLORIDE: 600; 310; 30; 20 INJECTION, SOLUTION INTRAVENOUS at 18:42

## 2024-08-05 RX ADMIN — PHENOBARBITAL 32.4 MG: 32.4 TABLET ORAL at 21:04

## 2024-08-05 RX ADMIN — Medication 1 MG: at 16:22

## 2024-08-05 RX ADMIN — THIAMINE HYDROCHLORIDE: 100 INJECTION, SOLUTION INTRAMUSCULAR; INTRAVENOUS at 08:46

## 2024-08-05 NOTE — CARE COORDINATION
Care Management Initial Assessment       RUR: 16%  Readmission? No  1st IM letter given? No    NADIA: Pt is homeless  - PT/OT pending, MRI pending    Transport: Needed ( van?)    CM met with patient at bedside to introduce self and role. Pt is homeless.     ADLs: Independent  DME: WC at baseline  PCP follow up: None  Previous Home Health: None  Previous Skilled Nursing Facility: [facility] L[facility] (was placed in 2024 and left AMA after 1 month)  Previous Inpatient Rehab: [facility]  Insurance verified: Yes [place] Medicaid insured  Pharmacy: Will need filled prior to dc likely   Emergency Contact: None - LNOK search completed while Pt at [facility] in January, parents and grandparents , no NOK    Pt stated he did not remember IPR at [facility], however does remember [facility] H&R and does not want to return to LTC setting at discharge. He is however open to IPR if recommended. [facility] has previously accepted Pt without a post IPR dispo, but unknown if they would accept Pt again.     CM will follow patient progress and assist as needed with NADIA plan.       24 1421   Service Assessment   Patient Orientation Alert and Oriented;Self;Person   Cognition Short Term Memory Deficit   History Provided By Patient   Primary Caregiver Self   Support Systems None   Patient's Healthcare Decision Maker is:   (None)   PCP Verified by CM No  (Pt does not have PCP, has not followed up with any past suggested avenues)   Prior Functional Level Independent in ADLs/IADLs   Can patient return to prior living arrangement Unknown at present   Ability to make needs known: Fair   Family able to assist with home care needs: No   Financial Resources Medicaid   Social/Functional History   Lives With Other (comment);Alone  (homeless)   Type of Home Homeless   Home Equipment Wheelchair - Manual   ADL Assistance Independent   Homemaking Assistance Independent   Homemaking Responsibilities No   Ambulation Assistance Independent   Transfer Assistance

## 2024-08-05 NOTE — ED NOTES
Pt notified that his bed is ready & this RN has arrived to take him upstairs. Pt in NAD with even & unlabored respirations. VSS. No requests @ this time. Pt transferred out of ED by this RN while on monitor.

## 2024-08-05 NOTE — PLAN OF CARE
assistance       Toileting: Maximum assistance                         ADL Intervention and task modifications:            Intervention/Education specific to: \"Stroke diagnoses\"      Fugl-Guadarrama Assessment of Motor Recovery after Stroke:     Reflex Activity  Flexors/Biceps/Fingers: Can be elicited  Extensors/Triceps: Can be elicited  Reflex Subtotal: 4    Volitional Movement Within Synergies  Shoulder Retraction: None  Shoulder Elevation: Partial (trace)  Shoulder Abduction (90 degrees): None  Shoulder External Rotation: None  Elbow Flexion: None  Forearm Supination: None  Shoulder Adduction/Internal Rotation: None  Elbow Extension: None  Forearm Pronation: None  Subtotal: 1    Volitional Movement Mixing Synergies  Hand to Lumbar Spine: None  Shoulder Flexion (0-90 degrees): None  Pronation-Supination: None  Subtotal: 0    Volitional Movement With Little or No Synergy  Shoulder Abduction (0-90 degrees): None  Shoulder Flexion ( degrees): None  Pronation/Supination: None  Subtotal: 0    Normal Reflex Activity  Biceps, Triceps, Finger Flexors: None  Subtotal: 0    Upper Extremity Total   Upper Extremity Total: 5    Wrist  Stability at 15 Degree Dorsiflexion: None  Repeated Dorsiflexion/ Volar Flexion: None  Stability at 15 Degree Dorsiflexion: None  Repeated Dorsiflexion/ Volar Flexion: None  Circumduction: None  Wrist Total: 0    Hand  Mass Flexion: None  Mass Extension: None  Grasp A: None  Grasp B: None  Grasp C: None  Grasp D: None  Grasp E: None  Hand Total: 0    Coordination/Speed  Tremor: Marked  Dysmetria: Marked  Time: >5s  Coordination/Speed Total: 0    Total A-D  Total A-D (Motor Function): 5         This is a reliable/valid measure of arm function after a neurological event. It has established value to characterize functional status and for measuring spontaneous and therapy-induced recovery; tests proximal and distal motor functions. Fugl-Guadarrama Assessment - UE scores recorded between five and 30 days  post neurologic event can be used to predict UE recovery at six months post neurologic event.  Severe = 0-21 points   Moderately Severe = 22-33 points   Moderate = 34-47 points   Mild = 48-66 points  SUSHANT Esteban, YASHIRA Montague, KAYY Mishra, JENNIFER Anaya, & JUAN Nava (1992). Measurement of motor recovery after stroke: Outcome assessment and sample size requirements. Stroke, 23, pp. 1634-4847.   --------------------------------------------------------------------------------------------------------------------------------------------------------------------  MCID:  Stroke:   (Taras et al, 2001; n = 171; mean age 70 (11) years; assessed within 17 (12) days of stroke, Acute Stroke)  FMA Motor Scores from Admission to Discharge   10 point increase in FMA Upper Extremity = 1.5 change in discharge FIM   10 point increase in FMA Lower Extremity = 1.9 change in discharge FIM  MDC:   Stroke:   (Salguero et al, 2008, n = 14, mean age = 59.9 (14.6) years, assessed on average 14 (6.5) months post stroke, Chronic Stroke)   FMA = 5.2 points for the Upper Extremity portion of the assessment           Pain Rating:  Generalized pain  Pain Intervention(s):   nursing notified, rest, elevation, and repositioning    Activity Tolerance:   Fair , Poor, and requires rest breaks    After treatment:   Patient left in no apparent distress in bed, Call bell within reach, Bed/ chair alarm activated, and Side rails x3    COMMUNICATION/EDUCATION:   The patient's plan of care was discussed with: physical therapist and registered nurse         Thank you for this referral.  Vandana Garcia, OT       Occupational Therapy Evaluation Charge Determination   History Examination Decision-Making   LOW Complexity : Brief history review  LOW Complexity: 1-3 Performance deficits relating to physical, cognitive, or psychosocial skills that result in activity limitations and/or participation restrictions LOW Complexity: No comorbidities that affect functional

## 2024-08-05 NOTE — PROGRESS NOTES
Physical Therapy  8/5/2024    Orders received, chart reviewed and patient evaluated by physical therapy. Pending progression with skilled acute physical therapy, recommend:  Therapy 3 hours/day 5-7 days/week    Full evaluation to follow.     Mary Kay Vitale, PT, DPT, CCS

## 2024-08-05 NOTE — ED NOTES
Attempted to call report to inpt RN. Inpt RN unable to take report @ this time. Per inpt RN, will call this RN back.

## 2024-08-05 NOTE — PROGRESS NOTES
RN was unable to complete pt's MRI checklist due to patient orientation status. Per MRI department, MRI checklist may be waved by radiologist if a chest xray and KUB is ordered.MD made aware. Both imagines are completed. MRI department made aware.

## 2024-08-05 NOTE — CONSULTS
NEUROLOGY CONSULT NOTE    Name Baldomero Reece Age 69 y.o.   MRN [id number]  1955     Consulting Physician: Chad Abdul MD      Chief Complaint:  AMS     Assessment:     Principal Problem:    Acute CVA (cerebrovascular accident) (HCC)  Resolved Problems:    * No resolved hospital problems. 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 Unknown LKW or initial NIHSS, however, he was noted to be dysarthric and have a facial droop. CT head negative for acute abnormality and chronic L hemisphere infarct noted. CTA head/neck no LVO and mild to moderate atherosclerosis. Also noted severe cervical spondylosis. EKG NSR with L-axis deviation and R BBB. Despite R-sided weakness, exam findings seem to be new.   LDL 38.4, A1c 5.0, Serum Etoh 214, UA/UDS negative.    Exam: Severe dysarthria, R HH, R facial droop, L tongue deviation     Acute infarct- symptoms seem consistent with L MCA stroke  Recommendations:   - MRI brain pending  - Continue aspirin 81 mg daily; discontinue Plavix   - Continue statin but change to atorvastatin 40 mg daily  - A1c at goal  - TTE pending  - Smoking cessation counseling and nicotine patch   - Goal normotension  - Currently receiving banana bag but patient would benefit from thiamine 100 mg daily  - PT/OT/SLP consults  - Patient understands driving restriction after stroke but does not drive at baseline        Will follow-up after MRI available. Please contact with any questions.  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 Patient has severe dysarthria and very difficult to understand but he states

## 2024-08-05 NOTE — PROGRESS NOTES
interpreted patient's lab and all other diagnostic data    Notes reviewed from all clinical/nonclinical/nursing services involved in patient's clinical care. Care coordination discussions were held with appropriate clinical/nonclinical/ nursing providers based on care coordination needs.     Labs:     Recent Labs     08/04/24  1233 08/05/24  0708   WBC 6.2 3.1*   HGB 15.0 13.2   HCT 47.9 42.3    104*     Recent Labs     08/04/24  1233 08/04/24  1845 08/05/24  0708     --  139   K 4.2  --  4.8     --  107   CO2 28  --  29   BUN 7  --  10   MG  --  1.7  --    PHOS  --  2.9  --      Recent Labs     08/04/24 1233 08/05/24  0708   ALT 22 20   GLOB 4.0 3.4     No results for input(s): \"INR\", \"APTT\" in the last 72 hours.    Invalid input(s): \"PTP\"   No results for input(s): \"TIBC\" in the last 72 hours.    Invalid input(s): \"FE\", \"PSAT\", \"FERR\"   No results found for: \"RBCF\"   No results for input(s): \"PH\", \"PCO2\", \"PO2\" in the last 72 hours.  No results for input(s): \"CPK\" in the last 72 hours.    Invalid input(s): \"CPKMB\", \"CKNDX\", \"TROIQ\"  Lab Results   Component Value Date/Time    CHOL 126 08/05/2024 07:08 AM    HDL 70 08/05/2024 07:08 AM    LDL 38.4 08/05/2024 07:08 AM    LDL 40.4 03/09/2024 01:14 AM     No results found for: \"GLUCPOC\"        Medications Reviewed:     Current Facility-Administered Medications   Medication Dose Route Frequency    atorvastatin (LIPITOR) tablet 40 mg  40 mg Oral Nightly    nicotine (NICODERM CQ) 21 MG/24HR 1 patch  1 patch TransDERmal Daily    sodium chloride flush 0.9 % injection 5-40 mL  5-40 mL IntraVENous 2 times per day    sodium chloride flush 0.9 % injection 5-40 mL  5-40 mL IntraVENous PRN    0.9 % sodium chloride infusion   IntraVENous PRN    ondansetron (ZOFRAN-ODT) disintegrating tablet 4 mg  4 mg Oral Q8H PRN    Or    ondansetron (ZOFRAN) injection 4 mg  4 mg IntraVENous Q6H PRN    polyethylene glycol (GLYCOLAX) packet 17 g  17 g Oral Daily PRN

## 2024-08-05 NOTE — PLAN OF CARE
Discharge: Demonstrates communication skills at highest level of function for planned discharge setting.  See evaluation for individualized goals.  8/5/2024 1030 by Maren Garner, SLP  Outcome: Progressing  8/5/2024 1029 by Maren Garner, SLP  Note: Goals initiated 8/5/24  Patient will tolerate least restrictive diet with no overt s/s aspiration within 7 days.  Patient will improve motor speech intelligibility to 80% at the word/phrase level within 7 days.  Patient will recall and implement motor speech strategies in conversation within 7 days.

## 2024-08-05 NOTE — PLAN OF CARE
Problem: Discharge Planning  Goal: Discharge to home or other facility with appropriate resources  Outcome: Progressing  Flowsheets (Taken 8/4/2024 2330)  Discharge to home or other facility with appropriate resources:   Identify barriers to discharge with patient and caregiver   Identify discharge learning needs (meds, wound care, etc)     Problem: Safety - Adult  Goal: Free from fall injury  Outcome: Progressing  Flowsheets (Taken 8/4/2024 2330)  Free From Fall Injury: Instruct family/caregiver on patient safety     Problem: Chronic Conditions and Co-morbidities  Goal: Patient's chronic conditions and co-morbidity symptoms are monitored and maintained or improved  Outcome: Progressing     Problem: Pain  Goal: Verbalizes/displays adequate comfort level or baseline comfort level  Outcome: Progressing

## 2024-08-05 NOTE — ED NOTES
Assumed care of pt @ this time. Introduced self to pt. Pt in NAD with even & unlabored respirations. Connected pt to monitoring equipment & VS set to cycle q 1 hr. VSS. Stretcher in lowest position, brakes secured, & B/L rails raised. Call bell within pt's reach. No requests @ this time.

## 2024-08-06 ENCOUNTER — APPOINTMENT (OUTPATIENT)
Facility: HOSPITAL | Age: 69
End: 2024-08-06
Payer: MEDICAID

## 2024-08-06 LAB
ECHO LV FRACTIONAL SHORTENING: 35 % (ref 28–44)
ECHO LV INTERNAL DIMENSION DIASTOLIC: 3.7 CM (ref 4.2–5.9)
ECHO LV INTERNAL DIMENSION SYSTOLIC: 2.4 CM
ECHO LV IVSD: 0.7 CM (ref 0.6–1)
ECHO LV MASS 2D: 68.8 G (ref 88–224)
ECHO LV POSTERIOR WALL DIASTOLIC: 0.7 CM (ref 0.6–1)
ECHO LV RELATIVE WALL THICKNESS RATIO: 0.38

## 2024-08-06 PROCEDURE — 6370000000 HC RX 637 (ALT 250 FOR IP): Performed by: INTERNAL MEDICINE

## 2024-08-06 PROCEDURE — 92526 ORAL FUNCTION THERAPY: CPT

## 2024-08-06 PROCEDURE — C8924 2D TTE W OR W/O FOL W/CON,FU: HCPCS

## 2024-08-06 PROCEDURE — 2580000003 HC RX 258: Performed by: INTERNAL MEDICINE

## 2024-08-06 PROCEDURE — 97530 THERAPEUTIC ACTIVITIES: CPT

## 2024-08-06 PROCEDURE — 6360000002 HC RX W HCPCS: Performed by: INTERNAL MEDICINE

## 2024-08-06 PROCEDURE — 92507 TX SP LANG VOICE COMM INDIV: CPT

## 2024-08-06 PROCEDURE — 70551 MRI BRAIN STEM W/O DYE: CPT

## 2024-08-06 PROCEDURE — 95816 EEG AWAKE AND DROWSY: CPT

## 2024-08-06 PROCEDURE — 95819 EEG AWAKE AND ASLEEP: CPT | Performed by: PSYCHIATRY & NEUROLOGY

## 2024-08-06 PROCEDURE — 99231 SBSQ HOSP IP/OBS SF/LOW 25: CPT | Performed by: NURSE PRACTITIONER

## 2024-08-06 PROCEDURE — 94640 AIRWAY INHALATION TREATMENT: CPT

## 2024-08-06 PROCEDURE — 6370000000 HC RX 637 (ALT 250 FOR IP): Performed by: NURSE PRACTITIONER

## 2024-08-06 PROCEDURE — 1100000000 HC RM PRIVATE

## 2024-08-06 PROCEDURE — 6360000004 HC RX CONTRAST MEDICATION: Performed by: INTERNAL MEDICINE

## 2024-08-06 RX ORDER — IPRATROPIUM BROMIDE AND ALBUTEROL SULFATE 2.5; .5 MG/3ML; MG/3ML
1 SOLUTION RESPIRATORY (INHALATION)
Status: DISCONTINUED | OUTPATIENT
Start: 2024-08-06 | End: 2024-08-07 | Stop reason: HOSPADM

## 2024-08-06 RX ADMIN — LORAZEPAM 1 MG: 2 INJECTION INTRAMUSCULAR; INTRAVENOUS at 09:13

## 2024-08-06 RX ADMIN — PHENOBARBITAL 32.4 MG: 32.4 TABLET ORAL at 13:33

## 2024-08-06 RX ADMIN — LEVOFLOXACIN 750 MG: 750 INJECTION, SOLUTION INTRAVENOUS at 20:29

## 2024-08-06 RX ADMIN — THERA TABS 1 TABLET: TAB at 09:04

## 2024-08-06 RX ADMIN — PHENOBARBITAL 32.4 MG: 32.4 TABLET ORAL at 20:41

## 2024-08-06 RX ADMIN — ATORVASTATIN CALCIUM 40 MG: 40 TABLET, FILM COATED ORAL at 20:42

## 2024-08-06 RX ADMIN — IPRATROPIUM BROMIDE AND ALBUTEROL SULFATE 1 DOSE: .5; 3 SOLUTION RESPIRATORY (INHALATION) at 20:11

## 2024-08-06 RX ADMIN — Medication 1 MG: at 09:05

## 2024-08-06 RX ADMIN — ASPIRIN 325 MG: 325 TABLET ORAL at 09:06

## 2024-08-06 RX ADMIN — LORAZEPAM 2 MG: 2 INJECTION INTRAMUSCULAR; INTRAVENOUS at 20:39

## 2024-08-06 RX ADMIN — LORAZEPAM 1 MG: 2 INJECTION INTRAMUSCULAR; INTRAVENOUS at 15:07

## 2024-08-06 RX ADMIN — IPRATROPIUM BROMIDE AND ALBUTEROL SULFATE 1 DOSE: .5; 3 SOLUTION RESPIRATORY (INHALATION) at 08:48

## 2024-08-06 RX ADMIN — SODIUM CHLORIDE, PRESERVATIVE FREE 10 ML: 5 INJECTION INTRAVENOUS at 20:43

## 2024-08-06 RX ADMIN — SODIUM CHLORIDE, PRESERVATIVE FREE 10 ML: 5 INJECTION INTRAVENOUS at 09:07

## 2024-08-06 RX ADMIN — Medication 100 MG: at 09:07

## 2024-08-06 RX ADMIN — PHENOBARBITAL 32.4 MG: 32.4 TABLET ORAL at 09:05

## 2024-08-06 RX ADMIN — PERFLUTREN 1.5 ML: 6.52 INJECTION, SUSPENSION INTRAVENOUS at 11:54

## 2024-08-06 RX ADMIN — LORAZEPAM 2 MG: 2 INJECTION INTRAMUSCULAR; INTRAVENOUS at 02:52

## 2024-08-06 RX ADMIN — IPRATROPIUM BROMIDE AND ALBUTEROL SULFATE 1 DOSE: .5; 3 SOLUTION RESPIRATORY (INHALATION) at 14:24

## 2024-08-06 NOTE — PLAN OF CARE
Problem: Discharge Planning  Goal: Discharge to home or other facility with appropriate resources  8/6/2024 1126 by Cande Askew RN  Outcome: Progressing  Flowsheets (Taken 8/6/2024 0800)  Discharge to home or other facility with appropriate resources: Identify barriers to discharge with patient and caregiver  8/6/2024 0325 by Susan Tyler RN (Aika)  Outcome: Progressing  Flowsheets (Taken 8/5/2024 2000)  Discharge to home or other facility with appropriate resources:   Identify barriers to discharge with patient and caregiver   Identify discharge learning needs (meds, wound care, etc)     Problem: Safety - Adult  Goal: Free from fall injury  8/6/2024 1126 by Cande Askew RN  Outcome: Progressing  Flowsheets (Taken 8/6/2024 0800)  Free From Fall Injury: Instruct family/caregiver on patient safety  8/6/2024 0325 by Susan Tyler RN (Aika)  Outcome: Progressing  Flowsheets (Taken 8/5/2024 2000)  Free From Fall Injury: Instruct family/caregiver on patient safety     Problem: Chronic Conditions and Co-morbidities  Goal: Patient's chronic conditions and co-morbidity symptoms are monitored and maintained or improved  8/6/2024 1126 by Cande Askew RN  Outcome: Progressing  Flowsheets (Taken 8/6/2024 0800)  Care Plan - Patient's Chronic Conditions and Co-Morbidity Symptoms are Monitored and Maintained or Improved: Monitor and assess patient's chronic conditions and comorbid symptoms for stability, deterioration, or improvement  8/6/2024 0325 by Susan Tyler RN (Aika)  Outcome: Progressing  Flowsheets (Taken 8/5/2024 2000)  Care Plan - Patient's Chronic Conditions and Co-Morbidity Symptoms are Monitored and Maintained or Improved:   Monitor and assess patient's chronic conditions and comorbid symptoms for stability, deterioration, or improvement   Collaborate with multidisciplinary team to address chronic and comorbid conditions and prevent exacerbation or deterioration   Update acute care plan with appropriate goals  if chronic or comorbid symptoms are exacerbated and prevent overall improvement and discharge     Problem: Pain  Goal: Verbalizes/displays adequate comfort level or baseline comfort level  8/6/2024 1126 by Cande Askew, RN  Outcome: Progressing  8/6/2024 0325 by Susan Tyler ([facility]), RN  Outcome: Progressing

## 2024-08-06 NOTE — PROCEDURES
PROCEDURE: ROUTINE INPATIENT EEG  NAME:   Baldomero Reece  ACCOUNT NUMBER : [id number]  MRN:   [id number]  DATE OF SERVICE: 8/6/2024     HISTORY/INDICATION: Pt is a 68yo male found down with Etoh level of 214, with dysarthria, right HH, right facial droop with h/o left hemisphere IPH. EEG to assess for evidence of underlying epilepsy.     MEDICATIONS:   Current Facility-Administered Medications   Medication Dose Route Frequency Provider Last Rate Last Admin    ipratropium 0.5 mg-albuterol 2.5 mg (DUONEB) nebulizer solution 1 Dose  1 Dose Inhalation TID RT Chad Abdul MD   1 Dose at 08/06/24 1424    atorvastatin (LIPITOR) tablet 40 mg  40 mg Oral Nightly Tia Liu APRN - NP   40 mg at 08/05/24 2104    PHENobarbital tablet 32.4 mg  32.4 mg Oral BID Chad Abdul MD        Followed by    [START ON 8/7/2024] PHENobarbital tablet 16.2 mg  16.2 mg Oral BID Chad Abdul MD        PHENobarbital tablet 32.4 mg  32.4 mg Oral Q6H PRN Chad Abdul MD        Followed by    [START ON 8/7/2024] PHENobarbital tablet 16.2 mg  16.2 mg Oral Q6H PRN Chad Abdul MD        thiamine tablet 100 mg  100 mg Oral Daily Chad Abdul MD   100 mg at 08/06/24 0907    folic acid (FOLVITE) tablet 1 mg  1 mg Oral Daily Chad Abdul MD   1 mg at 08/06/24 0905    multivitamin 1 tablet  1 tablet Oral Daily Chad Abdul MD   1 tablet at 08/06/24 0904    sodium chloride flush 0.9 % injection 5-40 mL  5-40 mL IntraVENous 2 times per day Chad Abdul MD   10 mL at 08/06/24 0907    sodium chloride flush 0.9 % injection 5-40 mL  5-40 mL IntraVENous PRN Chad Abdul MD        0.9 % sodium chloride infusion   IntraVENous PRN Chad Abdul MD        LORazepam (ATIVAN) tablet 1 mg  1 mg Oral Q1H PRN Chad Abdul MD        Or    LORazepam (ATIVAN) injection 1 mg  1 mg IntraVENous Q1H PRN Chad Abdul MD   1 mg at 08/06/24 1507    Or    LORazepam (ATIVAN) tablet 2 mg  2 mg Oral Q1H PRN Chad Abdul MD        Or

## 2024-08-06 NOTE — PLAN OF CARE
within 7 day(s).  2.  Patient will perform grooming with Moderate Assist within 7 day(s).  3.  Patient will perform lower body dressing with Moderate Assist within 7 day(s).  4.  Patient will perform toilet transfers with Moderate Assist within 7 day(s).  5.  Patient will perform all aspects of toileting with Moderate Assist within 7 day(s).  6.  Patient will participate in upper extremity therapeutic exercise/activities with Moderate Assist within 7 day(s).    7.  Patient will utilize energy conservation techniques during functional activities with verbal cues within 7 day(s).  8.  Patient will improve their Fugl Guadarrama score by 5 points in prep for ADLs within 7 days.   8/5/2024 1532 by Vandana Garcia, OT  Outcome: Progressing     Problem: Physical Therapy - Adult  Goal: By Discharge: Performs mobility at highest level of function for planned discharge setting.  See evaluation for individualized goals.  Description: FUNCTIONAL STATUS PRIOR TO ADMISSION: Patient was modified independent using a wheelchair for functional mobility. Mod I with transfers to w/c. R BKA and L transmet amp. \    HOME SUPPORT PRIOR TO ADMISSION: Patient is homeless and has left rehab/LTC AMA in the past.     Physical Therapy Goals  Initiated 8/5/2024  1.  Patient will move from supine to sit and sit to supine, scoot up and down, and roll side to side in bed with supervision/set-up within 7 day(s).    2.  Patient will sit EOB x10 min to participate in scooting in prep for transfers with supervision within 7 days.  3.  Patient will transfer to w/c with min A within 7 days.   4.  Patient will propel w/c x50 ft with min A within 7 days.  8/5/2024 1622 by Mary Kay Vitale, PT  Outcome: Progressing

## 2024-08-06 NOTE — PROGRESS NOTES
Neurology Progress Note         Addendum: Routine EEG normal. TTE EF 55-60%, RV moderately dilated. LA mildly dilated. Symptoms related to recrudescence of prior stroke. No changes to below plan. Neurology will now sign-off. Please contact if any questions.     NAME: Baldomero Reece   :  1955   MRN:  [id number]   DATE:  2024    Assessment:     Principal Problem:    Acute CVA (cerebrovascular accident) (HCC)  Resolved Problems:    * No resolved hospital problems. 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 There is also documentation that he had a seizure ? with unknown details per last Neurology note. Unknown LKW or initial NIHSS, however, he was noted to be dysarthric and have a facial droop. CT head negative for acute abnormality and chronic L hemisphere infarct noted. CTA head/neck no LVO and mild to moderate atherosclerosis. Also noted severe cervical spondylosis. EKG NSR with L-axis deviation and R BBB. Despite R-sided weakness, exam findings seem to be new. MRI brain (24) showed chronic hemorrhage and encephalomalacia in L frontal lobe, L basal ganglia and insula. MRI brain (24) negative for acute infarcts with chronic encephalomalacia and prior hemorrhage in L basal ganglia and frontal lobe with Wallerian degeneration and small chronic infarcts to R cerebellum. Of note, patient states he had not eaten for 3 days PTA.   LDL 38.4, A1c 5.0, Serum Etoh 214, UA/UDS negative.     Exam: Severe dysarthria (fluctuating on exam), R HH, R facial droop-

## 2024-08-06 NOTE — CARE COORDINATION
Transition of Care Plan:    Pt is homeless, prefers to discharge back to street  DC address: [address] [place] [address] (this is a [employer])    Transport:  [facility] (Medicaid transport [phone])    RUR: 16%  Prior Level of Functioning: Independent, WC at baseline  Disposition: homeless  If SNF or IPR: Date FOC offered: 8/6 - Pt declining LTC placement, does not qualify for IPR  Follow up appointments:   DME needed: none  [facility] at discharge: [facility]  Caregiver Contact: None - Pt does not have LNOK   Discharge Caregiver contacted prior to discharge?   Care Conference needed? No  Barriers to discharge: Medical, 24 hours    Pt declining LTC placement, does not have a IPR dx. Pt prefers to discharge to [address] [place] [address] ([employer] in [place]).     CM will follow.    GEOFF Jacobs (Ally)S.W.

## 2024-08-06 NOTE — PLAN OF CARE
Problem: Physical Therapy - Adult  Goal: By Discharge: Performs mobility at highest level of function for planned discharge setting.  See evaluation for individualized goals.  Description: FUNCTIONAL STATUS PRIOR TO ADMISSION: Patient was modified independent using a wheelchair for functional mobility. Mod I with transfers to w/c. R BKA and L transmet amp. \    HOME SUPPORT PRIOR TO ADMISSION: Patient is homeless and has left rehab/LTC AMA in the past.     Physical Therapy Goals  Initiated 8/5/2024  1.  Patient will move from supine to sit and sit to supine, scoot up and down, and roll side to side in bed with supervision/set-up within 7 day(s).    2.  Patient will sit EOB x10 min to participate in scooting in prep for transfers with supervision within 7 days.  3.  Patient will transfer to w/c with min A within 7 days.   4.  Patient will propel w/c x50 ft with min A within 7 days.  Outcome: Progressing   PHYSICAL THERAPY TREATMENT    Patient: Baldomero Reece (69 y.o. male)  Date: 8/6/2024  Diagnosis: Acute CVA (cerebrovascular accident) (HCC) [I63.9]  Altered mental status, unspecified altered mental status type [R41.82]  Acute cerebrovascular accident (CVA) (HCC) [I63.9] Acute CVA (cerebrovascular accident) (HCC)      Precautions:                        ASSESSMENT:  Patient continues to benefit from skilled PT services and is progressing towards goals. Patient received sidelying in bed, initially needing encouragement for participation. He demonstrated improvement in R UE use and he was able to participate in squat pivot transfer to his wheelchair and wheel his w/c around the unit with intermittent use of R UE without any assist. He is likely at his current baseline.          PLAN:  Patient continues to benefit from skilled intervention to address the above impairments.  Continue treatment per established plan of care.    Recommendation for discharge: (in order for the patient to meet his/her long term goals): No  skilled physical therapy    Other factors to consider for discharge:  homeless    IF patient discharges home will need the following DME: none       SUBJECTIVE:   Patient stated, \"I .\"    OBJECTIVE DATA SUMMARY:     Functional Mobility Training:  Bed Mobility:  Bed Mobility Training  Bed Mobility Training: Yes  Overall Level of Assistance: Supervision  Supine to Sit: Supervision  Scooting: Supervision  Transfers:  Transfer Training  Transfer Training: Yes  Stand Pivot Transfers: Supervision  Bed to Chair: Supervision;Stand-by assistance (to w/c)  Balance:  Balance  Sitting: Intact  Sitting - Static: Good (unsupported)  Sitting - Dynamic: Good (unsupported)    Activity Tolerance:   Good    After treatment:   Patient left in no apparent distress in bed, Call bell within reach, Bed/ chair alarm activated, and Side rails x3      COMMUNICATION/EDUCATION:   The patient's plan of care was discussed with: occupational therapist and registered nurse           Mary Kay Vitale, PT, DPT, CCS  Minutes: 14

## 2024-08-06 NOTE — PLAN OF CARE
Problem: Occupational Therapy - Adult  Goal: By Discharge: Performs self-care activities at highest level of function for planned discharge setting.  See evaluation for individualized goals.  Description: FUNCTIONAL STATUS PRIOR TO ADMISSION:  Patient was ambulatory using w/c      , ADL Assistance: Independent,  ,  ,  ,  ,  , Homemaking Assistance: Independent, Ambulation Assistance: Independent, Transfer Assistance: Independent,              HOME SUPPORT: Patient was homeless .     Occupational Therapy Goals  Initiated 8/5/2024   1.  Patient will perform self-feeding with Contact Guard Assist within 7 day(s).  2.  Patient will perform grooming with Moderate Assist within 7 day(s).  3.  Patient will perform lower body dressing with Moderate Assist within 7 day(s).  4.  Patient will perform toilet transfers with Moderate Assist within 7 day(s).  5.  Patient will perform all aspects of toileting with Moderate Assist within 7 day(s).  6.  Patient will participate in upper extremity therapeutic exercise/activities with Moderate Assist within 7 day(s).    7.  Patient will utilize energy conservation techniques during functional activities with verbal cues within 7 day(s).  8.  Patient will improve their Fugl Guadarrama score by 5 points in prep for ADLs within 7 days.   Outcome: Progressing   OCCUPATIONAL THERAPY TREATMENT  Patient: Baldomero Reece (69 y.o. male)  Date: 8/6/2024  Primary Diagnosis: Acute CVA (cerebrovascular accident) (HCC) [I63.9]  Altered mental status, unspecified altered mental status type [R41.82]  Acute cerebrovascular accident (CVA) (HCC) [I63.9]       Precautions:                  Chart, occupational therapy assessment, plan of care, and goals were reviewed.    ASSESSMENT  Patient continues to benefit from skilled OT services and is progressing towards goals. Pt demonstrated improved activity tolerance this session compared to previous session. Completed bed mobility and transfer to w/c with

## 2024-08-06 NOTE — PLAN OF CARE
Problem: SLP Adult - Impaired Communication  Goal: By Discharge: Demonstrates communication skills at highest level of function for planned discharge setting.  See evaluation for individualized goals.  8/6/2024 1208 by Billie Cedillo SLP  Outcome: Progressing  8/6/2024 1205 by Billie Cedillo SLP  Outcome: Progressing     Speech LAnguage Pathology TREATMENT    Patient: Baldomero Reece (69 y.o. male)  Date: 8/6/2024  Primary Diagnosis: Acute CVA (cerebrovascular accident) (HCC) [I63.9]  Altered mental status, unspecified altered mental status type [R41.82]  Acute cerebrovascular accident (CVA) (HCC) [I63.9]       Precautions:  Aspiration;                   ASSESSMENT :  Pt sleeping when SLP arrived and awoke easily to verbal stimuli. Pt agreeable to session and denies any difficulties. RN reports he is tolerating his diet without any s/s of aspiration and may discharge tomorrow. Pt observed with thin liquids via cup, applesauce and cracker.  Pt with functional oral and pharyngeal swallow despite being edentulous. He had a timely swallow and without overt s/s of aspiration noted on any consistency. Pt is considered an increased risk for both prandial and post prandial aspiration due to his hx of ETOH abuse and COPD and should adhere to strict aspiration and reflux precautions.     Pt also participated in speech therapy to address his moderate dysarthria that seems improved today. He was unable to recall speech strategies given yesterday despite verbal cues and re-educated (slow, loud, breathe). Pt used strategies to formulate sentences and participate in conversation with 70-80% intelligibility.  Recommend pt continue to be followed by speech therapy for both speech and swallowing goals.     Patient will benefit from skilled intervention to address the above impairments.     PLAN :  Recommendations and Planned Interventions:  Diet: Regular and thin liquids  -upright for all po intake  -remain upright for at

## 2024-08-06 NOTE — PROGRESS NOTES
Hospitalist Progress Note  Chad Abdul MD  Answering service: [phone] OR [phone] from in house phone        Date of Service:  2024  NAME:  Baldomero Reece  :  1955  MRN:  [id number]      Admission Summary:   Baldomero Reece is a 69 y.o. male with past medical history significant for COPD, dyslipidemia, peripheral artery disease with resultant right BKA, left transmetatarsal amputation presented at the emergency room with right facial droop as well as right-sided weakness and aphasia.  The patient was found by neighbors, he was laying on the ground with incoherent speech and EMS was called.  It was reported that patient drank some alcohol today.  He has history of alcohol abuse.  Patient is unable to provide  history because of the acuity of his condition.  He presented at the emergency room with serum alcohol level of 214.  CT of the head was negative for acute pathology.  CTA of the head and neck did not show large vessel occlusion.  Patient was observed in the emergency room and when he did not return to normal baseline mental status he was referred to the hospitalist service for admission for stroke workup.       Interval history / Subjective:   Brain MRI showed no acute process, chronic encephalomalacia and prior hemorrhage in the left basal ganglia and frontal lobe.  Patient was evaluated by speech and cleared for regular diet.  Dysarthria seems slightly improved this morning.     Assessment & Plan:          Right upper extremity weakness;   Dysarthria;   -Concerning for acute CVA;   -Brain MRI pending;  -Echocardiogram 3/24: EF of 55 to 60%;   -Neurology consulted;  -Lipid panel: LDL 38;  -Hemoglobin A1c: 5.0;   -Brain MRI :  No acute process. Chronic encephalomalacia and prior hemorrhage in the left basal ganglia and frontal lobe with associated Wallerian degeneration.  -PT/OT/SLP  warm, no rash; diffuse loss of subcutaneous adipose tissue and muscle mass;           Data Review:    Review and/or order of clinical lab test  Review and/or order of tests in the radiology section of CPT  Review and/or order of tests in the medicine section of CPT    I have independently reviewed and interpreted patient's lab and all other diagnostic data    Notes reviewed from all clinical/nonclinical/nursing services involved in patient's clinical care. Care coordination discussions were held with appropriate clinical/nonclinical/ nursing providers based on care coordination needs.     Labs:     Recent Labs     08/04/24 1233 08/05/24  0708   WBC 6.2 3.1*   HGB 15.0 13.2   HCT 47.9 42.3    104*       Recent Labs     08/04/24 1233 08/04/24  1845 08/05/24  0708     --  139   K 4.2  --  4.8     --  107   CO2 28  --  29   BUN 7  --  10   MG  --  1.7  --    PHOS  --  2.9  --        Recent Labs     08/04/24 1233 08/05/24  0708   ALT 22 20   GLOB 4.0 3.4       No results for input(s): \"INR\", \"APTT\" in the last 72 hours.    Invalid input(s): \"PTP\"   No results for input(s): \"TIBC\" in the last 72 hours.    Invalid input(s): \"FE\", \"PSAT\", \"FERR\"   No results found for: \"RBCF\"   No results for input(s): \"PH\", \"PCO2\", \"PO2\" in the last 72 hours.  No results for input(s): \"CPK\" in the last 72 hours.    Invalid input(s): \"CPKMB\", \"CKNDX\", \"TROIQ\"  Lab Results   Component Value Date/Time    CHOL 126 08/05/2024 07:08 AM    HDL 70 08/05/2024 07:08 AM    LDL 38.4 08/05/2024 07:08 AM    LDL 40.4 03/09/2024 01:14 AM     No results found for: \"GLUCPOC\"        Medications Reviewed:     Current Facility-Administered Medications   Medication Dose Route Frequency    ipratropium 0.5 mg-albuterol 2.5 mg (DUONEB) nebulizer solution 1 Dose  1 Dose Inhalation TID RT    atorvastatin (LIPITOR) tablet 40 mg  40 mg Oral Nightly    PHENobarbital tablet 32.4 mg  32.4 mg Oral 4x daily    Followed by    PHENobarbital tablet 32.4

## 2024-08-07 VITALS
SYSTOLIC BLOOD PRESSURE: 133 MMHG | TEMPERATURE: 97.7 F | WEIGHT: 146.39 LBS | HEART RATE: 110 BPM | RESPIRATION RATE: 20 BRPM | OXYGEN SATURATION: 98 % | DIASTOLIC BLOOD PRESSURE: 76 MMHG | BODY MASS INDEX: 21.62 KG/M2

## 2024-08-07 PROCEDURE — 6370000000 HC RX 637 (ALT 250 FOR IP): Performed by: INTERNAL MEDICINE

## 2024-08-07 PROCEDURE — 6360000002 HC RX W HCPCS: Performed by: INTERNAL MEDICINE

## 2024-08-07 PROCEDURE — 92507 TX SP LANG VOICE COMM INDIV: CPT

## 2024-08-07 PROCEDURE — 2580000003 HC RX 258: Performed by: INTERNAL MEDICINE

## 2024-08-07 PROCEDURE — 94640 AIRWAY INHALATION TREATMENT: CPT

## 2024-08-07 RX ORDER — BUDESONIDE AND FORMOTEROL FUMARATE DIHYDRATE 160; 4.5 UG/1; UG/1
2 AEROSOL RESPIRATORY (INHALATION) 2 TIMES DAILY
Qty: 10.2 G | Refills: 0 | Status: SHIPPED | OUTPATIENT
Start: 2024-08-07

## 2024-08-07 RX ORDER — ROSUVASTATIN CALCIUM 40 MG/1
40 TABLET, COATED ORAL NIGHTLY
Qty: 30 TABLET | Refills: 0 | Status: SHIPPED | OUTPATIENT
Start: 2024-08-07

## 2024-08-07 RX ORDER — ASPIRIN 81 MG/1
81 TABLET, CHEWABLE ORAL DAILY
Qty: 30 TABLET | Refills: 0 | Status: SHIPPED | COMMUNITY
Start: 2024-08-08

## 2024-08-07 RX ORDER — ALBUTEROL SULFATE 90 UG/1
2 AEROSOL, METERED RESPIRATORY (INHALATION) 4 TIMES DAILY PRN
Qty: 18 G | Refills: 0 | Status: SHIPPED | OUTPATIENT
Start: 2024-08-07

## 2024-08-07 RX ORDER — FOLIC ACID 1 MG/1
1 TABLET ORAL DAILY
Qty: 30 TABLET | Refills: 0 | Status: SHIPPED | COMMUNITY
Start: 2024-08-07

## 2024-08-07 RX ORDER — ASPIRIN 81 MG/1
81 TABLET, CHEWABLE ORAL DAILY
Status: DISCONTINUED | OUTPATIENT
Start: 2024-08-07 | End: 2024-08-07 | Stop reason: HOSPADM

## 2024-08-07 RX ORDER — LEVOFLOXACIN 500 MG/1
750 TABLET, FILM COATED ORAL
Status: DISCONTINUED | OUTPATIENT
Start: 2024-08-07 | End: 2024-08-07 | Stop reason: HOSPADM

## 2024-08-07 RX ADMIN — LORAZEPAM 2 MG: 2 INJECTION INTRAMUSCULAR; INTRAVENOUS at 12:18

## 2024-08-07 RX ADMIN — PHENOBARBITAL 32.4 MG: 32.4 TABLET ORAL at 13:52

## 2024-08-07 RX ADMIN — Medication 100 MG: at 08:44

## 2024-08-07 RX ADMIN — IPRATROPIUM BROMIDE AND ALBUTEROL SULFATE 1 DOSE: .5; 3 SOLUTION RESPIRATORY (INHALATION) at 14:37

## 2024-08-07 RX ADMIN — Medication 1 MG: at 08:44

## 2024-08-07 RX ADMIN — LORAZEPAM 2 MG: 2 INJECTION INTRAMUSCULAR; INTRAVENOUS at 08:53

## 2024-08-07 RX ADMIN — LORAZEPAM 3 MG: 2 INJECTION INTRAMUSCULAR; INTRAVENOUS at 13:26

## 2024-08-07 RX ADMIN — SODIUM CHLORIDE, PRESERVATIVE FREE 10 ML: 5 INJECTION INTRAVENOUS at 08:44

## 2024-08-07 RX ADMIN — THERA TABS 1 TABLET: TAB at 08:44

## 2024-08-07 RX ADMIN — ASPIRIN 81 MG CHEWABLE TABLET 81 MG: 81 TABLET CHEWABLE at 08:44

## 2024-08-07 RX ADMIN — IPRATROPIUM BROMIDE AND ALBUTEROL SULFATE 1 DOSE: .5; 3 SOLUTION RESPIRATORY (INHALATION) at 08:17

## 2024-08-07 RX ADMIN — PHENOBARBITAL 32.4 MG: 32.4 TABLET ORAL at 08:43

## 2024-08-07 NOTE — PLAN OF CARE
Problem: Discharge Planning  Goal: Discharge to home or other facility with appropriate resources  Outcome: Adequate for Discharge  Flowsheets (Taken 8/7/2024 0800)  Discharge to home or other facility with appropriate resources:   Identify barriers to discharge with patient and caregiver   Arrange for needed discharge resources and transportation as appropriate   Identify discharge learning needs (meds, wound care, etc)   Arrange for interpreters to assist at discharge as needed   Refer to discharge planning if patient needs post-hospital services based on physician order or complex needs related to functional status, cognitive ability or social support system     Problem: Safety - Adult  Goal: Free from fall injury  Outcome: Adequate for Discharge  Flowsheets (Taken 8/7/2024 0800)  Free From Fall Injury:   Instruct family/caregiver on patient safety   Based on caregiver fall risk screen, instruct family/caregiver to ask for assistance with transferring infant if caregiver noted to have fall risk factors     Problem: Chronic Conditions and Co-morbidities  Goal: Patient's chronic conditions and co-morbidity symptoms are monitored and maintained or improved  Outcome: Adequate for Discharge  Flowsheets (Taken 8/7/2024 0800)  Care Plan - Patient's Chronic Conditions and Co-Morbidity Symptoms are Monitored and Maintained or Improved:   Monitor and assess patient's chronic conditions and comorbid symptoms for stability, deterioration, or improvement   Collaborate with multidisciplinary team to address chronic and comorbid conditions and prevent exacerbation or deterioration   Update acute care plan with appropriate goals if chronic or comorbid symptoms are exacerbated and prevent overall improvement and discharge     Problem: Pain  Goal: Verbalizes/displays adequate comfort level or baseline comfort level  Outcome: Adequate for Discharge     Problem: SLP Adult - Impaired Communication  Goal: By Discharge: Demonstrates  communication skills at highest level of function for planned discharge setting.  See evaluation for individualized goals.  8/7/2024 1241 by Na Luis, SLP  Outcome: Adequate for Discharge

## 2024-08-07 NOTE — PROGRESS NOTES
1606: found pt's inmate identification card and home medications left at bedside. Charge nurse made aware.     Called both numbers in pt's chart. Neither was pt's phone number

## 2024-08-07 NOTE — DISCHARGE INSTRUCTIONS
Discharge Instructions       PATIENT ID: Baldomero Reece  MRN: [id number]   YOB: 1955    DATE OF ADMISSION: 8/4/2024   DATE OF DISCHARGE: 8/7/2024    PRIMARY CARE PROVIDER: None, None     ATTENDING PHYSICIAN: Chad Abdul MD   DISCHARGING PROVIDER: Chad Abdul MD    To contact this individual call [phone] and ask the  to page.   If unavailable ask to be transferred the Adult Hospitalist Department.    DISCHARGE DIAGNOSES   RUE weakness  Dysarthria    CONSULTATIONS:   Neurology    PROCEDURES/SURGERIES: * No surgery found *    PENDING TEST RESULTS:   At the time of discharge the following test results are still pending: None    FOLLOW UP APPOINTMENTS:   PCP in 1 to 2 weeks for general medical follow-up and medications refills;    ADDITIONAL CARE RECOMMENDATIONS:   Please take all your medications as prescribed;    DIET: regular diet    ACTIVITY: activity as tolerated    WOUND CARE: N/A    EQUIPMENT needed: As prior to admission      DISCHARGE MEDICATIONS:   See Medication Reconciliation Form    It is important that you take the medication exactly as they are prescribed.   Keep your medication in the bottles provided by the pharmacist and keep a list of the medication names, dosages, and times to be taken in your wallet.   Do not take other medications without consulting your doctor.       NOTIFY YOUR PHYSICIAN FOR ANY OF THE FOLLOWING:   Fever over 101 degrees for 24 hours.   Chest pain, shortness of breath, fever, chills, nausea, vomiting, diarrhea, change in mentation, falling, weakness, bleeding. Severe pain or pain not relieved by medications.  Or, any other signs or symptoms that you may have questions about.      DISPOSITION:   x Home With:   OT  PT  HH  RN       SNF/Inpatient Rehab/LTAC    Independent/assisted living    Hospice    Other:     CDMP Checked:   Yes IK     PROBLEM LIST Updated:  Yes IK       Signed:   Chad Abdul MD  8/7/2024  12:02 PM

## 2024-08-07 NOTE — PLAN OF CARE
Problem: SLP Adult - Impaired Communication  Goal: By Discharge: Demonstrates communication skills at highest level of function for planned discharge setting.  See evaluation for individualized goals.  Outcome: Adequate for Discharge   Speech LAnguage Pathology TREATMENT/DISCHARGE    Patient: Baldomero Reece (69 y.o. male)  Date: 8/7/2024  Primary Diagnosis: Acute CVA (cerebrovascular accident) ([facility]) [I63.9]  Altered mental status, unspecified altered mental status type [R41.82]  Acute cerebrovascular accident (CVA) (HCC) [I63.9]       Precautions:                     ASSESSMENT :  Patient continues with moderate dysarthria; however, continued with inability/disinterest in recalling compensatory speech strategies. Patient provided with review of strategies such as breathe support, increased volume, reduced rate and over-articulate. Despite education, patient with poor carryover into conversation. When asked if patient was interested in further ST to address dysarthria, he verbalized \"no.\"  Given patient not interested in further treatment and he has refused LTC placement with plan to discharge back to the streets, further slp tx not indicated.    Patient will be discharged from skilled speech-language pathology services at this time.     PLAN :  Recommendations and Planned Interventions:  Diet: Regular and thin liquids           Acute SLP Services: No, patient will be discharged from acute skilled speech-language pathology at this time.    Discharge Recommendations: No, additional SLP treatment not indicated at discharge     SUBJECTIVE:   Patient stated, “No.”    OBJECTIVE:     Past Medical History:   Diagnosis Date    Alcohol abuse     Below knee amputation (HCC)     Rt leg    Cerebral artery occlusion with cerebral infarction (HCC)     Diabetes mellitus (HCC)     Hypertension      Past Surgical History:   Procedure Laterality Date    FOOT DEBRIDEMENT Left 1/2/2024    LEFT FOOT INCISION AND DRAINAGE with misonix

## 2024-08-07 NOTE — DISCHARGE SUMMARY
Discharge Summary       PATIENT ID: Baldomero Reece  MRN: [id number]   YOB: 1955    DATE OF ADMISSION: 8/4/2024 11:05 AM    DATE OF DISCHARGE: 8/7/2024   PRIMARY CARE PROVIDER: None, None     DISCHARGING PROVIDER: Chad Abdul MD    To contact this individual call [phone] and ask the  to page.  If unavailable ask to be transferred the Adult Hospitalist Department.    CONSULTATIONS: IP CONSULT TO NEUROLOGY    PROCEDURES/SURGERIES: * No surgery found *     ADMITTING DIAGNOSES & HOSPITAL COURSE:   HPI:  Baldomero Reece is a 69 y.o. male with past medical history significant for COPD, dyslipidemia, peripheral artery disease with resultant right BKA, left transmetatarsal amputation presented at the emergency room with right facial droop as well as right-sided weakness and aphasia.  The patient was found by neighbors, he was laying on the ground with incoherent speech and EMS was called.  It was reported that patient drank some alcohol today.  He has history of alcohol abuse.  Patient is unable to provide  history because of the acuity of his condition.  He presented at the emergency room with serum alcohol level of 214.  CT of the head was negative for acute pathology.  CTA of the head and neck did not show large vessel occlusion.  Patient was observed in the emergency room and when he did not return to normal baseline mental status he was referred to the hospitalist service for admission for stroke workup.    HOSPITAL COURSE:    Right upper extremity weakness;   Dysarthria;   -Concerning for acute CVA;   -Echocardiogram 3/24: EF of 55 to 60%;   -Lipid panel: LDL 38;  -Hemoglobin A1c: 5.0;   -Routine EEG normal.   -Brain MRI 8/6:  No acute process. Chronic encephalomalacia and prior hemorrhage in the left basal ganglia and frontal lobe with associated Wallerian degeneration.  -PT/OT/SLP consulted;  -Neurology consulted:  Likely recrudescence of prior stroke symptoms in setting of alcohol    budesonide-formoterol 160-4.5 MCG/ACT Aero  Commonly known as: Symbicort  Inhale 2 puffs into the lungs 2 times daily     folic acid 1 MG tablet  Commonly known as: FOLVITE  Take 1 tablet by mouth daily     multivitamin Tabs tablet  Take 1 tablet by mouth daily     nicotine 21 MG/24HR  Commonly known as: NICODERM CQ  Place 1 patch onto the skin daily     rosuvastatin 40 MG tablet  Commonly known as: CRESTOR  Take 1 tablet by mouth nightly     thiamine 100 MG tablet  Take 1 tablet by mouth daily            STOP taking these medications      aspirin 325 MG tablet  Replaced by: aspirin 81 MG chewable tablet     clopidogrel 75 MG tablet  Commonly known as: PLAVIX               Where to Get Your Medications        These medications were sent to [place], [place] - [address] [place] [address] - P [phone] - F [phone]  [address] [place] [id number]      Phone: [phone]   albuterol sulfate  (90 Base) MCG/ACT inhaler  budesonide-formoterol 160-4.5 MCG/ACT Aero  rosuvastatin 40 MG tablet       Information about where to get these medications is not yet available    Ask your nurse or doctor about these medications  aspirin 81 MG chewable tablet  folic acid 1 MG tablet           NOTIFY YOUR PHYSICIAN FOR ANY OF THE FOLLOWING:   Fever over 101 degrees for 24 hours.   Chest pain, shortness of breath, fever, chills, nausea, vomiting, diarrhea, change in mentation, falling, weakness, bleeding. Severe pain or pain not relieved by medications.  Or, any other signs or symptoms that you may have questions about.    DISPOSITION:   x Home With:   OT  PT  HH  RN       Long term SNF/Inpatient Rehab    Independent/assisted living    Hospice    Other:       PATIENT CONDITION AT DISCHARGE:     Functional status    Poor     Deconditioned    x Independent      Cognition    x Lucid     Forgetful     Dementia      Catheters/lines (plus indication)    [place]     PICC     PEG    x None      Code status    x

## 2024-08-07 NOTE — CARE COORDINATION
Transition of Care Plan:    Pt is homeless, prefers to discharge back to street - Today  DC address: [address] (this is a [employer])    Transport: [facility] - WC Van 2:45pm    RUR: 16%  Prior Level of Functioning: Independent, WC at baseline  Disposition: homeless  If SNF or IPR: Date FOC offered: 8/6 - Pt declining LTC placement, does not qualify for IPR  Follow up appointments:   DME needed: none  Transportation at discharge: WC Van - pt is WC bound  Caregiver Contact: None - Pt does not have LNOK   Discharge Caregiver contacted prior to discharge?   Care Conference needed? No  Barriers to discharge: None    Plan for Pt to discharge to address requested by Pt today. CM attempted to schedule Medicaid WC Van through [place] [employer] - they are unable to accept trip as Pt destination is not a residence. Pt is not able to pay for transport. Hospital to cover transport for [facility] WC van - $70.00.    2:20pm: Pt eager to leave, [facility] transport moved up to 2:45pm.    3:16pm: CM received call from [facility] regarding Pt transport - Pt refusing to be dropped off at address he provided. CM asked for [facility] to take Pt where he desired within [facility] and return call with updated cost for trip.    3:57pm: CM spoke with [facility] - Pt was dropped off at [address] [facility]; total cost for trip is $91.00.    EUNICE Jacobs (Ally).

## 2024-08-07 NOTE — PROGRESS NOTES
Clinical Pharmacy Note: Re: IV to PO Automatic Conversion - Antibiotic    Please note: Baldomero CYR Reece’s medication(s) (levofloxacin) has/have been changed from IV to PO based on the following criteria:    The patient:  Received IV therapy for at least 24 hours   Is tolerating diet more advanced than clear liquids  Is tolerating oral medications  Is not on vasopressor blood pressure support (i.e. no signs of shock)      This IV to PO conversion is based on the P&T approved automatic conversion policy for eligible patients.  Please call with questions.

## 2024-08-07 NOTE — PLAN OF CARE
Problem: Discharge Planning  Goal: Discharge to home or other facility with appropriate resources  8/6/2024 2236 by Susan Tyler RN (Aika)  Outcome: Progressing  Flowsheets (Taken 8/6/2024 2000)  Discharge to home or other facility with appropriate resources:   Identify barriers to discharge with patient and caregiver   Identify discharge learning needs (meds, wound care, etc)  8/6/2024 1126 by Cande Askew RN  Outcome: Progressing  Flowsheets (Taken 8/6/2024 0800)  Discharge to home or other facility with appropriate resources: Identify barriers to discharge with patient and caregiver     Problem: Safety - Adult  Goal: Free from fall injury  8/6/2024 2236 by Susan Tyler RN (Aika)  Outcome: Progressing  Flowsheets (Taken 8/6/2024 2000)  Free From Fall Injury: Instruct family/caregiver on patient safety  8/6/2024 1126 by Cande Askew RN  Outcome: Progressing  Flowsheets (Taken 8/6/2024 0800)  Free From Fall Injury: Instruct family/caregiver on patient safety     Problem: Chronic Conditions and Co-morbidities  Goal: Patient's chronic conditions and co-morbidity symptoms are monitored and maintained or improved  8/6/2024 2236 by Susan Tyler RN (Aika)  Outcome: Progressing  Flowsheets (Taken 8/6/2024 2000)  Care Plan - Patient's Chronic Conditions and Co-Morbidity Symptoms are Monitored and Maintained or Improved:   Collaborate with multidisciplinary team to address chronic and comorbid conditions and prevent exacerbation or deterioration   Monitor and assess patient's chronic conditions and comorbid symptoms for stability, deterioration, or improvement   Update acute care plan with appropriate goals if chronic or comorbid symptoms are exacerbated and prevent overall improvement and discharge  8/6/2024 1126 by Cande Askew RN  Outcome: Progressing  Flowsheets (Taken 8/6/2024 0800)  Care Plan - Patient's Chronic Conditions and Co-Morbidity Symptoms are Monitored and Maintained or Improved: Monitor and assess  Vandana Garcia, DOC  Outcome: Progressing     Problem: Physical Therapy - Adult  Goal: By Discharge: Performs mobility at highest level of function for planned discharge setting.  See evaluation for individualized goals.  Description: FUNCTIONAL STATUS PRIOR TO ADMISSION: Patient was modified independent using a wheelchair for functional mobility. Mod I with transfers to w/c. R BKA and L transmet amp. \    HOME SUPPORT PRIOR TO ADMISSION: Patient is homeless and has left rehab/LTC AMA in the past.     Physical Therapy Goals  Initiated 8/5/2024  1.  Patient will move from supine to sit and sit to supine, scoot up and down, and roll side to side in bed with supervision/set-up within 7 day(s).    2.  Patient will sit EOB x10 min to participate in scooting in prep for transfers with supervision within 7 days.  3.  Patient will transfer to w/c with min A within 7 days.   4.  Patient will propel w/c x50 ft with min A within 7 days.  8/6/2024 1527 by Mary Kay Vitale, PT  Outcome: Progressing

## 2024-08-07 NOTE — PROGRESS NOTES
Discharge teaching explained to pt. Opportunity for questions and clarifications given.     Stroke Education provided to patient and the following topics were discussed    1. Patients personal risk factors for stroke are diabetes mellitus, hyperlipidemia, hypertension, and smoking    2. Warning signs of Stroke:        * Sudden numbness or weakness of the face, arm or leg, especially on one side of          The body            * Sudden confusion, trouble speaking or understanding        * Sudden trouble seeing in one or both eyes        * Sudden trouble walking, dizziness, loss of balance or coordination        * Sudden severe headache with no known cause      3. Importance of activation Emergency Medical Services ( 9-1-1 ) immediately if experience any warning signs of stroke.    4. Be sure and schedule a follow-up appointment with your primary care doctor or any specialists as instructed.     5. You must take medicine every day to treat your risk factors for stroke.  Be sure to take your medicines exactly as your doctor tells you: no more, no less.  Know what your medicines are for , what they do.  Anti-thrombotics /anticoagulants can help prevent strokes.  You are taking the following medicine(s)  Asprin     6.  Smoking and second-hand smoke greatly increase your risk of stroke, cardiovascular disease and death. Smoking history cigarettes, several per day or packs, several per day    7. Information provided was Verbal Education    8. Documentation of teaching completed in Patient Education Activity and on Care Plan with teaching response noted?  yes

## 2024-08-09 ENCOUNTER — APPOINTMENT (OUTPATIENT)
Facility: HOSPITAL | Age: 69
End: 2024-08-09
Payer: MEDICAID

## 2024-08-09 ENCOUNTER — HOSPITAL ENCOUNTER (EMERGENCY)
Facility: HOSPITAL | Age: 69
Discharge: HOME | End: 2024-08-09
Attending: STUDENT IN AN ORGANIZED HEALTH CARE EDUCATION/TRAINING PROGRAM
Payer: MEDICAID

## 2024-08-09 VITALS
WEIGHT: 128.8 LBS | DIASTOLIC BLOOD PRESSURE: 69 MMHG | TEMPERATURE: 98.2 F | HEART RATE: 104 BPM | OXYGEN SATURATION: 91 % | RESPIRATION RATE: 18 BRPM | SYSTOLIC BLOOD PRESSURE: 117 MMHG | BODY MASS INDEX: 19.08 KG/M2 | HEIGHT: 69 IN

## 2024-08-09 DIAGNOSIS — E86.0 DEHYDRATION: ICD-10-CM

## 2024-08-09 DIAGNOSIS — S52.502A CLOSED TRAUMATIC NONDISPLACED FRACTURE OF DISTAL END OF LEFT RADIUS, INITIAL ENCOUNTER: Primary | ICD-10-CM

## 2024-08-09 DIAGNOSIS — F10.90 ALCOHOL USE DISORDER: ICD-10-CM

## 2024-08-09 LAB
ALBUMIN SERPL-MCNC: 3.4 G/DL (ref 3.5–5)
ALBUMIN/GLOB SERPL: 0.9 (ref 1.1–2.2)
ALP SERPL-CCNC: 106 U/L (ref 45–117)
ALT SERPL-CCNC: 20 U/L (ref 12–78)
AMPHET UR QL SCN: NEGATIVE
ANION GAP SERPL CALC-SCNC: 12 MMOL/L (ref 5–15)
APPEARANCE UR: CLEAR
AST SERPL-CCNC: 31 U/L (ref 15–37)
BACTERIA URNS QL MICRO: NEGATIVE /HPF
BARBITURATES UR QL SCN: POSITIVE
BASOPHILS # BLD: 0.1 K/UL (ref 0–0.1)
BASOPHILS NFR BLD: 1 % (ref 0–1)
BENZODIAZ UR QL: NEGATIVE
BILIRUB SERPL-MCNC: 0.7 MG/DL (ref 0.2–1)
BILIRUB UR QL: NEGATIVE
BUN SERPL-MCNC: 3 MG/DL (ref 6–20)
BUN/CREAT SERPL: 3 (ref 12–20)
CALCIUM SERPL-MCNC: 8.4 MG/DL (ref 8.5–10.1)
CANNABINOIDS UR QL SCN: NEGATIVE
CHLORIDE SERPL-SCNC: 100 MMOL/L (ref 97–108)
CO2 SERPL-SCNC: 24 MMOL/L (ref 21–32)
COCAINE UR QL SCN: NEGATIVE
COLOR UR: NORMAL
CREAT SERPL-MCNC: 0.86 MG/DL (ref 0.7–1.3)
DIFFERENTIAL METHOD BLD: ABNORMAL
EOSINOPHIL # BLD: 0.4 K/UL (ref 0–0.4)
EOSINOPHIL NFR BLD: 4 % (ref 0–7)
EPITH CASTS URNS QL MICRO: NORMAL /LPF
ERYTHROCYTE [DISTWIDTH] IN BLOOD BY AUTOMATED COUNT: 13.8 % (ref 11.5–14.5)
ETHANOL SERPL-MCNC: 32 MG/DL (ref 0–0.08)
GLOBULIN SER CALC-MCNC: 3.9 G/DL (ref 2–4)
GLUCOSE SERPL-MCNC: 77 MG/DL (ref 65–100)
GLUCOSE UR STRIP.AUTO-MCNC: NEGATIVE MG/DL
HCT VFR BLD AUTO: 46.3 % (ref 36.6–50.3)
HGB BLD-MCNC: 14.3 G/DL (ref 12.1–17)
HGB UR QL STRIP: NEGATIVE
IMM GRANULOCYTES # BLD AUTO: 0 K/UL (ref 0–0.04)
IMM GRANULOCYTES NFR BLD AUTO: 0 % (ref 0–0.5)
KETONES UR QL STRIP.AUTO: NEGATIVE MG/DL
LEUKOCYTE ESTERASE UR QL STRIP.AUTO: NEGATIVE
LYMPHOCYTES # BLD: 1.1 K/UL (ref 0.8–3.5)
LYMPHOCYTES NFR BLD: 12 % (ref 12–49)
Lab: ABNORMAL
MAGNESIUM SERPL-MCNC: 1.8 MG/DL (ref 1.6–2.4)
MCH RBC QN AUTO: 27 PG (ref 26–34)
MCHC RBC AUTO-ENTMCNC: 30.9 G/DL (ref 30–36.5)
MCV RBC AUTO: 87.5 FL (ref 80–99)
METHADONE UR QL: NEGATIVE
MONOCYTES # BLD: 0.7 K/UL (ref 0–1)
MONOCYTES NFR BLD: 8 % (ref 5–13)
NEUTS SEG # BLD: 6.8 K/UL (ref 1.8–8)
NEUTS SEG NFR BLD: 75 % (ref 32–75)
NITRITE UR QL STRIP.AUTO: NEGATIVE
NRBC # BLD: 0 K/UL (ref 0–0.01)
NRBC BLD-RTO: 0 PER 100 WBC
OPIATES UR QL: NEGATIVE
PCP UR QL: NEGATIVE
PH UR STRIP: 6 (ref 5–8)
PLATELET # BLD AUTO: 106 K/UL (ref 150–400)
PMV BLD AUTO: 11.3 FL (ref 8.9–12.9)
POTASSIUM SERPL-SCNC: 4.3 MMOL/L (ref 3.5–5.1)
PROT SERPL-MCNC: 7.3 G/DL (ref 6.4–8.2)
PROT UR STRIP-MCNC: NEGATIVE MG/DL
RBC # BLD AUTO: 5.29 M/UL (ref 4.1–5.7)
RBC #/AREA URNS HPF: NORMAL /HPF (ref 0–5)
SODIUM SERPL-SCNC: 136 MMOL/L (ref 136–145)
SP GR UR REFRACTOMETRY: <1.005
URINE CULTURE IF INDICATED: NORMAL
UROBILINOGEN UR QL STRIP.AUTO: 1 EU/DL (ref 0.2–1)
WBC # BLD AUTO: 9.1 K/UL (ref 4.1–11.1)
WBC URNS QL MICRO: NORMAL /HPF (ref 0–4)

## 2024-08-09 PROCEDURE — 72170 X-RAY EXAM OF PELVIS: CPT

## 2024-08-09 PROCEDURE — 73100 X-RAY EXAM OF WRIST: CPT

## 2024-08-09 PROCEDURE — 80053 COMPREHEN METABOLIC PANEL: CPT

## 2024-08-09 PROCEDURE — 96365 THER/PROPH/DIAG IV INF INIT: CPT

## 2024-08-09 PROCEDURE — 71045 X-RAY EXAM CHEST 1 VIEW: CPT

## 2024-08-09 PROCEDURE — 80307 DRUG TEST PRSMV CHEM ANLYZR: CPT

## 2024-08-09 PROCEDURE — 36415 COLL VENOUS BLD VENIPUNCTURE: CPT

## 2024-08-09 PROCEDURE — 70450 CT HEAD/BRAIN W/O DYE: CPT

## 2024-08-09 PROCEDURE — 96366 THER/PROPH/DIAG IV INF ADDON: CPT

## 2024-08-09 PROCEDURE — 99284 EMERGENCY DEPT VISIT MOD MDM: CPT

## 2024-08-09 PROCEDURE — 83735 ASSAY OF MAGNESIUM: CPT

## 2024-08-09 PROCEDURE — 2500000003 HC RX 250 WO HCPCS: Performed by: STUDENT IN AN ORGANIZED HEALTH CARE EDUCATION/TRAINING PROGRAM

## 2024-08-09 PROCEDURE — 6360000002 HC RX W HCPCS: Performed by: STUDENT IN AN ORGANIZED HEALTH CARE EDUCATION/TRAINING PROGRAM

## 2024-08-09 PROCEDURE — 81001 URINALYSIS AUTO W/SCOPE: CPT

## 2024-08-09 PROCEDURE — 85025 COMPLETE CBC W/AUTO DIFF WBC: CPT

## 2024-08-09 PROCEDURE — 2580000003 HC RX 258: Performed by: STUDENT IN AN ORGANIZED HEALTH CARE EDUCATION/TRAINING PROGRAM

## 2024-08-09 PROCEDURE — 82077 ASSAY SPEC XCP UR&BREATH IA: CPT

## 2024-08-09 RX ORDER — 0.9 % SODIUM CHLORIDE 0.9 %
1000 INTRAVENOUS SOLUTION INTRAVENOUS ONCE
Status: COMPLETED | OUTPATIENT
Start: 2024-08-09 | End: 2024-08-09

## 2024-08-09 RX ORDER — IBUPROFEN 600 MG/1
600 TABLET ORAL EVERY 6 HOURS PRN
Qty: 50 TABLET | Refills: 1 | Status: SHIPPED | OUTPATIENT
Start: 2024-08-09

## 2024-08-09 RX ADMIN — SODIUM CHLORIDE 1000 ML: 9 INJECTION, SOLUTION INTRAVENOUS at 17:46

## 2024-08-09 RX ADMIN — FOLIC ACID: 5 INJECTION, SOLUTION INTRAMUSCULAR; INTRAVENOUS; SUBCUTANEOUS at 17:45

## 2024-08-09 ASSESSMENT — PAIN SCALES - GENERAL: PAINLEVEL_OUTOF10: 8

## 2024-08-09 ASSESSMENT — PAIN DESCRIPTION - DESCRIPTORS: DESCRIPTORS: ACHING

## 2024-08-09 ASSESSMENT — PAIN DESCRIPTION - LOCATION: LOCATION: ARM;WRIST

## 2024-08-09 ASSESSMENT — PAIN - FUNCTIONAL ASSESSMENT: PAIN_FUNCTIONAL_ASSESSMENT: 0-10

## 2024-08-09 ASSESSMENT — PAIN DESCRIPTION - ORIENTATION: ORIENTATION: LEFT

## 2024-08-09 NOTE — ED NOTES
Verbal shift change report given to LEANDRO Calloway (oncoming nurse) by LEANDRO MARTE (offgoing nurse). Report included the following information Nurse Handoff Report, ED Encounter Summary, ED SBAR, Intake/Output, MAR, and Recent Results.

## 2024-08-09 NOTE — ED PROVIDER NOTES
8/9/24 1941)       CONSULTS: (Who and What was discussed)  None      Chronic Conditions: Alcohol use disorder, COPD, polysubstance abuse, hypertension, CVA    Social Determinants affecting Dx or Tx: Patient lacks support at home or lives alone.  Patient is homeless.  Patient has a substance abuse problem.     Records Reviewed (source and summary of external notes): Nursing Notes, Old Medical Records, Previous Radiology Studies, and Previous Laboratory Studies    CC/HPI Summary, DDx, ED Course, and Reassessment:   Mdm  69-year-old male who presents via EMS for reportedly falling yesterday with bilateral wrist pain.  Upon arrival he is intoxicated and history from the patient is severely limited.  Nursing was able to obtain history for the patient stated that he had bilateral wrist pain after a fall yesterday but denied head injury.  Given his level of intoxication and poor history we should obtain blood work, CT imaging of the head and neck, x-rays of the chest and pelvis and x-ray of the wrist.  Will keep on telemetry monitoring and reevaluate once blood work and imaging has been completed.  Will also treat with IV fluid as he appears dehydrated, has a mild sinus tachycardia, no obvious distracting injuries but his level of intoxication makes it difficult to examine the patient.  Differential includes head injury, fall, intracranial bleeding, electro disturbance, renal dysfunction, syncope, fracture, confusion, acute encephalopathy, polysubstance abuse, malingering.    ED Course as of 08/10/24 0712   Fri Aug 09, 2024   1834 XR WRIST LEFT (2 VIEWS)  Distal radius fracture, no displacement, will place the patient to a splint.  Because of his alcohol use disorder, patient may not follow-up as recommended.  We will however recommend he follow-up with orthopedics for reassessment.  Will discharge home with medication for symptom management.  Once fluids have been completed we will reassess vital signs and disposition

## 2024-08-09 NOTE — ED TRIAGE NOTES
Pt presents via EMS soiled in diarrhea with left wrist pain since last night. EMS states that pt was hypotensive on scene with a BP of 83/56 and a temperature of 100.1 F. Pt states that he has left wrist pain and a swollen hand since last night and is unsure what happened.

## 2024-08-09 NOTE — ED NOTES
Pt presents to ED soiled in diarrhea complaining of left wrist pain and back pain since last night. Pt is unsure of the mechanism of injury, and displays swelling along his hand and wrist. Pt is alert and oriented x 4, RR even and unlabored, skin is warm and dry. Pt appears in NAD at this time. Assessment completed and pt updated on plan of care.  Call bell in reach.   Emergency Department Nursing Plan of Care  The Nursing Plan of Care is developed from the Nursing assessment and Emergency Department Attending provider initial evaluation.  The plan of care may be reviewed in the “ED Provider note”.  The Plan of Care was developed with the following considerations:  Patient / Family readiness to learn indicated by:Refer to Medical chart in [facility]  Persons(s) to be included in education: Refer to Medical chart in [facility]  Barriers to Learning/Limitations:Normal

## 2024-08-10 NOTE — ED NOTES
Discharge instructions were given to the patient by Brodie.     The patient left the Emergency Department alert and oriented and in no acute distress with 1 prescriptions. The patient was encouraged to call or return to the ED for worsening issues or problems and was encouraged to schedule a follow up appointment for continuing care.     Ambulation assessment completed before discharge.  Pt left Emergency Department at expected ambulatory status with wheelchair  Ortho device education: none    The patient verbalized understanding of discharge instructions and prescriptions, all questions were answered. The patient has no further concerns at this time.

## 2024-09-07 ENCOUNTER — APPOINTMENT (OUTPATIENT)
Facility: HOSPITAL | Age: 69
DRG: 058 | End: 2024-09-07
Payer: MEDICAID

## 2024-09-07 ENCOUNTER — HOSPITAL ENCOUNTER (INPATIENT)
Facility: HOSPITAL | Age: 69
LOS: 1 days | Discharge: LEFT BEFORE DISCHARGE | DRG: 058 | End: 2024-09-08
Payer: MEDICAID
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
## 2024-09-07 DIAGNOSIS — R29.90 STROKE-LIKE SYMPTOMS: Primary | ICD-10-CM

## 2024-09-07 PROBLEM — I63.9 STROKE DETERMINED BY CLINICAL ASSESSMENT (HCC): Status: ACTIVE | Noted: 2024-09-07

## 2024-09-07 LAB
ALBUMIN SERPL-MCNC: 3.9 G/DL (ref 3.5–5)
ALBUMIN/GLOB SERPL: 1.1 (ref 1.1–2.2)
ALP SERPL-CCNC: 116 U/L (ref 45–117)
ALT SERPL-CCNC: 39 U/L (ref 12–78)
AMPHET UR QL SCN: NEGATIVE
ANION GAP SERPL CALC-SCNC: 10 MMOL/L (ref 2–12)
APPEARANCE UR: CLEAR
AST SERPL W P-5'-P-CCNC: 43 U/L (ref 15–37)
BACTERIA URNS QL MICRO: NEGATIVE /HPF
BARBITURATES UR QL SCN: NEGATIVE
BASOPHILS # BLD: 0.1 K/UL (ref 0–0.1)
BASOPHILS NFR BLD: 2 % (ref 0–1)
BENZODIAZ UR QL: NEGATIVE
BILIRUB SERPL-MCNC: 0.4 MG/DL (ref 0.2–1)
BILIRUB UR QL: NEGATIVE
BUN SERPL-MCNC: 14 MG/DL (ref 6–20)
BUN/CREAT SERPL: 24 (ref 12–20)
CA-I BLD-MCNC: 9 MG/DL (ref 8.5–10.1)
CANNABINOIDS UR QL SCN: POSITIVE
CHLORIDE SERPL-SCNC: 108 MMOL/L (ref 97–108)
CO2 SERPL-SCNC: 20 MMOL/L (ref 21–32)
COCAINE UR QL SCN: NEGATIVE
COLOR UR: NORMAL
CREAT SERPL-MCNC: 0.58 MG/DL (ref 0.7–1.3)
DIFFERENTIAL METHOD BLD: ABNORMAL
EKG ATRIAL RATE: 89 BPM
EKG DIAGNOSIS: NORMAL
EKG P AXIS: 85 DEGREES
EKG P-R INTERVAL: 222 MS
EKG Q-T INTERVAL: 392 MS
EKG QRS DURATION: 146 MS
EKG QTC CALCULATION (BAZETT): 476 MS
EKG R AXIS: -83 DEGREES
EKG T AXIS: 72 DEGREES
EKG VENTRICULAR RATE: 89 BPM
EOSINOPHIL # BLD: 0.3 K/UL (ref 0–0.4)
EOSINOPHIL NFR BLD: 3 % (ref 0–7)
EPITH CASTS URNS QL MICRO: NORMAL /LPF
ERYTHROCYTE [DISTWIDTH] IN BLOOD BY AUTOMATED COUNT: 14 % (ref 11.5–14.5)
ETHANOL SERPL-MCNC: 10 MG/DL (ref 0–0.08)
GLOBULIN SER CALC-MCNC: 3.5 G/DL (ref 2–4)
GLUCOSE BLD STRIP.AUTO-MCNC: 78 MG/DL (ref 65–100)
GLUCOSE BLD-MCNC: 78 MG/DL
GLUCOSE SERPL-MCNC: 75 MG/DL (ref 65–100)
GLUCOSE UR STRIP.AUTO-MCNC: NEGATIVE MG/DL
HCT VFR BLD AUTO: 44.7 % (ref 36.6–50.3)
HGB BLD-MCNC: 14.2 G/DL (ref 12.1–17)
HGB UR QL STRIP: NEGATIVE
IMM GRANULOCYTES # BLD AUTO: 0 K/UL (ref 0–0.04)
IMM GRANULOCYTES NFR BLD AUTO: 0 % (ref 0–0.5)
KETONES UR QL STRIP.AUTO: NEGATIVE MG/DL
LEUKOCYTE ESTERASE UR QL STRIP.AUTO: NEGATIVE
LYMPHOCYTES # BLD: 1.4 K/UL (ref 0.8–3.5)
LYMPHOCYTES NFR BLD: 19 % (ref 12–49)
Lab: ABNORMAL
MCH RBC QN AUTO: 27.2 PG (ref 26–34)
MCHC RBC AUTO-ENTMCNC: 31.8 G/DL (ref 30–36.5)
MCV RBC AUTO: 85.6 FL (ref 80–99)
METHADONE UR QL: NEGATIVE
MONOCYTES # BLD: 0.5 K/UL (ref 0–1)
MONOCYTES NFR BLD: 7 % (ref 5–13)
NEUTS SEG # BLD: 5.2 K/UL (ref 1.8–8)
NEUTS SEG NFR BLD: 69 % (ref 32–75)
NITRITE UR QL STRIP.AUTO: NEGATIVE
NRBC # BLD: 0 K/UL (ref 0–0.01)
NRBC BLD-RTO: 0 PER 100 WBC
OPIATES UR QL: NEGATIVE
PCP UR QL: NEGATIVE
PERFORMED BY:: NORMAL
PH UR STRIP: 5 (ref 5–8)
PLATELET # BLD AUTO: 131 K/UL (ref 150–400)
POTASSIUM SERPL-SCNC: 4.6 MMOL/L (ref 3.5–5.1)
PROT SERPL-MCNC: 7.4 G/DL (ref 6.4–8.2)
PROT UR STRIP-MCNC: NEGATIVE MG/DL
RBC # BLD AUTO: 5.22 M/UL (ref 4.1–5.7)
RBC #/AREA URNS HPF: NORMAL /HPF (ref 0–5)
SODIUM SERPL-SCNC: 138 MMOL/L (ref 136–145)
SP GR UR REFRACTOMETRY: 1.01 (ref 1–1.03)
TROPONIN I SERPL HS-MCNC: 5 NG/L (ref 0–76)
URINE CULTURE IF INDICATED: NORMAL
UROBILINOGEN UR QL STRIP.AUTO: 0.1 EU/DL (ref 0.1–1)
WBC # BLD AUTO: 7.6 K/UL (ref 4.1–11.1)
WBC URNS QL MICRO: NORMAL /HPF (ref 0–4)

## 2024-09-07 PROCEDURE — 72125 CT NECK SPINE W/O DYE: CPT

## 2024-09-07 PROCEDURE — 4A03X5D MEASUREMENT OF ARTERIAL FLOW, INTRACRANIAL, EXTERNAL APPROACH: ICD-10-PCS | Performed by: STUDENT IN AN ORGANIZED HEALTH CARE EDUCATION/TRAINING PROGRAM

## 2024-09-07 PROCEDURE — 82077 ASSAY SPEC XCP UR&BREATH IA: CPT

## 2024-09-07 PROCEDURE — 0042T CT BRAIN PERFUSION: CPT

## 2024-09-07 PROCEDURE — 93005 ELECTROCARDIOGRAM TRACING: CPT | Performed by: NURSE PRACTITIONER

## 2024-09-07 PROCEDURE — 85025 COMPLETE CBC W/AUTO DIFF WBC: CPT

## 2024-09-07 PROCEDURE — 80307 DRUG TEST PRSMV CHEM ANLYZR: CPT

## 2024-09-07 PROCEDURE — 71045 X-RAY EXAM CHEST 1 VIEW: CPT

## 2024-09-07 PROCEDURE — 80053 COMPREHEN METABOLIC PANEL: CPT

## 2024-09-07 PROCEDURE — 70498 CT ANGIOGRAPHY NECK: CPT

## 2024-09-07 PROCEDURE — 1100000000 HC RM PRIVATE

## 2024-09-07 PROCEDURE — 82962 GLUCOSE BLOOD TEST: CPT

## 2024-09-07 PROCEDURE — 70450 CT HEAD/BRAIN W/O DYE: CPT

## 2024-09-07 PROCEDURE — 81001 URINALYSIS AUTO W/SCOPE: CPT

## 2024-09-07 PROCEDURE — 84484 ASSAY OF TROPONIN QUANT: CPT

## 2024-09-07 PROCEDURE — 36415 COLL VENOUS BLD VENIPUNCTURE: CPT

## 2024-09-07 PROCEDURE — 99285 EMERGENCY DEPT VISIT HI MDM: CPT

## 2024-09-07 PROCEDURE — 2580000003 HC RX 258

## 2024-09-07 PROCEDURE — 6360000004 HC RX CONTRAST MEDICATION: Performed by: NURSE PRACTITIONER

## 2024-09-07 RX ORDER — SODIUM CHLORIDE 9 MG/ML
INJECTION, SOLUTION INTRAVENOUS PRN
Status: DISCONTINUED | OUTPATIENT
Start: 2024-09-07 | End: 2024-09-07 | Stop reason: SDUPTHER

## 2024-09-07 RX ORDER — ALBUTEROL SULFATE 90 UG/1
2 AEROSOL, METERED RESPIRATORY (INHALATION) 4 TIMES DAILY PRN
Status: DISCONTINUED | OUTPATIENT
Start: 2024-09-07 | End: 2024-09-08 | Stop reason: HOSPADM

## 2024-09-07 RX ORDER — ACETAMINOPHEN 650 MG/1
650 SUPPOSITORY RECTAL EVERY 6 HOURS PRN
Status: DISCONTINUED | OUTPATIENT
Start: 2024-09-07 | End: 2024-09-08 | Stop reason: HOSPADM

## 2024-09-07 RX ORDER — ROSUVASTATIN CALCIUM 20 MG/1
40 TABLET, COATED ORAL NIGHTLY
Status: DISCONTINUED | OUTPATIENT
Start: 2024-09-07 | End: 2024-09-08 | Stop reason: HOSPADM

## 2024-09-07 RX ORDER — POTASSIUM CHLORIDE 7.45 MG/ML
10 INJECTION INTRAVENOUS PRN
Status: DISCONTINUED | OUTPATIENT
Start: 2024-09-07 | End: 2024-09-08 | Stop reason: HOSPADM

## 2024-09-07 RX ORDER — NICOTINE 21 MG/24HR
1 PATCH, TRANSDERMAL 24 HOURS TRANSDERMAL DAILY
Status: DISCONTINUED | OUTPATIENT
Start: 2024-09-07 | End: 2024-09-08 | Stop reason: HOSPADM

## 2024-09-07 RX ORDER — LORAZEPAM 1 MG/1
4 TABLET ORAL
Status: DISCONTINUED | OUTPATIENT
Start: 2024-09-07 | End: 2024-09-08 | Stop reason: HOSPADM

## 2024-09-07 RX ORDER — FOLIC ACID 1 MG/1
1 TABLET ORAL DAILY
Status: DISCONTINUED | OUTPATIENT
Start: 2024-09-07 | End: 2024-09-08 | Stop reason: HOSPADM

## 2024-09-07 RX ORDER — ONDANSETRON 4 MG/1
4 TABLET, ORALLY DISINTEGRATING ORAL EVERY 8 HOURS PRN
Status: DISCONTINUED | OUTPATIENT
Start: 2024-09-07 | End: 2024-09-08 | Stop reason: HOSPADM

## 2024-09-07 RX ORDER — ENOXAPARIN SODIUM 100 MG/ML
40 INJECTION SUBCUTANEOUS DAILY
Status: DISCONTINUED | OUTPATIENT
Start: 2024-09-07 | End: 2024-09-08 | Stop reason: HOSPADM

## 2024-09-07 RX ORDER — IOPAMIDOL 755 MG/ML
100 INJECTION, SOLUTION INTRAVASCULAR
Status: COMPLETED | OUTPATIENT
Start: 2024-09-07 | End: 2024-09-07

## 2024-09-07 RX ORDER — GAUZE BANDAGE 2" X 2"
100 BANDAGE TOPICAL DAILY
Status: DISCONTINUED | OUTPATIENT
Start: 2024-09-07 | End: 2024-09-08 | Stop reason: HOSPADM

## 2024-09-07 RX ORDER — LORAZEPAM 1 MG/1
2 TABLET ORAL
Status: DISCONTINUED | OUTPATIENT
Start: 2024-09-07 | End: 2024-09-08 | Stop reason: HOSPADM

## 2024-09-07 RX ORDER — BUDESONIDE AND FORMOTEROL FUMARATE DIHYDRATE 160; 4.5 UG/1; UG/1
2 AEROSOL RESPIRATORY (INHALATION) 2 TIMES DAILY
Status: DISCONTINUED | OUTPATIENT
Start: 2024-09-07 | End: 2024-09-08 | Stop reason: HOSPADM

## 2024-09-07 RX ORDER — LORAZEPAM 2 MG/ML
1 INJECTION INTRAMUSCULAR
Status: DISCONTINUED | OUTPATIENT
Start: 2024-09-07 | End: 2024-09-08 | Stop reason: HOSPADM

## 2024-09-07 RX ORDER — SODIUM CHLORIDE 0.9 % (FLUSH) 0.9 %
5-40 SYRINGE (ML) INJECTION PRN
Status: DISCONTINUED | OUTPATIENT
Start: 2024-09-07 | End: 2024-09-07 | Stop reason: SDUPTHER

## 2024-09-07 RX ORDER — LORAZEPAM 2 MG/ML
3 INJECTION INTRAMUSCULAR
Status: DISCONTINUED | OUTPATIENT
Start: 2024-09-07 | End: 2024-09-08 | Stop reason: HOSPADM

## 2024-09-07 RX ORDER — LORAZEPAM 1 MG/1
1 TABLET ORAL
Status: DISCONTINUED | OUTPATIENT
Start: 2024-09-07 | End: 2024-09-08 | Stop reason: HOSPADM

## 2024-09-07 RX ORDER — ACETAMINOPHEN 325 MG/1
650 TABLET ORAL EVERY 6 HOURS PRN
Status: DISCONTINUED | OUTPATIENT
Start: 2024-09-07 | End: 2024-09-08 | Stop reason: HOSPADM

## 2024-09-07 RX ORDER — ONDANSETRON 2 MG/ML
4 INJECTION INTRAMUSCULAR; INTRAVENOUS EVERY 6 HOURS PRN
Status: DISCONTINUED | OUTPATIENT
Start: 2024-09-07 | End: 2024-09-08 | Stop reason: HOSPADM

## 2024-09-07 RX ORDER — ASPIRIN 81 MG/1
81 TABLET, CHEWABLE ORAL DAILY
Status: DISCONTINUED | OUTPATIENT
Start: 2024-09-07 | End: 2024-09-08 | Stop reason: HOSPADM

## 2024-09-07 RX ORDER — POTASSIUM CHLORIDE 1500 MG/1
40 TABLET, EXTENDED RELEASE ORAL PRN
Status: DISCONTINUED | OUTPATIENT
Start: 2024-09-07 | End: 2024-09-08 | Stop reason: HOSPADM

## 2024-09-07 RX ORDER — SODIUM CHLORIDE 0.9 % (FLUSH) 0.9 %
5-40 SYRINGE (ML) INJECTION EVERY 12 HOURS SCHEDULED
Status: DISCONTINUED | OUTPATIENT
Start: 2024-09-07 | End: 2024-09-07 | Stop reason: SDUPTHER

## 2024-09-07 RX ORDER — SODIUM CHLORIDE 9 MG/ML
INJECTION, SOLUTION INTRAVENOUS PRN
Status: DISCONTINUED | OUTPATIENT
Start: 2024-09-07 | End: 2024-09-08 | Stop reason: HOSPADM

## 2024-09-07 RX ORDER — LORAZEPAM 2 MG/ML
4 INJECTION INTRAMUSCULAR
Status: DISCONTINUED | OUTPATIENT
Start: 2024-09-07 | End: 2024-09-08 | Stop reason: HOSPADM

## 2024-09-07 RX ORDER — MAGNESIUM SULFATE IN WATER 40 MG/ML
2000 INJECTION, SOLUTION INTRAVENOUS PRN
Status: DISCONTINUED | OUTPATIENT
Start: 2024-09-07 | End: 2024-09-08 | Stop reason: HOSPADM

## 2024-09-07 RX ORDER — POLYETHYLENE GLYCOL 3350 17 G/17G
17 POWDER, FOR SOLUTION ORAL DAILY PRN
Status: DISCONTINUED | OUTPATIENT
Start: 2024-09-07 | End: 2024-09-08 | Stop reason: HOSPADM

## 2024-09-07 RX ORDER — MULTIVITAMIN WITH IRON
1 TABLET ORAL DAILY
Status: DISCONTINUED | OUTPATIENT
Start: 2024-09-07 | End: 2024-09-08 | Stop reason: HOSPADM

## 2024-09-07 RX ORDER — LORAZEPAM 2 MG/ML
2 INJECTION INTRAMUSCULAR
Status: DISCONTINUED | OUTPATIENT
Start: 2024-09-07 | End: 2024-09-08 | Stop reason: HOSPADM

## 2024-09-07 RX ORDER — SODIUM CHLORIDE 0.9 % (FLUSH) 0.9 %
5-40 SYRINGE (ML) INJECTION PRN
Status: DISCONTINUED | OUTPATIENT
Start: 2024-09-07 | End: 2024-09-08 | Stop reason: HOSPADM

## 2024-09-07 RX ORDER — SODIUM CHLORIDE 0.9 % (FLUSH) 0.9 %
5-40 SYRINGE (ML) INJECTION EVERY 12 HOURS SCHEDULED
Status: DISCONTINUED | OUTPATIENT
Start: 2024-09-07 | End: 2024-09-08 | Stop reason: HOSPADM

## 2024-09-07 RX ORDER — LORAZEPAM 1 MG/1
3 TABLET ORAL
Status: DISCONTINUED | OUTPATIENT
Start: 2024-09-07 | End: 2024-09-08 | Stop reason: HOSPADM

## 2024-09-07 RX ADMIN — IOPAMIDOL 100 ML: 755 INJECTION, SOLUTION INTRAVENOUS at 15:52

## 2024-09-07 RX ADMIN — SODIUM CHLORIDE, PRESERVATIVE FREE 10 ML: 5 INJECTION INTRAVENOUS at 21:33

## 2024-09-07 ASSESSMENT — PAIN SCALES - GENERAL: PAINLEVEL_OUTOF10: 8

## 2024-09-08 ENCOUNTER — APPOINTMENT (OUTPATIENT)
Facility: HOSPITAL | Age: 69
DRG: 058 | End: 2024-09-08
Payer: MEDICAID

## 2024-09-08 VITALS
DIASTOLIC BLOOD PRESSURE: 74 MMHG | SYSTOLIC BLOOD PRESSURE: 119 MMHG | HEIGHT: 69 IN | HEART RATE: 92 BPM | BODY MASS INDEX: 20.96 KG/M2 | WEIGHT: 141.54 LBS | OXYGEN SATURATION: 95 % | RESPIRATION RATE: 19 BRPM | TEMPERATURE: 98.3 F

## 2024-09-08 LAB
ANION GAP SERPL CALC-SCNC: 7 MMOL/L (ref 2–12)
BASOPHILS # BLD: 0.1 K/UL (ref 0–0.1)
BASOPHILS NFR BLD: 2 % (ref 0–1)
BUN SERPL-MCNC: 14 MG/DL (ref 6–20)
BUN/CREAT SERPL: 21 (ref 12–20)
CA-I BLD-MCNC: 9 MG/DL (ref 8.5–10.1)
CHLORIDE SERPL-SCNC: 108 MMOL/L (ref 97–108)
CHOLEST SERPL-MCNC: 128 MG/DL
CO2 SERPL-SCNC: 23 MMOL/L (ref 21–32)
CREAT SERPL-MCNC: 0.66 MG/DL (ref 0.7–1.3)
DIFFERENTIAL METHOD BLD: ABNORMAL
EOSINOPHIL # BLD: 0.3 K/UL (ref 0–0.4)
EOSINOPHIL NFR BLD: 7 % (ref 0–7)
ERYTHROCYTE [DISTWIDTH] IN BLOOD BY AUTOMATED COUNT: 13.9 % (ref 11.5–14.5)
GLUCOSE SERPL-MCNC: 84 MG/DL (ref 65–100)
HCT VFR BLD AUTO: 43.4 % (ref 36.6–50.3)
HDLC SERPL-MCNC: 45 MG/DL
HDLC SERPL: 2.8 (ref 0–5)
HGB BLD-MCNC: 13.6 G/DL (ref 12.1–17)
IMM GRANULOCYTES # BLD AUTO: 0 K/UL (ref 0–0.04)
IMM GRANULOCYTES NFR BLD AUTO: 0 % (ref 0–0.5)
LDLC SERPL CALC-MCNC: 49 MG/DL (ref 0–100)
LIPID PANEL: ABNORMAL
LYMPHOCYTES # BLD: 1.2 K/UL (ref 0.8–3.5)
LYMPHOCYTES NFR BLD: 27 % (ref 12–49)
MCH RBC QN AUTO: 26.7 PG (ref 26–34)
MCHC RBC AUTO-ENTMCNC: 31.3 G/DL (ref 30–36.5)
MCV RBC AUTO: 85.3 FL (ref 80–99)
MONOCYTES # BLD: 0.4 K/UL (ref 0–1)
MONOCYTES NFR BLD: 9 % (ref 5–13)
NEUTS SEG # BLD: 2.5 K/UL (ref 1.8–8)
NEUTS SEG NFR BLD: 55 % (ref 32–75)
NRBC # BLD: 0 K/UL (ref 0–0.01)
NRBC BLD-RTO: 0 PER 100 WBC
PLATELET # BLD AUTO: 104 K/UL (ref 150–400)
PMV BLD AUTO: 11.7 FL (ref 8.9–12.9)
POTASSIUM SERPL-SCNC: 4.3 MMOL/L (ref 3.5–5.1)
RBC # BLD AUTO: 5.09 M/UL (ref 4.1–5.7)
SODIUM SERPL-SCNC: 138 MMOL/L (ref 136–145)
TRIGL SERPL-MCNC: 170 MG/DL
VLDLC SERPL CALC-MCNC: 34 MG/DL
WBC # BLD AUTO: 4.5 K/UL (ref 4.1–11.1)

## 2024-09-08 PROCEDURE — 36415 COLL VENOUS BLD VENIPUNCTURE: CPT

## 2024-09-08 PROCEDURE — 6370000000 HC RX 637 (ALT 250 FOR IP)

## 2024-09-08 PROCEDURE — 80061 LIPID PANEL: CPT

## 2024-09-08 PROCEDURE — 94761 N-INVAS EAR/PLS OXIMETRY MLT: CPT

## 2024-09-08 PROCEDURE — 97161 PT EVAL LOW COMPLEX 20 MIN: CPT

## 2024-09-08 PROCEDURE — 2580000003 HC RX 258

## 2024-09-08 PROCEDURE — 97530 THERAPEUTIC ACTIVITIES: CPT

## 2024-09-08 PROCEDURE — 85025 COMPLETE CBC W/AUTO DIFF WBC: CPT

## 2024-09-08 PROCEDURE — 80048 BASIC METABOLIC PNL TOTAL CA: CPT

## 2024-09-08 RX ADMIN — THIAMINE HCL TAB 100 MG 100 MG: 100 TAB at 09:43

## 2024-09-08 RX ADMIN — THERA TABS 1 TABLET: TAB at 09:43

## 2024-09-08 RX ADMIN — SODIUM CHLORIDE, PRESERVATIVE FREE 10 ML: 5 INJECTION INTRAVENOUS at 09:44

## 2024-09-08 RX ADMIN — ASPIRIN 81 MG 81 MG: 81 TABLET ORAL at 09:43

## 2024-09-08 RX ADMIN — FOLIC ACID 1 MG: 1 TABLET ORAL at 09:43

## 2024-09-17 ENCOUNTER — HOSPITAL ENCOUNTER (EMERGENCY)
Facility: HOSPITAL | Age: 69
Discharge: HOME | End: 2024-09-17
Payer: MEDICAID

## 2024-09-17 VITALS
HEART RATE: 86 BPM | SYSTOLIC BLOOD PRESSURE: 105 MMHG | OXYGEN SATURATION: 95 % | HEIGHT: 69 IN | TEMPERATURE: 97.3 F | RESPIRATION RATE: 16 BRPM | DIASTOLIC BLOOD PRESSURE: 61 MMHG | BODY MASS INDEX: 20.88 KG/M2 | WEIGHT: 141 LBS

## 2024-09-17 DIAGNOSIS — F10.920 ACUTE ALCOHOLIC INTOXICATION WITHOUT COMPLICATION (HCC): Primary | ICD-10-CM

## 2024-09-17 PROCEDURE — 99283 EMERGENCY DEPT VISIT LOW MDM: CPT

## 2024-09-17 RX ORDER — 0.9 % SODIUM CHLORIDE 0.9 %
1000 INTRAVENOUS SOLUTION INTRAVENOUS
Status: DISCONTINUED | OUTPATIENT
Start: 2024-09-17 | End: 2024-09-17

## 2024-09-17 ASSESSMENT — PAIN SCALES - GENERAL
PAINLEVEL_OUTOF10: 0
PAINLEVEL_OUTOF10: 0

## 2024-09-17 ASSESSMENT — PAIN - FUNCTIONAL ASSESSMENT
PAIN_FUNCTIONAL_ASSESSMENT: 0-10
PAIN_FUNCTIONAL_ASSESSMENT: 0-10

## 2024-09-26 ENCOUNTER — HOSPITAL ENCOUNTER (EMERGENCY)
Facility: HOSPITAL | Age: 69
Discharge: HOME | DRG: 058 | End: 2024-09-27
Attending: EMERGENCY MEDICINE
Payer: MEDICAID

## 2024-09-26 DIAGNOSIS — F10.10 ETOH ABUSE: Primary | ICD-10-CM

## 2024-09-26 PROCEDURE — 99284 EMERGENCY DEPT VISIT MOD MDM: CPT

## 2024-09-26 ASSESSMENT — PAIN - FUNCTIONAL ASSESSMENT: PAIN_FUNCTIONAL_ASSESSMENT: NONE - DENIES PAIN

## 2024-09-26 ASSESSMENT — LIFESTYLE VARIABLES
HOW OFTEN DO YOU HAVE A DRINK CONTAINING ALCOHOL: PATIENT DECLINED
HOW MANY STANDARD DRINKS CONTAINING ALCOHOL DO YOU HAVE ON A TYPICAL DAY: PATIENT DECLINED

## 2024-09-26 NOTE — ED PROVIDER NOTES
[facility] EMERGENCY DEPT  EMERGENCY DEPARTMENT HISTORY AND PHYSICAL EXAM      Date: 9/26/2024  Patient Name: Baldomero Reece  MRN: [id number]  Birthdate 1955  Date of evaluation: 9/26/2024  Provider: Marcus Montenegro MD   Note Started: 5:54 PM EDT 9/26/24    HISTORY OF PRESENT ILLNESS     Chief Complaint   Patient presents with    Alcohol Intoxication       History Provided By: Patient    HPI: Baldomero Reece is a 69 y.o. male with known past medical history significant for alcohol abuse and right below the knee amputation who states that he became intoxicated today while sitting in his wheelchair outside of a [employer] decided to lay on the ground to go to sleep and the police were called.  He said the police showed up and said that he was drunk in public need to go to the hospital or group home and he opted to come to the hospital.  He denies any symptoms at this point time does not want any treatment and is refusing any type of intervention.    PAST MEDICAL HISTORY   Past Medical History:  Past Medical History:   Diagnosis Date    Alcohol abuse     Below knee amputation (HCC)     Rt leg    Cerebral artery occlusion with cerebral infarction (HCC)     Diabetes mellitus (HCC)     Hypertension        Past Surgical History:  Past Surgical History:   Procedure Laterality Date    FOOT DEBRIDEMENT Left 1/2/2024    LEFT FOOT INCISION AND DRAINAGE with misonix , First metatarsal head amputation and left fifth toe amputation, skin graft application left foot performed by Percy Salazar DPM at [facility] MAIN OR    LEG AMPUTATION BELOW KNEE Right     LOWER EXTREMITY AMPUTATION Left     transmetatarsal       Family History:  No family history on file.    Social History:  Social History     Tobacco Use    Smoking status: Every Day     Current packs/day: 2.00     Average packs/day: 2.0 packs/day for 52.7 years (105.5 ttl pk-yrs)     Types: Cigarettes     Start date: 1972    Smokeless tobacco: Never   Substance Use Topics    Alcohol use:  Judgment normal.           SCREENINGS                Clinical Opiate Withdrawal Scale Score: 4 (9/7/2024  8:35 PM)      LAB, EKG AND DIAGNOSTIC RESULTS   Labs:  No results found for this or any previous visit (from the past 12 hour(s)).    EKG:.Not Applicable    Radiologic Studies:  Non-plain film images such as CT, Ultrasound and MRI are read by the radiologist. Plain radiographic images are visualized and preliminarily interpreted by the ED Provider with the following findings: Not Applicable.    Interpretation per the Radiologist below, if available at the time of this note:  No orders to display        ED COURSE and DIFFERENTIAL DIAGNOSIS/MDM   7:11 PM Differential and Considerations: Patient is no longer tachycardic at this point in time but is refusing any other type of workup, physical exam.  We discussed risk benefits and alternatives and he says he understands that he has been through a lot and would like to leave at this time.    Records Reviewed (source and summary of external notes): Prior medical records and Nursing notes.    Vitals:    Vitals:    09/26/24 1555   Pulse: (!) 112   Resp: 16   Temp: 99.2 °F (37.3 °C)   TempSrc: Oral   SpO2: 92%   Weight: 68 kg (150 lb)   Height: 1.75 m (5' 8.9\")        ED COURSE   Patient's workplace department again today bringing    SEPSIS Reassessment: Sepsis reassessment not applicable    Clinical Management Tools:      Patient was given the following medications:  Medications - No data to display    CONSULTS: See ED Course/MDM for further details.  None     Social Determinants affecting Diagnosis/Treatment: None    Smoking Cessation: Not Applicable    PROCEDURES   Unless otherwise noted above, none  Procedures      CRITICAL CARE TIME   Patient does not meet Critical Care Time, 0 minutes    ED IMPRESSION     1. ETOH abuse          DISPOSITION/PLAN   DISPOSITION Decision To Discharge 09/26/2024 07:10:19 PM  Condition at Disposition: Data Unavailable    Discharge Note:

## 2024-09-26 NOTE — ED TRIAGE NOTES
Per EMS, pt was found lying on the ground at the [employer] on [address] in [place], [place]    911 was called  Pt [facility]    He is aaox3  No acute distress observed.

## 2024-09-26 NOTE — ED NOTES
Pt is refusing to be hooked up to monitor    Pt is requesting his wheelchair in order to leave    Wheelchair was left at scene where patient was found     Per EMS, the officer on scene was not bringing the wheelchair

## 2024-09-27 VITALS
TEMPERATURE: 98.7 F | WEIGHT: 150 LBS | OXYGEN SATURATION: 94 % | RESPIRATION RATE: 18 BRPM | SYSTOLIC BLOOD PRESSURE: 117 MMHG | HEIGHT: 69 IN | BODY MASS INDEX: 22.22 KG/M2 | DIASTOLIC BLOOD PRESSURE: 64 MMHG | HEART RATE: 90 BPM

## 2024-09-30 ENCOUNTER — APPOINTMENT (OUTPATIENT)
Facility: HOSPITAL | Age: 69
DRG: 058 | End: 2024-09-30
Payer: MEDICAID

## 2024-09-30 ENCOUNTER — APPOINTMENT (OUTPATIENT)
Facility: HOSPITAL | Age: 69
DRG: 058 | End: 2024-09-30
Attending: FAMILY MEDICINE
Payer: MEDICAID

## 2024-09-30 ENCOUNTER — HOSPITAL ENCOUNTER (INPATIENT)
Facility: HOSPITAL | Age: 69
LOS: 4 days | Discharge: HOME | DRG: 058 | End: 2024-10-04
Admitting: FAMILY MEDICINE
Payer: MEDICAID

## 2024-09-30 DIAGNOSIS — R47.01 EXPRESSIVE APHASIA: Primary | ICD-10-CM

## 2024-09-30 DIAGNOSIS — R07.9 CHEST PAIN, UNSPECIFIED TYPE: ICD-10-CM

## 2024-09-30 PROBLEM — I69.993 CVA, OLD, ATAXIA: Status: ACTIVE | Noted: 2024-09-30

## 2024-09-30 LAB
ALBUMIN SERPL-MCNC: 3.4 G/DL (ref 3.5–5)
ALBUMIN/GLOB SERPL: 0.9 (ref 1.1–2.2)
ALP SERPL-CCNC: 116 U/L (ref 45–117)
ALT SERPL-CCNC: 36 U/L (ref 12–78)
AMPHET UR QL SCN: NEGATIVE
AMPHET UR QL SCN: NEGATIVE
ANION GAP SERPL CALC-SCNC: 8 MMOL/L (ref 2–12)
APPEARANCE UR: CLEAR
AST SERPL W P-5'-P-CCNC: ABNORMAL U/L (ref 15–37)
BACTERIA URNS QL MICRO: NEGATIVE /HPF
BARBITURATES UR QL SCN: NEGATIVE
BARBITURATES UR QL SCN: NEGATIVE
BASOPHILS # BLD: 0.1 K/UL (ref 0–0.1)
BASOPHILS NFR BLD: 2 % (ref 0–1)
BENZODIAZ UR QL: NEGATIVE
BENZODIAZ UR QL: NEGATIVE
BILIRUB SERPL-MCNC: 0.6 MG/DL (ref 0.2–1)
BILIRUB UR QL: NEGATIVE
BUN SERPL-MCNC: 8 MG/DL (ref 6–20)
BUN/CREAT SERPL: 13 (ref 12–20)
CA-I BLD-MCNC: 8.5 MG/DL (ref 8.5–10.1)
CANNABINOIDS UR QL SCN: NEGATIVE
CANNABINOIDS UR QL SCN: NEGATIVE
CHLORIDE SERPL-SCNC: 107 MMOL/L (ref 97–108)
CO2 SERPL-SCNC: 23 MMOL/L (ref 21–32)
COCAINE UR QL SCN: NEGATIVE
COCAINE UR QL SCN: NEGATIVE
COLOR UR: ABNORMAL
CREAT SERPL-MCNC: 0.62 MG/DL (ref 0.7–1.3)
DIFFERENTIAL METHOD BLD: ABNORMAL
EOSINOPHIL # BLD: 0.2 K/UL (ref 0–0.4)
EOSINOPHIL NFR BLD: 5 % (ref 0–7)
EPITH CASTS URNS QL MICRO: NORMAL /LPF
ERYTHROCYTE [DISTWIDTH] IN BLOOD BY AUTOMATED COUNT: 16.2 % (ref 11.5–14.5)
ETHANOL SERPL-MCNC: 272 MG/DL (ref 0–0.08)
GLOBULIN SER CALC-MCNC: 3.8 G/DL (ref 2–4)
GLUCOSE BLD STRIP.AUTO-MCNC: 74 MG/DL (ref 65–100)
GLUCOSE BLD STRIP.AUTO-MCNC: 79 MG/DL (ref 65–100)
GLUCOSE SERPL-MCNC: 74 MG/DL (ref 65–100)
GLUCOSE UR STRIP.AUTO-MCNC: NEGATIVE MG/DL
HCT VFR BLD AUTO: 42.7 % (ref 36.6–50.3)
HGB BLD-MCNC: 13.2 G/DL (ref 12.1–17)
HGB UR QL STRIP: ABNORMAL
IMM GRANULOCYTES # BLD AUTO: 0 K/UL (ref 0–0.04)
IMM GRANULOCYTES NFR BLD AUTO: 0 % (ref 0–0.5)
KETONES UR QL STRIP.AUTO: NEGATIVE MG/DL
LEUKOCYTE ESTERASE UR QL STRIP.AUTO: NEGATIVE
LYMPHOCYTES # BLD: 1.5 K/UL (ref 0.8–3.5)
LYMPHOCYTES NFR BLD: 35 % (ref 12–49)
Lab: NORMAL
Lab: NORMAL
MCH RBC QN AUTO: 27.2 PG (ref 26–34)
MCHC RBC AUTO-ENTMCNC: 30.9 G/DL (ref 30–36.5)
MCV RBC AUTO: 88 FL (ref 80–99)
METHADONE UR QL: NEGATIVE
METHADONE UR QL: NEGATIVE
MONOCYTES # BLD: 0.4 K/UL (ref 0–1)
MONOCYTES NFR BLD: 9 % (ref 5–13)
NEUTS SEG # BLD: 2.1 K/UL (ref 1.8–8)
NEUTS SEG NFR BLD: 49 % (ref 32–75)
NITRITE UR QL STRIP.AUTO: NEGATIVE
NRBC # BLD: 0 K/UL (ref 0–0.01)
NRBC BLD-RTO: 0 PER 100 WBC
OPIATES UR QL: NEGATIVE
OPIATES UR QL: NEGATIVE
PCP UR QL: NEGATIVE
PCP UR QL: NEGATIVE
PERFORMED BY:: NORMAL
PERFORMED BY:: NORMAL
PH UR STRIP: 5 (ref 5–8)
PLATELET # BLD AUTO: 94 K/UL (ref 150–400)
POTASSIUM SERPL-SCNC: ABNORMAL MMOL/L (ref 3.5–5.1)
PROT SERPL-MCNC: 7.2 G/DL (ref 6.4–8.2)
PROT UR STRIP-MCNC: NEGATIVE MG/DL
RBC # BLD AUTO: 4.85 M/UL (ref 4.1–5.7)
RBC #/AREA URNS HPF: ABNORMAL /HPF (ref 0–5)
RBC #/AREA URNS HPF: NORMAL /HPF (ref 0–5)
SODIUM SERPL-SCNC: 138 MMOL/L (ref 136–145)
SP GR UR REFRACTOMETRY: >1.03 (ref 1–1.03)
TROPONIN I SERPL HS-MCNC: 7 NG/L (ref 0–76)
UROBILINOGEN UR QL STRIP.AUTO: 2 EU/DL (ref 0.1–1)
WBC # BLD AUTO: 4.3 K/UL (ref 4.1–11.1)
WBC URNS QL MICRO: ABNORMAL /HPF (ref 0–4)
WBC URNS QL MICRO: NORMAL /HPF (ref 0–4)

## 2024-09-30 PROCEDURE — 81001 URINALYSIS AUTO W/SCOPE: CPT

## 2024-09-30 PROCEDURE — 84484 ASSAY OF TROPONIN QUANT: CPT

## 2024-09-30 PROCEDURE — 80053 COMPREHEN METABOLIC PANEL: CPT

## 2024-09-30 PROCEDURE — 1100000000 HC RM PRIVATE

## 2024-09-30 PROCEDURE — 6360000002 HC RX W HCPCS: Performed by: FAMILY MEDICINE

## 2024-09-30 PROCEDURE — 70498 CT ANGIOGRAPHY NECK: CPT

## 2024-09-30 PROCEDURE — 2580000003 HC RX 258: Performed by: FAMILY MEDICINE

## 2024-09-30 PROCEDURE — 6360000004 HC RX CONTRAST MEDICATION

## 2024-09-30 PROCEDURE — 0042T CT BRAIN PERFUSION: CPT

## 2024-09-30 PROCEDURE — 87086 URINE CULTURE/COLONY COUNT: CPT

## 2024-09-30 PROCEDURE — 6370000000 HC RX 637 (ALT 250 FOR IP): Performed by: FAMILY MEDICINE

## 2024-09-30 PROCEDURE — 82077 ASSAY SPEC XCP UR&BREATH IA: CPT

## 2024-09-30 PROCEDURE — 70450 CT HEAD/BRAIN W/O DYE: CPT

## 2024-09-30 PROCEDURE — 82962 GLUCOSE BLOOD TEST: CPT

## 2024-09-30 PROCEDURE — 99285 EMERGENCY DEPT VISIT HI MDM: CPT

## 2024-09-30 PROCEDURE — 4A03X5D MEASUREMENT OF ARTERIAL FLOW, INTRACRANIAL, EXTERNAL APPROACH: ICD-10-PCS | Performed by: INTERNAL MEDICINE

## 2024-09-30 PROCEDURE — 80307 DRUG TEST PRSMV CHEM ANLYZR: CPT

## 2024-09-30 PROCEDURE — 85025 COMPLETE CBC W/AUTO DIFF WBC: CPT

## 2024-09-30 PROCEDURE — 93005 ELECTROCARDIOGRAM TRACING: CPT

## 2024-09-30 RX ORDER — LORAZEPAM 1 MG/1
3 TABLET ORAL
Status: DISCONTINUED | OUTPATIENT
Start: 2024-09-30 | End: 2024-10-04 | Stop reason: HOSPADM

## 2024-09-30 RX ORDER — IOPAMIDOL 755 MG/ML
100 INJECTION, SOLUTION INTRAVASCULAR
Status: COMPLETED | OUTPATIENT
Start: 2024-09-30 | End: 2024-09-30

## 2024-09-30 RX ORDER — POLYETHYLENE GLYCOL 3350 17 G/17G
17 POWDER, FOR SOLUTION ORAL DAILY PRN
Status: DISCONTINUED | OUTPATIENT
Start: 2024-09-30 | End: 2024-10-04 | Stop reason: HOSPADM

## 2024-09-30 RX ORDER — HEPARIN SODIUM 5000 [USP'U]/ML
5000 INJECTION, SOLUTION INTRAVENOUS; SUBCUTANEOUS EVERY 8 HOURS SCHEDULED
Status: DISCONTINUED | OUTPATIENT
Start: 2024-09-30 | End: 2024-10-04 | Stop reason: HOSPADM

## 2024-09-30 RX ORDER — LORAZEPAM 1 MG/1
1 TABLET ORAL
Status: DISCONTINUED | OUTPATIENT
Start: 2024-09-30 | End: 2024-10-04 | Stop reason: HOSPADM

## 2024-09-30 RX ORDER — SODIUM CHLORIDE 9 MG/ML
INJECTION, SOLUTION INTRAVENOUS PRN
Status: DISCONTINUED | OUTPATIENT
Start: 2024-09-30 | End: 2024-09-30 | Stop reason: SDUPTHER

## 2024-09-30 RX ORDER — LORAZEPAM 2 MG/ML
3 INJECTION INTRAMUSCULAR
Status: DISCONTINUED | OUTPATIENT
Start: 2024-09-30 | End: 2024-10-04 | Stop reason: HOSPADM

## 2024-09-30 RX ORDER — ASPIRIN 300 MG/1
300 SUPPOSITORY RECTAL DAILY
Status: DISCONTINUED | OUTPATIENT
Start: 2024-10-01 | End: 2024-09-30

## 2024-09-30 RX ORDER — LORAZEPAM 2 MG/ML
1 INJECTION INTRAMUSCULAR
Status: DISCONTINUED | OUTPATIENT
Start: 2024-09-30 | End: 2024-10-04 | Stop reason: HOSPADM

## 2024-09-30 RX ORDER — ONDANSETRON 4 MG/1
4 TABLET, ORALLY DISINTEGRATING ORAL EVERY 8 HOURS PRN
Status: DISCONTINUED | OUTPATIENT
Start: 2024-09-30 | End: 2024-10-04 | Stop reason: HOSPADM

## 2024-09-30 RX ORDER — ASPIRIN 81 MG/1
81 TABLET, CHEWABLE ORAL DAILY
Status: DISCONTINUED | OUTPATIENT
Start: 2024-10-01 | End: 2024-10-04 | Stop reason: HOSPADM

## 2024-09-30 RX ORDER — ENOXAPARIN SODIUM 100 MG/ML
40 INJECTION SUBCUTANEOUS DAILY
Status: DISCONTINUED | OUTPATIENT
Start: 2024-10-01 | End: 2024-09-30

## 2024-09-30 RX ORDER — GAUZE BANDAGE 2" X 2"
100 BANDAGE TOPICAL DAILY
Status: DISCONTINUED | OUTPATIENT
Start: 2024-10-01 | End: 2024-10-04 | Stop reason: HOSPADM

## 2024-09-30 RX ORDER — SODIUM CHLORIDE 0.9 % (FLUSH) 0.9 %
5-40 SYRINGE (ML) INJECTION EVERY 12 HOURS SCHEDULED
Status: DISCONTINUED | OUTPATIENT
Start: 2024-09-30 | End: 2024-10-04 | Stop reason: HOSPADM

## 2024-09-30 RX ORDER — LORAZEPAM 1 MG/1
2 TABLET ORAL
Status: DISCONTINUED | OUTPATIENT
Start: 2024-09-30 | End: 2024-10-04 | Stop reason: HOSPADM

## 2024-09-30 RX ORDER — ATORVASTATIN CALCIUM 40 MG/1
80 TABLET, FILM COATED ORAL NIGHTLY
Status: DISCONTINUED | OUTPATIENT
Start: 2024-09-30 | End: 2024-10-04 | Stop reason: HOSPADM

## 2024-09-30 RX ORDER — SODIUM CHLORIDE 0.9 % (FLUSH) 0.9 %
5-40 SYRINGE (ML) INJECTION PRN
Status: DISCONTINUED | OUTPATIENT
Start: 2024-09-30 | End: 2024-09-30 | Stop reason: SDUPTHER

## 2024-09-30 RX ORDER — ONDANSETRON 2 MG/ML
4 INJECTION INTRAMUSCULAR; INTRAVENOUS EVERY 6 HOURS PRN
Status: DISCONTINUED | OUTPATIENT
Start: 2024-09-30 | End: 2024-10-04 | Stop reason: HOSPADM

## 2024-09-30 RX ORDER — SODIUM CHLORIDE 0.9 % (FLUSH) 0.9 %
5-40 SYRINGE (ML) INJECTION PRN
Status: DISCONTINUED | OUTPATIENT
Start: 2024-09-30 | End: 2024-10-04 | Stop reason: HOSPADM

## 2024-09-30 RX ORDER — SODIUM CHLORIDE 0.9 % (FLUSH) 0.9 %
5-40 SYRINGE (ML) INJECTION EVERY 12 HOURS SCHEDULED
Status: DISCONTINUED | OUTPATIENT
Start: 2024-09-30 | End: 2024-09-30 | Stop reason: SDUPTHER

## 2024-09-30 RX ORDER — LORAZEPAM 2 MG/ML
4 INJECTION INTRAMUSCULAR
Status: DISCONTINUED | OUTPATIENT
Start: 2024-09-30 | End: 2024-10-04 | Stop reason: HOSPADM

## 2024-09-30 RX ORDER — LORAZEPAM 1 MG/1
4 TABLET ORAL
Status: DISCONTINUED | OUTPATIENT
Start: 2024-09-30 | End: 2024-10-04 | Stop reason: HOSPADM

## 2024-09-30 RX ORDER — CLOPIDOGREL BISULFATE 75 MG/1
75 TABLET ORAL DAILY
Status: DISCONTINUED | OUTPATIENT
Start: 2024-09-30 | End: 2024-10-04 | Stop reason: HOSPADM

## 2024-09-30 RX ORDER — LORAZEPAM 2 MG/ML
2 INJECTION INTRAMUSCULAR
Status: DISCONTINUED | OUTPATIENT
Start: 2024-09-30 | End: 2024-10-04 | Stop reason: HOSPADM

## 2024-09-30 RX ORDER — SODIUM CHLORIDE 9 MG/ML
INJECTION, SOLUTION INTRAVENOUS PRN
Status: DISCONTINUED | OUTPATIENT
Start: 2024-09-30 | End: 2024-10-04 | Stop reason: HOSPADM

## 2024-09-30 RX ORDER — ASPIRIN 81 MG/1
81 TABLET, CHEWABLE ORAL DAILY
Status: DISCONTINUED | OUTPATIENT
Start: 2024-09-30 | End: 2024-09-30

## 2024-09-30 RX ADMIN — HEPARIN SODIUM 5000 UNITS: 5000 INJECTION INTRAVENOUS; SUBCUTANEOUS at 22:04

## 2024-09-30 RX ADMIN — SODIUM CHLORIDE, PRESERVATIVE FREE 10 ML: 5 INJECTION INTRAVENOUS at 22:00

## 2024-09-30 RX ADMIN — IOPAMIDOL 100 ML: 755 INJECTION, SOLUTION INTRAVENOUS at 12:56

## 2024-09-30 RX ADMIN — LORAZEPAM 4 MG: 2 INJECTION INTRAMUSCULAR; INTRAVENOUS at 22:04

## 2024-09-30 RX ADMIN — ATORVASTATIN CALCIUM 80 MG: 40 TABLET, FILM COATED ORAL at 22:05

## 2024-09-30 ASSESSMENT — PAIN - FUNCTIONAL ASSESSMENT: PAIN_FUNCTIONAL_ASSESSMENT: NONE - DENIES PAIN

## 2024-09-30 ASSESSMENT — LIFESTYLE VARIABLES
HOW OFTEN DO YOU HAVE A DRINK CONTAINING ALCOHOL: 2-3 TIMES A WEEK
HOW MANY STANDARD DRINKS CONTAINING ALCOHOL DO YOU HAVE ON A TYPICAL DAY: 5 OR 6

## 2024-09-30 ASSESSMENT — PAIN SCALES - GENERAL: PAINLEVEL_OUTOF10: 0

## 2024-09-30 NOTE — ED NOTES
ED TO INPATIENT SBAR HANDOFF    Patient Name: Baldomero Reece   Preferred Name: Baldomero  : 1955  69 y.o.   Family/Caregiver Present: no   Code Status Order: Full Code  PO Status: NPO:Yes  Telemetry Order:   C-SSRS: Risk of Suicide: No Risk  Sitter no   Restraints:   Sepsis Risk Score     Situation  Chief Complaint   Patient presents with    Cerebrovascular Accident     Brief Description of Patient's Condition:stroke  alert  Mental Status: oriented and alert  Arrived from:Home  Imaging:   CT BRAIN PERFUSION   Final Result   CTA Head:   1. No evidence of significant stenosis or aneurysm.      CTA Neck:   1. No evidence of significant stenosis.      CT Brain Perfusion:   1. No acute abnormality. Hypoperfusion corresponding to chronic encephalomalacia   in the left frontal lobe and basal ganglia.         Electronically signed by Juan Elmore      CTA HEAD NECK W CONTRAST   Final Result   CTA Head:   1. No evidence of significant stenosis or aneurysm.      CTA Neck:   1. No evidence of significant stenosis.      CT Brain Perfusion:   1. No acute abnormality. Hypoperfusion corresponding to chronic encephalomalacia   in the left frontal lobe and basal ganglia.         Electronically signed by Juan Elmore      CT HEAD WO CONTRAST   Final Result      No acute intracranial abnormality.         Electronically signed by Yuan French        Abnormal labs:   Abnormal Labs Reviewed   CBC WITH AUTO DIFFERENTIAL - Abnormal; Notable for the following components:       Result Value    RDW 16.2 (*)     Platelets 94 (*)     Basophils % 2 (*)     All other components within normal limits   COMPREHENSIVE METABOLIC PANEL - Abnormal; Notable for the following components:    Creatinine 0.62 (*)     Albumin 3.4 (*)     Albumin/Globulin Ratio 0.9 (*)     All other components within normal limits   ETHANOL - Abnormal; Notable for the following components:    Ethanol Lvl 272 (*)     All other components within normal limits  injection 100 mL (100 mLs IntraVENous Given 9/30/24 1256)     Last documented pain medication administration: none  Pertinent or High Risk Medications/Drips: no   If Yes, please provide details: n/a  Blood Product Administration: no  If Yes, please provide details: n/a  Process Protocols/Bundles: Stroke Protocol/Bundle Completion-1627    Recommendation  Incomplete STAT orders: none  Overdue Medications: none  Patient Belongings: with him  Additional Comments: please send someone down for NIHSS @ bedside  If any further questions, please call Sending RN at Janneth @ [phone]       Admitting Unit Notification  Name of person notified and time: 9/30/24 @ 1758 talked to       Electronically signed by: Electronically signed by Janneth Silveira RN on 9/30/2024 at 5:56 PM

## 2024-09-30 NOTE — ED TRIAGE NOTES
Patient arrives via ems patient is homeless has an extensive history of ETOH abuse and aphasia. Stroke alert was called on this patient

## 2024-09-30 NOTE — PROGRESS NOTES
..4 Eyes Skin Assessment     NAME:  Baldomero Reece  YOB: 1955  MEDICAL RECORD NUMBER:  [id number]    The patient is being assessed for  Admission    I agree that at least one RN has performed a thorough Head to Toe Skin Assessment on the patient. ALL assessment sites listed below have been assessed.      Areas assessed by both nurses:    Head, Face, Ears, Shoulders, Back, Chest, Arms, Elbows, Hands, Sacrum. Buttock, Coccyx, Ischium, Legs. Feet and Heels, and Under Medical Devices         Does the Patient have a Wound? No noted wound(s)       Jack Prevention initiated by RN: No  Wound Care Orders initiated by RN: No    Pressure Injury (Stage 3,4, Unstageable, DTI, NWPT, and Complex wounds) if present, place Wound referral order by RN under : No    New Ostomies, if present place, Ostomy referral order under : No     Nurse 1 eSignature: Electronically signed by Dylon Griffith RN on 9/30/24 at 6:45 PM EDT    **SHARE this note so that the co-signing nurse can place an eSignature**    Nurse 2 eSignature: {Esignature:[id number]}

## 2024-09-30 NOTE — CARE COORDINATION
09/30/24 3229   Service Assessment   Patient Orientation Alert and Oriented   Cognition Alert   History Provided By Patient   Primary Caregiver Self   Accompanied By/Relationship Pt alone   Support Systems Other (Comment)  (Pt declined.)   Patient's Healthcare Decision Maker is: Legal Next of Kin   PCP Verified by CM Yes  (Per pt no PCP.)   Last Visit to PCP   (No PCP.)   Prior Functional Level Independent in ADLs/IADLs;Assistance with the following:;Mobility  (W/C/RBKA)   Current Functional Level Independent in ADLs/IADLs;Assistance with the following:;Mobility  (W/C/RBKA)   Can patient return to prior living arrangement Other (see comment)  (Undecided on SNF d/t homelessness.)   Ability to make needs known: Good   Family able to assist with home care needs: Other (comment)  (Declined)   Would you like for me to discuss the discharge plan with any other family members/significant others, and if so, who? No   Financial Resources Medicaid   Community Resources None   CM/SW Referral Other (see comment)  (None)   Social/Functional History   Lives With Alone   Type of Home Homeless   Home Layout   (None)   Home Access   (None)   Bathroom Shower/Tub None   Bathroom Toilet   (None)   Bathroom Equipment None   Bathroom Accessibility Not accessible   Home Equipment None   Receives Help From Other (comment)   ADL Assistance Independent   Homemaking Assistance Independent   Homemaking Responsibilities Yes   Ambulation Assistance Non-ambulatory  (W/C/RBKA)   Transfer Assistance Independent   Active  No   Occupation On disability   Discharge Planning   Type of Residence Homeless   Living Arrangements Alone   Current Services Prior To Admission None   Potential Assistance Needed N/A   DME Ordered? No   Potential Assistance Purchasing Medications No   Type of Home Care Services None   Patient expects to be discharged to: Other (comment)  (Homeless)   One/Two Story Residence Other (comment)   History of falls? 0

## 2024-09-30 NOTE — PROGRESS NOTES
Patient admitted to floor around 1840. No orders from MD placed. Contacted Dr. Wu to request orders. Md to put them in.

## 2024-10-01 ENCOUNTER — APPOINTMENT (OUTPATIENT)
Facility: HOSPITAL | Age: 69
DRG: 058 | End: 2024-10-01
Attending: FAMILY MEDICINE
Payer: MEDICAID

## 2024-10-01 PROBLEM — I69.351 HEMIPARESIS AFFECTING RIGHT SIDE AS LATE EFFECT OF CEREBROVASCULAR ACCIDENT (CVA) (HCC): Status: ACTIVE | Noted: 2024-10-01

## 2024-10-01 PROBLEM — G45.9 TIA (TRANSIENT ISCHEMIC ATTACK): Status: ACTIVE | Noted: 2024-10-01

## 2024-10-01 PROBLEM — Z86.73 HX OF ARTERIAL ISCHEMIC STROKE: Status: ACTIVE | Noted: 2024-10-01

## 2024-10-01 PROBLEM — I69.322 DYSARTHRIA AS LATE EFFECT OF CEREBELLAR CEREBROVASCULAR ACCIDENT (CVA): Status: ACTIVE | Noted: 2024-10-01

## 2024-10-01 LAB
CHOLEST SERPL-MCNC: 143 MG/DL
ECHO AO ROOT DIAM: 3.4 CM
ECHO AO ROOT INDEX: 1.88 CM/M2
ECHO AV MEAN GRADIENT: 3 MMHG
ECHO AV MEAN VELOCITY: 0.7 M/S
ECHO AV PEAK GRADIENT: 5 MMHG
ECHO AV PEAK VELOCITY: 1.1 M/S
ECHO AV VTI: 21.7 CM
ECHO BSA: 1.81 M2
ECHO LA AREA 4C: 14.4 CM2
ECHO LA DIAMETER INDEX: 1.66 CM/M2
ECHO LA DIAMETER: 3 CM
ECHO LA MAJOR AXIS: 4.5 CM
ECHO LA TO AORTIC ROOT RATIO: 0.88
ECHO LA VOL MOD A4C: 36 ML (ref 18–58)
ECHO LA VOLUME INDEX MOD A4C: 20 ML/M2 (ref 16–34)
ECHO LV E' LATERAL VELOCITY: 8.3 CM/S
ECHO LV E' SEPTAL VELOCITY: 6.7 CM/S
ECHO LV EDV A4C: 97 ML
ECHO LV EDV INDEX A4C: 54 ML/M2
ECHO LV EJECTION FRACTION A4C: 51 %
ECHO LV EJECTION FRACTION BIPLANE: 51 % (ref 55–100)
ECHO LV ESV A4C: 48 ML
ECHO LV ESV INDEX A4C: 27 ML/M2
ECHO LV FRACTIONAL SHORTENING: 29 % (ref 28–44)
ECHO LV INTERNAL DIMENSION DIASTOLE INDEX: 2.71 CM/M2
ECHO LV INTERNAL DIMENSION DIASTOLIC: 4.9 CM (ref 4.2–5.9)
ECHO LV INTERNAL DIMENSION SYSTOLIC INDEX: 1.93 CM/M2
ECHO LV INTERNAL DIMENSION SYSTOLIC: 3.5 CM
ECHO LV IVSD: 1.2 CM (ref 0.6–1)
ECHO LV MASS 2D: 164.3 G (ref 88–224)
ECHO LV MASS INDEX 2D: 90.8 G/M2 (ref 49–115)
ECHO LV POSTERIOR WALL DIASTOLIC: 0.7 CM (ref 0.6–1)
ECHO LV RELATIVE WALL THICKNESS RATIO: 0.29
ECHO LVOT AREA: 3.5 CM2
ECHO LVOT DIAM: 2.1 CM
ECHO PV MAX VELOCITY: 0.7 M/S
ECHO PV PEAK GRADIENT: 2 MMHG
ECHO RA AREA 4C: 7.8 CM2
ECHO RA END SYSTOLIC VOLUME APICAL 4 CHAMBER INDEX BSA: 9 ML/M2
ECHO RA VOLUME: 17 ML
ECHO RV BASAL DIMENSION: 2.6 CM
ECHO RV TAPSE: 2.5 CM (ref 1.7–?)
EKG ATRIAL RATE: 90 BPM
EKG DIAGNOSIS: NORMAL
EKG P AXIS: 75 DEGREES
EKG P-R INTERVAL: 190 MS
EKG Q-T INTERVAL: 400 MS
EKG QRS DURATION: 146 MS
EKG QTC CALCULATION (BAZETT): 489 MS
EKG R AXIS: -83 DEGREES
EKG T AXIS: 74 DEGREES
EKG VENTRICULAR RATE: 90 BPM
ERYTHROCYTE [DISTWIDTH] IN BLOOD BY AUTOMATED COUNT: 16.2 % (ref 11.5–14.5)
EST. AVERAGE GLUCOSE BLD GHB EST-MCNC: 94 MG/DL
GLUCOSE BLD STRIP.AUTO-MCNC: 82 MG/DL (ref 65–100)
HBA1C MFR BLD: 4.9 % (ref 4–5.6)
HCT VFR BLD AUTO: 39.5 % (ref 36.6–50.3)
HDLC SERPL-MCNC: 84 MG/DL
HDLC SERPL: 1.7 (ref 0–5)
HGB BLD-MCNC: 12.3 G/DL (ref 12.1–17)
LDLC SERPL CALC-MCNC: 42.2 MG/DL (ref 0–100)
LIPID PANEL: NORMAL
MCH RBC QN AUTO: 27.3 PG (ref 26–34)
MCHC RBC AUTO-ENTMCNC: 31.1 G/DL (ref 30–36.5)
MCV RBC AUTO: 87.6 FL (ref 80–99)
NRBC # BLD: 0 K/UL (ref 0–0.01)
NRBC BLD-RTO: 0 PER 100 WBC
PERFORMED BY:: NORMAL
PLATELET # BLD AUTO: 86 K/UL (ref 150–400)
PMV BLD AUTO: 10.3 FL (ref 8.9–12.9)
RBC # BLD AUTO: 4.51 M/UL (ref 4.1–5.7)
TRIGL SERPL-MCNC: 84 MG/DL
VLDLC SERPL CALC-MCNC: 16.8 MG/DL
WBC # BLD AUTO: 2.7 K/UL (ref 4.1–11.1)

## 2024-10-01 PROCEDURE — 6370000000 HC RX 637 (ALT 250 FOR IP)

## 2024-10-01 PROCEDURE — 6360000002 HC RX W HCPCS: Performed by: FAMILY MEDICINE

## 2024-10-01 PROCEDURE — 85027 COMPLETE CBC AUTOMATED: CPT

## 2024-10-01 PROCEDURE — 6370000000 HC RX 637 (ALT 250 FOR IP): Performed by: INTERNAL MEDICINE

## 2024-10-01 PROCEDURE — 82962 GLUCOSE BLOOD TEST: CPT

## 2024-10-01 PROCEDURE — 93306 TTE W/DOPPLER COMPLETE: CPT

## 2024-10-01 PROCEDURE — 80061 LIPID PANEL: CPT

## 2024-10-01 PROCEDURE — 94640 AIRWAY INHALATION TREATMENT: CPT

## 2024-10-01 PROCEDURE — 94761 N-INVAS EAR/PLS OXIMETRY MLT: CPT

## 2024-10-01 PROCEDURE — 6360000002 HC RX W HCPCS: Performed by: INTERNAL MEDICINE

## 2024-10-01 PROCEDURE — 36415 COLL VENOUS BLD VENIPUNCTURE: CPT

## 2024-10-01 PROCEDURE — G0426 INPT/ED TELECONSULT50: HCPCS | Performed by: STUDENT IN AN ORGANIZED HEALTH CARE EDUCATION/TRAINING PROGRAM

## 2024-10-01 PROCEDURE — 70551 MRI BRAIN STEM W/O DYE: CPT

## 2024-10-01 PROCEDURE — 83036 HEMOGLOBIN GLYCOSYLATED A1C: CPT

## 2024-10-01 PROCEDURE — 1100000000 HC RM PRIVATE

## 2024-10-01 PROCEDURE — 92610 EVALUATE SWALLOWING FUNCTION: CPT

## 2024-10-01 PROCEDURE — 6370000000 HC RX 637 (ALT 250 FOR IP): Performed by: FAMILY MEDICINE

## 2024-10-01 RX ORDER — PHENOBARBITAL 32.4 MG/1
32.4 TABLET ORAL 4 TIMES DAILY
Status: DISPENSED | OUTPATIENT
Start: 2024-10-02 | End: 2024-10-03

## 2024-10-01 RX ORDER — PHENOBARBITAL 32.4 MG/1
64.8 TABLET ORAL EVERY 6 HOURS PRN
Status: ACTIVE | OUTPATIENT
Start: 2024-10-01 | End: 2024-10-02

## 2024-10-01 RX ORDER — PHENOBARBITAL 32.4 MG/1
32.4 TABLET ORAL EVERY 6 HOURS PRN
Status: DISCONTINUED | OUTPATIENT
Start: 2024-10-02 | End: 2024-10-04 | Stop reason: HOSPADM

## 2024-10-01 RX ORDER — PHENOBARBITAL 32.4 MG/1
16.2 TABLET ORAL 2 TIMES DAILY
Status: DISCONTINUED | OUTPATIENT
Start: 2024-10-04 | End: 2024-10-04 | Stop reason: HOSPADM

## 2024-10-01 RX ORDER — METHYLPREDNISOLONE SODIUM SUCCINATE 40 MG/ML
40 INJECTION, POWDER, LYOPHILIZED, FOR SOLUTION INTRAMUSCULAR; INTRAVENOUS EVERY 12 HOURS
Status: DISCONTINUED | OUTPATIENT
Start: 2024-10-01 | End: 2024-10-01

## 2024-10-01 RX ORDER — PREDNISONE 20 MG/1
40 TABLET ORAL DAILY
Status: DISCONTINUED | OUTPATIENT
Start: 2024-10-02 | End: 2024-10-04 | Stop reason: HOSPADM

## 2024-10-01 RX ORDER — NICOTINE 21 MG/24HR
1 PATCH, TRANSDERMAL 24 HOURS TRANSDERMAL DAILY
Status: DISCONTINUED | OUTPATIENT
Start: 2024-10-01 | End: 2024-10-04 | Stop reason: HOSPADM

## 2024-10-01 RX ORDER — IPRATROPIUM BROMIDE AND ALBUTEROL SULFATE 2.5; .5 MG/3ML; MG/3ML
1 SOLUTION RESPIRATORY (INHALATION)
Status: DISCONTINUED | OUTPATIENT
Start: 2024-10-01 | End: 2024-10-01

## 2024-10-01 RX ORDER — PHENOBARBITAL 32.4 MG/1
64.8 TABLET ORAL 4 TIMES DAILY
Status: COMPLETED | OUTPATIENT
Start: 2024-10-01 | End: 2024-10-02

## 2024-10-01 RX ORDER — BUDESONIDE 0.5 MG/2ML
0.5 INHALANT ORAL
Status: DISCONTINUED | OUTPATIENT
Start: 2024-10-01 | End: 2024-10-03

## 2024-10-01 RX ORDER — PHENOBARBITAL 32.4 MG/1
16.2 TABLET ORAL EVERY 6 HOURS PRN
Status: DISCONTINUED | OUTPATIENT
Start: 2024-10-04 | End: 2024-10-04 | Stop reason: HOSPADM

## 2024-10-01 RX ORDER — PHENOBARBITAL 32.4 MG/1
32.4 TABLET ORAL 2 TIMES DAILY
Status: DISCONTINUED | OUTPATIENT
Start: 2024-10-03 | End: 2024-10-04 | Stop reason: HOSPADM

## 2024-10-01 RX ORDER — FOLIC ACID 1 MG/1
1 TABLET ORAL DAILY
Status: DISCONTINUED | OUTPATIENT
Start: 2024-10-01 | End: 2024-10-04 | Stop reason: HOSPADM

## 2024-10-01 RX ORDER — GAUZE BANDAGE 2" X 2"
100 BANDAGE TOPICAL DAILY
Status: DISCONTINUED | OUTPATIENT
Start: 2024-10-01 | End: 2024-10-01 | Stop reason: SDUPTHER

## 2024-10-01 RX ORDER — IPRATROPIUM BROMIDE AND ALBUTEROL SULFATE 2.5; .5 MG/3ML; MG/3ML
1 SOLUTION RESPIRATORY (INHALATION)
Status: DISCONTINUED | OUTPATIENT
Start: 2024-10-01 | End: 2024-10-02

## 2024-10-01 RX ADMIN — BUDESONIDE 500 MCG: 0.5 SUSPENSION RESPIRATORY (INHALATION) at 19:48

## 2024-10-01 RX ADMIN — LORAZEPAM 4 MG: 2 INJECTION INTRAMUSCULAR; INTRAVENOUS at 18:01

## 2024-10-01 RX ADMIN — IPRATROPIUM BROMIDE AND ALBUTEROL SULFATE 1 DOSE: 2.5; .5 SOLUTION RESPIRATORY (INHALATION) at 19:48

## 2024-10-01 RX ADMIN — METHYLPREDNISOLONE SODIUM SUCCINATE 40 MG: 40 INJECTION INTRAMUSCULAR; INTRAVENOUS at 12:48

## 2024-10-01 RX ADMIN — FOLIC ACID 1 MG: 1 TABLET ORAL at 12:46

## 2024-10-01 RX ADMIN — LORAZEPAM 1 MG: 1 TABLET ORAL at 15:42

## 2024-10-01 RX ADMIN — LORAZEPAM 1 MG: 2 INJECTION INTRAMUSCULAR; INTRAVENOUS at 16:45

## 2024-10-01 RX ADMIN — ASPIRIN 81 MG 81 MG: 81 TABLET ORAL at 12:47

## 2024-10-01 RX ADMIN — IPRATROPIUM BROMIDE AND ALBUTEROL SULFATE 1 DOSE: 2.5; .5 SOLUTION RESPIRATORY (INHALATION) at 14:18

## 2024-10-01 RX ADMIN — BUDESONIDE 500 MCG: 0.5 SUSPENSION RESPIRATORY (INHALATION) at 14:16

## 2024-10-01 RX ADMIN — HEPARIN SODIUM 5000 UNITS: 5000 INJECTION INTRAVENOUS; SUBCUTANEOUS at 23:30

## 2024-10-01 RX ADMIN — PHENOBARBITAL 64.8 MG: 32.4 TABLET ORAL at 23:30

## 2024-10-01 RX ADMIN — CLOPIDOGREL BISULFATE 75 MG: 75 TABLET ORAL at 12:47

## 2024-10-01 ASSESSMENT — PAIN SCALES - GENERAL
PAINLEVEL_OUTOF10: 0
PAINLEVEL_OUTOF10: 0

## 2024-10-01 NOTE — CONSULTS
[facility] TELENEUROLOGY CONSULTATION        Impression/Recommendations:   Hx of stroke - encephalomalacia on left frontal lobe  Residual Dysarthria and right sided hemiparesis from previous stroke   DM2  HTN  Polysubstance abuse    -     MRI brain negative for acute stroke   -   suspect recrudescence of previous stroke symptoms, MRI with previous left frontal lobe infarct that may explain right weakness and dysarthria   Continue aspirin 81mg daily   Echo 50% EF   LDL goal <70, continue statin   A1c 5.0 in 8/5/2024, repeat pending   Continue neuro checks   PT/OT/ST   Encouraged seeking treatment for polysubstance use   Optimize risk factors:  control BP, manage glucose, etc.  Follow up with PCP at discharge   No further inpatient work up, neurology will sign off       Chief Complaint/Admission Diagnosis:   Aphasia [R47.01]  Expressive aphasia [R47.01]  CVA, old, ataxia [I69.993]     I have been asked to see this 69 y.o. male in neurological consultation by Michael Rodriguez MD to render advice and opinion regarding stroke      ?  HPI:   History was obtained from a review of the electronic record and from the patient and family.??  Patient reports he developed word finding difficulties yesterday, he presented to ER, he has had some improvement but still feels his speech is slurred.  He reports had numbness and weakness on right arm as well.  No vision symptoms.  Symptoms on right arm.  He had stroke in the past that affected right arm and speech symptoms.    He uses cocaine, cannabis, and tobacco.  Daily etoh use.       ?     Review of Symptoms:     A ten system review of constitutional, cardiovascular, respiratory, musculoskeletal, endocrine, skin, HEENT, genitourinary, neurological, and psychiatric systems was obtained and is negative except as noted above in HPI.     PMH:   Past Medical History:   Diagnosis Date    Alcohol abuse     Below knee amputation (HCC)     Rt leg    Cerebral artery occlusion with  PERFUSION    INDICATION:   STROKE    COMPARISON: CT head 9/30/2024.    CONTRAST: 100 mL of Isovue-370.    TECHNIQUE:  Unenhanced  images were obtained to localize the volume for  acquisition.  Multislice helical axial CT angiography was performed from the  aortic arch to the top of the head during uneventful rapid bolus intravenous  contrast administration.   Coronal and sagittal reformations and 3D post  processing was performed.  CT dose reduction was achieved through use of a  standardized protocol tailored for this examination and automatic exposure  control for dose modulation.    CT brain perfusion was performed with generation of hemodynamic maps of multiple  parameters, including cerebral blood flow, cerebral blood volume, and MTT (mean  transit time). CT dose reduction was achieved through use of a standardized  protocol tailored for this examination and automatic exposure control for dose  modulation.    This study was analyzed by the [employer]. algorithm.    FINDINGS:    CTA Head:  There is no evidence of large vessel occlusion or flow-limiting stenosis of the  intracranial internal carotid, anterior cerebral, and middle cerebral arteries.  Calcification of the bilateral carotid siphons without significant stenosis. The  anterior communicating artery is patent.    There is no evidence of large vessel occlusion or flow-limiting stenosis of the  intracranial vertebral arteries, basilar artery, or posterior cerebral arteries.  The posterior communicating arteries are not well seen.    There is no evidence of aneurysm or vascular malformation. The dural venous  sinuses and deep cerebral venous system are patent. No evidence of abnormal  enhancement on delayed phase images. Chronic encephalomalacia in the left  frontal lobe and basal ganglia.    CTA NECK:  NASCET method was utilized for calculating stenosis.    Mild calcification of the aortic arch. The common carotid arteries demonstrate  no significant

## 2024-10-01 NOTE — PLAN OF CARE
Problem: Skin/Tissue Integrity  Goal: Absence of new skin breakdown  Description: 1.  Monitor for areas of redness and/or skin breakdown  2.  Assess vascular access sites hourly  3.  Every 4-6 hours minimum:  Change oxygen saturation probe site  4.  Every 4-6 hours:  If on nasal continuous positive airway pressure, respiratory therapy assess nares and determine need for appliance change or resting period.  10/1/2024 1631 by Stephen Escalera, RN  Outcome: Progressing  10/1/2024 0331 by Patricia Cantu, RN  Outcome: Progressing

## 2024-10-01 NOTE — PROGRESS NOTES
PT/OT eval order received and acknowledged. PT/OT eval attempted at 0935 however pt currently leaving the floor for MRI. Will continue to follow patient and attempt PT eval at a later time. Thank you.

## 2024-10-01 NOTE — PLAN OF CARE
Problem: Chronic Conditions and Co-morbidities  Goal: Patient's chronic conditions and co-morbidity symptoms are monitored and maintained or improved  Outcome: Progressing  Flowsheets (Taken 9/30/2024 2245)  Care Plan - Patient's Chronic Conditions and Co-Morbidity Symptoms are Monitored and Maintained or Improved:   Monitor and assess patient's chronic conditions and comorbid symptoms for stability, deterioration, or improvement   Collaborate with multidisciplinary team to address chronic and comorbid conditions and prevent exacerbation or deterioration   Update acute care plan with appropriate goals if chronic or comorbid symptoms are exacerbated and prevent overall improvement and discharge     Problem: Discharge Planning  Goal: Discharge to home or other facility with appropriate resources  Outcome: Progressing  Flowsheets (Taken 9/30/2024 2245)  Discharge to home or other facility with appropriate resources:   Identify barriers to discharge with patient and caregiver   Arrange for needed discharge resources and transportation as appropriate   Identify discharge learning needs (meds, wound care, etc)   Refer to discharge planning if patient needs post-hospital services based on physician order or complex needs related to functional status, cognitive ability or social support system     Problem: Skin/Tissue Integrity  Goal: Absence of new skin breakdown  Description: 1.  Monitor for areas of redness and/or skin breakdown  2.  Assess vascular access sites hourly  3.  Every 4-6 hours minimum:  Change oxygen saturation probe site  4.  Every 4-6 hours:  If on nasal continuous positive airway pressure, respiratory therapy assess nares and determine need for appliance change or resting period.  Outcome: Progressing     Problem: Safety - Adult  Goal: Free from fall injury  Outcome: Progressing

## 2024-10-01 NOTE — THERAPY EVALUATION
Speech LAnguage Pathology Dysphagia EVALUATION/DISCHARGE    Patient: Baldomero Reece (69 y.o. male)  Date: 10/1/2024  Primary Diagnosis: Aphasia [R47.01]  Expressive aphasia [R47.01]  CVA, old, ataxia [I69.993]       Precautions: aspiration  Time In: 1324  Time Out: 1335    DIET RECOMMENDATIONS: Easy to chew and thin liquids    SWALLOW SAFETY PRECAUTIONS: aspiration and GERD precautions, monitor pt for s/s aspiration, meds crushed if able in applesauce or pudding    ASSESSMENT :  Based on the objective data described below, the patient presents with oropharyngeal sw function WFL with right facial weakness, dysarthria that is residual from previous CVA.     Pt seen for bedside sw evaluation, see details below. Pt admitted for CVA workup due to slurred speech. ETOH intoxication.  Hx includes CVA, R BKA, DM, HTN.   Pt is resistant to evaluation; however, is agreeable once SLP explained purpose and role of SLP assessment. Pt does not attend to or accept SLP education following assessment.     Pt has been evaluated by SLP during multiple previous admissions at [facility]. Most recently, pt was seen by SLP 8/7/2024 with recs for reg/thin diet and note of right facial weakness and dysarthria reported. Pt's presentation this date appears consistent with recent SLP assessment.     Pt is edentulous but able to tolerate solids without difficulty. Pt impulsive with intake, rapid rate of ingestion noted. Cough x 1 following thin via straw. 02 sats 94% on RA. Otherwise, no overt s/s aspiration.      CT head results noted:  \"There is stable chronic microvascular ischemic change in the  periventricular white matter and stable chronic encephalomalacia in the left  temporal and frontal lobes.\"  MRI results negative for acute findings.     Patient will be discharged from skilled speech-language pathology services at this time.       PLAN :  Recommendations and Planned Interventions:  DIET RECOMMENDATIONS: Easy to chew and thin

## 2024-10-01 NOTE — PROGRESS NOTES
PT eval order received and acknowledged. PT eval attempted at 1319 however pt declined to attempt OOB mobility at this time, reports \"leg\" pain. Pt educated on the importance of continued mobility, pt continued to decline despite education and encouragement. Will continue to follow patient and attempt PT eval at a later time. Thank you.

## 2024-10-01 NOTE — PROGRESS NOTES
Ada Mcnamara 62  PHYSICAL THERAPY MISSED TREATMENT NOTE  [facility]    Date: 2020  Patient Name: Delfin Jara        MRN: [id number]   : 1940  (68 y.o.)  Gender: female   Referring Practitioner: Tru Bautista  Diagnosis: AMS         REASON FOR MISSED TREATMENT:  Pt transitioning to hospice on 20. Will discontinue PT orders at this time. [place].  Awais Isidro, [place] 8 PM   Result Value Ref Range    Body Surface Area 1.81 m2    LV EDV A4C 97 mL    LV ESV A4C 48 mL    IVSd 1.2 (A) 0.6 - 1.0 cm    LVIDd 4.9 4.2 - 5.9 cm    LVIDs 3.5 cm    LVOT Diameter 2.1 cm    LVPWd 0.7 0.6 - 1.0 cm    LV E' Lateral Velocity 8.3 cm/s    LV E' Septal Velocity 6.7 cm/s    LV Ejection Fraction A4C 51 %    LVOT Area 3.5 cm2    LA Major [place] 4.5 cm    LA Area 4C 14.4 cm2    LA Volume MOD A4C 36 18 - 58 mL    LA Diameter 3.0 cm    RA Area 4C 7.8 cm2    RA Volume 17 ml    AV Mean Gradient 3 mmHg    AV VTI 21.7 cm    AV Mean Velocity 0.7 m/s    AV Peak Velocity 1.1 m/s    AV Peak Gradient 5 mmHg    Aortic Root 3.4 cm    PV Max Velocity 0.7 m/s    PV Peak Gradient 2 mmHg    RV Basal Dimension 2.6 cm    TAPSE 2.5 1.7 cm    Fractional Shortening 2D 29 28 - 44 %    LV ESV Index A4C 27 mL/m2    LV EDV Index A4C 54 mL/m2    LVIDd Index 2.71 cm/m2    LVIDs Index 1.93 cm/m2    LV RWT Ratio 0.29     LV Mass 2D 164.3 88 - 224 g    LV Mass 2D Index 90.8 49 - 115 g/m2    LA Volume Index MOD A4C 20 16 - 34 ml/m2    LA Size Index 1.66 cm/m2    LA/AO Root Ratio 0.88     RA Volume Index A4C 9 mL/m2    Ao Root Index 1.88 cm/m2    EF BP 51 (A) 55 - 100 %       MRI BRAIN WO CONTRAST   Final Result   No acute intracranial abnormality.      Electronically signed by Saeid Riggs      CT BRAIN PERFUSION   Final Result   CTA Head:   1. No evidence of significant stenosis or aneurysm.      CTA Neck:   1. No evidence of significant stenosis.      CT Brain Perfusion:   1. No acute abnormality. Hypoperfusion corresponding to chronic encephalomalacia   in the left frontal lobe and basal ganglia.         Electronically signed by Juan Elmore      CTA HEAD NECK W CONTRAST   Final Result   CTA Head:   1. No evidence of significant stenosis or aneurysm.      CTA Neck:   1. No evidence of significant stenosis.      CT Brain Perfusion:   1. No acute abnormality. Hypoperfusion corresponding to chronic encephalomalacia   in the left

## 2024-10-01 NOTE — H&P
History and Physical    NAME:   Baldomero Reece   :  1955   MRN:  [id number]     Date/Time: 2024 9:13 PM    Patient PCP: None, None  ______________________________________________________________________       Subjective:     CHIEF COMPLAINT:   Possible stroke      HISTORY OF PRESENT ILLNESS:     General Daily Progress Note  Patient is a 69 y.o. year old male with past medical history obesity COPD hyperlipidemia pelvic vascular disease with right below-knee amputation left tarsometatarsal amputation came to the hospital with slurred speech patient was recently discharged from the hospital with the same symptoms CT scan of the head and CT of the head was done negative patient also history of alcohol dependency  By the ER physician recommended patient to be admitted for MRI    When I saw the patient patient was resting the bed restless agitated in the ER    Denies any chest pain shortness of breath nausea vomiting diarrhea constipation no fever no chills blood work was done shows alcohol level is 272 otherwise unremarkable    Past Medical History:   Diagnosis Date    Alcohol abuse     Below knee amputation (HCC)     Rt leg    Cerebral artery occlusion with cerebral infarction (HCC)     Diabetes mellitus (HCC)     Hypertension         Past Surgical History:   Procedure Laterality Date    FOOT DEBRIDEMENT Left 2024    LEFT FOOT INCISION AND DRAINAGE with misonix , First metatarsal head amputation and left fifth toe amputation, skin graft application left foot performed by Percy Salazar DPM at [facility] MAIN OR    LEG AMPUTATION BELOW KNEE Right     LOWER EXTREMITY AMPUTATION Left     transmetatarsal       Social History     Tobacco Use    Smoking status: Every Day     Current packs/day: 2.00     Average packs/day: 2.0 packs/day for 52.7 years (105.5 ttl pk-yrs)     Types: Cigarettes     Start date:     Smokeless tobacco: Never   Substance Use Topics    Alcohol use: Yes        No family history on  or sensory deficit. Displays a negative Romberg sign.        ASSESSMENT & PLAN:    Slurred speech rule out CVA  History of COPD  Alcohol dependency  Peripheral vascular disease  Right below-knee amputation  Left metatarsal amputation  Alcohol withdrawal  Chronic thrombocytopenia      Patient admitted to medical telemetry for teleneurology consult MRI of the brain  PT OT speech therapy consult  Start on [facility] protocol  DVT prophylax with heparin monitor platelet count if drops will discontinue heparin/Plavix    Current Facility-Administered Medications:     clopidogrel (PLAVIX) tablet 75 mg, 75 mg, Oral, Daily, Elizabeth Riddle MD    sodium chloride flush 0.9 % injection 5-40 mL, 5-40 mL, IntraVENous, 2 times per day, Evonne Wu MD    sodium chloride flush 0.9 % injection 5-40 mL, 5-40 mL, IntraVENous, PRN, Evonne Wu MD    0.9 % sodium chloride infusion, , IntraVENous, PRN, Evonne Wu MD    ondansetron (ZOFRAN-ODT) disintegrating tablet 4 mg, 4 mg, Oral, Q8H PRN **OR** ondansetron (ZOFRAN) injection 4 mg, 4 mg, IntraVENous, Q6H PRN, Evonne Wu MD    polyethylene glycol (GLYCOLAX) packet 17 g, 17 g, Oral, Daily PRN, Evonne Wu MD    atorvastatin (LIPITOR) tablet 80 mg, 80 mg, Oral, Nightly, Evonne Wu MD    [START ON 10/1/2024] aspirin chewable tablet 81 mg, 81 mg, Oral, Daily **OR** [DISCONTINUED] aspirin suppository 300 mg, 300 mg, Rectal, Daily, Evonne Wu MD    sodium chloride flush 0.9 % injection 5-40 mL, 5-40 mL, IntraVENous, 2 times per day, Evonne Wu MD    sodium chloride flush 0.9 % injection 5-40 mL, 5-40 mL, IntraVENous, PRN, Evonne Wu MD    0.9 % sodium chloride infusion, , IntraVENous, PRN, Evonne Wu MD    [START ON 10/1/2024] thiamine mononitrate tablet 100 mg, 100 mg, Oral, Daily, Evonne Wu MD    LORazepam (ATIVAN) tablet 1 mg, 1 mg, Oral, Q1H PRN **OR** LORazepam (ATIVAN) injection 1 mg, 1 mg, IntraVENous, Q1H PRN

## 2024-10-01 NOTE — PROGRESS NOTES
Consult received, chart reviewed and evaluation attempted. Pt remains off the floor for testing at this time. Pt passed nsg dysphagia screen and has EC/thin consistency diet ordered. Previous SLP notes appreciated. Will cont to follow and complete eval as pt able to participate.

## 2024-10-01 NOTE — PROGRESS NOTES
OT eval order received and acknowledged. OT eval attempted at 100pm however pt not agreeable to OOB assessment at this time. Despite max education and encouragement, pt continued to decline. Will continue to follow patient and attempt OT eval at a later time. Thank you.

## 2024-10-01 NOTE — PROGRESS NOTES
Patient still without orders. Called Dr. Wu again. Informed MD that patient is agitated. MD to place orders.

## 2024-10-01 NOTE — PROGRESS NOTES
Completed MRI screening with patient. Left in progress as patient remains confused. Only oriented to self. No other historian available. Will endorse to day RN for follow up.

## 2024-10-01 NOTE — PROGRESS NOTES
4 Eyes Skin Assessment     NAME:  Baldomero Reece  YOB: 1955  MEDICAL RECORD NUMBER:  [id number]    The patient is being assessed for  Admission    I agree that at least one RN has performed a thorough Head to Toe Skin Assessment on the patient. ALL assessment sites listed below have been assessed.      Areas assessed by both nurses:    Head, Face, Ears, Shoulders, Back, Chest, Arms, Elbows, Hands, Sacrum. Buttock, Coccyx, Ischium, Legs. Feet and Heels, and Under Medical Devices         Does the Patient have a Wound? No noted wound(s)       Jack Prevention initiated by RN: No  Wound Care Orders initiated by RN: No    Pressure Injury (Stage 3,4, Unstageable, DTI, NWPT, and Complex wounds) if present, place Wound referral order by RN under : No    New Ostomies, if present place, Ostomy referral order under : No     Nurse 1 eSignature: Electronically signed by Patricia Cantu RN on 9/30/24 at 11:20 PM EDT    **SHARE this note so that the co-signing nurse can place an eSignature**    Nurse 2 eSignature: Electronically signed by Raquel Colorado RN on 10/1/24 at 3:21 AM EDT

## 2024-10-01 NOTE — PROGRESS NOTES
Orders received from MD. At 2150 CIWA assessed. Score 30. Administered Ativan as ordered at 2204, see MAR. Patient reassessed at 2304. CIWA now 5. Patient is very drowsy at this time. Unable to complete admission assessments. Unable to complete full neuro assessment d/t drowsiness. Completed what could be done. Will reassess once patient more alert. Patient with persistent productive cough. Mild insp and exp wheezing. Swallowing mucous, unable to assess color or character. Respirations 22. SpO2 90% on RA. Placed on 2L O2 NC, orders already in place. After O2 SpO2 96%. Desats to low 90s at times, mucous clears with cough, and sats return above 95%. Will cont to monitor patient. Placed on telemetry monitor. Patient with Sinus Rhythm to Sinus Tach. Rate 98 bpm at rest-125 bpm when coughing. Resting in bed, call bell in reach. Bed alarm on.

## 2024-10-01 NOTE — ED PROVIDER NOTES
[facility] EMERGENCY DEPT  EMERGENCY DEPARTMENT HISTORY AND PHYSICAL EXAM      Date: 9/30/2024  Patient Name: Baldomero Reece  MRN: [id number]  Birthdate 1955  Date of evaluation: 9/30/2024  Provider: Elizabeth Riddle MD   Note Started: 10:16 PM EDT 9/30/24    HISTORY OF PRESENT ILLNESS     Chief Complaint   Patient presents with    Cerebrovascular Accident       History Provided By: Patient    HPI: Baldomero Reece is a 69 y.o. male who presents with aphasia. Pt has a history of alcohol use, however the EMS providers who picked him up have seen many times and report that his speech does not normally sound like this. Pt is able to follow commands, intermittently can speak a few phrases, but speech is otherwise incomprehensible. History limited due to pt condition.     PAST MEDICAL HISTORY   Past Medical History:  Past Medical History:   Diagnosis Date    Alcohol abuse     Below knee amputation (HCC)     Rt leg    Cerebral artery occlusion with cerebral infarction (HCC)     Diabetes mellitus (HCC)     Hypertension        Past Surgical History:  Past Surgical History:   Procedure Laterality Date    FOOT DEBRIDEMENT Left 1/2/2024    LEFT FOOT INCISION AND DRAINAGE with misonix , First metatarsal head amputation and left fifth toe amputation, skin graft application left foot performed by Percy Salazar DPM at \[facility]"" MAIN OR    LEG AMPUTATION BELOW KNEE Right     LOWER EXTREMITY AMPUTATION Left     transmetatarsal       Family History:  No family history on file.    Social History:  Social History     Tobacco Use    Smoking status: Every Day     Current packs/day: 2.00     Average packs/day: 2.0 packs/day for 52.7 years (105.5 ttl pk-yrs)     Types: Cigarettes     Start date: 1972    Smokeless tobacco: Never   Substance Use Topics    Alcohol use: Yes    Drug use: Yes     Types: Marijuana (Weed), Methamphetamines (Crystal Meth)       Allergies:  Allergies   Allergen Reactions    Penicillins Swelling     Swelling of  Monocytes % 9 5 - 13 %    Eosinophils % 5 0 - 7 %    Basophils % 2 (H) 0 - 1 %    Immature Granulocytes % 0 0 - 0.5 %    Neutrophils Absolute 2.1 1.8 - 8.0 K/UL    Lymphocytes Absolute 1.5 0.8 - 3.5 K/UL    Monocytes Absolute 0.4 0.0 - 1.0 K/UL    Eosinophils Absolute 0.2 0.0 - 0.4 K/UL    Basophils Absolute 0.1 0.0 - 0.1 K/UL    Immature Granulocytes Absolute 0.0 0.00 - 0.04 K/UL    Differential Type AUTOMATED     Comprehensive Metabolic Panel    Collection Time: 09/30/24  1:29 PM   Result Value Ref Range    Sodium 138 136 - 145 mmol/L    Potassium Hemolyzed, Recollection Recommended 3.5 - 5.1 mmol/L    Chloride 107 97 - 108 mmol/L    CO2 23 21 - 32 mmol/L    Anion Gap 8 2 - 12 mmol/L    Glucose 74 65 - 100 mg/dL    BUN 8 6 - 20 mg/dL    Creatinine 0.62 (L) 0.70 - 1.30 mg/dL    BUN/Creatinine Ratio 13 12 - 20      Est, Glom Filt Rate >90 >60 ml/min/1.73m2    Calcium 8.5 8.5 - 10.1 mg/dL    Total Bilirubin 0.6 0.2 - 1.0 mg/dL    AST Hemolyzed, Recollection Recommended 15 - 37 U/L    ALT 36 12 - 78 U/L    Alk Phosphatase 116 45 - 117 U/L    Total Protein 7.2 6.4 - 8.2 g/dL    Albumin 3.4 (L) 3.5 - 5.0 g/dL    Globulin 3.8 2.0 - 4.0 g/dL    Albumin/Globulin Ratio 0.9 (L) 1.1 - 2.2     Troponin    Collection Time: 09/30/24  1:29 PM   Result Value Ref Range    Troponin, High Sensitivity 7 0 - 76 ng/L   Ethanol    Collection Time: 09/30/24  1:29 PM   Result Value Ref Range    Ethanol Lvl 272 (H) <10 mg/dL   Urine Drug Screen    Collection Time: 09/30/24  1:29 PM   Result Value Ref Range    Amphetamine, Urine Negative Negative      Barbiturates, Urine Negative Negative      Benzodiazepines, Urine Negative Negative      Cocaine, Urine Negative Negative      Methadone, Urine Negative Negative      Opiates, Urine Negative Negative      Phencyclidine, Urine Negative Negative      THC, TH-Cannabinol, Urine Negative Negative      Comments:        This test is a screen for drugs of abuse in a medical setting only (i.e., they are

## 2024-10-01 NOTE — PROGRESS NOTES
Called tele neuro general rounding consult to [facility]. Spoke with Daya. Patient placed on morning schedule.

## 2024-10-01 NOTE — CONSULTS
PULMONARY CONSULT  [facility]    Name: Baldomero Reece MRN: [id number]   : 1955 Hospital: [facility]   Date: 10/1/2024  Admission date: 2024 Hospital Day: 2       HPI:     Patient Active Problem List   Diagnosis    Intraparenchymal hemorrhage of brain (HCC)    Brain bleed (HCC)    Alcoholic cirrhosis of liver without ascites (HCC)    Alcohol abuse    Osteomyelitis of ankle and foot    COPD (chronic obstructive pulmonary disease) (HCC)    Hx of right BKA (HCC)    History of multiple sclerosis (HCC)    Hyperammonemia (HCC)    Impaired ambulation    Hypertensive heart disease without heart failure    Polysubstance abuse (HCC)    Sepsis without acute organ dysfunction (HCC)    Tachycardia    Bacteremia    Ischemia of foot    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    Osteomyelitis of foot    Cerebrovascular accident (CVA), unspecified mechanism ([facility])    Aphasia    Right sided weakness    Alcohol abuse    Confusion    Altered mental status    Suspected cerebrovascular accident    Acute CVA (cerebrovascular accident) ([facility])    Stroke determined by clinical assessment ([facility])    CVA, old, ataxia             [x] High complexity decision making was performed  [x] See my orders for details      Subjective/Initial History:     I was asked by Evonne Wu MD to see Baldomero Reece  a 69 y.o.   male in consultation     Excerpts from admission 2024 or consult notes as follows:   69-year-old male came in for possible stroke significant past medical COPD peripheral vascular disease right below the knee amputation left tarsometatarsal amputation he came in because of slurring of speech he was recently discharged from the hospital history of alcohol dependency unable to wake him up he was put on oxygen by nasal cannula he was restless agitated now he is calm down alcohol level was 272.      Allergies   Allergen Reactions    Penicillins Swelling     Swelling of  ganglia. Electronically signed by Juan Elmore    CT HEAD WO CONTRAST    Result Date: 9/30/2024  EXAM:  CT CODE NEURO HEAD WO CONTRAST INDICATION: Stroke. No other history available. COMPARISON: CT 9/7/2024. TECHNIQUE: Noncontrast head CT. Coronal and sagittal reformats. CT dose reduction was achieved through use of a standardized protocol tailored for this examination and automatic exposure control for dose modulation. FINDINGS: The ventricles and sulci are age-appropriate without hydrocephalus. There is no mass effect or midline shift. There is no intracranial hemorrhage or extra-axial fluid collection. There is no abnormal area of decreased density to suggest infarct. There is stable chronic microvascular ischemic change in the periventricular white matter and stable chronic encephalomalacia in the left temporal and frontal lobes. The basal cisterns are patent. The osseous structures are intact. There is moderate mucosal thickening in the right maxillary sinus and mild mucosal thickening in the bilateral ethmoid sinuses. The remaining paranasal sinuses and mastoid air cells are clear.     No acute intracranial abnormality. Electronically signed by Yuan French       ARTERIAL BLOOD GAS  No results for input(s): \"PHART\", \"OCN4QVZ\", \"PO2ART\", \"JHE8XHK\", \"BEART\", \"UPM5JBMS\", \"HGBART\", \"SV0FYVKZW\", \"FIO2A\", \"B4KRURWF\", \"OXYHEM\", \"CARBOXHGBART\", \"METHGBART\", \"M8ZEJBELY\", \"PHCORART\", \"TEMP\" in the last 72 hours.    Invalid input(s): \"ATI9ZDQXK\"  No results found for this or any previous visit.    IMPRESSION:   Acute hypoxic respiratory failure  Impending Dts  COPD without exacerbation  Peripheral vascular disease right below the knee amputation and left metatarsal amputation  EtOH abuse with withdrawal  Chronic thrombocytopenia  History of hepatitis C cirrhosis of liver  History of left extremity DVT  Pt is requiring Drug therapy requiring intensive monitoring for toxicity  Pt is unstable, unpredictable needing

## 2024-10-01 NOTE — CARE COORDINATION
Chart reviewed. Patient is R BKA  w/c bound. Patient is homeless.    DCP: TBD    CM will continue to follow patient and recs of medical team

## 2024-10-01 NOTE — PROGRESS NOTES
RRT Clinical Rounding Nurse Progress Report      SUBJECTIVE: Patient assessed secondary to elevated Deterioration Index Score.      Vitals:    09/30/24 2245 09/30/24 2350 10/01/24 0055 10/01/24 0151   BP: 131/79 129/79 131/72 (!) 144/77   Pulse: (!) 108 93 100 (!) 104   Resp: 22      Temp: 98.2 °F (36.8 °C)      TempSrc: Axillary      SpO2: 93%      Weight:       Height:            DETERIORATION INDEX SCORE: 53    PLAN:  Spoke with LEANDRO Khoury Charge regarding elevated DI score. States she will notify primary RN and consult physician as necessary.    ROSSY BRAVO RN

## 2024-10-02 ENCOUNTER — HOSPITAL ENCOUNTER (INPATIENT)
Facility: HOSPITAL | Age: 69
Discharge: HOME | DRG: 058 | End: 2024-10-05
Payer: MEDICAID

## 2024-10-02 ENCOUNTER — APPOINTMENT (OUTPATIENT)
Facility: HOSPITAL | Age: 69
DRG: 058 | End: 2024-10-02
Payer: MEDICAID

## 2024-10-02 LAB
ANION GAP SERPL CALC-SCNC: 4 MMOL/L (ref 2–12)
BACTERIA SPEC CULT: NORMAL
BASOPHILS # BLD: 0 K/UL (ref 0–0.1)
BASOPHILS NFR BLD: 1 % (ref 0–1)
BUN SERPL-MCNC: 18 MG/DL (ref 6–20)
BUN/CREAT SERPL: 26 (ref 12–20)
CA-I BLD-MCNC: 8.8 MG/DL (ref 8.5–10.1)
CHLORIDE SERPL-SCNC: 106 MMOL/L (ref 97–108)
CO2 SERPL-SCNC: 27 MMOL/L (ref 21–32)
CREAT SERPL-MCNC: 0.7 MG/DL (ref 0.7–1.3)
DIFFERENTIAL METHOD BLD: ABNORMAL
EOSINOPHIL # BLD: 0 K/UL (ref 0–0.4)
EOSINOPHIL NFR BLD: 1 % (ref 0–7)
ERYTHROCYTE [DISTWIDTH] IN BLOOD BY AUTOMATED COUNT: 16.3 % (ref 11.5–14.5)
FERRITIN SERPL-MCNC: 32 NG/ML (ref 26–388)
GLUCOSE BLD STRIP.AUTO-MCNC: 103 MG/DL (ref 65–100)
GLUCOSE SERPL-MCNC: 84 MG/DL (ref 65–100)
HBV SURFACE AB SER QL: REACTIVE
HBV SURFACE AB SER-ACNC: 30.55 MIU/ML
HBV SURFACE AG SER QL: <0.1 INDEX
HBV SURFACE AG SER QL: NEGATIVE
HCT VFR BLD AUTO: 41.3 % (ref 36.6–50.3)
HCV AB SER IA-ACNC: >11 INDEX
HCV AB SERPL QL IA: REACTIVE
HGB BLD-MCNC: 12.5 G/DL (ref 12.1–17)
IMM GRANULOCYTES # BLD AUTO: 0 K/UL (ref 0–0.04)
IMM GRANULOCYTES NFR BLD AUTO: 0 % (ref 0–0.5)
INR PPP: 1 (ref 0.9–1.1)
IRON SATN MFR SERPL: 37 % (ref 20–50)
IRON SERPL-MCNC: 143 UG/DL (ref 35–150)
LYMPHOCYTES # BLD: 0.6 K/UL (ref 0.8–3.5)
LYMPHOCYTES NFR BLD: 28 % (ref 12–49)
Lab: NORMAL
MCH RBC QN AUTO: 27.5 PG (ref 26–34)
MCHC RBC AUTO-ENTMCNC: 30.3 G/DL (ref 30–36.5)
MCV RBC AUTO: 90.8 FL (ref 80–99)
MONOCYTES # BLD: 0.2 K/UL (ref 0–1)
MONOCYTES NFR BLD: 11 % (ref 5–13)
NEUTS SEG # BLD: 1.3 K/UL (ref 1.8–8)
NEUTS SEG NFR BLD: 59 % (ref 32–75)
NRBC # BLD: 0 K/UL (ref 0–0.01)
NRBC BLD-RTO: 0 PER 100 WBC
PERFORMED BY:: ABNORMAL
PHOSPHATE SERPL-MCNC: 2.7 MG/DL (ref 2.6–4.7)
PLATELET # BLD AUTO: 86 K/UL (ref 150–400)
PMV BLD AUTO: 10.2 FL (ref 8.9–12.9)
POTASSIUM SERPL-SCNC: 4.1 MMOL/L (ref 3.5–5.1)
PROTHROMBIN TIME: 13.9 SEC (ref 11.9–14.6)
RBC # BLD AUTO: 4.55 M/UL (ref 4.1–5.7)
SODIUM SERPL-SCNC: 137 MMOL/L (ref 136–145)
TIBC SERPL-MCNC: 384 UG/DL (ref 250–450)
WBC # BLD AUTO: 2.1 K/UL (ref 4.1–11.1)

## 2024-10-02 PROCEDURE — 86706 HEP B SURFACE ANTIBODY: CPT

## 2024-10-02 PROCEDURE — 94761 N-INVAS EAR/PLS OXIMETRY MLT: CPT

## 2024-10-02 PROCEDURE — 94640 AIRWAY INHALATION TREATMENT: CPT

## 2024-10-02 PROCEDURE — 84100 ASSAY OF PHOSPHORUS: CPT

## 2024-10-02 PROCEDURE — 6360000002 HC RX W HCPCS: Performed by: INTERNAL MEDICINE

## 2024-10-02 PROCEDURE — 86803 HEPATITIS C AB TEST: CPT

## 2024-10-02 PROCEDURE — 6370000000 HC RX 637 (ALT 250 FOR IP)

## 2024-10-02 PROCEDURE — 1100000000 HC RM PRIVATE

## 2024-10-02 PROCEDURE — 6360000002 HC RX W HCPCS: Performed by: FAMILY MEDICINE

## 2024-10-02 PROCEDURE — 85025 COMPLETE CBC W/AUTO DIFF WBC: CPT

## 2024-10-02 PROCEDURE — 82962 GLUCOSE BLOOD TEST: CPT

## 2024-10-02 PROCEDURE — 83540 ASSAY OF IRON: CPT

## 2024-10-02 PROCEDURE — 6370000000 HC RX 637 (ALT 250 FOR IP): Performed by: INTERNAL MEDICINE

## 2024-10-02 PROCEDURE — 71045 X-RAY EXAM CHEST 1 VIEW: CPT

## 2024-10-02 PROCEDURE — 36415 COLL VENOUS BLD VENIPUNCTURE: CPT

## 2024-10-02 PROCEDURE — 80048 BASIC METABOLIC PNL TOTAL CA: CPT

## 2024-10-02 PROCEDURE — 6370000000 HC RX 637 (ALT 250 FOR IP): Performed by: FAMILY MEDICINE

## 2024-10-02 PROCEDURE — 85610 PROTHROMBIN TIME: CPT

## 2024-10-02 PROCEDURE — 87040 BLOOD CULTURE FOR BACTERIA: CPT

## 2024-10-02 PROCEDURE — 97530 THERAPEUTIC ACTIVITIES: CPT

## 2024-10-02 PROCEDURE — 97165 OT EVAL LOW COMPLEX 30 MIN: CPT

## 2024-10-02 PROCEDURE — 76705 ECHO EXAM OF ABDOMEN: CPT

## 2024-10-02 PROCEDURE — 86704 HEP B CORE ANTIBODY TOTAL: CPT

## 2024-10-02 PROCEDURE — 82728 ASSAY OF FERRITIN: CPT

## 2024-10-02 PROCEDURE — 87340 HEPATITIS B SURFACE AG IA: CPT

## 2024-10-02 RX ORDER — IPRATROPIUM BROMIDE AND ALBUTEROL SULFATE 2.5; .5 MG/3ML; MG/3ML
1 SOLUTION RESPIRATORY (INHALATION) EVERY 12 HOURS PRN
Status: DISCONTINUED | OUTPATIENT
Start: 2024-10-02 | End: 2024-10-04 | Stop reason: HOSPADM

## 2024-10-02 RX ADMIN — IPRATROPIUM BROMIDE AND ALBUTEROL SULFATE 1 DOSE: 2.5; .5 SOLUTION RESPIRATORY (INHALATION) at 07:17

## 2024-10-02 RX ADMIN — FOLIC ACID 1 MG: 1 TABLET ORAL at 08:42

## 2024-10-02 RX ADMIN — PHENOBARBITAL 64.8 MG: 32.4 TABLET ORAL at 17:31

## 2024-10-02 RX ADMIN — LORAZEPAM 4 MG: 1 TABLET ORAL at 15:29

## 2024-10-02 RX ADMIN — ATORVASTATIN CALCIUM 80 MG: 40 TABLET, FILM COATED ORAL at 21:34

## 2024-10-02 RX ADMIN — HEPARIN SODIUM 5000 UNITS: 5000 INJECTION INTRAVENOUS; SUBCUTANEOUS at 21:35

## 2024-10-02 RX ADMIN — LORAZEPAM 3 MG: 1 TABLET ORAL at 08:53

## 2024-10-02 RX ADMIN — LORAZEPAM 3 MG: 1 TABLET ORAL at 13:54

## 2024-10-02 RX ADMIN — ONDANSETRON 4 MG: 4 TABLET, ORALLY DISINTEGRATING ORAL at 03:29

## 2024-10-02 RX ADMIN — THIAMINE HCL TAB 100 MG 100 MG: 100 TAB at 08:42

## 2024-10-02 RX ADMIN — PHENOBARBITAL 64.8 MG: 32.4 TABLET ORAL at 13:55

## 2024-10-02 RX ADMIN — BUDESONIDE 500 MCG: 0.5 SUSPENSION RESPIRATORY (INHALATION) at 21:00

## 2024-10-02 RX ADMIN — LORAZEPAM 2 MG: 1 TABLET ORAL at 03:28

## 2024-10-02 RX ADMIN — CLOPIDOGREL BISULFATE 75 MG: 75 TABLET ORAL at 08:42

## 2024-10-02 RX ADMIN — PHENOBARBITAL 64.8 MG: 32.4 TABLET ORAL at 08:42

## 2024-10-02 RX ADMIN — PREDNISONE 40 MG: 20 TABLET ORAL at 08:53

## 2024-10-02 RX ADMIN — ASPIRIN 81 MG 81 MG: 81 TABLET ORAL at 08:42

## 2024-10-02 RX ADMIN — BUDESONIDE 500 MCG: 0.5 SUSPENSION RESPIRATORY (INHALATION) at 07:17

## 2024-10-02 RX ADMIN — PHENOBARBITAL 32.4 MG: 32.4 TABLET ORAL at 21:34

## 2024-10-02 RX ADMIN — HEPARIN SODIUM 5000 UNITS: 5000 INJECTION INTRAVENOUS; SUBCUTANEOUS at 13:56

## 2024-10-02 ASSESSMENT — PAIN SCALES - GENERAL
PAINLEVEL_OUTOF10: 0
PAINLEVEL_OUTOF10: 0

## 2024-10-02 NOTE — PLAN OF CARE
Problem: Chronic Conditions and Co-morbidities  Goal: Patient's chronic conditions and co-morbidity symptoms are monitored and maintained or improved  Outcome: Progressing  Flowsheets (Taken 10/1/2024 2300)  Care Plan - Patient's Chronic Conditions and Co-Morbidity Symptoms are Monitored and Maintained or Improved: Monitor and assess patient's chronic conditions and comorbid symptoms for stability, deterioration, or improvement     Problem: Discharge Planning  Goal: Discharge to home or other facility with appropriate resources  Outcome: Progressing     Problem: Skin/Tissue Integrity  Goal: Absence of new skin breakdown  Description: 1.  Monitor for areas of redness and/or skin breakdown  2.  Assess vascular access sites hourly  3.  Every 4-6 hours minimum:  Change oxygen saturation probe site  4.  Every 4-6 hours:  If on nasal continuous positive airway pressure, respiratory therapy assess nares and determine need for appliance change or resting period.  10/2/2024 0121 by Fabiana Lynn, RN  Outcome: Progressing  10/1/2024 1631 by Stephen Escalera, RN  Outcome: Progressing     Problem: Safety - Adult  Goal: Free from fall injury  Outcome: Progressing     Problem: ABCDS Injury Assessment  Goal: Absence of physical injury  Outcome: Progressing

## 2024-10-02 NOTE — PROGRESS NOTES
PULMONARY NOTE  [facility]    Name: Baldomero Reece MRN: [id number]   : 1955 Hospital: [facility]   Date: 10/2/2024  Admission date: 2024 Hospital Day: 3       HPI:     Patient Active Problem List   Diagnosis    Intraparenchymal hemorrhage of brain (HCC)    Brain bleed (HCC)    Alcoholic cirrhosis of liver without ascites (HCC)    Alcohol abuse    Osteomyelitis of ankle and foot    COPD (chronic obstructive pulmonary disease) (HCC)    Hx of right BKA (HCC)    History of multiple sclerosis (HCC)    Hyperammonemia (HCC)    Impaired ambulation    Hypertensive heart disease without heart failure    Polysubstance abuse (HCC)    Sepsis without acute organ dysfunction (HCC)    Tachycardia    Bacteremia    Ischemia of foot    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    Osteomyelitis of foot    Cerebrovascular accident (CVA), unspecified mechanism ([facility])    Expressive aphasia    Right sided weakness    Alcohol abuse    Confusion    Altered mental status    Suspected cerebrovascular accident    Acute CVA (cerebrovascular accident) (HCC)    Stroke determined by clinical assessment ([facility])    CVA, old, ataxia    TIA (transient ischemic attack)    Hx of arterial ischemic stroke    Dysarthria as late effect of cerebellar cerebrovascular accident (CVA)    Hemiparesis affecting right side as late effect of cerebrovascular accident (CVA) ([facility])             [x] High complexity decision making was performed  [x] See my orders for details      Subjective/Initial History:     I was asked by Evonne Wu MD to see Baldomero Reece  a 69 y.o.   male in consultation     Excerpts from admission 2024 or consult notes as follows:   69-year-old male came in for possible stroke significant past medical COPD peripheral vascular disease right below the knee amputation left tarsometatarsal amputation he came in because of slurring of speech he was recently discharged from the  identified risk factors.  discussed my assessment/management with : Nursing, Hospitalist and Family for coordination of care  Will get chest x-ray today          Francisco Paige MD

## 2024-10-02 NOTE — PROGRESS NOTES
PT eval order received and acknowledged. Pt screened and is currently presenting with baseline modified independence for all functional mobility/transfers. PT evaluation order will be discontinued at this time as pt has no acute PT needs. Please reorder PT if pt functional status changes. Thank you.    [facility] AM-PAC™ “6 Clicks”         Basic Mobility Inpatient Short Form  How much difficulty does the patient currently have... Unable A Lot A Little None   1.  Turning over in bed (including adjusting bedclothes, sheets and blankets)?   [] 1   [] 2   [] 3   [x] 4   2.  Sitting down on and standing up from a chair with arms ( e.g., wheelchair, bedside commode, etc.)   [] 1   [] 2   [] 3   [x] 4   3.  Moving from lying on back to sitting on the side of the bed?   [] 1   [] 2   [] 3   [x] 4          How much help from another person does the patient currently need... Total A Lot A Little None   4.  Moving to and from a bed to a chair (including a wheelchair)?   [] 1   [] 2   [] 3   [x] 4   5.  Need to walk in hospital room?   [x] 1   [] 2   [] 3   [] 4   6.  Climbing 3-5 steps with a railing?   [x] 1   [] 2   [] 3   [] 4   © 2007, Trustees of [facility], under license to [employer]. All rights reserved     Score:  Initial: 18/24 Most Recent: X (Date: 10/2/2024 )   Interpretation of Tool:  Represents activities that are increasingly more difficult (i.e. Bed mobility, Transfers, Gait).  Score 24 23 22-20 19-15 14-10 9-7 6   Modifier CH CI CJ CK CL CM CN

## 2024-10-02 NOTE — CARE COORDINATION
CM reviewed chart. DCP home self care when medicallly ready. Patient is homeless. CM will continue to follow.

## 2024-10-02 NOTE — PROGRESS NOTES
Hospitalist Progress Note               Daily Progress Note: 10/2/2024      Hospital Day: 3     Chief complaint:   Chief Complaint   Patient presents with    Cerebrovascular Accident        Subjective:   Patient is seen and examined this morning.  He does not have any active complaints, feels that he is getting a little better.      Assessment and plan  Patient is a 69 y.o. year old male with past medical history obesity COPD hyperlipidemia pelvic vascular disease with right below-knee amputation left tarsometatarsal amputation came to the hospital with slurred speech patient was recently discharged from the hospital with the same symptoms CT scan of the head and CT of the head was done negative patient also history of alcohol dependency  By the ER physician recommended patient to be admitted for MRI    Slurred speech-resolved  TIA, negative MRI  CT head negative on admission  CTA hypoperfusion corresponding to chronic encephalomalacia in the left frontal lobe and basal ganglia.  No evidence of significant stenosis or aneurysm  Teleneurology contacted  Echo EF 51%, mild global hypokinesis.  MRI with no acute intracranial abnormality  Patient declined PT and OT evaluation yesterday  [] Continue aspirin and statin  [] Disposition to be determined    Leukopenia-persistent  White blood cells 2.1 today  Was normal on admission  Patient is afebrile, tachycardic but this is likely a sign of withdrawal.  Normotensive  Continue to monitor for signs of infection  Negative urinalysis on admission  [] Blood cultures  [] Chest x-ray  [] COVID    Alcohol dependence with current withdrawal  Mild epigastric tenderness  Cirrhosis  Alcohol level on admission 272  Patient states that he drinks as much alcohol as he can find  Monitor electrolytes  Check iron stores as his RDW is high  Continue thiamine  CIWA scores 6 this morning  Ultrasound with evidence of cirrhosis  PT/INR normal  [] Continue CIWA, phenobarbital taper added

## 2024-10-02 NOTE — PLAN OF CARE
OCCUPATIONAL THERAPY EVALUATION AND DISCHARGE  Patient: Baldomero Reece (69 y.o. male)  Date: 10/2/2024  Primary Diagnosis: Aphasia [R47.01]  Expressive aphasia [R47.01]  CVA, old, ataxia [I69.993]       Precautions: Fall Risk, Bed Alarm                Recommendations for nursing mobility: Use of bed/chair alarm for safety and Assist x1    In place during session:None  ASSESSMENT  Pt is a 69 y.o. male presenting to [facility] on 9/30 with c/o AMS and slurred speech. Pt admitted and currently being treated for CVA workup. CT negative for acute abnormalities, and showed chronic L frontal andbasl ganglia infarct. Pt received semi-supine in bed upon arrival, AXO xself, and resistive to answering other questions, and agreeable to OT evaluation.     Based on the objective data described below pt currently present at baseline MOD I for ADLs and function mobility/transfers at this time. (See below for objective details and assist levels).     Overall pt tolerated session good today with no c/o pain. Pt was able to complete all functional tasks with MOD I. Pt completed bed to WC transfer with MOD I requiring education on proper WC positioning and safety. However pt resistive to learning new techniques. Pt completed WC mobility from bed to toilet with MOD I. Pt complete toilet hygiene with MOD I. Pt has no skilled acute OT needs at this time either noted by OT or reported by pt, will DC skilled OT following evaluation; pt verbalized understanding and agreement.  Current OT DC recommendation no skilled OT services at this time.       PLAN :  Recommendations and Planned Interventions: DC from skilled OT services following evaluation.     Rationale for discharge: Patient currently at functional baseline for transfers/mobility, no further skilled therapy required at this time    Frequency/Duration: DC from OT services following evaluation due to Patient currently at functional baseline for transfers/mobility, no further skilled therapy  4   6.  Eating meals? []  1 []  2 []  3 [x]  4   © , Trustees of [facility], under license to [employer]. All rights reserved     Score: 24/24     Interpretation of Tool:  Represents clinically-significant functional categories (i.e. Activities of daily living).  Percentage of Impairment CH    0%   CI    1-19% CJ    20-39% CK    40-59% CL    60-79% CM    80-99% CN     100%   AMPA  Score 6-24 24 23 20-22 15-19 10-14 7-9 6     Occupational Therapy Evaluation Charge Determination   History Examination Decision-Making   LOW Complexity : Brief history review  LOW Complexity: 1-3 Performance deficits relating to physical, cognitive, or psychosocial skills that result in activity limitations and/or participation restrictions LOW Complexity: No comorbidities that affect functional and  no verbal  or physical assist needed to complete eval tasks      Based on the above components, the patient evaluation is determined to be of the following complexity level: Low    Pain Ratin/10   Pain Intervention(s):       Activity Tolerance:   Good    After treatment patient left in no apparent distress:    Patient left in no apparent distress in bed, Call bell within reach, and Bed/ chair alarm activated, bed locked and in lowest position.    COMMUNICATION/EDUCATION:   The patient’s plan of care was discussed with: Physical therapist and Registered nurse    Patient Education  Education Given To: Patient  Education Provided: Role of Therapy;Equipment  Education Provided Comments: WC safety  Education Method: Demonstration;Verbal  Barriers to Learning: Cognition  Education Outcome: Verbalized understanding    Thank you for this referral,  Cindy Bernal OT  Minutes: 22

## 2024-10-02 NOTE — PROGRESS NOTES
OT eval order received and acknowledged. OT eval attempted at 805 however pt off the unit at US. Will continue to follow patient and attempt OT eval at a later time. Thank you.

## 2024-10-02 NOTE — PROGRESS NOTES
4 Eyes Skin Assessment     NAME:  Baldomero Reece  YOB: 1955  MEDICAL RECORD NUMBER:  [id number]    The patient is being assessed for  Other weekly    I agree that at least one RN has performed a thorough Head to Toe Skin Assessment on the patient. ALL assessment sites listed below have been assessed.      Areas assessed by both nurses:    Head, Face, Ears, Shoulders, Back, Chest, Arms, Elbows, Hands, Sacrum. Buttock, Coccyx, Ischium, Legs. Feet and Heels, and Under Medical Devices         Does the Patient have a Wound? No noted wound(s)       Jack Prevention initiated by RN: Yes  Wound Care Orders initiated by RN: No    Pressure Injury (Stage 3,4, Unstageable, DTI, NWPT, and Complex wounds) if present, place Wound referral order by RN under : No    New Ostomies, if present place, Ostomy referral order under : No     Nurse 1 eSignature: Electronically signed by Dianna Crooks RN on 10/2/24 at 4:07 AM EDT    **SHARE this note so that the co-signing nurse can place an eSignature**    Nurse 2 eSignature: Electronically signed by Fabiana Lynn RN on 10/2/24 at 4:17 AM EDT

## 2024-10-03 LAB
ANION GAP SERPL CALC-SCNC: 6 MMOL/L (ref 2–12)
BASOPHILS # BLD: 0 K/UL (ref 0–0.1)
BASOPHILS NFR BLD: 1 % (ref 0–1)
BUN SERPL-MCNC: 16 MG/DL (ref 6–20)
BUN/CREAT SERPL: 23 (ref 12–20)
CA-I BLD-MCNC: 8.8 MG/DL (ref 8.5–10.1)
CHLORIDE SERPL-SCNC: 107 MMOL/L (ref 97–108)
CO2 SERPL-SCNC: 26 MMOL/L (ref 21–32)
CREAT SERPL-MCNC: 0.69 MG/DL (ref 0.7–1.3)
DIFFERENTIAL METHOD BLD: ABNORMAL
EOSINOPHIL # BLD: 0 K/UL (ref 0–0.4)
EOSINOPHIL NFR BLD: 2 % (ref 0–7)
ERYTHROCYTE [DISTWIDTH] IN BLOOD BY AUTOMATED COUNT: 16.1 % (ref 11.5–14.5)
FLUAV RNA SPEC QL NAA+PROBE: NOT DETECTED
FLUBV RNA SPEC QL NAA+PROBE: NOT DETECTED
GLUCOSE SERPL-MCNC: 80 MG/DL (ref 65–100)
HCT VFR BLD AUTO: 38.4 % (ref 36.6–50.3)
HGB BLD-MCNC: 12.4 G/DL (ref 12.1–17)
IMM GRANULOCYTES # BLD AUTO: 0 K/UL (ref 0–0.04)
IMM GRANULOCYTES NFR BLD AUTO: 1 % (ref 0–0.5)
LYMPHOCYTES # BLD: 0.7 K/UL (ref 0.8–3.5)
LYMPHOCYTES NFR BLD: 34 % (ref 12–49)
MCH RBC QN AUTO: 28 PG (ref 26–34)
MCHC RBC AUTO-ENTMCNC: 32.3 G/DL (ref 30–36.5)
MCV RBC AUTO: 86.7 FL (ref 80–99)
MONOCYTES # BLD: 0.3 K/UL (ref 0–1)
MONOCYTES NFR BLD: 12 % (ref 5–13)
NEUTS SEG # BLD: 1.1 K/UL (ref 1.8–8)
NEUTS SEG NFR BLD: 50 % (ref 32–75)
NRBC # BLD: 0 K/UL (ref 0–0.01)
NRBC BLD-RTO: 0 PER 100 WBC
PHOSPHATE SERPL-MCNC: 2.9 MG/DL (ref 2.6–4.7)
PLATELET # BLD AUTO: 90 K/UL (ref 150–400)
PMV BLD AUTO: 11.4 FL (ref 8.9–12.9)
POTASSIUM SERPL-SCNC: 3.7 MMOL/L (ref 3.5–5.1)
RBC # BLD AUTO: 4.43 M/UL (ref 4.1–5.7)
SARS-COV-2 RNA RESP QL NAA+PROBE: NOT DETECTED
SODIUM SERPL-SCNC: 139 MMOL/L (ref 136–145)
WBC # BLD AUTO: 2.1 K/UL (ref 4.1–11.1)

## 2024-10-03 PROCEDURE — 6370000000 HC RX 637 (ALT 250 FOR IP)

## 2024-10-03 PROCEDURE — 1100000000 HC RM PRIVATE

## 2024-10-03 PROCEDURE — 6370000000 HC RX 637 (ALT 250 FOR IP): Performed by: FAMILY MEDICINE

## 2024-10-03 PROCEDURE — 6370000000 HC RX 637 (ALT 250 FOR IP): Performed by: INTERNAL MEDICINE

## 2024-10-03 PROCEDURE — 85025 COMPLETE CBC W/AUTO DIFF WBC: CPT

## 2024-10-03 PROCEDURE — 36415 COLL VENOUS BLD VENIPUNCTURE: CPT

## 2024-10-03 PROCEDURE — 80048 BASIC METABOLIC PNL TOTAL CA: CPT

## 2024-10-03 PROCEDURE — 84100 ASSAY OF PHOSPHORUS: CPT

## 2024-10-03 PROCEDURE — 87636 SARSCOV2 & INF A&B AMP PRB: CPT

## 2024-10-03 RX ORDER — BUDESONIDE AND FORMOTEROL FUMARATE DIHYDRATE 160; 4.5 UG/1; UG/1
2 AEROSOL RESPIRATORY (INHALATION)
Status: DISCONTINUED | OUTPATIENT
Start: 2024-10-03 | End: 2024-10-04 | Stop reason: HOSPADM

## 2024-10-03 RX ADMIN — THIAMINE HCL TAB 100 MG 100 MG: 100 TAB at 08:58

## 2024-10-03 RX ADMIN — PREDNISONE 40 MG: 20 TABLET ORAL at 08:58

## 2024-10-03 RX ADMIN — ATORVASTATIN CALCIUM 80 MG: 40 TABLET, FILM COATED ORAL at 20:12

## 2024-10-03 RX ADMIN — CLOPIDOGREL BISULFATE 75 MG: 75 TABLET ORAL at 08:58

## 2024-10-03 RX ADMIN — PHENOBARBITAL 32.4 MG: 32.4 TABLET ORAL at 20:13

## 2024-10-03 RX ADMIN — FOLIC ACID 1 MG: 1 TABLET ORAL at 08:58

## 2024-10-03 RX ADMIN — PHENOBARBITAL 32.4 MG: 32.4 TABLET ORAL at 08:58

## 2024-10-03 RX ADMIN — ASPIRIN 81 MG 81 MG: 81 TABLET ORAL at 08:58

## 2024-10-03 NOTE — PLAN OF CARE
Problem: Chronic Conditions and Co-morbidities  Goal: Patient's chronic conditions and co-morbidity symptoms are monitored and maintained or improved  10/3/2024 1007 by Sera Doherty RN  Outcome: Progressing  Flowsheets (Taken 10/3/2024 1007)  Care Plan - Patient's Chronic Conditions and Co-Morbidity Symptoms are Monitored and Maintained or Improved:   Monitor and assess patient's chronic conditions and comorbid symptoms for stability, deterioration, or improvement   Collaborate with multidisciplinary team to address chronic and comorbid conditions and prevent exacerbation or deterioration  10/2/2024 2239 by Fabiana Lynn RN  Outcome: Progressing     Problem: Discharge Planning  Goal: Discharge to home or other facility with appropriate resources  10/3/2024 1007 by Sera Doherty RN  Outcome: Progressing  Flowsheets (Taken 10/3/2024 1007)  Discharge to home or other facility with appropriate resources:   Identify barriers to discharge with patient and caregiver   Arrange for needed discharge resources and transportation as appropriate  10/2/2024 2239 by Fabiana Lynn RN  Outcome: Progressing     Problem: Skin/Tissue Integrity  Goal: Absence of new skin breakdown  Description: 1.  Monitor for areas of redness and/or skin breakdown  2.  Assess vascular access sites hourly  3.  Every 4-6 hours minimum:  Change oxygen saturation probe site  4.  Every 4-6 hours:  If on nasal continuous positive airway pressure, respiratory therapy assess nares and determine need for appliance change or resting period.  10/3/2024 1007 by Sera Doherty RN  Outcome: Progressing  10/2/2024 2239 by Fabiana Lynn RN  Outcome: Progressing     Problem: Safety - Adult  Goal: Free from fall injury  10/3/2024 1007 by Sera Doherty RN  Outcome: Progressing  Flowsheets (Taken 10/3/2024 1007)  Free From Fall Injury:   Instruct family/caregiver on patient safety   Based on caregiver fall risk screen, instruct  family/caregiver to ask for assistance with transferring infant if caregiver noted to have fall risk factors  10/2/2024 2239 by Fabiana Lynn, RN  Outcome: Progressing     Problem: ABCDS Injury Assessment  Goal: Absence of physical injury  10/3/2024 1007 by Sera Doherty, RN  Outcome: Progressing  Flowsheets (Taken 10/3/2024 1007)  Absence of Physical Injury: Implement safety measures based on patient assessment  10/2/2024 2239 by Fabiana Lynn, RN  Outcome: Progressing

## 2024-10-03 NOTE — PROGRESS NOTES
PULMONARY NOTE  [facility]    Name: Baldomero Reece MRN: [id number]   : 1955 Hospital: [facility]   Date: 10/3/2024  Admission date: 2024 Hospital Day: 4       HPI:     Patient Active Problem List   Diagnosis    Intraparenchymal hemorrhage of brain (HCC)    Brain bleed (HCC)    Alcoholic cirrhosis of liver without ascites (HCC)    Alcohol abuse    Osteomyelitis of ankle and foot    COPD (chronic obstructive pulmonary disease) (HCC)    Hx of right BKA (HCC)    History of multiple sclerosis (HCC)    Hyperammonemia (HCC)    Impaired ambulation    Hypertensive heart disease without heart failure    Polysubstance abuse (HCC)    Sepsis without acute organ dysfunction (HCC)    Tachycardia    Bacteremia    Ischemia of foot    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    Osteomyelitis of foot    Cerebrovascular accident (CVA), unspecified mechanism ([facility])    Expressive aphasia    Right sided weakness    Alcohol abuse    Confusion    Altered mental status    Suspected cerebrovascular accident    Acute CVA (cerebrovascular accident) (HCC)    Stroke determined by clinical assessment ([facility])    CVA, old, ataxia    TIA (transient ischemic attack)    Hx of arterial ischemic stroke    Dysarthria as late effect of cerebellar cerebrovascular accident (CVA)    Hemiparesis affecting right side as late effect of cerebrovascular accident (CVA) ([facility])             [x] High complexity decision making was performed  [x] See my orders for details      Subjective/Initial History:     I was asked by Evonne Wu MD to see Baldomero Reece  a 69 y.o.   male in consultation     Excerpts from admission 2024 or consult notes as follows:   69-year-old male came in for possible stroke significant past medical COPD peripheral vascular disease right below the knee amputation left tarsometatarsal amputation he came in because of slurring of speech he was recently discharged from the

## 2024-10-03 NOTE — PROGRESS NOTES
Patient refusing all of his medications at this time. Patient educated on importance of Phenobarbital tapering and need for it.

## 2024-10-03 NOTE — PROGRESS NOTES
Hospitalist Progress Note               Daily Progress Note: 10/3/2024      Hospital Day: 4     Chief complaint:   Chief Complaint   Patient presents with    Cerebrovascular Accident        Subjective:   Patient is no acute distress today, does not have any acute complaints.  We discussed with him his cirrhosis diagnosis and hepatitis C.  He states that he does not know about those diagnoses and not sure if he received any treatment.  We discussed alcohol cessation to prevent further damage to the liver.  He understands and states that he does not need support groups or medications to help.  He wants to quit alcohol.    Assessment and plan  Patient is a 69 y.o. year old male with past medical history obesity COPD hyperlipidemia pelvic vascular disease with right below-knee amputation left tarsometatarsal amputation came to the hospital with slurred speech patient was recently discharged from the hospital with the same symptoms CT scan of the head and CT of the head was done negative patient also history of alcohol dependency  By the ER physician recommended patient to be admitted for MRI    Slurred speech-resolved  TIA, negative MRI  CT head negative on admission  CTA hypoperfusion corresponding to chronic encephalomalacia in the left frontal lobe and basal ganglia.  No evidence of significant stenosis or aneurysm  Teleneurology contacted  Echo EF 51%, mild global hypokinesis.  MRI with no acute intracranial abnormality  Patient declined PT and OT evaluation yesterday  [] Continue aspirin and statin  [] Disposition Home    Leukopenia-persistent  White blood cells 2.1 today  Was normal on admission  Patient is afebrile, tachycardia resolved, normotensive  Continue to monitor for signs of infection  Negative urinalysis on admission  Chest x-ray normal  [] Follow-up blood culture, negative to date  [] COVID and flu    Alcohol dependence with current withdrawal  Liver cirrhosis  Alcohol level on admission

## 2024-10-03 NOTE — CARE COORDINATION
CM reviewed chart. DCP home self care, patient/ homeless when medicallly ready. CM will continue to follow.

## 2024-10-03 NOTE — PLAN OF CARE
Problem: Chronic Conditions and Co-morbidities  Goal: Patient's chronic conditions and co-morbidity symptoms are monitored and maintained or improved  10/2/2024 2239 by Fabiana Lynn RN  Outcome: Progressing  10/2/2024 0923 by Margarita Trevino RN  Outcome: Progressing  Flowsheets (Taken 10/2/2024 0846)  Care Plan - Patient's Chronic Conditions and Co-Morbidity Symptoms are Monitored and Maintained or Improved: Monitor and assess patient's chronic conditions and comorbid symptoms for stability, deterioration, or improvement     Problem: Discharge Planning  Goal: Discharge to home or other facility with appropriate resources  10/2/2024 2239 by Fabiana Lynn RN  Outcome: Progressing  10/2/2024 0923 by Margarita Trevino RN  Outcome: Progressing  Flowsheets (Taken 10/2/2024 0846)  Discharge to home or other facility with appropriate resources: Identify barriers to discharge with patient and caregiver     Problem: Skin/Tissue Integrity  Goal: Absence of new skin breakdown  Description: 1.  Monitor for areas of redness and/or skin breakdown  2.  Assess vascular access sites hourly  3.  Every 4-6 hours minimum:  Change oxygen saturation probe site  4.  Every 4-6 hours:  If on nasal continuous positive airway pressure, respiratory therapy assess nares and determine need for appliance change or resting period.  10/2/2024 2239 by Fabiana Lynn RN  Outcome: Progressing  10/2/2024 0923 by Margarita Trevino RN  Outcome: Progressing     Problem: Safety - Adult  Goal: Free from fall injury  10/2/2024 2239 by Fabiana Lynn, RN  Outcome: Progressing  10/2/2024 0923 by Margarita Trevino RN  Outcome: Progressing     Problem: ABCDS Injury Assessment  Goal: Absence of physical injury  Outcome: Progressing

## 2024-10-04 VITALS
WEIGHT: 150 LBS | DIASTOLIC BLOOD PRESSURE: 75 MMHG | HEIGHT: 68 IN | HEART RATE: 61 BPM | RESPIRATION RATE: 17 BRPM | BODY MASS INDEX: 22.73 KG/M2 | TEMPERATURE: 97.8 F | SYSTOLIC BLOOD PRESSURE: 125 MMHG | OXYGEN SATURATION: 98 %

## 2024-10-04 PROCEDURE — 94761 N-INVAS EAR/PLS OXIMETRY MLT: CPT

## 2024-10-04 PROCEDURE — 6370000000 HC RX 637 (ALT 250 FOR IP): Performed by: INTERNAL MEDICINE

## 2024-10-04 PROCEDURE — 94640 AIRWAY INHALATION TREATMENT: CPT

## 2024-10-04 RX ORDER — NICOTINE 21 MG/24HR
1 PATCH, TRANSDERMAL 24 HOURS TRANSDERMAL DAILY
Qty: 30 PATCH | Refills: 3 | Status: SHIPPED | OUTPATIENT
Start: 2024-10-04

## 2024-10-04 RX ORDER — ATORVASTATIN CALCIUM 80 MG/1
80 TABLET, FILM COATED ORAL NIGHTLY
Qty: 30 TABLET | Refills: 3 | Status: SHIPPED | OUTPATIENT
Start: 2024-10-04

## 2024-10-04 RX ORDER — BUDESONIDE AND FORMOTEROL FUMARATE DIHYDRATE 160; 4.5 UG/1; UG/1
2 AEROSOL RESPIRATORY (INHALATION) 2 TIMES DAILY
Qty: 10.2 G | Refills: 0 | Status: SHIPPED | OUTPATIENT
Start: 2024-10-04

## 2024-10-04 RX ORDER — THIAMINE MONONITRATE (VIT B1) 100 MG
100 TABLET ORAL DAILY
Qty: 90 TABLET | Refills: 2 | Status: SHIPPED | OUTPATIENT
Start: 2024-10-04

## 2024-10-04 RX ORDER — ASPIRIN 81 MG/1
81 TABLET, CHEWABLE ORAL DAILY
Qty: 30 TABLET | Refills: 3 | Status: SHIPPED | OUTPATIENT
Start: 2024-10-04

## 2024-10-04 RX ADMIN — BUDESONIDE AND FORMOTEROL FUMARATE DIHYDRATE 2 PUFF: 160; 4.5 AEROSOL RESPIRATORY (INHALATION) at 08:33

## 2024-10-04 ASSESSMENT — PAIN SCALES - GENERAL: PAINLEVEL_OUTOF10: 0

## 2024-10-04 NOTE — DISCHARGE INSTR - COC
Altered mental status R41.82    Suspected cerebrovascular accident R09.89    Acute CVA (cerebrovascular accident) ([facility]) I63.9    Stroke determined by clinical assessment ([facility]) I63.9    CVA, old, ataxia I69.993    TIA (transient ischemic attack) G45.9    Hx of arterial ischemic stroke Z86.73    Dysarthria as late effect of cerebellar cerebrovascular accident (CVA) I69.322    Hemiparesis affecting right side as late effect of cerebrovascular accident (CVA) ([facility]) I69.351       Isolation/Infection:   Isolation            Contact          Patient Infection Status       Infection Onset Added Last Indicated Last Indicated By Review Planned Expiration Resolved Resolved By    Acinetobacter, MDRO  03/13/24 03/13/24 Lida Bliss RN        MRSA 03/08/24 03/10/24 03/08/24 Culture, Wound        MDRO (multi-drug resistant organism)  01/03/24 01/03/24 Archana Avery RN        12/23 Blood positive for Carapennem-resistant Acinetobacter baumannii  1/5 Staphylococcus simulans, resistant to three antibiotic classes                       Nurse Assessment:  Last Vital Signs: /75   Pulse 61   Temp 97.8 °F (36.6 °C) (Oral)   Resp 17   Ht 1.727 m (5' 8\")   Wt 68 kg (150 lb)   SpO2 98%   BMI 22.81 kg/m²     Last documented pain score (0-10 scale): Pain Level: 0  Last Weight:   Wt Readings from Last 1 Encounters:   09/30/24 68 kg (150 lb)     Mental Status:  oriented    IV Access:  - None    Nursing Mobility/ADLs:  Walking   Assisted  Transfer  Independent  Bathing  Independent  Dressing  Independent  Toileting  Independent  Feeding  Independent  Med Admin  Independent  Med Delivery   none    Wound Care Documentation and Therapy:  Wound 02/18/24 Foot Left;Anterior Amputation/ Ulcer to left foot (Active)   Wound Etiology Surgical 09/07/24 2131   Dressing/Treatment Open to air 09/07/24 2131   Drainage Amount None (dry) 09/07/24 2131   Odor None 09/07/24 2131   Number of days: 229       Wound 09/07/24 Pretibial  Proximal;Right right BKA (Active)   Wound Etiology Other 09/07/24 2035   Dressing/Treatment Open to air 09/07/24 2035   Drainage Amount None (dry) 09/07/24 2035   Odor None 09/07/24 2035   Number of days: 26        Elimination:  Continence:   Bowel: Yes  Bladder: Yes  Urinary Catheter: None   Colostomy/Ileostomy/Ileal Conduit: No       Date of Last BM: 10/4//24  No intake or output data in the 24 hours ending 10/04/24 0929  No intake/output data recorded.    Safety Concerns:     None    Impairments/Disabilities:      None    Nutrition Therapy:  Current Nutrition Therapy:   - Oral Diet:  General    Routes of Feeding: Oral  Liquids: Thin Liquids  Daily Fluid Restriction: no  Last Modified Barium Swallow with Video (Video Swallowing Test): not done    Treatments at the Time of Hospital Discharge:   Respiratory Treatments: none  Oxygen Therapy:  is not on home oxygen therapy.  Ventilator:    - No ventilator support    Rehab Therapies: none  Weight Bearing Status/Restrictions: No weight bearing restrictions  Other Medical Equipment (for information only, NOT a DME order):  wheelchair  Other Treatments: bka, wheelchair    Patient's personal belongings (please select all that are sent with patient):  wheelchair    RN SIGNATURE:  Electronically signed by Susanne Blunt RN on 10/4/24 at 9:32 AM EDT    CASE MANAGEMENT/SOCIAL WORK SECTION    Inpatient Status Date: ***    Readmission Risk Assessment Score:  Readmission Risk              Risk of Unplanned Readmission:  42           Discharging to Facility/ Agency   Name:   Address:  Phone:  Fax:    Dialysis Facility (if applicable)   Name:  Address:  Dialysis Schedule:  Phone:  Fax:    / signature: {Esignature:[id number]}    PHYSICIAN SECTION    Prognosis: {Prognosis:8799807209}    Condition at Discharge: { Patient Condition:[id number]}    Rehab Potential (if transferring to Rehab): {Prognosis:0425266536}    Recommended Labs or Other Treatments After

## 2024-10-04 NOTE — PROGRESS NOTES
Pt refusing to be physically assessed, so not able to listen with stethoscope  or look at skiin under gown.

## 2024-10-04 NOTE — PROGRESS NOTES
Pt refusing care, refusing education on care and discharge ed.   Refusing to wait for follow up appt.   No iv to removed.   Pt left in his wheelchair with papers in hand.

## 2024-10-04 NOTE — DISCHARGE INSTRUCTIONS
You need to stop drinking as you have liver cirrhosis and drinking can facilitate liver failure  You need to establish care with gastroenterology as outpatient, your PCP can make this referral.  Gastroenterology will follow liver cirrhosis and schedule liver ultrasound every 6 months for screening for cancer.  Your primary care needs to repeat lab work in 1 week and follow-up HIV and hepatitis C results that we have sent and still awaiting for.  You need to stop smoking

## 2024-10-04 NOTE — CARE COORDINATION
Transition of Care Plan:    RUR: 21%  Prior Level of Functioning: Indepenedent  Disposition: Home/ Homeless  If SNF or IPR: Date FOC offered: n/a  Date FOC received: n/a  Accepting facility: n/a  Date authorization started with reference number: n/a  Date authorization received and expires: n/a  Follow up appointments: Per MD  DME needed: n/a  Transportation at discharge:   IM/IMM Medicare/ letter given: n/a  Is patient a  and connected with [facility]? N/a   If yes, was [place] transfer form completed and [facility] notified?   Caregiver Contact: n/a  Discharge Caregiver contacted prior to discharge? N/a  Care Conference needed? no  Barriers to discharge: none

## 2024-10-04 NOTE — PROGRESS NOTES
PULMONARY NOTE  [facility]    Name: Baldomero Reece MRN: [id number]   : 1955 Hospital: [facility]   Date: 10/4/2024  Admission date: 2024 Hospital Day: 5       HPI:     Patient Active Problem List   Diagnosis    Intraparenchymal hemorrhage of brain (HCC)    Brain bleed (HCC)    Alcoholic cirrhosis of liver without ascites (HCC)    Alcohol abuse    Osteomyelitis of ankle and foot    COPD (chronic obstructive pulmonary disease) (HCC)    Hx of right BKA (HCC)    History of multiple sclerosis (HCC)    Hyperammonemia (HCC)    Impaired ambulation    Hypertensive heart disease without heart failure    Polysubstance abuse (HCC)    Sepsis without acute organ dysfunction (HCC)    Tachycardia    Bacteremia    Ischemia of foot    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    Osteomyelitis of foot    Cerebrovascular accident (CVA), unspecified mechanism ([facility])    Expressive aphasia    Right sided weakness    Alcohol abuse    Confusion    Altered mental status    Suspected cerebrovascular accident    Acute CVA (cerebrovascular accident) (HCC)    Stroke determined by clinical assessment ([facility])    CVA, old, ataxia    TIA (transient ischemic attack)    Hx of arterial ischemic stroke    Dysarthria as late effect of cerebellar cerebrovascular accident (CVA)    Hemiparesis affecting right side as late effect of cerebrovascular accident (CVA) ([facility])             [x] High complexity decision making was performed  [x] See my orders for details      Subjective/Initial History:     I was asked by Evonne Wu MD to see Baldomero Reece  a 69 y.o.   male in consultation     Excerpts from admission 2024 or consult notes as follows:   69-year-old male came in for possible stroke significant past medical COPD peripheral vascular disease right below the knee amputation left tarsometatarsal amputation he came in because of slurring of speech he was recently discharged from the      No acute intracranial abnormality. Electronically signed by Yuan French       ARTERIAL BLOOD GAS      IMPRESSION:   Acute hypoxic respiratory failure now on room air  EtOH abuse now on phenobarbital taper  Lethargy likely due to phenobarbital  COPD without exacerbation will continue Symbicort  Peripheral vascular disease right below the knee amputation and left metatarsal amputation  Chronic thrombocytopenia  History of hepatitis C cirrhosis of liver  History of left extremity DVT        RECOMMENDATIONS/PLAN:     69-year-old male came in on 2 L nasal Cannula oxygen as salvage oxygen delivery device to provide high concentration of oxygen to overcome refractory hypoxia; now on room air  Blood alcohol level elevated impending DTs patient on thiamine folic acid and phenobarbital taper  Will continue Symbicort inhaler  Follow electrolytes  He has significant history of alcohol abuse hepatitis C liver cirrhosis left lower extremity 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

## 2024-10-04 NOTE — DISCHARGE SUMMARY
Physician Discharge Summary     Patient ID:    Baldomero Reece  [id number]  69 y.o.  1955    Admit date: 9/30/2024    Discharge date : 10/4/2024      Final Diagnoses:   Aphasia [R47.01]  Expressive aphasia [R47.01]  CVA, old, ataxia [I69.993]      Reason for Hospitalization: rule out stroke    Hospital Course:   Patient is a 69 y.o. year old male with past medical history obesity COPD hyperlipidemia pelvic vascular disease with right below-knee amputation left tarsometatarsal amputation came to the hospital with slurred speech patient was recently discharged from the hospital with the same symptoms CT scan of the head and CT of the head was done negative patient also history of alcohol dependency  By the ER physician recommended patient to be admitted for MRI     Slurred speech-resolved  TIA, negative MRI  CT head negative on admission  CTA hypoperfusion corresponding to chronic encephalomalacia in the left frontal lobe and basal ganglia.  No evidence of significant stenosis or aneurysm  Teleneurology seen  Echo EF 51%, mild global hypokinesis.  MRI with no acute intracranial abnormality  [] Continue aspirin and statin on dc    Leukopenia-persistent  White blood cells 2.1 today  Was normal on admission  Patient is afebrile, tachycardia resolved, normotensive  Continue to monitor for signs of infection  Negative urinalysis on admission  Chest x-ray normal  BG neg 48h  COVID and flu negative  []HIV sent  []pending HCV RNA  His leukopenia is likely related to his however received, he will need to follow with primary care to repeat CBC in 1 week and also follow HCV RNA and HIV levels  Patient does not meet criteria for antibiotics as no other signs of infection     Alcohol dependence with current withdrawal  Liver cirrhosis  Alcohol level on admission 272  Patient states that he drinks as much alcohol as he can find  Normal iron levels  Ultrasound with evidence of cirrhosis  PT/INR normal  Patient

## 2024-10-06 ENCOUNTER — HOSPITAL ENCOUNTER (EMERGENCY)
Facility: HOSPITAL | Age: 69
Discharge: HOME | End: 2024-10-07
Attending: EMERGENCY MEDICINE
Payer: MEDICAID

## 2024-10-06 ENCOUNTER — APPOINTMENT (OUTPATIENT)
Facility: HOSPITAL | Age: 69
End: 2024-10-06
Payer: MEDICAID

## 2024-10-06 DIAGNOSIS — K74.60 CIRRHOSIS OF LIVER WITHOUT ASCITES, UNSPECIFIED HEPATIC CIRRHOSIS TYPE (HCC): ICD-10-CM

## 2024-10-06 DIAGNOSIS — K80.20 GALLSTONES: Primary | ICD-10-CM

## 2024-10-06 LAB
ALBUMIN SERPL-MCNC: 3.6 G/DL (ref 3.5–5)
ALBUMIN/GLOB SERPL: 0.9 (ref 1.1–2.2)
ALP SERPL-CCNC: 103 U/L (ref 45–117)
ALT SERPL-CCNC: 33 U/L (ref 12–78)
ANION GAP SERPL CALC-SCNC: 4 MMOL/L (ref 2–12)
AST SERPL W P-5'-P-CCNC: 51 U/L (ref 15–37)
BACTERIA SPEC CULT: NORMAL
BACTERIA SPEC CULT: NORMAL
BASOPHILS # BLD: 0.1 K/UL (ref 0–0.1)
BASOPHILS NFR BLD: 2 % (ref 0–1)
BILIRUB SERPL-MCNC: 0.4 MG/DL (ref 0.2–1)
BUN SERPL-MCNC: 11 MG/DL (ref 6–20)
BUN/CREAT SERPL: 15 (ref 12–20)
CA-I BLD-MCNC: 8.4 MG/DL (ref 8.5–10.1)
CHLORIDE SERPL-SCNC: 110 MMOL/L (ref 97–108)
CO2 SERPL-SCNC: 26 MMOL/L (ref 21–32)
CREAT SERPL-MCNC: 0.75 MG/DL (ref 0.7–1.3)
DIFFERENTIAL METHOD BLD: ABNORMAL
EOSINOPHIL # BLD: 0.2 K/UL (ref 0–0.4)
EOSINOPHIL NFR BLD: 5 % (ref 0–7)
ERYTHROCYTE [DISTWIDTH] IN BLOOD BY AUTOMATED COUNT: 16.3 % (ref 11.5–14.5)
GLOBULIN SER CALC-MCNC: 3.8 G/DL (ref 2–4)
GLUCOSE SERPL-MCNC: 87 MG/DL (ref 65–100)
HCT VFR BLD AUTO: 42.6 % (ref 36.6–50.3)
HGB BLD-MCNC: 13.4 G/DL (ref 12.1–17)
IMM GRANULOCYTES # BLD AUTO: 0 K/UL (ref 0–0.04)
IMM GRANULOCYTES NFR BLD AUTO: 1 % (ref 0–0.5)
LIPASE SERPL-CCNC: 87 U/L (ref 13–75)
LYMPHOCYTES # BLD: 1.5 K/UL (ref 0.8–3.5)
LYMPHOCYTES NFR BLD: 35 % (ref 12–49)
Lab: NORMAL
Lab: NORMAL
MCH RBC QN AUTO: 27.9 PG (ref 26–34)
MCHC RBC AUTO-ENTMCNC: 31.5 G/DL (ref 30–36.5)
MCV RBC AUTO: 88.6 FL (ref 80–99)
MONOCYTES # BLD: 0.5 K/UL (ref 0–1)
MONOCYTES NFR BLD: 11 % (ref 5–13)
NEUTS SEG # BLD: 2 K/UL (ref 1.8–8)
NEUTS SEG NFR BLD: 46 % (ref 32–75)
NRBC # BLD: 0 K/UL (ref 0–0.01)
NRBC BLD-RTO: 0 PER 100 WBC
PLATELET # BLD AUTO: 102 K/UL (ref 150–400)
PMV BLD AUTO: 11 FL (ref 8.9–12.9)
POTASSIUM SERPL-SCNC: 4.6 MMOL/L (ref 3.5–5.1)
PROT SERPL-MCNC: 7.4 G/DL (ref 6.4–8.2)
RBC # BLD AUTO: 4.81 M/UL (ref 4.1–5.7)
SODIUM SERPL-SCNC: 140 MMOL/L (ref 136–145)
WBC # BLD AUTO: 4.3 K/UL (ref 4.1–11.1)

## 2024-10-06 PROCEDURE — 80053 COMPREHEN METABOLIC PANEL: CPT

## 2024-10-06 PROCEDURE — 6360000004 HC RX CONTRAST MEDICATION: Performed by: EMERGENCY MEDICINE

## 2024-10-06 PROCEDURE — 83690 ASSAY OF LIPASE: CPT

## 2024-10-06 PROCEDURE — 74177 CT ABD & PELVIS W/CONTRAST: CPT

## 2024-10-06 PROCEDURE — 36415 COLL VENOUS BLD VENIPUNCTURE: CPT

## 2024-10-06 PROCEDURE — 85025 COMPLETE CBC W/AUTO DIFF WBC: CPT

## 2024-10-06 PROCEDURE — 99285 EMERGENCY DEPT VISIT HI MDM: CPT

## 2024-10-06 RX ORDER — IOPAMIDOL 755 MG/ML
100 INJECTION, SOLUTION INTRAVASCULAR
Status: COMPLETED | OUTPATIENT
Start: 2024-10-06 | End: 2024-10-06

## 2024-10-06 RX ADMIN — IOPAMIDOL 100 ML: 755 INJECTION, SOLUTION INTRAVENOUS at 21:20

## 2024-10-06 NOTE — ED TRIAGE NOTES
Pt comes in for abdominal pain for a few days. Pt is homeless. Pt admits to doing drugs and alcohol today but is unsure what drugs he did.

## 2024-10-07 VITALS
RESPIRATION RATE: 18 BRPM | BODY MASS INDEX: 22.73 KG/M2 | TEMPERATURE: 98.4 F | DIASTOLIC BLOOD PRESSURE: 64 MMHG | HEIGHT: 68 IN | HEART RATE: 84 BPM | OXYGEN SATURATION: 96 % | WEIGHT: 150 LBS | SYSTOLIC BLOOD PRESSURE: 112 MMHG

## 2024-10-07 NOTE — ED PROVIDER NOTES
independently by ER physician as NORMAL   [HP]   2058 CBC with Auto Differential(!):    WBC 4.3   RBC 4.81   Hemoglobin Quant 13.4   Hematocrit 42.6   MCV 88.6   MCH 27.9   MCHC 31.5   RDW 16.3(!)   Platelet Count 102(!)   MPV 11.0   Nucleated Red Blood Cells 0.0   Nucleated Red Blood Cells 0.00   Neutrophils % 46   Lymphocyte % 35   Monocytes % 11   Eosinophils % 5   Basophils % 2(!)   Immature Granulocytes % 1(!)   Neutrophils Absolute 2.0   Lymphocytes Absolute 1.5   Monocytes Absolute 0.5   Eosinophils Absolute 0.2   Basophils Absolute 0.1   Immature Granulocytes Absolute 0.0   Differential Type AUTOMATED  Lab work interpreted independently by ER physician as NORMAL   [HP]   2200 CT ABDOMEN PELVIS W IV CONTRAST Additional Contrast? None  IMPRESSION:  Cirrhosis. Segment 4 and segment 2 liver lesions incompletely characterized  without multiphasic contrast scan.  Cholelithiasis without gallbladder inflammation.  No acute bowel pathology.     Electronically signed by GEETA FABIAN   [HP]   2200 Gallstones [HP]      ED Course User Index  [HP] Serena Dejesus MD       SEPSIS Reassessment: Sepsis reassessment not applicable    Clinical Management Tools:  Not Applicable    Patient was given the following medications:  Medications   iopamidol (ISOVUE-370) 76 % injection 100 mL (100 mLs IntraVENous Given 10/6/24 2120)       CONSULTS: See ED Course/MDM for further details.  None     Social Determinants affecting Diagnosis/Treatment: None    Smoking Cessation: Not Applicable    PROCEDURES   Unless otherwise noted above, none  Procedures      CRITICAL CARE TIME   Patient does not meet Critical Care Time, 0 minutes    ED IMPRESSION     1. Gallstones    2. Cirrhosis of liver without ascites, unspecified hepatic cirrhosis type (HCC)          DISPOSITION/PLAN   DISPOSITION Decision To Discharge 10/06/2024 10:00:37 PM  Condition at Disposition: Data Unavailable    Discharge Note: The patient is stable for discharge home. The

## 2024-10-08 LAB
BACTERIA SPEC CULT: NORMAL
BACTERIA SPEC CULT: NORMAL
Lab: NORMAL
Lab: NORMAL

## 2024-10-09 LAB — HBV CORE AB SERPL QL IA: NORMAL

## 2024-10-12 ENCOUNTER — APPOINTMENT (OUTPATIENT)
Facility: HOSPITAL | Age: 69
End: 2024-10-12
Payer: MEDICAID

## 2024-10-12 ENCOUNTER — HOSPITAL ENCOUNTER (EMERGENCY)
Facility: HOSPITAL | Age: 69
Discharge: HOME | End: 2024-10-12
Attending: STUDENT IN AN ORGANIZED HEALTH CARE EDUCATION/TRAINING PROGRAM
Payer: MEDICAID

## 2024-10-12 VITALS
BODY MASS INDEX: 22.73 KG/M2 | RESPIRATION RATE: 20 BRPM | OXYGEN SATURATION: 95 % | WEIGHT: 150 LBS | SYSTOLIC BLOOD PRESSURE: 129 MMHG | HEIGHT: 68 IN | TEMPERATURE: 98.8 F | HEART RATE: 98 BPM | DIASTOLIC BLOOD PRESSURE: 78 MMHG
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
## 2024-10-12 DIAGNOSIS — S09.90XA CLOSED HEAD INJURY, INITIAL ENCOUNTER: ICD-10-CM

## 2024-10-12 PROCEDURE — 70450 CT HEAD/BRAIN W/O DYE: CPT

## 2024-10-12 PROCEDURE — 99284 EMERGENCY DEPT VISIT MOD MDM: CPT

## 2024-10-12 ASSESSMENT — PAIN - FUNCTIONAL ASSESSMENT: PAIN_FUNCTIONAL_ASSESSMENT: 0-10

## 2024-10-12 ASSESSMENT — PAIN SCALES - GENERAL: PAINLEVEL_OUTOF10: 7

## 2024-10-12 NOTE — ED PROVIDER NOTES
[facility] EMERGENCY DEPT  EMERGENCY DEPARTMENT HISTORY AND PHYSICAL EXAM      Date: 10/12/2024  Patient Name: Baldomero Reece  MRN: [id number]  Birthdate 1955  Date of evaluation: 10/12/2024  Provider: Sangeetha Sylvester MD   Note Started: 7:44 PM EDT 10/12/24    HISTORY OF PRESENT ILLNESS     Chief Complaint   Patient presents with    Fall    Homeless       History Provided By: Patient    HPI: Baldomero Reece is a 69 y.o. male with PMH of DM, CVA, and alcohol abuse who comes to the ED intoxicated.  He did have a mechanical fall resulting in head trauma.  He is denying any chest pain or shortness of breath.  No lightheadedness or dizziness.  He report that he did not eat today.  No abdominal pain nausea or vomiting.  Patient does not have any other specific symptoms or concerns today.      PAST MEDICAL HISTORY   Past Medical History:  Past Medical History:   Diagnosis Date    Alcohol abuse     Below knee amputation (HCC)     Rt leg    Cerebral artery occlusion with cerebral infarction (HCC)     Diabetes mellitus (HCC)     Hypertension        Past Surgical History:  Past Surgical History:   Procedure Laterality Date    FOOT DEBRIDEMENT Left 1/2/2024    LEFT FOOT INCISION AND DRAINAGE with misonix , First metatarsal head amputation and left fifth toe amputation, skin graft application left foot performed by Percy Salazar DPM at [facility] MAIN OR    LEG AMPUTATION BELOW KNEE Right     LOWER EXTREMITY AMPUTATION Left     transmetatarsal       Family History:  No family history on file.    Social History:  Social History     Tobacco Use    Smoking status: Every Day     Current packs/day: 2.00     Average packs/day: 2.0 packs/day for 52.8 years (105.6 ttl pk-yrs)     Types: Cigarettes     Start date: 1972    Smokeless tobacco: Never   Substance Use Topics    Alcohol use: Yes    Drug use: Yes     Types: Marijuana (Weed), Methamphetamines (Crystal Meth)       Allergies:  Allergies   Allergen Reactions    Penicillins Swelling      Swelling of hands and feet    Methadone Other (See Comments)     Does not like the way it makes him feel       PCP: Evonne Wu MD    Current Meds:   No current facility-administered medications for this encounter.     Current Outpatient Medications   Medication Sig Dispense Refill    aspirin 81 MG chewable tablet Take 1 tablet by mouth daily 30 tablet 3    budesonide-formoterol (SYMBICORT) 160-4.5 MCG/ACT AERO Inhale 2 puffs into the lungs 2 times daily 10.2 g 0    atorvastatin (LIPITOR) 80 MG tablet Take 1 tablet by mouth nightly 30 tablet 3    nicotine (NICODERM CQ) 21 MG/24HR Place 1 patch onto the skin daily 30 patch 3    thiamine mononitrate (THIAMINE) 100 MG tablet Take 1 tablet by mouth daily 90 tablet 2       Social Determinants of Health:   Social Determinants of Health     Tobacco Use: High Risk (10/6/2024)    Patient History     Smoking Tobacco Use: Every Day     Smokeless Tobacco Use: Never     Passive Exposure: Not on file   Alcohol Use: Alcohol Misuse (9/30/2024)    AUDIT-C     Frequency of Alcohol Consumption: 2-3 times a week     Average Number of Drinks: 5 or 6     Frequency of Binge Drinking: Weekly   Financial Resource Strain: Not on file   Food Insecurity: Food Insecurity Present (10/1/2024)    Hunger Vital Sign     Worried About Running Out of Food in the Last Year: Often true     Ran Out of Food in the Last Year: Often true   Transportation Needs: Unmet Transportation Needs (10/1/2024)    PRAPARE - Transportation     Lack of Transportation (Medical): Yes     Lack of Transportation (Non-Medical): Yes   Physical Activity: Not on file   Stress: Not on file   Social Connections: Not on file   Intimate Partner Violence: Not on file   Depression: Not at risk (3/3/2022)    Received from [facility]    PHQ-2     PHQ-2 Total Score: 0   Housing Stability: High Risk (10/1/2024)    Housing Stability Vital Sign     Unable to Pay for Housing in the Last Year: No     Number of

## 2024-10-12 NOTE — ED NOTES
Pt. Given box lunch and soda and is eating at this time  This RN notified pt of planned discharge, pt states, \"if I am let go I am just going to go back out there and drink. I need to be admitted for 4-5 days.\"   Pt. Vitals have improved at this time, CIWA completed at bedside, see screening

## 2024-10-13 NOTE — ED NOTES
Pt. Departed via wheelchair, given pants and clean shirt from his bag, this RN and Arturo RN assisted pt with getting dressed. Pt. Left with home wheelchair, RR even and unlabored, NAD

## 2024-10-16 ENCOUNTER — APPOINTMENT (OUTPATIENT)
Facility: HOSPITAL | Age: 69
End: 2024-10-16
Payer: MEDICAID

## 2024-10-16 ENCOUNTER — HOSPITAL ENCOUNTER (EMERGENCY)
Facility: HOSPITAL | Age: 69
Discharge: HOME | End: 2024-10-16
Attending: EMERGENCY MEDICINE
Payer: MEDICAID

## 2024-10-16 VITALS
DIASTOLIC BLOOD PRESSURE: 92 MMHG | HEIGHT: 70 IN | TEMPERATURE: 97.7 F | HEART RATE: 93 BPM | OXYGEN SATURATION: 100 % | BODY MASS INDEX: 25.77 KG/M2 | WEIGHT: 180 LBS | SYSTOLIC BLOOD PRESSURE: 123 MMHG | RESPIRATION RATE: 16 BRPM

## 2024-10-16 DIAGNOSIS — F10.929 ACUTE ALCOHOLIC INTOXICATION WITH COMPLICATION (HCC): ICD-10-CM

## 2024-10-16 DIAGNOSIS — T07.XXXA MULTIPLE CONTUSIONS: ICD-10-CM
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
## 2024-10-16 LAB
ALBUMIN SERPL-MCNC: 2.9 G/DL (ref 3.5–5)
ALBUMIN/GLOB SERPL: 0.7 (ref 1.1–2.2)
ALP SERPL-CCNC: 113 U/L (ref 45–117)
ALT SERPL-CCNC: 30 U/L (ref 12–78)
ANION GAP SERPL CALC-SCNC: 8 MMOL/L (ref 2–12)
AST SERPL W P-5'-P-CCNC: 65 U/L (ref 15–37)
BASOPHILS # BLD: 0.1 K/UL (ref 0–0.1)
BASOPHILS NFR BLD: 2 % (ref 0–1)
BILIRUB SERPL-MCNC: 0.3 MG/DL (ref 0.2–1)
BUN SERPL-MCNC: 13 MG/DL (ref 6–20)
BUN/CREAT SERPL: 19 (ref 12–20)
CA-I BLD-MCNC: 8.5 MG/DL (ref 8.5–10.1)
CHLORIDE SERPL-SCNC: 108 MMOL/L (ref 97–108)
CO2 SERPL-SCNC: 27 MMOL/L (ref 21–32)
CREAT SERPL-MCNC: 0.68 MG/DL (ref 0.7–1.3)
DIFFERENTIAL METHOD BLD: ABNORMAL
EOSINOPHIL # BLD: 0.2 K/UL (ref 0–0.4)
EOSINOPHIL NFR BLD: 6 % (ref 0–7)
ERYTHROCYTE [DISTWIDTH] IN BLOOD BY AUTOMATED COUNT: 16.5 % (ref 11.5–14.5)
ETHANOL SERPL-MCNC: 257 MG/DL (ref 0–0.08)
GLOBULIN SER CALC-MCNC: 3.9 G/DL (ref 2–4)
GLUCOSE BLD STRIP.AUTO-MCNC: 89 MG/DL (ref 65–100)
GLUCOSE SERPL-MCNC: 102 MG/DL (ref 65–100)
HCT VFR BLD AUTO: 41 % (ref 36.6–50.3)
HGB BLD-MCNC: 12.9 G/DL (ref 12.1–17)
IMM GRANULOCYTES # BLD AUTO: 0 K/UL (ref 0–0.04)
IMM GRANULOCYTES NFR BLD AUTO: 0 % (ref 0–0.5)
LYMPHOCYTES # BLD: 1.4 K/UL (ref 0.8–3.5)
LYMPHOCYTES NFR BLD: 33 % (ref 12–49)
MCH RBC QN AUTO: 28.4 PG (ref 26–34)
MCHC RBC AUTO-ENTMCNC: 31.5 G/DL (ref 30–36.5)
MCV RBC AUTO: 90.1 FL (ref 80–99)
MONOCYTES # BLD: 0.3 K/UL (ref 0–1)
MONOCYTES NFR BLD: 7 % (ref 5–13)
NEUTS SEG # BLD: 2.1 K/UL (ref 1.8–8)
NEUTS SEG NFR BLD: 52 % (ref 32–75)
NRBC # BLD: 0 K/UL (ref 0–0.01)
NRBC BLD-RTO: 0 PER 100 WBC
PERFORMED BY:: NORMAL
PLATELET # BLD AUTO: 179 K/UL (ref 150–400)
PMV BLD AUTO: 10.3 FL (ref 8.9–12.9)
POTASSIUM SERPL-SCNC: 4.5 MMOL/L (ref 3.5–5.1)
PROT SERPL-MCNC: 6.8 G/DL (ref 6.4–8.2)
RBC # BLD AUTO: 4.55 M/UL (ref 4.1–5.7)
SODIUM SERPL-SCNC: 143 MMOL/L (ref 136–145)
WBC # BLD AUTO: 4.1 K/UL (ref 4.1–11.1)

## 2024-10-16 PROCEDURE — 96372 THER/PROPH/DIAG INJ SC/IM: CPT

## 2024-10-16 PROCEDURE — 85025 COMPLETE CBC W/AUTO DIFF WBC: CPT

## 2024-10-16 PROCEDURE — 99284 EMERGENCY DEPT VISIT MOD MDM: CPT

## 2024-10-16 PROCEDURE — 80053 COMPREHEN METABOLIC PANEL: CPT

## 2024-10-16 PROCEDURE — 6360000002 HC RX W HCPCS: Performed by: EMERGENCY MEDICINE

## 2024-10-16 PROCEDURE — 70450 CT HEAD/BRAIN W/O DYE: CPT

## 2024-10-16 PROCEDURE — 2580000003 HC RX 258: Performed by: EMERGENCY MEDICINE

## 2024-10-16 PROCEDURE — 73552 X-RAY EXAM OF FEMUR 2/>: CPT

## 2024-10-16 PROCEDURE — 72125 CT NECK SPINE W/O DYE: CPT

## 2024-10-16 PROCEDURE — 73590 X-RAY EXAM OF LOWER LEG: CPT

## 2024-10-16 PROCEDURE — 36415 COLL VENOUS BLD VENIPUNCTURE: CPT

## 2024-10-16 PROCEDURE — 82077 ASSAY SPEC XCP UR&BREATH IA: CPT

## 2024-10-16 PROCEDURE — 82962 GLUCOSE BLOOD TEST: CPT

## 2024-10-16 PROCEDURE — 6370000000 HC RX 637 (ALT 250 FOR IP): Performed by: EMERGENCY MEDICINE

## 2024-10-16 PROCEDURE — 73030 X-RAY EXAM OF SHOULDER: CPT

## 2024-10-16 RX ORDER — THIAMINE HYDROCHLORIDE 100 MG/ML
100 INJECTION, SOLUTION INTRAMUSCULAR; INTRAVENOUS ONCE
Status: COMPLETED | OUTPATIENT
Start: 2024-10-16 | End: 2024-10-16

## 2024-10-16 RX ORDER — 0.9 % SODIUM CHLORIDE 0.9 %
1000 INTRAVENOUS SOLUTION INTRAVENOUS ONCE
Status: COMPLETED | OUTPATIENT
Start: 2024-10-16 | End: 2024-10-16

## 2024-10-16 RX ORDER — FOLIC ACID 1 MG/1
1 TABLET ORAL ONCE
Status: COMPLETED | OUTPATIENT
Start: 2024-10-16 | End: 2024-10-16

## 2024-10-16 RX ADMIN — FOLIC ACID 1 MG: 1 TABLET ORAL at 16:23

## 2024-10-16 RX ADMIN — SODIUM CHLORIDE 1000 ML: 9 INJECTION, SOLUTION INTRAVENOUS at 16:22

## 2024-10-16 RX ADMIN — THIAMINE HYDROCHLORIDE 100 MG: 100 INJECTION, SOLUTION INTRAMUSCULAR; INTRAVENOUS at 16:23

## 2024-10-16 ASSESSMENT — PAIN DESCRIPTION - LOCATION: LOCATION: OTHER (COMMENT)

## 2024-10-16 ASSESSMENT — PAIN - FUNCTIONAL ASSESSMENT
PAIN_FUNCTIONAL_ASSESSMENT: 0-10
PAIN_FUNCTIONAL_ASSESSMENT: 0-10

## 2024-10-16 ASSESSMENT — PAIN SCALES - GENERAL
PAINLEVEL_OUTOF10: 0
PAINLEVEL_OUTOF10: 5

## 2024-10-16 ASSESSMENT — LIFESTYLE VARIABLES
HOW OFTEN DO YOU HAVE A DRINK CONTAINING ALCOHOL: 4 OR MORE TIMES A WEEK
HOW MANY STANDARD DRINKS CONTAINING ALCOHOL DO YOU HAVE ON A TYPICAL DAY: 7 TO 9

## 2024-10-16 NOTE — ED TRIAGE NOTES
Pt picked up on the street by ems. Called for a fall and complain on left leg and right hand pain. Right leg partially amputated.     Pt has severe stutter but is able to communicate well  
User Index  [MELIZA] Silvestre Blanc DO   Will discharge home pending sobriety    SEPSIS Reassessment: Sepsis reassessment not applicable    Clinical Management Tools:  Not Applicable    Patient was given the following medications:  Medications   thiamine (B-1) injection 100 mg (100 mg IntraMUSCular Given 10/16/24 1623)   sodium chloride 0.9 % bolus 1,000 mL (0 mLs IntraVENous Stopped 10/16/24 1631)   folic acid (FOLVITE) tablet 1 mg (1 mg Oral Given 10/16/24 1623)       CONSULTS: See ED Course/MDM for further details.  None     Social Determinants affecting Diagnosis/Treatment: Patient has a substance abuse problem. Given Substance Abuse resources. and Patient is homeless/has housing insecurity.  Given Housing Resources.    Smoking Cessation: Not Applicable    PROCEDURES   Unless otherwise noted above, none.  Procedures      CRITICAL CARE TIME   Patient does not meet Critical Care Time, 0 minutes    ED IMPRESSION     1. Fall from wheelchair, initial encounter    2. Multiple contusions    3. Acute alcoholic intoxication with complication (HCC)          DISPOSITION/PLAN   DISPOSITION Decision To Discharge 10/16/2024 05:32:46 PM  Condition at Disposition: Data Unavailable    Discharge Note: The patient is stable for discharge home. The signs, symptoms, diagnosis, and discharge instructions have been discussed, understanding conveyed, and agreed upon. The patient is to follow up as recommended or return to ER should their symptoms worsen.      PATIENT REFERRED TO:  Evonne Wu MD  [facility]   [facility] [id number] [phone]    Call today      [facility]’s National Helpline  4202944FJKE ([phone])    Confidential Helpline for subtance abuse        Online Substance Abuse Treatment  https://findtreatment.gov/  Call today      [facility] EMERGENCY DEPT  [address] [place] [id number] [phone]  Call today  As needed, If symptoms worsen        DISCHARGE MEDICATIONS:     Medication List        ASK

## 2024-10-16 NOTE — ED NOTES
Pt given new, clean sweat pants and shirt and food. Discharge instructions reviewed with pt and pt indicated understanding. Pt left via wheelchair and all belongings.

## 2024-10-16 NOTE — ED NOTES
Pt woken up for medication pass. Pt stated the IV was hurting and he would like it removed. Pt also states he is hungry and would like more food. Pt served dinner tray.

## 2024-10-16 NOTE — DISCHARGE INSTRUCTIONS
Date of Service: 01/02/2019    She is a 61-year-old who noted a lump in the left side of her neck about a month and a half ago. She points at the medial head of the clavicle at the acromioclavicular joint. It is not enlarging. It is not painful. Never had it before. She is pretty good at eating solids and liquids. No odynophagia. Voice is okay. She has had a history of reflux for years. Her weight has been steady. Appetite has been down. No blood in the stools. Voice is okay, does not smoke, does not drink, does not work. An ultrasound of the neck was done recently and it was normal.    ALLERGIES:  Hydroxychloroquine. Lisinopril. MEDICATIONS:  Losartan. Amlodipine. Hydrochlorothiazide. Potassium. Zoloft. Flonase. Advair. Singulair. Dexilant. Albuterol. Keppra. Dilantin. Klonopin. Her problem list includes asthma, hypertension, ulcer of the esophagus, eczema, history of convulsions, seizures, depression, history of shingles, diverticulitis, Raynaud's, mixed connective tissue disease, skin cancer, not near the area of concern; spinal stenosis. PHYSICAL EXAMINATION:  NECK:  Without masses, nodes or increased thyroid. The medial part of the clavicle at the acromioclavicular joint is about twice the size as on the right. It is not mobile. It is not tender. It is firm like bone. The rest of the neck is without masses, nodes or increased thyroid. ENT:  Mouth shows that she has very poor dentition. No upper teeth. Tongue, palate, posterior pharynx are clear. Nose is without deviation, polyps or pus. Ears are without wax. Eardrums are normal.  Air conduction is greater than bone conduction. We did an exam of the hypopharynx and larynx with flexible laryngoscopy after spraying the mouth with 4% Xylocaine. We passed the scope. The base of tongue was clear, no pooling in the vallecula or the piriforms.   Supraglottic structures were normal.  Both vocal cords were clear and mobile without tumors or growths. Subglottis was clear. Lateral pharyngeal walls, postcricoid area and piriforms were all clear. IMPRESSION:  Bony asymmetry of the clavicle. I am going to order at first just see whether a simple chest x-ray can  the differential of the 2 heads of the medial portion of the clavicles. If not, then we may have to go to a CAT scan.       Dictated By: Manjeet Nelson MD  Signing Provider: Manjeet Nelson MD    RC/ps ([id number])  DD: 01/02/2019 15:18:03 TD: 01/03/2019 07:27:52    Copy Sent To: changes in your illness and treatment, and (3) for ongoing care. Please take your discharge instructions with you when you go to your follow-up appointment.     If you have any problem arranging a follow-up appointment, contact us!  If your symptoms become worse or you do not improve as expected, please return to us. We are available 24 hours a day.     If a prescription has been provided, please fill it as soon as possible to prevent a delay in treatment. If you have any questions or reservations about taking the medication due to side effects or interactions with other medications, please call your primary care provider or contact us directly.  Again, THANK YOU for choosing us to care for YOU!

## 2024-10-16 NOTE — ED NOTES
Pt wheelchair bound with BKA on right leg. Pt presented in multiple coats and sweat pants. As nurse began assessing vital signs, pt has BM. Pt states he is incontinent. Pt cleaned and linens changed, placed in a gown and brief.

## 2024-10-19 ENCOUNTER — APPOINTMENT (OUTPATIENT)
Facility: HOSPITAL | Age: 69
End: 2024-10-19
Payer: MEDICAID

## 2024-10-19 ENCOUNTER — HOSPITAL ENCOUNTER (EMERGENCY)
Facility: HOSPITAL | Age: 69
Discharge: HOME | End: 2024-10-20
Attending: EMERGENCY MEDICINE
Payer: MEDICAID

## 2024-10-19 VITALS
RESPIRATION RATE: 20 BRPM | OXYGEN SATURATION: 94 % | TEMPERATURE: 98 F | HEART RATE: 96 BPM | DIASTOLIC BLOOD PRESSURE: 61 MMHG | SYSTOLIC BLOOD PRESSURE: 93 MMHG

## 2024-10-19 DIAGNOSIS — F10.929 ACUTE ALCOHOLIC INTOXICATION WITH COMPLICATION (HCC): Primary | ICD-10-CM

## 2024-10-19 LAB
ANION GAP SERPL CALC-SCNC: 7 MMOL/L (ref 2–12)
BASOPHILS # BLD: 0.1 K/UL (ref 0–0.1)
BASOPHILS NFR BLD: 2 % (ref 0–1)
BUN SERPL-MCNC: 11 MG/DL (ref 6–20)
BUN/CREAT SERPL: 16 (ref 12–20)
CA-I BLD-MCNC: 8.7 MG/DL (ref 8.5–10.1)
CHLORIDE SERPL-SCNC: 105 MMOL/L (ref 97–108)
CO2 SERPL-SCNC: 23 MMOL/L (ref 21–32)
CREAT SERPL-MCNC: 0.67 MG/DL (ref 0.7–1.3)
DIFFERENTIAL METHOD BLD: ABNORMAL
EOSINOPHIL # BLD: 0.3 K/UL (ref 0–0.4)
EOSINOPHIL NFR BLD: 4 % (ref 0–7)
ERYTHROCYTE [DISTWIDTH] IN BLOOD BY AUTOMATED COUNT: 16.1 % (ref 11.5–14.5)
ETHANOL SERPL-MCNC: 223 MG/DL (ref 0–0.08)
GLUCOSE SERPL-MCNC: 76 MG/DL (ref 65–100)
HCT VFR BLD AUTO: 41.9 % (ref 36.6–50.3)
HGB BLD-MCNC: 13.3 G/DL (ref 12.1–17)
IMM GRANULOCYTES # BLD AUTO: 0 K/UL (ref 0–0.04)
IMM GRANULOCYTES NFR BLD AUTO: 0 % (ref 0–0.5)
LYMPHOCYTES # BLD: 1.4 K/UL (ref 0.8–3.5)
LYMPHOCYTES NFR BLD: 23 % (ref 12–49)
MCH RBC QN AUTO: 28.8 PG (ref 26–34)
MCHC RBC AUTO-ENTMCNC: 31.7 G/DL (ref 30–36.5)
MCV RBC AUTO: 90.7 FL (ref 80–99)
MONOCYTES # BLD: 0.5 K/UL (ref 0–1)
MONOCYTES NFR BLD: 8 % (ref 5–13)
NEUTS SEG # BLD: 3.9 K/UL (ref 1.8–8)
NEUTS SEG NFR BLD: 63 % (ref 32–75)
NRBC # BLD: 0 K/UL (ref 0–0.01)
NRBC BLD-RTO: 0 PER 100 WBC
PLATELET # BLD AUTO: 124 K/UL (ref 150–400)
PMV BLD AUTO: 10.1 FL (ref 8.9–12.9)
POTASSIUM SERPL-SCNC: 4.4 MMOL/L (ref 3.5–5.1)
RBC # BLD AUTO: 4.62 M/UL (ref 4.1–5.7)
SODIUM SERPL-SCNC: 135 MMOL/L (ref 136–145)
WBC # BLD AUTO: 6.2 K/UL (ref 4.1–11.1)

## 2024-10-19 PROCEDURE — 80048 BASIC METABOLIC PNL TOTAL CA: CPT

## 2024-10-19 PROCEDURE — 70450 CT HEAD/BRAIN W/O DYE: CPT

## 2024-10-19 PROCEDURE — 85025 COMPLETE CBC W/AUTO DIFF WBC: CPT

## 2024-10-19 PROCEDURE — 82077 ASSAY SPEC XCP UR&BREATH IA: CPT

## 2024-10-19 PROCEDURE — 36415 COLL VENOUS BLD VENIPUNCTURE: CPT

## 2024-10-19 PROCEDURE — 99284 EMERGENCY DEPT VISIT MOD MDM: CPT

## 2024-10-19 NOTE — ED TRIAGE NOTES
Pt. Presents with EMS after complaints of alcohol intoxication and complaining of back pain at the gas station. Pt. Is a poor historian and is has incomprehensible speech at time. Pt is soiled with urine and feces, requesting his clothes to be removed, this RN notified him when help is available myself and someone else will assist. Pt. Denies CP and SOB, VSS.

## 2024-10-20 ENCOUNTER — APPOINTMENT (OUTPATIENT)
Facility: HOSPITAL | Age: 69
End: 2024-10-20
Payer: MEDICAID

## 2024-10-20 VITALS
DIASTOLIC BLOOD PRESSURE: 55 MMHG | OXYGEN SATURATION: 95 % | TEMPERATURE: 98.1 F | WEIGHT: 180 LBS | BODY MASS INDEX: 25.77 KG/M2 | HEIGHT: 70 IN | SYSTOLIC BLOOD PRESSURE: 100 MMHG | HEART RATE: 103 BPM | RESPIRATION RATE: 17 BRPM

## 2024-10-20 DIAGNOSIS — Z78.9 ALCOHOL USE: Primary | ICD-10-CM

## 2024-10-20 PROCEDURE — 73562 X-RAY EXAM OF KNEE 3: CPT

## 2024-10-20 PROCEDURE — 6370000000 HC RX 637 (ALT 250 FOR IP): Performed by: EMERGENCY MEDICINE

## 2024-10-20 PROCEDURE — 99283 EMERGENCY DEPT VISIT LOW MDM: CPT

## 2024-10-20 RX ORDER — ACETAMINOPHEN 500 MG
1000 TABLET ORAL
Status: COMPLETED | OUTPATIENT
Start: 2024-10-20 | End: 2024-10-20

## 2024-10-20 RX ADMIN — ACETAMINOPHEN 1000 MG: 500 TABLET ORAL at 15:05

## 2024-10-20 ASSESSMENT — PAIN - FUNCTIONAL ASSESSMENT: PAIN_FUNCTIONAL_ASSESSMENT: NONE - DENIES PAIN

## 2024-10-20 ASSESSMENT — LIFESTYLE VARIABLES
HOW MANY STANDARD DRINKS CONTAINING ALCOHOL DO YOU HAVE ON A TYPICAL DAY: 5 OR 6
HOW OFTEN DO YOU HAVE A DRINK CONTAINING ALCOHOL: 4 OR MORE TIMES A WEEK

## 2024-10-20 ASSESSMENT — PAIN SCALES - GENERAL: PAINLEVEL_OUTOF10: 7

## 2024-10-20 NOTE — ED TRIAGE NOTES
EMS report they found pt on ground at [facility]. EMS reports no one saw pt fall despite there being were several bystanders. Report pt says he doesn't want to drink any more.  Pt has incontinent of B&B and has to be cleaned.

## 2024-10-20 NOTE — ED NOTES
This RN to room for bloodwork; pt awakes with voice stimulation  RN got flash in IV, pt swings right hand at this RN's face, yells, \"I told you not there!!\"  IV removed, no blood collected. Bleeding controlled, writer exited room, staff notified of need for assistance

## 2024-10-20 NOTE — ED NOTES
Pt states that he is homeless and that there is nowhere for him to go. [facility].

## 2024-10-20 NOTE — ED NOTES
Pt. Asleep on stretcher, RR even and unlabored, NAD. Pt. Refuses to keep continuous NIBP and pulse ox on

## 2024-10-20 NOTE — DISCHARGE INSTRUCTIONS
Thank you for choosing our Emergency Department for your care.  It is our privilege to care for you in your time of need.  In the next several days, you may receive a survey via email or mailed to your home about your experience with our team.  We would greatly appreciate you taking a few minutes to complete the survey, as we use this information to learn what we have done well and what we could be doing better. Thank you for trusting us with your care!    Below you will find a list of your tests from today's visit.   Labs  No results found for this or any previous visit (from the past 12 hour(s)).    Radiologic Studies  XR KNEE RIGHT (3 VIEWS)    (Results Pending)     ------------------------------------------------------------------------------------------------------------  The evaluation and treatment you received in the Emergency Department were for an urgent problem. It is important that you follow-up with a doctor, nurse practitioner, or physician assistant to:  (1) confirm your diagnosis,  (2) re-evaluation of changes in your illness and treatment, and (3) for ongoing care. Please take your discharge instructions with you when you go to your follow-up appointment.     If you have any problem arranging a follow-up appointment, contact us!  If your symptoms become worse or you do not improve as expected, please return to us. We are available 24 hours a day.     If a prescription has been provided, please fill it as soon as possible to prevent a delay in treatment. If you have any questions or reservations about taking the medication due to side effects or interactions with other medications, please call your primary care provider or contact us directly.  Again, THANK YOU for choosing us to care for YOU!  Substance Abuse Resources around [place], [facility], [facility] [address] [place] [address] [place] [phone] [facility] [address] Crisis:

## 2024-10-20 NOTE — DISCHARGE INSTRUCTIONS
Thank you!    Thank you for allowing me to care for you in the emergency department.  I sincerely hope that you are satisfied with your visit today.  It is my goal to provide you with excellent care.    Below you will find a list of your labs and imaging from your visit today if applicable. Should you have any questions regarding these results please do not hesitate to call the emergency department. Please review [employer] for a more detailed result list since the below list may not be comprehensive. Instructions on how to sign up to [employer] should be provided in this packet.    Labs -     Recent Results (from the past 12 hour(s))   CBC with Auto Differential    Collection Time: 10/19/24  9:26 PM   Result Value Ref Range    WBC 6.2 4.1 - 11.1 K/uL    RBC 4.62 4.10 - 5.70 M/uL    Hemoglobin 13.3 12.1 - 17.0 g/dL    Hematocrit 41.9 36.6 - 50.3 %    MCV 90.7 80.0 - 99.0 FL    MCH 28.8 26.0 - 34.0 PG    MCHC 31.7 30.0 - 36.5 g/dL    RDW 16.1 (H) 11.5 - 14.5 %    Platelets 124 (L) 150 - 400 K/uL    MPV 10.1 8.9 - 12.9 FL    Nucleated RBCs 0.0 0.0  WBC    nRBC 0.00 0.00 - 0.01 K/uL    Neutrophils % 63 32 - 75 %    Lymphocytes % 23 12 - 49 %    Monocytes % 8 5 - 13 %    Eosinophils % 4 0 - 7 %    Basophils % 2 (H) 0 - 1 %    Immature Granulocytes % 0 0 - 0.5 %    Neutrophils Absolute 3.9 1.8 - 8.0 K/UL    Lymphocytes Absolute 1.4 0.8 - 3.5 K/UL    Monocytes Absolute 0.5 0.0 - 1.0 K/UL    Eosinophils Absolute 0.3 0.0 - 0.4 K/UL    Basophils Absolute 0.1 0.0 - 0.1 K/UL    Immature Granulocytes Absolute 0.0 0.00 - 0.04 K/UL    Differential Type AUTOMATED     BMP    Collection Time: 10/19/24  9:26 PM   Result Value Ref Range    Sodium 135 (L) 136 - 145 mmol/L    Potassium 4.4 3.5 - 5.1 mmol/L    Chloride 105 97 - 108 mmol/L    CO2 23 21 - 32 mmol/L    Anion Gap 7 2 - 12 mmol/L    Glucose 76 65 - 100 mg/dL    BUN 11 6 - 20 mg/dL    Creatinine 0.67 (L) 0.70 - 1.30 mg/dL    BUN/Creatinine Ratio 16 12 - 20      Est, Glom

## 2024-10-20 NOTE — ED PROVIDER NOTES
[facility] EMERGENCY DEPT  EMERGENCY DEPARTMENT HISTORY AND PHYSICAL EXAM      Date: 10/19/2024  Patient Name: Baldomero Reece  MRN: [id number]  YOB: 1955  Date of evaluation: 10/19/2024  Provider: Silvestre Blanc DO   Note Started: 9:00 PM EDT 10/19/24    HISTORY OF PRESENT ILLNESS     Chief Complaint   Patient presents with    Back Pain    Alcohol Intoxication       History was provided by: Patient    HPI:Patient is 69-year-old male presents emergency room with a chief complaint of alcohol tox occasion.  Patient is unable to articulate a complaint when I evaluate him, similar to previous ED encounters.  He is wheelchair-bound, he \"does not know\" if he fell.  EMS reports patient was at a gas station and requested transport.  He has a history of alcohol abuse, diabetes, hypertension, and prior strokes.          PAST MEDICAL HISTORY   Past Medical History:  Past Medical History:   Diagnosis Date    Alcohol abuse     Below knee amputation (HCC)     Rt leg    Cerebral artery occlusion with cerebral infarction (HCC)     Diabetes mellitus (HCC)     Hypertension        Past Surgical History:  Past Surgical History:   Procedure Laterality Date    FOOT DEBRIDEMENT Left 1/2/2024    LEFT FOOT INCISION AND DRAINAGE with misonix , First metatarsal head amputation and left fifth toe amputation, skin graft application left foot performed by Percy Salazar DPM at [facility] MAIN OR    LEG AMPUTATION BELOW KNEE Right     LOWER EXTREMITY AMPUTATION Left     transmetatarsal       Family History:  History reviewed. No pertinent family history.    Social History:  Social History     Tobacco Use    Smoking status: Every Day     Current packs/day: 2.00     Average packs/day: 2.0 packs/day for 52.8 years (105.6 ttl pk-yrs)     Types: Cigarettes     Start date: 1972    Smokeless tobacco: Never   Substance Use Topics    Alcohol use: Yes    Drug use: Yes     Types: Marijuana (Weed), Methamphetamines (Crystal Meth)       Allergies:  Allergies

## 2024-10-20 NOTE — ED NOTES
Pt. resting on stretcher, RR even and unlabored, NAD  Pt. Continues to refuse to wear NIBP and pulse ox  Siderails up x2

## 2024-10-21 NOTE — ED PROVIDER NOTES
[facility] EMERGENCY DEPT  EMERGENCY DEPARTMENT HISTORY AND PHYSICAL EXAM      Date: 10/20/2024  Patient Name: Baldomero Reece  MRN: [id number]  YOB: 1955  Date of evaluation: 10/20/2024  Provider: Iona Tadeo MD   HISTORY OF PRESENT ILLNESS     Chief Complaint   Patient presents with    Mental Health Problem       History Provided By: Patient    HPI: Baldomero Reece is a 69 y.o. male past medical history of alcohol use, stroke diabetes and hypertension presents for evaluation for alcohol use disorder.  Patient states he frequently drinks and would like to stop drinking.  He was seen here earlier for the same and was provided resources however patient states he is not able to use these resources and would prefer inpatient management.  Denies drinking since discharge from the hospital earlier today.  Also reports nontraumatic right knee pain.      PAST MEDICAL HISTORY   Past Medical History:  Past Medical History:   Diagnosis Date    Alcohol abuse     Below knee amputation (HCC)     Rt leg    Cerebral artery occlusion with cerebral infarction (HCC)     Diabetes mellitus (HCC)     Hypertension        Past Surgical History:  Past Surgical History:   Procedure Laterality Date    FOOT DEBRIDEMENT Left 1/2/2024    LEFT FOOT INCISION AND DRAINAGE with misonix , First metatarsal head amputation and left fifth toe amputation, skin graft application left foot performed by Percy Salazar DPM at [facility] MAIN OR    LEG AMPUTATION BELOW KNEE Right     LOWER EXTREMITY AMPUTATION Left     transmetatarsal       Family History:  No family history on file.    Social History:  Social History     Tobacco Use    Smoking status: Every Day     Current packs/day: 2.00     Average packs/day: 2.0 packs/day for 52.8 years (105.6 ttl pk-yrs)     Types: Cigarettes     Start date: 1972    Smokeless tobacco: Never   Substance Use Topics    Alcohol use: Yes    Drug use: Yes     Types: Marijuana (Weed), Methamphetamines (Crystal Meth)

## 2024-10-21 NOTE — ED NOTES
Transportation has been arranged for this patient through insurance [phone].  NO ETA. Wheelchair accessible has been requested.  Trip number [id number]

## 2024-10-21 NOTE — ED NOTES
0200     Called access to care for update.     States \"can take up to 3 hours\"     Representative said the ride was booked at 2100 however documentation with trip # done at 1925.    Address cab was set up to Is a bus station, states they will not take patient to anything but a home or a hotel .    Hotel across the street located and address given to dispatch. Patient is wheelchair bound and able to roll himself that distance to the [employer] station where he is trying to go.       After providing hotel info to dispatch they then stated \"it must be a shelter or a home.. no hotels\"     Homeless shelters researched and closest shelter address to bus (within one block) provided. Dispatch states \"it must during business hours, cannot take to shelter that is closed/after hours\"     [facility] located on [address] in [place] website states 24/7 services available.     Trip number same.     States she will call back with ETA and this address is submitted. \

## 2024-10-27 ENCOUNTER — APPOINTMENT (OUTPATIENT)
Facility: HOSPITAL | Age: 69
End: 2024-10-27
Payer: MEDICAID

## 2024-10-27 ENCOUNTER — HOSPITAL ENCOUNTER (EMERGENCY)
Facility: HOSPITAL | Age: 69
Discharge: HOME | End: 2024-10-27
Attending: STUDENT IN AN ORGANIZED HEALTH CARE EDUCATION/TRAINING PROGRAM
Payer: MEDICAID

## 2024-10-27 VITALS
DIASTOLIC BLOOD PRESSURE: 60 MMHG | OXYGEN SATURATION: 90 % | SYSTOLIC BLOOD PRESSURE: 131 MMHG | HEART RATE: 101 BPM | WEIGHT: 175 LBS | RESPIRATION RATE: 18 BRPM | TEMPERATURE: 98.1 F | HEIGHT: 70 IN | BODY MASS INDEX: 25.05 KG/M2
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
## 2024-10-27 DIAGNOSIS — Z87.898 HISTORY OF ALCOHOL USE: ICD-10-CM

## 2024-10-27 DIAGNOSIS — S00.93XA CONTUSION OF HEAD, UNSPECIFIED PART OF HEAD, INITIAL ENCOUNTER: ICD-10-CM

## 2024-10-27 LAB
ANION GAP SERPL CALC-SCNC: 5 MMOL/L (ref 2–12)
BASOPHILS # BLD: 0.1 K/UL (ref 0–0.1)
BASOPHILS NFR BLD: 2 % (ref 0–1)
BUN SERPL-MCNC: 12 MG/DL (ref 6–20)
BUN/CREAT SERPL: 15 (ref 12–20)
CA-I BLD-MCNC: 8.4 MG/DL (ref 8.5–10.1)
CHLORIDE SERPL-SCNC: 104 MMOL/L (ref 97–108)
CK SERPL-CCNC: 264 U/L (ref 39–308)
CO2 SERPL-SCNC: 28 MMOL/L (ref 21–32)
CREAT SERPL-MCNC: 0.8 MG/DL (ref 0.7–1.3)
DIFFERENTIAL METHOD BLD: ABNORMAL
EOSINOPHIL # BLD: 0.1 K/UL (ref 0–0.4)
EOSINOPHIL NFR BLD: 2 % (ref 0–7)
ERYTHROCYTE [DISTWIDTH] IN BLOOD BY AUTOMATED COUNT: 15.7 % (ref 11.5–14.5)
GLUCOSE SERPL-MCNC: 86 MG/DL (ref 65–100)
HCT VFR BLD AUTO: 43.5 % (ref 36.6–50.3)
HGB BLD-MCNC: 13.8 G/DL (ref 12.1–17)
IMM GRANULOCYTES # BLD AUTO: 0 K/UL (ref 0–0.04)
IMM GRANULOCYTES NFR BLD AUTO: 1 % (ref 0–0.5)
LYMPHOCYTES # BLD: 0.6 K/UL (ref 0.8–3.5)
LYMPHOCYTES NFR BLD: 14 % (ref 12–49)
MCH RBC QN AUTO: 28.3 PG (ref 26–34)
MCHC RBC AUTO-ENTMCNC: 31.7 G/DL (ref 30–36.5)
MCV RBC AUTO: 89.1 FL (ref 80–99)
MONOCYTES # BLD: 0.4 K/UL (ref 0–1)
MONOCYTES NFR BLD: 10 % (ref 5–13)
NEUTS SEG # BLD: 2.9 K/UL (ref 1.8–8)
NEUTS SEG NFR BLD: 71 % (ref 32–75)
NRBC # BLD: 0 K/UL (ref 0–0.01)
NRBC BLD-RTO: 0 PER 100 WBC
PLATELET # BLD AUTO: 112 K/UL (ref 150–400)
PMV BLD AUTO: 10.9 FL (ref 8.9–12.9)
POTASSIUM SERPL-SCNC: 4.2 MMOL/L (ref 3.5–5.1)
RBC # BLD AUTO: 4.88 M/UL (ref 4.1–5.7)
RBC MORPH BLD: ABNORMAL
SODIUM SERPL-SCNC: 137 MMOL/L (ref 136–145)
WBC # BLD AUTO: 4.1 K/UL (ref 4.1–11.1)

## 2024-10-27 PROCEDURE — 70450 CT HEAD/BRAIN W/O DYE: CPT

## 2024-10-27 PROCEDURE — 82550 ASSAY OF CK (CPK): CPT

## 2024-10-27 PROCEDURE — 85025 COMPLETE CBC W/AUTO DIFF WBC: CPT

## 2024-10-27 PROCEDURE — 99284 EMERGENCY DEPT VISIT MOD MDM: CPT

## 2024-10-27 PROCEDURE — 6370000000 HC RX 637 (ALT 250 FOR IP): Performed by: STUDENT IN AN ORGANIZED HEALTH CARE EDUCATION/TRAINING PROGRAM

## 2024-10-27 PROCEDURE — 80048 BASIC METABOLIC PNL TOTAL CA: CPT

## 2024-10-27 PROCEDURE — 36415 COLL VENOUS BLD VENIPUNCTURE: CPT

## 2024-10-27 PROCEDURE — 2580000003 HC RX 258: Performed by: STUDENT IN AN ORGANIZED HEALTH CARE EDUCATION/TRAINING PROGRAM

## 2024-10-27 RX ORDER — 0.9 % SODIUM CHLORIDE 0.9 %
500 INTRAVENOUS SOLUTION INTRAVENOUS ONCE
Status: COMPLETED | OUTPATIENT
Start: 2024-10-27 | End: 2024-10-27

## 2024-10-27 RX ORDER — CHLORDIAZEPOXIDE HYDROCHLORIDE 25 MG/1
25 CAPSULE, GELATIN COATED ORAL ONCE
Status: COMPLETED | OUTPATIENT
Start: 2024-10-27 | End: 2024-10-27

## 2024-10-27 RX ADMIN — CHLORDIAZEPOXIDE HYDROCHLORIDE 25 MG: 25 CAPSULE ORAL at 20:48

## 2024-10-27 RX ADMIN — SODIUM CHLORIDE 500 ML: 9 INJECTION, SOLUTION INTRAVENOUS at 20:49

## 2024-10-27 ASSESSMENT — PAIN - FUNCTIONAL ASSESSMENT: PAIN_FUNCTIONAL_ASSESSMENT: 0-10

## 2024-10-27 ASSESSMENT — PAIN DESCRIPTION - LOCATION: LOCATION: BACK

## 2024-10-27 ASSESSMENT — PAIN SCALES - GENERAL: PAINLEVEL_OUTOF10: 10

## 2024-10-27 NOTE — ED NOTES
Monitor reading 85% O2 on room air, pt placed on 2L NC and given call bell. Bed left in lowest and locked position, lights dimmed per pt request.

## 2024-10-27 NOTE — ED TRIAGE NOTES
Pt arrives via EMS with a c/o lower back pain x2 days. Pt states he fell out of his wheelchair last night.

## 2024-10-27 NOTE — ED PROVIDER NOTES
EMERGENCY DEPARTMENT HISTORY AND PHYSICAL EXAM      Date: 10/27/2024  Patient Name: Baldomero Reece  MRN: [id number]  YOB: 1955  Date of evaluation: 10/27/2024  Provider: Baldomero Skaggs MD     History of Present Illness     Chief Complaint   Patient presents with    Back Pain       History Provided By: Patient    HPI: Baldomero Reece, 69 y.o. male with past medical history as listed and reviewed below presenting to the ED for evaluation of alcohol abuse, fall.  Patient is wheelchair dependent, due to an amputation.  He states he tripped over his wheelchair 2 days ago and was laying on the ground for 2 days, he reports that someone found him today and called an ambulance.  He notes he defecated on himself and has been unable to move so he has a pain sores in his scrotum area.  He also reports he has not had any alcohol to drink recently. EMS reports patient on ground for shorter time.  Patient also remarked to multiple ED staff that he is purely here so he can be cleaned up after he defecated on himself  Medical History     Past Medical History:  Past Medical History:   Diagnosis Date    Alcohol abuse     Below knee amputation (HCC)     Rt leg    Cerebral artery occlusion with cerebral infarction (HCC)     Diabetes mellitus (HCC)     Hypertension        Past Surgical History:  Past Surgical History:   Procedure Laterality Date    FOOT DEBRIDEMENT Left 1/2/2024    LEFT FOOT INCISION AND DRAINAGE with misonix , First metatarsal head amputation and left fifth toe amputation, skin graft application left foot performed by Percy Salazar DPM at [facility] MAIN OR    LEG AMPUTATION BELOW KNEE Right     LOWER EXTREMITY AMPUTATION Left     transmetatarsal       Family History:  No family history on file.    Social History:  Social History     Tobacco Use    Smoking status: Every Day     Current packs/day: 2.00     Average packs/day: 2.0 packs/day for 52.8 years (105.6 ttl pk-yrs)     Types: Cigarettes     Start

## 2024-10-27 NOTE — ED NOTES
Pt is homeless, states he left [facility] shelter because he \"don't want to stay\".     Pt also states he waits to have a bowel movement until he comes to the ER  \"so yall can clean me up\". Pt states he doesn't feel like cleaning himself.

## 2024-10-28 NOTE — DISCHARGE INSTRUCTIONS
Thank you!    Thank you for allowing me to care for you in the emergency department.  I sincerely hope that you are satisfied with your visit today.  It is my goal to provide you with excellent care.    Below you will find a list of your labs and imaging from your visit today if applicable. Should you have any questions regarding these results please do not hesitate to call the emergency department. Please review [employer] for a more detailed result list since the below list may not be comprehensive. Instructions on how to sign up to [employer] should be provided in this packet.    Labs -     Recent Results (from the past 12 hour(s))   CBC with Auto Differential    Collection Time: 10/27/24  8:04 PM   Result Value Ref Range    WBC 4.1 4.1 - 11.1 K/uL    RBC 4.88 4.10 - 5.70 M/uL    Hemoglobin 13.8 12.1 - 17.0 g/dL    Hematocrit 43.5 36.6 - 50.3 %    MCV 89.1 80.0 - 99.0 FL    MCH 28.3 26.0 - 34.0 PG    MCHC 31.7 30.0 - 36.5 g/dL    RDW 15.7 (H) 11.5 - 14.5 %    Platelets 112 (L) 150 - 400 K/uL    MPV 10.9 8.9 - 12.9 FL    Nucleated RBCs 0.0 0.0  WBC    nRBC 0.00 0.00 - 0.01 K/uL    Neutrophils % PENDING %    Lymphocytes % PENDING %    Monocytes % PENDING %    Eosinophils % PENDING %    Basophils % PENDING %    Immature Granulocytes % PENDING %    Neutrophils Absolute PENDING K/UL    Lymphocytes Absolute PENDING K/UL    Monocytes Absolute PENDING K/UL    Eosinophils Absolute PENDING K/UL    Basophils Absolute PENDING K/UL    Immature Granulocytes Absolute PENDING K/UL    Differential Type PENDING    BMP    Collection Time: 10/27/24  8:04 PM   Result Value Ref Range    Sodium 137 136 - 145 mmol/L    Potassium 4.2 3.5 - 5.1 mmol/L    Chloride 104 97 - 108 mmol/L    CO2 28 21 - 32 mmol/L    Anion Gap 5 2 - 12 mmol/L    Glucose 86 65 - 100 mg/dL    BUN 12 6 - 20 mg/dL    Creatinine 0.80 0.70 - 1.30 mg/dL    BUN/Creatinine Ratio 15 12 - 20      Est, Glom Filt Rate >90 >60 ml/min/1.73m2    Calcium 8.4 (L) 8.5 - 10.1

## 2024-10-31 ENCOUNTER — HOSPITAL ENCOUNTER (INPATIENT)
Facility: HOSPITAL | Age: 69
LOS: 8 days | Discharge: NURSING, REHAB OR LONG-TERM CARE | DRG: 383 | End: 2024-11-08
Attending: EMERGENCY MEDICINE | Admitting: INTERNAL MEDICINE
Payer: MEDICAID

## 2024-10-31 DIAGNOSIS — B37.2 INTERTRIGINOUS CANDIDIASIS: Primary | ICD-10-CM

## 2024-10-31 DIAGNOSIS — F10.20 ALCOHOLISM (HCC): ICD-10-CM

## 2024-10-31 LAB
ALBUMIN SERPL-MCNC: 3.2 G/DL (ref 3.5–5)
ALBUMIN/GLOB SERPL: 0.8 (ref 1.1–2.2)
ALP SERPL-CCNC: 106 U/L (ref 45–117)
ALT SERPL-CCNC: 33 U/L (ref 12–78)
ANION GAP SERPL CALC-SCNC: 7 MMOL/L (ref 2–12)
AST SERPL W P-5'-P-CCNC: 63 U/L (ref 15–37)
BASOPHILS # BLD: 0.1 K/UL (ref 0–0.1)
BASOPHILS NFR BLD: 2 % (ref 0–1)
BILIRUB SERPL-MCNC: 0.6 MG/DL (ref 0.2–1)
BUN SERPL-MCNC: 11 MG/DL (ref 6–20)
BUN/CREAT SERPL: 20 (ref 12–20)
CA-I BLD-MCNC: 8.4 MG/DL (ref 8.5–10.1)
CHLORIDE SERPL-SCNC: 108 MMOL/L (ref 97–108)
CO2 SERPL-SCNC: 21 MMOL/L (ref 21–32)
CREAT SERPL-MCNC: 0.56 MG/DL (ref 0.7–1.3)
DIFFERENTIAL METHOD BLD: ABNORMAL
EOSINOPHIL # BLD: 0.2 K/UL (ref 0–0.4)
EOSINOPHIL NFR BLD: 6 % (ref 0–7)
ERYTHROCYTE [DISTWIDTH] IN BLOOD BY AUTOMATED COUNT: 15.4 % (ref 11.5–14.5)
ETHANOL SERPL-MCNC: 184 MG/DL (ref 0–0.08)
GLOBULIN SER CALC-MCNC: 4 G/DL (ref 2–4)
GLUCOSE SERPL-MCNC: 80 MG/DL (ref 65–100)
HCT VFR BLD AUTO: 42.3 % (ref 36.6–50.3)
HGB BLD-MCNC: 13.4 G/DL (ref 12.1–17)
IMM GRANULOCYTES # BLD AUTO: 0 K/UL (ref 0–0.04)
IMM GRANULOCYTES NFR BLD AUTO: 0 % (ref 0–0.5)
LYMPHOCYTES # BLD: 1.1 K/UL (ref 0.8–3.5)
LYMPHOCYTES NFR BLD: 28 % (ref 12–49)
MCH RBC QN AUTO: 28.6 PG (ref 26–34)
MCHC RBC AUTO-ENTMCNC: 31.7 G/DL (ref 30–36.5)
MCV RBC AUTO: 90.2 FL (ref 80–99)
MONOCYTES # BLD: 0.5 K/UL (ref 0–1)
MONOCYTES NFR BLD: 11 % (ref 5–13)
NEUTS SEG # BLD: 2.2 K/UL (ref 1.8–8)
NEUTS SEG NFR BLD: 53 % (ref 32–75)
NRBC # BLD: 0 K/UL (ref 0–0.01)
NRBC BLD-RTO: 0 PER 100 WBC
PLATELET # BLD AUTO: 94 K/UL (ref 150–400)
PMV BLD AUTO: 10.4 FL (ref 8.9–12.9)
POTASSIUM SERPL-SCNC: 5.1 MMOL/L (ref 3.5–5.1)
PROT SERPL-MCNC: 7.2 G/DL (ref 6.4–8.2)
RBC # BLD AUTO: 4.69 M/UL (ref 4.1–5.7)
SODIUM SERPL-SCNC: 136 MMOL/L (ref 136–145)
WBC # BLD AUTO: 4.1 K/UL (ref 4.1–11.1)

## 2024-10-31 PROCEDURE — 36415 COLL VENOUS BLD VENIPUNCTURE: CPT

## 2024-10-31 PROCEDURE — 6370000000 HC RX 637 (ALT 250 FOR IP): Performed by: EMERGENCY MEDICINE

## 2024-10-31 PROCEDURE — 6360000002 HC RX W HCPCS: Performed by: EMERGENCY MEDICINE

## 2024-10-31 PROCEDURE — 1100000000 HC RM PRIVATE

## 2024-10-31 PROCEDURE — 99285 EMERGENCY DEPT VISIT HI MDM: CPT

## 2024-10-31 PROCEDURE — 82077 ASSAY SPEC XCP UR&BREATH IA: CPT

## 2024-10-31 PROCEDURE — 2580000003 HC RX 258: Performed by: INTERNAL MEDICINE

## 2024-10-31 PROCEDURE — 87040 BLOOD CULTURE FOR BACTERIA: CPT

## 2024-10-31 PROCEDURE — 6370000000 HC RX 637 (ALT 250 FOR IP): Performed by: INTERNAL MEDICINE

## 2024-10-31 PROCEDURE — 85025 COMPLETE CBC W/AUTO DIFF WBC: CPT

## 2024-10-31 PROCEDURE — 80053 COMPREHEN METABOLIC PANEL: CPT

## 2024-10-31 PROCEDURE — 6360000002 HC RX W HCPCS: Performed by: INTERNAL MEDICINE

## 2024-10-31 PROCEDURE — 2580000003 HC RX 258: Performed by: EMERGENCY MEDICINE

## 2024-10-31 RX ORDER — SODIUM CHLORIDE 0.9 % (FLUSH) 0.9 %
5-40 SYRINGE (ML) INJECTION EVERY 12 HOURS SCHEDULED
Status: DISCONTINUED | OUTPATIENT
Start: 2024-10-31 | End: 2024-11-08 | Stop reason: HOSPADM

## 2024-10-31 RX ORDER — LORAZEPAM 1 MG/1
4 TABLET ORAL
Status: DISCONTINUED | OUTPATIENT
Start: 2024-10-31 | End: 2024-11-08 | Stop reason: HOSPADM

## 2024-10-31 RX ORDER — LORAZEPAM 2 MG/ML
3 INJECTION INTRAMUSCULAR
Status: DISCONTINUED | OUTPATIENT
Start: 2024-10-31 | End: 2024-11-08 | Stop reason: HOSPADM

## 2024-10-31 RX ORDER — MAGNESIUM SULFATE IN WATER 40 MG/ML
2000 INJECTION, SOLUTION INTRAVENOUS PRN
Status: DISCONTINUED | OUTPATIENT
Start: 2024-10-31 | End: 2024-11-08 | Stop reason: HOSPADM

## 2024-10-31 RX ORDER — CLINDAMYCIN IN PERCENT DEXTROSE 6 MG/ML
300 INJECTION, SOLUTION INTRAVENOUS EVERY 6 HOURS
Status: DISCONTINUED | OUTPATIENT
Start: 2024-10-31 | End: 2024-11-01

## 2024-10-31 RX ORDER — GAUZE BANDAGE 2" X 2"
100 BANDAGE TOPICAL DAILY
Status: DISCONTINUED | OUTPATIENT
Start: 2024-10-31 | End: 2024-11-08 | Stop reason: HOSPADM

## 2024-10-31 RX ORDER — FOLIC ACID 1 MG/1
1 TABLET ORAL DAILY
Status: DISCONTINUED | OUTPATIENT
Start: 2024-10-31 | End: 2024-11-08 | Stop reason: HOSPADM

## 2024-10-31 RX ORDER — LORAZEPAM 1 MG/1
1 TABLET ORAL
Status: DISCONTINUED | OUTPATIENT
Start: 2024-10-31 | End: 2024-11-08 | Stop reason: HOSPADM

## 2024-10-31 RX ORDER — ONDANSETRON 2 MG/ML
4 INJECTION INTRAMUSCULAR; INTRAVENOUS EVERY 6 HOURS PRN
Status: DISCONTINUED | OUTPATIENT
Start: 2024-10-31 | End: 2024-11-08 | Stop reason: HOSPADM

## 2024-10-31 RX ORDER — ATORVASTATIN CALCIUM 20 MG/1
20 TABLET, FILM COATED ORAL NIGHTLY
Status: DISCONTINUED | OUTPATIENT
Start: 2024-10-31 | End: 2024-11-08 | Stop reason: HOSPADM

## 2024-10-31 RX ORDER — SODIUM CHLORIDE 0.9 % (FLUSH) 0.9 %
5-40 SYRINGE (ML) INJECTION PRN
Status: DISCONTINUED | OUTPATIENT
Start: 2024-10-31 | End: 2024-11-08 | Stop reason: HOSPADM

## 2024-10-31 RX ORDER — POTASSIUM CHLORIDE 1500 MG/1
40 TABLET, EXTENDED RELEASE ORAL PRN
Status: DISCONTINUED | OUTPATIENT
Start: 2024-10-31 | End: 2024-11-08 | Stop reason: HOSPADM

## 2024-10-31 RX ORDER — LORAZEPAM 1 MG/1
3 TABLET ORAL
Status: DISCONTINUED | OUTPATIENT
Start: 2024-10-31 | End: 2024-11-08 | Stop reason: HOSPADM

## 2024-10-31 RX ORDER — SODIUM CHLORIDE 9 MG/ML
INJECTION, SOLUTION INTRAVENOUS PRN
Status: DISCONTINUED | OUTPATIENT
Start: 2024-10-31 | End: 2024-11-08 | Stop reason: HOSPADM

## 2024-10-31 RX ORDER — POLYETHYLENE GLYCOL 3350 17 G/17G
17 POWDER, FOR SOLUTION ORAL DAILY PRN
Status: DISCONTINUED | OUTPATIENT
Start: 2024-10-31 | End: 2024-11-08 | Stop reason: HOSPADM

## 2024-10-31 RX ORDER — LORAZEPAM 2 MG/ML
2 INJECTION INTRAMUSCULAR
Status: DISCONTINUED | OUTPATIENT
Start: 2024-10-31 | End: 2024-11-08 | Stop reason: HOSPADM

## 2024-10-31 RX ORDER — POTASSIUM CHLORIDE 7.45 MG/ML
10 INJECTION INTRAVENOUS PRN
Status: DISCONTINUED | OUTPATIENT
Start: 2024-10-31 | End: 2024-11-08 | Stop reason: HOSPADM

## 2024-10-31 RX ORDER — NICOTINE 21 MG/24HR
1 PATCH, TRANSDERMAL 24 HOURS TRANSDERMAL DAILY
Status: DISCONTINUED | OUTPATIENT
Start: 2024-10-31 | End: 2024-11-08 | Stop reason: HOSPADM

## 2024-10-31 RX ORDER — MULTIVITAMIN WITH IRON
1 TABLET ORAL DAILY
Status: DISCONTINUED | OUTPATIENT
Start: 2024-10-31 | End: 2024-11-08 | Stop reason: HOSPADM

## 2024-10-31 RX ORDER — ONDANSETRON 4 MG/1
4 TABLET, ORALLY DISINTEGRATING ORAL EVERY 8 HOURS PRN
Status: DISCONTINUED | OUTPATIENT
Start: 2024-10-31 | End: 2024-11-08 | Stop reason: HOSPADM

## 2024-10-31 RX ORDER — LORAZEPAM 2 MG/ML
4 INJECTION INTRAMUSCULAR
Status: DISCONTINUED | OUTPATIENT
Start: 2024-10-31 | End: 2024-11-08 | Stop reason: HOSPADM

## 2024-10-31 RX ORDER — BUDESONIDE AND FORMOTEROL FUMARATE DIHYDRATE 160; 4.5 UG/1; UG/1
2 AEROSOL RESPIRATORY (INHALATION) 2 TIMES DAILY
Status: DISCONTINUED | OUTPATIENT
Start: 2024-10-31 | End: 2024-11-08 | Stop reason: HOSPADM

## 2024-10-31 RX ORDER — FLUCONAZOLE 100 MG/1
200 TABLET ORAL
Status: COMPLETED | OUTPATIENT
Start: 2024-10-31 | End: 2024-10-31

## 2024-10-31 RX ORDER — ASPIRIN 81 MG/1
81 TABLET, CHEWABLE ORAL DAILY
Status: DISCONTINUED | OUTPATIENT
Start: 2024-10-31 | End: 2024-11-08 | Stop reason: HOSPADM

## 2024-10-31 RX ORDER — ENOXAPARIN SODIUM 100 MG/ML
40 INJECTION SUBCUTANEOUS DAILY
Status: DISCONTINUED | OUTPATIENT
Start: 2024-10-31 | End: 2024-11-01

## 2024-10-31 RX ORDER — CLOTRIMAZOLE 1 %
CREAM (GRAM) TOPICAL
Qty: 28 G | Refills: 1 | Status: SHIPPED | OUTPATIENT
Start: 2024-10-31 | End: 2024-10-31 | Stop reason: CLARIF

## 2024-10-31 RX ORDER — LORAZEPAM 1 MG/1
2 TABLET ORAL
Status: DISCONTINUED | OUTPATIENT
Start: 2024-10-31 | End: 2024-11-08 | Stop reason: HOSPADM

## 2024-10-31 RX ORDER — DIAZEPAM 5 MG/1
5 TABLET ORAL 3 TIMES DAILY
Status: COMPLETED | OUTPATIENT
Start: 2024-10-31 | End: 2024-11-03

## 2024-10-31 RX ORDER — LORAZEPAM 2 MG/ML
1 INJECTION INTRAMUSCULAR
Status: DISCONTINUED | OUTPATIENT
Start: 2024-10-31 | End: 2024-11-08 | Stop reason: HOSPADM

## 2024-10-31 RX ORDER — IPRATROPIUM BROMIDE AND ALBUTEROL SULFATE 2.5; .5 MG/3ML; MG/3ML
1 SOLUTION RESPIRATORY (INHALATION) EVERY 4 HOURS PRN
Status: DISCONTINUED | OUTPATIENT
Start: 2024-10-31 | End: 2024-11-08 | Stop reason: HOSPADM

## 2024-10-31 RX ADMIN — ASPIRIN 81 MG 81 MG: 81 TABLET ORAL at 17:50

## 2024-10-31 RX ADMIN — CLINDAMYCIN PHOSPHATE 300 MG: 300 INJECTION, SOLUTION INTRAVENOUS at 18:01

## 2024-10-31 RX ADMIN — THERA TABS 1 TABLET: TAB at 17:49

## 2024-10-31 RX ADMIN — FOLIC ACID 1 MG: 1 TABLET ORAL at 17:50

## 2024-10-31 RX ADMIN — FLUCONAZOLE 200 MG: 100 TABLET ORAL at 12:58

## 2024-10-31 RX ADMIN — DIAZEPAM 5 MG: 5 TABLET ORAL at 23:30

## 2024-10-31 RX ADMIN — ATORVASTATIN CALCIUM 20 MG: 20 TABLET, FILM COATED ORAL at 23:30

## 2024-10-31 RX ADMIN — LORAZEPAM 4 MG: 2 INJECTION INTRAMUSCULAR; INTRAVENOUS at 17:50

## 2024-10-31 RX ADMIN — SODIUM CHLORIDE: 9 INJECTION, SOLUTION INTRAVENOUS at 17:59

## 2024-10-31 RX ADMIN — DIAZEPAM 5 MG: 5 TABLET ORAL at 17:49

## 2024-10-31 RX ADMIN — WATER 2000 MG: 1 INJECTION INTRAMUSCULAR; INTRAVENOUS; SUBCUTANEOUS at 17:05

## 2024-10-31 RX ADMIN — THIAMINE HCL TAB 100 MG 100 MG: 100 TAB at 17:50

## 2024-10-31 RX ADMIN — CLINDAMYCIN PHOSPHATE 300 MG: 300 INJECTION, SOLUTION INTRAVENOUS at 22:42

## 2024-10-31 ASSESSMENT — PAIN SCALES - GENERAL
PAINLEVEL_OUTOF10: 8
PAINLEVEL_OUTOF10: 10
PAINLEVEL_OUTOF10: 0

## 2024-10-31 ASSESSMENT — LIFESTYLE VARIABLES
HOW OFTEN DO YOU HAVE A DRINK CONTAINING ALCOHOL: 4 OR MORE TIMES A WEEK
HOW MANY STANDARD DRINKS CONTAINING ALCOHOL DO YOU HAVE ON A TYPICAL DAY: 5 OR 6

## 2024-10-31 ASSESSMENT — PAIN DESCRIPTION - RADICULAR PAIN: RADICULAR_PAIN: ABSENT

## 2024-10-31 ASSESSMENT — PAIN - FUNCTIONAL ASSESSMENT: PAIN_FUNCTIONAL_ASSESSMENT: 0-10

## 2024-10-31 NOTE — ED NOTES
ED TO INPATIENT SBAR HANDOFF    Patient Name: Baldomero Reece   Preferred Name: Baldomero  : 1955  69 y.o.   Family/Caregiver Present: no   Code Status Order: Full Code  PO Status: NPO:No  Telemetry Order:   C-SSRS: Risk of Suicide: No Risk  Sitter no   Restraints:   Sepsis Risk Score     Situation  Chief Complaint   Patient presents with    Back Pain     Brief Description of Patient's Condition: Pt arrives with complaint of back pain and left leg pain. Pt also complains of blood in stool.  Mental Status: oriented, alert, and coherent  Arrived from:  Imaging:   No orders to display     Abnormal labs:   Abnormal Labs Reviewed   COMPREHENSIVE METABOLIC PANEL - Abnormal; Notable for the following components:       Result Value    Creatinine 0.56 (*)     Calcium 8.4 (*)     AST 63 (*)     Albumin 3.2 (*)     Albumin/Globulin Ratio 0.8 (*)     All other components within normal limits   ETHANOL - Abnormal; Notable for the following components:    Ethanol Lvl 184 (*)     All other components within normal limits   CBC WITH AUTO DIFFERENTIAL - Abnormal; Notable for the following components:    RDW 15.4 (*)     Platelets 94 (*)     Basophils % 2 (*)     All other components within normal limits       Background  Allergies:   Allergies   Allergen Reactions    Penicillins Swelling     Swelling of hands and feet    Methadone Other (See Comments)     Does not like the way it makes him feel     History:   Past Medical History:   Diagnosis Date    Alcohol abuse     Below knee amputation (HCC)     Rt leg    Cerebral artery occlusion with cerebral infarction (HCC)     Diabetes mellitus (HCC)     Hypertension        Assessment  Vitals: MEWS Score: 3  Level of Consciousness: Alert (0)   Vitals:    10/31/24 1118 10/31/24 1545   BP: (!) 100/59 (!) 159/89   Pulse: (!) 104 99   Resp: 16 18   Temp: 98.1 °F (36.7 °C) 98.3 °F (36.8 °C)   TempSrc: Oral Oral   SpO2: 91% 98%   Weight: 68 kg (150 lb)    Height: 1.753 m (5' 9\")    Or   LORazepam (ATIVAN) injection 3 mg (has no administration in time range)     Or   LORazepam (ATIVAN) tablet 4 mg (has no administration in time range)     Or   LORazepam (ATIVAN) injection 4 mg (has no administration in time range)   diazePAM (VALIUM) tablet 5 mg (has no administration in time range)   folic acid (FOLVITE) tablet 1 mg (has no administration in time range)   multivitamin 1 tablet (has no administration in time range)   miconazole (MICOTIN) 2 % powder (has no administration in time range)   clindamycin (CLEOCIN) 300 mg in dextrose 5% 50 mL IVPB (has no administration in time range)   fluconazole (DIFLUCAN) tablet 200 mg (200 mg Oral Given 10/31/24 1258)     Last documented pain medication administration:   Pertinent or High Risk Medications/Drips: no   If Yes, please provide details:   Blood Product Administration: no  If Yes, please provide details:   Process Protocols/Bundles: N/A    Recommendation  Incomplete STAT orders:   Overdue Medications:   Patient Belongings:   Additional Comments:   If any further questions, please call Sending RN at [phone]      Admitting Unit Notification  Name of person notified and time: Noelle at 16:46      Electronically signed by: Electronically signed by Vannessa Cisneros RN on 10/31/2024 at 4:44 PM

## 2024-10-31 NOTE — PROGRESS NOTES
Received Order for Telemetry     Baldomero Reece   1955   [id number]   Alcoholism (HCC) [F10.20]  Intertriginous candidiasis [B37.2]   Aurora Chatman MD     Tele Box # [id number] placed on patient at  1715 pm  ER Room # 11  Admitting to Room 587  Verified with Primary Nurse Unknown at  1905 pm

## 2024-10-31 NOTE — DISCHARGE INSTRUCTIONS
Thank you for choosing our Emergency Department for your care.  It is our privilege to care for you in your time of need.  In the next several days, you may receive a survey via email or mailed to your home about your experience with our team.  We would greatly appreciate you taking a few minutes to complete the survey, as we use this information to learn what we have done well and what we could be doing better. Thank you for trusting us with your care!    Below you will find a list of your tests from today's visit.   Labs  Recent Results (from the past 12 hour(s))   Comprehensive Metabolic Panel    Collection Time: 10/31/24 12:34 PM   Result Value Ref Range    Sodium 136 136 - 145 mmol/L    Potassium 5.1 3.5 - 5.1 mmol/L    Chloride 108 97 - 108 mmol/L    CO2 21 21 - 32 mmol/L    Anion Gap 7 2 - 12 mmol/L    Glucose 80 65 - 100 mg/dL    BUN 11 6 - 20 mg/dL    Creatinine 0.56 (L) 0.70 - 1.30 mg/dL    BUN/Creatinine Ratio 20 12 - 20      Est, Glom Filt Rate >90 >60 ml/min/1.73m2    Calcium 8.4 (L) 8.5 - 10.1 mg/dL    Total Bilirubin 0.6 0.2 - 1.0 mg/dL    AST 63 (H) 15 - 37 U/L    ALT 33 12 - 78 U/L    Alk Phosphatase 106 45 - 117 U/L    Total Protein 7.2 6.4 - 8.2 g/dL    Albumin 3.2 (L) 3.5 - 5.0 g/dL    Globulin 4.0 2.0 - 4.0 g/dL    Albumin/Globulin Ratio 0.8 (L) 1.1 - 2.2     ETOH    Collection Time: 10/31/24 12:34 PM   Result Value Ref Range    Ethanol Lvl 184 (H) <10 mg/dL   CBC with Auto Differential    Collection Time: 10/31/24  1:10 PM   Result Value Ref Range    WBC 4.1 4.1 - 11.1 K/uL    RBC 4.69 4.10 - 5.70 M/uL    Hemoglobin 13.4 12.1 - 17.0 g/dL    Hematocrit 42.3 36.6 - 50.3 %    MCV 90.2 80.0 - 99.0 FL    MCH 28.6 26.0 - 34.0 PG    MCHC 31.7 30.0 - 36.5 g/dL    RDW 15.4 (H) 11.5 - 14.5 %    Platelets 94 (L) 150 - 400 K/uL    MPV 10.4 8.9 - 12.9 FL    Nucleated RBCs 0.0 0.0  WBC    nRBC 0.00 0.00 - 0.01 K/uL    Neutrophils % 53 32 - 75 %    Lymphocytes % 28 12 - 49 %    Monocytes % 11 5 -

## 2024-10-31 NOTE — H&P
History & Physical    Primary Care Provider: Evonne Wu MD  Source of Information: Patient/family     Chief complaint:   Chief Complaint   Patient presents with    Back Pain        History of Presenting Illness:   Baldomero Reece is a 69 y.o. man with alcohol use disorder, history of CVA, COPD, hyperlipidemia, alcoholic cirrhosis and hep C NC positive, peripheral vascular disease, wheelchair-bound and homeless who presented to the emergency room complaining of pain in his buttock and posterior left leg.  Apparently patient had fallen out of his wheelchair about 4 days ago and was on the ground for 2 days.  He was subsequently brought here and workup was negative including normal CK.  He declined any significant workup and eventually left the ED.  He comes back today again because of pain.  No fevers or chills.  He is homeless and admits to drinking every day including this morning but says he now wants to quit and wants to come into the hospital so that he can go to rehab.  Alcohol level is 184.  He denies headache, change in vision or new focal weakness although he has been admitted several times recently for rule out stroke.  He does have evidence of old strokes but did not have a stroke on his most recent admission several weeks ago.  He reports eating okay when he can get food.  He does continue to smoke.  Denies shortness of breath but does have a congested cough at times.  Has not noticed any leg edema.  Patient was given antifungals for possible fungal infection of his buttock and groin area but also given a dose of antibiotics for possible cellulitis and hospital service was asked to admit the patient for possible cellulitis and to manage potential withdrawal as patient is stating he wishes to quit alcohol entirely.     Review of Systems:  Review of Systems   Constitutional:  Positive for appetite change and fatigue. Negative for activity change, chills and fever.   HENT:  Negative for congestion

## 2024-10-31 NOTE — PLAN OF CARE
Problem: Chronic Conditions and Co-morbidities  Goal: Patient's chronic conditions and co-morbidity symptoms are monitored and maintained or improved  Outcome: Progressing  Flowsheets (Taken 10/31/2024 1814)  Care Plan - Patient's Chronic Conditions and Co-Morbidity Symptoms are Monitored and Maintained or Improved: Monitor and assess patient's chronic conditions and comorbid symptoms for stability, deterioration, or improvement     Problem: Discharge Planning  Goal: Discharge to home or other facility with appropriate resources  Outcome: Progressing  Flowsheets (Taken 10/31/2024 1814)  Discharge to home or other facility with appropriate resources:   Identify barriers to discharge with patient and caregiver   Arrange for needed discharge resources and transportation as appropriate   Identify discharge learning needs (meds, wound care, etc)     Problem: Pain  Goal: Verbalizes/displays adequate comfort level or baseline comfort level  Outcome: Progressing  Flowsheets (Taken 10/31/2024 1814)  Verbalizes/displays adequate comfort level or baseline comfort level:   Encourage patient to monitor pain and request assistance   Assess pain using appropriate pain scale   Implement non-pharmacological measures as appropriate and evaluate response   Administer analgesics based on type and severity of pain and evaluate response     Problem: Skin/Tissue Integrity  Goal: Absence of new skin breakdown  Description: 1.  Monitor for areas of redness and/or skin breakdown  2.  Assess vascular access sites hourly  3.  Every 4-6 hours minimum:  Change oxygen saturation probe site  4.  Every 4-6 hours:  If on nasal continuous positive airway pressure, respiratory therapy assess nares and determine need for appliance change or resting period.  Outcome: Progressing  Note: The pt will remain free from developing skin breakdown      Problem: Safety - Adult  Goal: Free from fall injury  Outcome: Progressing  Flowsheets (Taken 10/31/2024

## 2024-10-31 NOTE — ED PROVIDER NOTES
[facility] EMERGENCY DEPT  EMERGENCY DEPARTMENT HISTORY AND PHYSICAL EXAM      Date: 10/31/2024  Patient Name: Baldomero Reece  MRN: [id number]  YOB: 1955  Date of evaluation: 10/31/2024  Provider: Lexa Pereira MD   Note Started: 12:14 PM EDT 10/31/24    HISTORY OF PRESENT ILLNESS     Chief Complaint   Patient presents with    Back Pain       History Provided By: Patient    HPI: Baldomero Reece is a 69-year-old male with history of CVA, diabetes, hypertension, right BKA, alcohol abuse, wheelchair dependent here complaining of back pain and leg pain.      Patient was seen in the ER 2 days ago after a fall from wheelchair.  Patient was reportedly on the ground for 2 days and someone eventually called the ambulance.  When he arrived to the hospital he reported that he was only there to be cleaned up.  Patient had screening labs which were unremarkable.    Had some sores in his scrotal area which were cleaned. Was given a dose of Librium and small fluid bolus.    PAST MEDICAL HISTORY   Past Medical History:  Past Medical History:   Diagnosis Date    Alcohol abuse     Below knee amputation (HCC)     Rt leg    Cerebral artery occlusion with cerebral infarction (HCC)     Diabetes mellitus (HCC)     Hypertension        Past Surgical History:  Past Surgical History:   Procedure Laterality Date    FOOT DEBRIDEMENT Left 1/2/2024    LEFT FOOT INCISION AND DRAINAGE with misonix , First metatarsal head amputation and left fifth toe amputation, skin graft application left foot performed by Percy Salazar DPM at [facility]"" MAIN OR    LEG AMPUTATION BELOW KNEE Right     LOWER EXTREMITY AMPUTATION Left     transmetatarsal       Family History:  No family history on file.    Social History:  Social History     Tobacco Use    Smoking status: Every Day     Current packs/day: 2.00     Average packs/day: 2.0 packs/day for 52.8 years (105.7 ttl pk-yrs)     Types: Cigarettes     Start date: 1972    Smokeless tobacco: Never

## 2024-10-31 NOTE — CARE COORDINATION
10/31/24 3010   Service Assessment   Patient Orientation Alert and Oriented   Cognition Alert   History Provided By Patient   Primary Caregiver Self   Accompanied By/Relationship Pt alone   Support Systems Friends/Neighbors   Patient's Healthcare Decision Maker is: Legal Next of Kin   PCP Verified by CM Yes  (Bryce gómez - last visit unknown.)   Last Visit to PCP Within last two years  (Unknown.)   Prior Functional Level Assistance with the following:;Independent in ADLs/IADLs;Mobility  (W/C)   Current Functional Level Assistance with the following:;Bathing;Dressing;Toileting;Cooking;Housework;Shopping;Mobility  (W/C)   Can patient return to prior living arrangement No  (Wants LTC)   Ability to make needs known: Fair   Family able to assist with home care needs: No   Would you like for me to discuss the discharge plan with any other family members/significant others, and if so, who? No   Financial Resources Medicaid   Community Resources None   CM/SW Referral Other (see comment)  (None)   Social/Functional History   Lives With Alone   Type of Home Homeless   Home Layout   (Homeless)   Home Access   (Homeless)   Bathroom Shower/Tub None   Bathroom Toilet   (Homeless)   Bathroom Equipment None   Bathroom Accessibility Not accessible   Home Equipment Wheelchair - Manual   Receives Help From Friend(s)   ADL Assistance Needs assistance   Toileting Needs assistance   Homemaking Assistance Needs assistance   Homemaking Responsibilities Yes   Ambulation Assistance Non-ambulatory  (W/C/ Top of  ( L) foot amputee.)   Transfer Assistance Needs assistance   Active  No   Occupation On disability   Discharge Planning   Type of Residence Skilled Nursing Facility;Long-Term Care   Living Arrangements Other (Comment)  (Homeless)   Current Services Prior To Admission Transportation;Skilled Nursing Facility   Potential Assistance Needed Transportation;Skilled Nursing Facility   DME Ordered? No   Potential Assistance

## 2024-10-31 NOTE — PROGRESS NOTES
4 Eyes Skin Assessment     NAME:  Baldomero Reece  YOB: 1955  MEDICAL RECORD NUMBER:  [id number]    The patient is being assessed for  Admission    I agree that at least one RN has performed a thorough Head to Toe Skin Assessment on the patient. ALL assessment sites listed below have been assessed.      Areas assessed by both nurses:    Head, Face, Ears, Shoulders, Back, Chest, Arms, Elbows, Hands, Sacrum. Buttock, Coccyx, Ischium, Legs. Feet and Heels, and Under Medical Devices         Does the Patient have a Wound? No noted wound(s)       Jack Prevention initiated by RN: Yes  Wound Care Orders initiated by RN: No    Pressure Injury (Stage 3,4, Unstageable, DTI, NWPT, and Complex wounds) if present, place Wound referral order by RN under : No    New Ostomies, if present place, Ostomy referral order under : No     Nurse 1 eSignature: Electronically signed by Carito Vitale RN on 10/31/24 at 6:13 PM EDT    **SHARE this note so that the co-signing nurse can place an eSignature**    Nurse 2 eSignature: Electronically signed by NAVEEN TERESA RN on 10/31/24 at 6:13 PM EDT

## 2024-11-01 ENCOUNTER — APPOINTMENT (OUTPATIENT)
Facility: HOSPITAL | Age: 69
DRG: 383 | End: 2024-11-01
Payer: MEDICAID

## 2024-11-01 LAB
ANION GAP SERPL CALC-SCNC: 6 MMOL/L (ref 2–12)
BASOPHILS # BLD: 0.1 K/UL (ref 0–0.1)
BASOPHILS NFR BLD: 3 % (ref 0–1)
BUN SERPL-MCNC: 17 MG/DL (ref 6–20)
BUN/CREAT SERPL: 22 (ref 12–20)
CA-I BLD-MCNC: 8.9 MG/DL (ref 8.5–10.1)
CHLORIDE SERPL-SCNC: 104 MMOL/L (ref 97–108)
CO2 SERPL-SCNC: 28 MMOL/L (ref 21–32)
CREAT SERPL-MCNC: 0.77 MG/DL (ref 0.7–1.3)
DIFFERENTIAL METHOD BLD: ABNORMAL
EOSINOPHIL # BLD: 0.2 K/UL (ref 0–0.4)
EOSINOPHIL NFR BLD: 7 % (ref 0–7)
ERYTHROCYTE [DISTWIDTH] IN BLOOD BY AUTOMATED COUNT: 15.2 % (ref 11.5–14.5)
GLUCOSE SERPL-MCNC: 86 MG/DL (ref 65–100)
HCT VFR BLD AUTO: 40 % (ref 36.6–50.3)
HGB BLD-MCNC: 12.7 G/DL (ref 12.1–17)
IMM GRANULOCYTES # BLD AUTO: 0 K/UL (ref 0–0.04)
IMM GRANULOCYTES NFR BLD AUTO: 0 % (ref 0–0.5)
LYMPHOCYTES # BLD: 0.6 K/UL (ref 0.8–3.5)
LYMPHOCYTES NFR BLD: 24 % (ref 12–49)
MCH RBC QN AUTO: 28.9 PG (ref 26–34)
MCHC RBC AUTO-ENTMCNC: 31.8 G/DL (ref 30–36.5)
MCV RBC AUTO: 91.1 FL (ref 80–99)
MONOCYTES # BLD: 0.3 K/UL (ref 0–1)
MONOCYTES NFR BLD: 11 % (ref 5–13)
NEUTS SEG # BLD: 1.4 K/UL (ref 1.8–8)
NEUTS SEG NFR BLD: 55 % (ref 32–75)
NRBC # BLD: 0 K/UL (ref 0–0.01)
NRBC BLD-RTO: 0 PER 100 WBC
PLATELET # BLD AUTO: 93 K/UL (ref 150–400)
PMV BLD AUTO: 10.7 FL (ref 8.9–12.9)
POTASSIUM SERPL-SCNC: 4.4 MMOL/L (ref 3.5–5.1)
RBC # BLD AUTO: 4.39 M/UL (ref 4.1–5.7)
SODIUM SERPL-SCNC: 138 MMOL/L (ref 136–145)
WBC # BLD AUTO: 2.6 K/UL (ref 4.1–11.1)

## 2024-11-01 PROCEDURE — 6360000002 HC RX W HCPCS: Performed by: INTERNAL MEDICINE

## 2024-11-01 PROCEDURE — 2580000003 HC RX 258: Performed by: INTERNAL MEDICINE

## 2024-11-01 PROCEDURE — 71045 X-RAY EXAM CHEST 1 VIEW: CPT

## 2024-11-01 PROCEDURE — 36415 COLL VENOUS BLD VENIPUNCTURE: CPT

## 2024-11-01 PROCEDURE — 85025 COMPLETE CBC W/AUTO DIFF WBC: CPT

## 2024-11-01 PROCEDURE — 2500000003 HC RX 250 WO HCPCS: Performed by: INTERNAL MEDICINE

## 2024-11-01 PROCEDURE — 6370000000 HC RX 637 (ALT 250 FOR IP): Performed by: INTERNAL MEDICINE

## 2024-11-01 PROCEDURE — 1100000000 HC RM PRIVATE

## 2024-11-01 PROCEDURE — 80048 BASIC METABOLIC PNL TOTAL CA: CPT

## 2024-11-01 RX ORDER — AZITHROMYCIN 500 MG/1
500 TABLET, FILM COATED ORAL DAILY
Status: COMPLETED | OUTPATIENT
Start: 2024-11-01 | End: 2024-11-05

## 2024-11-01 RX ORDER — ENOXAPARIN SODIUM 100 MG/ML
30 INJECTION SUBCUTANEOUS DAILY
Status: DISCONTINUED | OUTPATIENT
Start: 2024-11-02 | End: 2024-11-08 | Stop reason: HOSPADM

## 2024-11-01 RX ORDER — PREDNISONE 5 MG/1
20 TABLET ORAL 2 TIMES DAILY
Status: DISCONTINUED | OUTPATIENT
Start: 2024-11-01 | End: 2024-11-01

## 2024-11-01 RX ORDER — PREDNISONE 5 MG/1
20 TABLET ORAL 2 TIMES DAILY
Status: DISCONTINUED | OUTPATIENT
Start: 2024-11-01 | End: 2024-11-02

## 2024-11-01 RX ADMIN — ATORVASTATIN CALCIUM 20 MG: 20 TABLET, FILM COATED ORAL at 20:45

## 2024-11-01 RX ADMIN — SODIUM CHLORIDE, PRESERVATIVE FREE 10 ML: 5 INJECTION INTRAVENOUS at 09:36

## 2024-11-01 RX ADMIN — MICONAZOLE NITRATE: 20 POWDER TOPICAL at 09:42

## 2024-11-01 RX ADMIN — THIAMINE HCL TAB 100 MG 100 MG: 100 TAB at 09:36

## 2024-11-01 RX ADMIN — ASPIRIN 81 MG 81 MG: 81 TABLET ORAL at 09:36

## 2024-11-01 RX ADMIN — FOLIC ACID 1 MG: 1 TABLET ORAL at 09:36

## 2024-11-01 RX ADMIN — LORAZEPAM 2 MG: 2 INJECTION INTRAMUSCULAR; INTRAVENOUS at 09:37

## 2024-11-01 RX ADMIN — AZITHROMYCIN DIHYDRATE 500 MG: 500 TABLET ORAL at 17:28

## 2024-11-01 RX ADMIN — CLINDAMYCIN PHOSPHATE 300 MG: 300 INJECTION, SOLUTION INTRAVENOUS at 06:05

## 2024-11-01 RX ADMIN — SODIUM CHLORIDE, PRESERVATIVE FREE 10 ML: 5 INJECTION INTRAVENOUS at 20:45

## 2024-11-01 RX ADMIN — DIAZEPAM 5 MG: 5 TABLET ORAL at 09:35

## 2024-11-01 RX ADMIN — MICONAZOLE NITRATE: 20 POWDER TOPICAL at 20:45

## 2024-11-01 RX ADMIN — WATER 2000 MG: 1 INJECTION INTRAMUSCULAR; INTRAVENOUS; SUBCUTANEOUS at 17:28

## 2024-11-01 RX ADMIN — SODIUM CHLORIDE: 9 INJECTION, SOLUTION INTRAVENOUS at 11:06

## 2024-11-01 RX ADMIN — THERA TABS 1 TABLET: TAB at 09:36

## 2024-11-01 RX ADMIN — DIAZEPAM 5 MG: 5 TABLET ORAL at 20:45

## 2024-11-01 RX ADMIN — PREDNISONE 20 MG: 5 TABLET ORAL at 20:45

## 2024-11-01 RX ADMIN — CLINDAMYCIN PHOSPHATE 300 MG: 300 INJECTION, SOLUTION INTRAVENOUS at 11:07

## 2024-11-01 RX ADMIN — DIAZEPAM 5 MG: 5 TABLET ORAL at 15:25

## 2024-11-01 ASSESSMENT — PAIN DESCRIPTION - RADICULAR PAIN: RADICULAR_PAIN: ABSENT

## 2024-11-01 ASSESSMENT — PAIN SCALES - GENERAL: PAINLEVEL_OUTOF10: 0

## 2024-11-01 NOTE — PROGRESS NOTES
Spiritual Health History and Assessment/Progress Note  [facility]    Initial Encounter,  ,  ,      Name: Baldomero Reece MRN: [id number]    Age: 69 y.o.     Sex: male   Language: English   Baptism: Non-Hoahaoism   Alcoholism (HCC)     Date: 11/1/2024            Total Time Calculated: 18 min              Spiritual Assessment began in [facility]G        Referral/Consult From: Rounding   Encounter Overview/Reason: Initial Encounter  Service Provided For: Patient    Margarita, Belief, Meaning:   Patient identifies as spiritual  Family/Friends No family/friends present      Importance and Influence:  Patient unable to assess at this time  Family/Friends No family/friends present    Community:  Patient expresses feelings of isolation: disconnected from family/friends and feeling there is no one to turn to for help  Family/Friends No family/friends present    Assessment and Plan of Care:     Patient Interventions include: Facilitated expression of thoughts and feelings and Explored spiritual coping/struggle/distress  Family/Friends Interventions include: No family/friends present    Patient Plan of Care: Spiritual Care available upon further referral  Family/Friends Plan of Care: Spiritual Care available upon further referral     is visiting for an initial spiritual assessment with the patient in 587. He shared he is not up for an extended visit. He processed briefly his experience of being homeless. He does not have any connections with friends/family. He is disconnected from a Jewish.  He does welcome prayer. He shared he loreto mainly by drinking and sleeping. He wishes to try and change this going forward, but does not have any plans to do so. Patient shared he had soiled himself and needed an RN to help come and change him.  aided through getting the RN to come and provided needed help.     Electronically signed by JAZZ WOODS on 11/1/2024 at 3:10 PM

## 2024-11-01 NOTE — PROGRESS NOTES
lb)   SpO2 97%   BMI 22.15 kg/m²    O2 Device: None (Room air)    Temp (24hrs), Av.2 °F (36.8 °C), Min:97.9 °F (36.6 °C), Max:98.8 °F (37.1 °C)    No intake/output data recorded.   10/30 1901 -  0700  In: -   Out: 400 [Urine:400]    Mode Rate TV Press PEEP FiO2 PIP Min. Vent                                  Data Review:     Medications reviewed  Current Facility-Administered Medications   Medication Dose Route Frequency    ceFAZolin (ANCEF) 2,000 mg in sterile water 20 mL IV syringe  2,000 mg IntraVENous Q8H    [START ON 2024] enoxaparin Sodium (LOVENOX) injection 30 mg  30 mg SubCUTAneous Daily    sodium chloride flush 0.9 % injection 5-40 mL  5-40 mL IntraVENous 2 times per day    sodium chloride flush 0.9 % injection 5-40 mL  5-40 mL IntraVENous PRN    0.9 % sodium chloride infusion   IntraVENous PRN    thiamine mononitrate tablet 100 mg  100 mg Oral Daily    potassium chloride (KLOR-CON M) extended release tablet 40 mEq  40 mEq Oral PRN    Or    potassium bicarb-citric acid (EFFER-K) effervescent tablet 40 mEq  40 mEq Oral PRN    Or    potassium chloride 10 mEq/100 mL IVPB (Peripheral Line)  10 mEq IntraVENous PRN    magnesium sulfate 2000 mg in 50 mL IVPB premix  2,000 mg IntraVENous PRN    ondansetron (ZOFRAN-ODT) disintegrating tablet 4 mg  4 mg Oral Q8H PRN    Or    ondansetron (ZOFRAN) injection 4 mg  4 mg IntraVENous Q6H PRN    polyethylene glycol (GLYCOLAX) packet 17 g  17 g Oral Daily PRN    LORazepam (ATIVAN) tablet 1 mg  1 mg Oral Q1H PRN    Or    LORazepam (ATIVAN) injection 1 mg  1 mg IntraVENous Q1H PRN    Or    LORazepam (ATIVAN) tablet 2 mg  2 mg Oral Q1H PRN    Or    LORazepam (ATIVAN) injection 2 mg  2 mg IntraVENous Q1H PRN    Or    LORazepam (ATIVAN) tablet 3 mg  3 mg Oral Q1H PRN    Or    LORazepam (ATIVAN) injection 3 mg  3 mg IntraVENous Q1H PRN    Or    LORazepam (ATIVAN) tablet 4 mg  4 mg Oral Q1H PRN    Or    LORazepam (ATIVAN) injection 4 mg  4 mg IntraVENous Q1H PRN     3)  [x] All available Consultant notes from yesterday/today were reviewed  [x] All current labs were reviewed and interpreted for clinical significance   [x] Appropriate follow-up labs were ordered  [] Collateral history obtained from:       Time spent with patient including counseling, chart review and nursing communication: 38 minutes    Rolando Harvey Cha, MD

## 2024-11-01 NOTE — PLAN OF CARE
Problem: Chronic Conditions and Co-morbidities  Goal: Patient's chronic conditions and co-morbidity symptoms are monitored and maintained or improved  11/1/2024 1043 by Carito Vitale RN  Outcome: Progressing  Flowsheets (Taken 11/1/2024 1043)  Care Plan - Patient's Chronic Conditions and Co-Morbidity Symptoms are Monitored and Maintained or Improved: Monitor and assess patient's chronic conditions and comorbid symptoms for stability, deterioration, or improvement  11/1/2024 0047 by Susanne Blunt RN  Outcome: Not Progressing  Flowsheets (Taken 10/31/2024 2300)  Care Plan - Patient's Chronic Conditions and Co-Morbidity Symptoms are Monitored and Maintained or Improved: Monitor and assess patient's chronic conditions and comorbid symptoms for stability, deterioration, or improvement     Problem: Discharge Planning  Goal: Discharge to home or other facility with appropriate resources  11/1/2024 1043 by Carito Vitale RN  Outcome: Progressing  Flowsheets (Taken 11/1/2024 1043)  Discharge to home or other facility with appropriate resources:   Identify barriers to discharge with patient and caregiver   Arrange for needed discharge resources and transportation as appropriate   Identify discharge learning needs (meds, wound care, etc)  11/1/2024 0047 by Susanne Blunt RN  Outcome: Not Progressing  Flowsheets (Taken 10/31/2024 2300)  Discharge to home or other facility with appropriate resources: Identify barriers to discharge with patient and caregiver     Problem: Pain  Goal: Verbalizes/displays adequate comfort level or baseline comfort level  11/1/2024 1043 by Carito Vitale RN  Outcome: Progressing  Flowsheets (Taken 11/1/2024 1043)  Verbalizes/displays adequate comfort level or baseline comfort level:   Encourage patient to monitor pain and request assistance   Assess pain using appropriate pain scale   Implement non-pharmacological measures as appropriate and evaluate response   Administer analgesics based

## 2024-11-01 NOTE — CARE COORDINATION
DC Plan: SNF for LTC    Cm met with pt at the bedside. Pt is homeless. Pt is interested in going to a SNF for long term care. Pt indicated he is willing to go anywhere.     Multiple referrals were sent when he was in the ED. So far, [facility] accepted pending a bed.

## 2024-11-01 NOTE — PLAN OF CARE
Problem: Chronic Conditions and Co-morbidities  Goal: Patient's chronic conditions and co-morbidity symptoms are monitored and maintained or improved  11/1/2024 0047 by Susanne Blunt RN  Outcome: Not Progressing  10/31/2024 1814 by Carito Vitale RN  Outcome: Progressing  Flowsheets (Taken 10/31/2024 1814)  Care Plan - Patient's Chronic Conditions and Co-Morbidity Symptoms are Monitored and Maintained or Improved: Monitor and assess patient's chronic conditions and comorbid symptoms for stability, deterioration, or improvement     Problem: Discharge Planning  Goal: Discharge to home or other facility with appropriate resources  11/1/2024 0047 by Susanne Blunt RN  Outcome: Not Progressing  10/31/2024 1814 by Carito Vitale RN  Outcome: Progressing  Flowsheets (Taken 10/31/2024 1814)  Discharge to home or other facility with appropriate resources:   Identify barriers to discharge with patient and caregiver   Arrange for needed discharge resources and transportation as appropriate   Identify discharge learning needs (meds, wound care, etc)     Problem: Skin/Tissue Integrity  Goal: Absence of new skin breakdown  Description: 1.  Monitor for areas of redness and/or skin breakdown  2.  Assess vascular access sites hourly  3.  Every 4-6 hours minimum:  Change oxygen saturation probe site  4.  Every 4-6 hours:  If on nasal continuous positive airway pressure, respiratory therapy assess nares and determine need for appliance change or resting period.  11/1/2024 0047 by Susanne Blunt RN  Outcome: Not Progressing  10/31/2024 1814 by Carito Vitale RN  Outcome: Progressing  Note: The pt will remain free from developing skin breakdown      Problem: Chronic Conditions and Co-morbidities  Goal: Patient's chronic conditions and co-morbidity symptoms are monitored and maintained or improved  11/1/2024 0047 by Susanne Blunt RN  Outcome: Not Progressing  10/31/2024 1814 by Carito Vitale RN  Outcome:

## 2024-11-02 PROCEDURE — 6370000000 HC RX 637 (ALT 250 FOR IP): Performed by: INTERNAL MEDICINE

## 2024-11-02 PROCEDURE — 2580000003 HC RX 258: Performed by: INTERNAL MEDICINE

## 2024-11-02 PROCEDURE — 6360000002 HC RX W HCPCS: Performed by: INTERNAL MEDICINE

## 2024-11-02 PROCEDURE — 94640 AIRWAY INHALATION TREATMENT: CPT

## 2024-11-02 PROCEDURE — 1100000000 HC RM PRIVATE

## 2024-11-02 RX ORDER — METHYLPREDNISOLONE SODIUM SUCCINATE 40 MG/ML
40 INJECTION, POWDER, LYOPHILIZED, FOR SOLUTION INTRAMUSCULAR; INTRAVENOUS EVERY 12 HOURS
Status: DISCONTINUED | OUTPATIENT
Start: 2024-11-02 | End: 2024-11-03

## 2024-11-02 RX ADMIN — ASPIRIN 81 MG 81 MG: 81 TABLET ORAL at 08:54

## 2024-11-02 RX ADMIN — Medication 2 PUFF: at 20:42

## 2024-11-02 RX ADMIN — METHYLPREDNISOLONE SODIUM SUCCINATE 40 MG: 40 INJECTION INTRAMUSCULAR; INTRAVENOUS at 09:51

## 2024-11-02 RX ADMIN — FOLIC ACID 1 MG: 1 TABLET ORAL at 08:54

## 2024-11-02 RX ADMIN — WATER 2000 MG: 1 INJECTION INTRAMUSCULAR; INTRAVENOUS; SUBCUTANEOUS at 01:37

## 2024-11-02 RX ADMIN — SODIUM CHLORIDE, PRESERVATIVE FREE 10 ML: 5 INJECTION INTRAVENOUS at 09:00

## 2024-11-02 RX ADMIN — DIAZEPAM 5 MG: 5 TABLET ORAL at 23:07

## 2024-11-02 RX ADMIN — METHYLPREDNISOLONE SODIUM SUCCINATE 40 MG: 40 INJECTION INTRAMUSCULAR; INTRAVENOUS at 23:07

## 2024-11-02 RX ADMIN — WATER 2000 MG: 1 INJECTION INTRAMUSCULAR; INTRAVENOUS; SUBCUTANEOUS at 17:16

## 2024-11-02 RX ADMIN — THERA TABS 1 TABLET: TAB at 08:54

## 2024-11-02 RX ADMIN — MICONAZOLE NITRATE: 20 POWDER TOPICAL at 09:13

## 2024-11-02 RX ADMIN — AZITHROMYCIN DIHYDRATE 500 MG: 500 TABLET ORAL at 08:54

## 2024-11-02 RX ADMIN — ATORVASTATIN CALCIUM 20 MG: 20 TABLET, FILM COATED ORAL at 23:07

## 2024-11-02 RX ADMIN — DIAZEPAM 5 MG: 5 TABLET ORAL at 08:54

## 2024-11-02 RX ADMIN — SODIUM CHLORIDE, PRESERVATIVE FREE 10 ML: 5 INJECTION INTRAVENOUS at 23:07

## 2024-11-02 RX ADMIN — DIAZEPAM 5 MG: 5 TABLET ORAL at 15:48

## 2024-11-02 RX ADMIN — LORAZEPAM 2 MG: 2 INJECTION INTRAMUSCULAR; INTRAVENOUS at 09:51

## 2024-11-02 RX ADMIN — THIAMINE HCL TAB 100 MG 100 MG: 100 TAB at 08:54

## 2024-11-02 RX ADMIN — Medication 2 PUFF: at 09:37

## 2024-11-02 RX ADMIN — WATER 2000 MG: 1 INJECTION INTRAMUSCULAR; INTRAVENOUS; SUBCUTANEOUS at 08:54

## 2024-11-02 RX ADMIN — LORAZEPAM 2 MG: 2 INJECTION INTRAMUSCULAR; INTRAVENOUS at 15:48

## 2024-11-02 RX ADMIN — MICONAZOLE NITRATE: 20 POWDER TOPICAL at 23:08

## 2024-11-02 NOTE — PROGRESS NOTES
Regular rate and rhythm, S1, S2 normal, no murmur. No click, rub or gallop.   Abdomen:   Soft, non-tender. Bowel sounds normal. No masses,  No organomegaly.   Extremities: no  edema. Right BKA, left foot toes amputation. Very minimal left foot erythema, tender on palpation. No obvious open wound on b/l lower extremities        Skin: gluteal cleft erythema with some superficial breakdown    Neurologic: Right sided weakness       Assessment and Plan:  #Cellulitis left posterior leg  # Gluteal tinea cruris   -Not septic  -Looks like yeast infection as he does have some intertrigo in the buttock area but cannot rule out a bacterial cellulitis  -miconazole powder.  -IV cefazoline empirically     # COPD exacerbation  - not hypoxic   - Nebulized bronchodilator treatment  - Change to IV solumedrol given still persistently wheezing.   - PO Azithromycin     # Alcohol use disorder  #Alcohol withdrawal   - IV ativan per [facility] protocol   - PO valium  - vitamin supplement. recent folate and vitamin b12 within normal limits   - Consider naltrexone or acamprosate on discharge     # Abdominal pain  - Active bowel sounds with regular BM, low suspicious for SBO  - given underlying cirrhosis, SBP is possible. Will consult IR for ultrasound guided diagnostic drainage.     # Peripheral arterial disease  # Right BKA  # Left toes amputation  -continue aspirin and atorvastatin       # Alcoholic cirrhosis, compensated   # hepatitis  B and C  -Check ammonia level  -Recent INR normal    # Chronic pancytopenia  - vitamin level and folate within normal limits  - secondary to cirrhosis. Patient still has high risk of thrombosis given cirrhosis, will do SQ lovenox as prophylaxis despite thrombocytopenia.     # Smoker  - nicotine patch cessation counseling    Code status: full    Social determinants of health: none      Estimated discharge date//time frame/disposition:    [] PT/OT consulted   [] PT/OT not needed   [] PT/OT consulted and evaluated.    ----------------------------------------------------------------------  C. Data (any 2)  [] Discussed current management and discharge planning options with Case Management.  [] Discussed management of the case with:    [] Telemetry personally reviewed and interpreted as documented above    [] Imaging personally reviewed and interpreted, includes:    [x] Data Review (any 3)  [x] All available Consultant notes from yesterday/today were reviewed  [x] All current labs were reviewed and interpreted for clinical significance   [x] Appropriate follow-up labs were ordered  [] Collateral history obtained from:       Time spent with patient including counseling, chart review and nursing communication: 38 minutes    Rolando Harvey Cha, MD

## 2024-11-02 NOTE — PLAN OF CARE
Problem: Chronic Conditions and Co-morbidities  Goal: Patient's chronic conditions and co-morbidity symptoms are monitored and maintained or improved  Outcome: Progressing     Problem: Discharge Planning  Goal: Discharge to home or other facility with appropriate resources  Outcome: Progressing     Problem: Pain  Goal: Verbalizes/displays adequate comfort level or baseline comfort level  Outcome: Progressing     Problem: Skin/Tissue Integrity  Goal: Absence of new skin breakdown  Description: 1.  Monitor for areas of redness and/or skin breakdown  2.  Assess vascular access sites hourly  3.  Every 4-6 hours minimum:  Change oxygen saturation probe site  4.  Every 4-6 hours:  If on nasal continuous positive airway pressure, respiratory therapy assess nares and determine need for appliance change or resting period.  Outcome: Progressing     Problem: Safety - Adult  Goal: Free from fall injury  Outcome: Progressing  Flowsheets (Taken 11/1/2024 2030)  Free From Fall Injury: Instruct family/caregiver on patient safety     Problem: ABCDS Injury Assessment  Goal: Absence of physical injury  Outcome: Progressing  Flowsheets (Taken 11/1/2024 2030)  Absence of Physical Injury: Implement safety measures based on patient assessment

## 2024-11-02 NOTE — PLAN OF CARE
Problem: Discharge Planning  Goal: Discharge to home or other facility with appropriate resources  Outcome: Progressing  Flowsheets (Taken 11/2/2024 8870)  Discharge to home or other facility with appropriate resources: Identify barriers to discharge with patient and caregiver

## 2024-11-03 LAB
ALBUMIN SERPL-MCNC: 3.4 G/DL (ref 3.5–5)
ALBUMIN/GLOB SERPL: 0.8 (ref 1.1–2.2)
ALP SERPL-CCNC: 102 U/L (ref 45–117)
ALT SERPL-CCNC: 20 U/L (ref 12–78)
ANION GAP SERPL CALC-SCNC: 6 MMOL/L (ref 2–12)
AST SERPL W P-5'-P-CCNC: 26 U/L (ref 15–37)
BASOPHILS # BLD: 0 K/UL (ref 0–0.1)
BASOPHILS NFR BLD: 0 % (ref 0–1)
BILIRUB SERPL-MCNC: 0.3 MG/DL (ref 0.2–1)
BUN SERPL-MCNC: 12 MG/DL (ref 6–20)
BUN/CREAT SERPL: 13 (ref 12–20)
CA-I BLD-MCNC: 9 MG/DL (ref 8.5–10.1)
CHLORIDE SERPL-SCNC: 104 MMOL/L (ref 97–108)
CO2 SERPL-SCNC: 27 MMOL/L (ref 21–32)
CREAT SERPL-MCNC: 0.89 MG/DL (ref 0.7–1.3)
DIFFERENTIAL METHOD BLD: ABNORMAL
EOSINOPHIL # BLD: 0 K/UL (ref 0–0.4)
EOSINOPHIL NFR BLD: 0 % (ref 0–7)
ERYTHROCYTE [DISTWIDTH] IN BLOOD BY AUTOMATED COUNT: 15.1 % (ref 11.5–14.5)
GLOBULIN SER CALC-MCNC: 4.1 G/DL (ref 2–4)
GLUCOSE SERPL-MCNC: 152 MG/DL (ref 65–100)
HCT VFR BLD AUTO: 43.6 % (ref 36.6–50.3)
HGB BLD-MCNC: 13.6 G/DL (ref 12.1–17)
IMM GRANULOCYTES # BLD AUTO: 0 K/UL (ref 0–0.04)
IMM GRANULOCYTES NFR BLD AUTO: 0 % (ref 0–0.5)
LYMPHOCYTES # BLD: 0.5 K/UL (ref 0.8–3.5)
LYMPHOCYTES NFR BLD: 15 % (ref 12–49)
MCH RBC QN AUTO: 28.6 PG (ref 26–34)
MCHC RBC AUTO-ENTMCNC: 31.2 G/DL (ref 30–36.5)
MCV RBC AUTO: 91.6 FL (ref 80–99)
MONOCYTES # BLD: 0.2 K/UL (ref 0–1)
MONOCYTES NFR BLD: 6 % (ref 5–13)
NEUTS SEG # BLD: 2.4 K/UL (ref 1.8–8)
NEUTS SEG NFR BLD: 79 % (ref 32–75)
NRBC # BLD: 0 K/UL (ref 0–0.01)
NRBC BLD-RTO: 0 PER 100 WBC
PLATELET # BLD AUTO: 109 K/UL (ref 150–400)
PMV BLD AUTO: 10.7 FL (ref 8.9–12.9)
POTASSIUM SERPL-SCNC: 4.5 MMOL/L (ref 3.5–5.1)
PROT SERPL-MCNC: 7.5 G/DL (ref 6.4–8.2)
RBC # BLD AUTO: 4.76 M/UL (ref 4.1–5.7)
RBC MORPH BLD: ABNORMAL
SODIUM SERPL-SCNC: 137 MMOL/L (ref 136–145)
WBC # BLD AUTO: 3.1 K/UL (ref 4.1–11.1)

## 2024-11-03 PROCEDURE — 6370000000 HC RX 637 (ALT 250 FOR IP): Performed by: INTERNAL MEDICINE

## 2024-11-03 PROCEDURE — 6370000000 HC RX 637 (ALT 250 FOR IP): Performed by: PHYSICIAN ASSISTANT

## 2024-11-03 PROCEDURE — 36415 COLL VENOUS BLD VENIPUNCTURE: CPT

## 2024-11-03 PROCEDURE — 2580000003 HC RX 258: Performed by: INTERNAL MEDICINE

## 2024-11-03 PROCEDURE — 85025 COMPLETE CBC W/AUTO DIFF WBC: CPT

## 2024-11-03 PROCEDURE — 1100000000 HC RM PRIVATE

## 2024-11-03 PROCEDURE — 80053 COMPREHEN METABOLIC PANEL: CPT

## 2024-11-03 PROCEDURE — 6360000002 HC RX W HCPCS: Performed by: INTERNAL MEDICINE

## 2024-11-03 PROCEDURE — 94640 AIRWAY INHALATION TREATMENT: CPT

## 2024-11-03 RX ORDER — TRAMADOL HYDROCHLORIDE 50 MG/1
50 TABLET ORAL EVERY 6 HOURS PRN
Status: DISCONTINUED | OUTPATIENT
Start: 2024-11-03 | End: 2024-11-08 | Stop reason: HOSPADM

## 2024-11-03 RX ORDER — METHYLPREDNISOLONE SODIUM SUCCINATE 40 MG/ML
40 INJECTION, POWDER, LYOPHILIZED, FOR SOLUTION INTRAMUSCULAR; INTRAVENOUS EVERY 12 HOURS
Status: COMPLETED | OUTPATIENT
Start: 2024-11-03 | End: 2024-11-03

## 2024-11-03 RX ORDER — CHLORDIAZEPOXIDE HYDROCHLORIDE 5 MG/1
10 CAPSULE, GELATIN COATED ORAL 3 TIMES DAILY
Status: COMPLETED | OUTPATIENT
Start: 2024-11-03 | End: 2024-11-04

## 2024-11-03 RX ORDER — PREDNISONE 5 MG/1
20 TABLET ORAL 2 TIMES DAILY
Status: DISCONTINUED | OUTPATIENT
Start: 2024-11-04 | End: 2024-11-08 | Stop reason: HOSPADM

## 2024-11-03 RX ADMIN — MICONAZOLE NITRATE: 20 POWDER TOPICAL at 22:08

## 2024-11-03 RX ADMIN — METHYLPREDNISOLONE SODIUM SUCCINATE 40 MG: 40 INJECTION INTRAMUSCULAR; INTRAVENOUS at 21:57

## 2024-11-03 RX ADMIN — ONDANSETRON 4 MG: 2 INJECTION INTRAMUSCULAR; INTRAVENOUS at 00:02

## 2024-11-03 RX ADMIN — TRAMADOL HYDROCHLORIDE 50 MG: 50 TABLET ORAL at 00:27

## 2024-11-03 RX ADMIN — CEPHALEXIN 500 MG: 250 CAPSULE ORAL at 23:32

## 2024-11-03 RX ADMIN — SODIUM CHLORIDE, PRESERVATIVE FREE 10 ML: 5 INJECTION INTRAVENOUS at 08:59

## 2024-11-03 RX ADMIN — SODIUM CHLORIDE, PRESERVATIVE FREE 10 ML: 5 INJECTION INTRAVENOUS at 21:57

## 2024-11-03 RX ADMIN — CHLORDIAZEPOXIDE HYDROCHLORIDE 10 MG: 5 CAPSULE ORAL at 14:38

## 2024-11-03 RX ADMIN — THERA TABS 1 TABLET: TAB at 08:46

## 2024-11-03 RX ADMIN — FOLIC ACID 1 MG: 1 TABLET ORAL at 08:46

## 2024-11-03 RX ADMIN — ASPIRIN 81 MG 81 MG: 81 TABLET ORAL at 08:46

## 2024-11-03 RX ADMIN — WATER 2000 MG: 1 INJECTION INTRAMUSCULAR; INTRAVENOUS; SUBCUTANEOUS at 08:46

## 2024-11-03 RX ADMIN — LORAZEPAM 2 MG: 2 INJECTION INTRAMUSCULAR; INTRAVENOUS at 00:02

## 2024-11-03 RX ADMIN — METHYLPREDNISOLONE SODIUM SUCCINATE 40 MG: 40 INJECTION INTRAMUSCULAR; INTRAVENOUS at 08:46

## 2024-11-03 RX ADMIN — DIAZEPAM 5 MG: 5 TABLET ORAL at 08:46

## 2024-11-03 RX ADMIN — AZITHROMYCIN DIHYDRATE 500 MG: 500 TABLET ORAL at 08:46

## 2024-11-03 RX ADMIN — LORAZEPAM 2 MG: 1 TABLET ORAL at 03:53

## 2024-11-03 RX ADMIN — WATER 2000 MG: 1 INJECTION INTRAMUSCULAR; INTRAVENOUS; SUBCUTANEOUS at 00:02

## 2024-11-03 RX ADMIN — THIAMINE HCL TAB 100 MG 100 MG: 100 TAB at 08:46

## 2024-11-03 RX ADMIN — MICONAZOLE NITRATE: 20 POWDER TOPICAL at 09:00

## 2024-11-03 RX ADMIN — CEPHALEXIN 500 MG: 250 CAPSULE ORAL at 16:57

## 2024-11-03 RX ADMIN — Medication 2 PUFF: at 10:28

## 2024-11-03 RX ADMIN — CHLORDIAZEPOXIDE HYDROCHLORIDE 10 MG: 5 CAPSULE ORAL at 21:58

## 2024-11-03 RX ADMIN — Medication 2 PUFF: at 20:24

## 2024-11-03 RX ADMIN — ATORVASTATIN CALCIUM 20 MG: 20 TABLET, FILM COATED ORAL at 21:58

## 2024-11-03 ASSESSMENT — PAIN SCALES - GENERAL
PAINLEVEL_OUTOF10: 0
PAINLEVEL_OUTOF10: 0

## 2024-11-03 ASSESSMENT — PAIN DESCRIPTION - DESCRIPTORS: DESCRIPTORS: ACHING

## 2024-11-03 ASSESSMENT — PAIN DESCRIPTION - LOCATION: LOCATION: HEAD

## 2024-11-03 NOTE — PLAN OF CARE
Problem: Discharge Planning  Goal: Discharge to home or other facility with appropriate resources  Outcome: Progressing  Flowsheets (Taken 11/3/2024 0800)  Discharge to home or other facility with appropriate resources: Identify barriers to discharge with patient and caregiver

## 2024-11-03 NOTE — PROGRESS NOTES
mL IVPB premix  2,000 mg IntraVENous PRN    ondansetron (ZOFRAN-ODT) disintegrating tablet 4 mg  4 mg Oral Q8H PRN    Or    ondansetron (ZOFRAN) injection 4 mg  4 mg IntraVENous Q6H PRN    polyethylene glycol (GLYCOLAX) packet 17 g  17 g Oral Daily PRN    LORazepam (ATIVAN) tablet 1 mg  1 mg Oral Q1H PRN    Or    LORazepam (ATIVAN) injection 1 mg  1 mg IntraVENous Q1H PRN    Or    LORazepam (ATIVAN) tablet 2 mg  2 mg Oral Q1H PRN    Or    LORazepam (ATIVAN) injection 2 mg  2 mg IntraVENous Q1H PRN    Or    LORazepam (ATIVAN) tablet 3 mg  3 mg Oral Q1H PRN    Or    LORazepam (ATIVAN) injection 3 mg  3 mg IntraVENous Q1H PRN    Or    LORazepam (ATIVAN) tablet 4 mg  4 mg Oral Q1H PRN    Or    LORazepam (ATIVAN) injection 4 mg  4 mg IntraVENous Q1H PRN    folic acid (FOLVITE) tablet 1 mg  1 mg Oral Daily    multivitamin 1 tablet  1 tablet Oral Daily    aspirin chewable tablet 81 mg  81 mg Oral Daily    budesonide-formoterol (SYMBICORT) 160-4.5 MCG/ACT inhaler 2 puff  2 puff Inhalation BID    nicotine (NICODERM CQ) 21 MG/24HR 1 patch  1 patch TransDERmal Daily    ipratropium 0.5 mg-albuterol 2.5 mg (DUONEB) nebulizer solution 1 Dose  1 Dose Inhalation Q4H PRN    miconazole (MICOTIN) 2 % powder   Topical BID    atorvastatin (LIPITOR) tablet 20 mg  20 mg Oral Nightly         All relevant laboratory and imaging results reviewed in EPIC electronic medical records.            Discussion/MDM:     [] High (any 2)    A. Problems (any 1)  [] Acute/Chronic Illness/injury posing threat to life or bodily function:    [] Severe exacerbation of chronic illness:    ---------------------------------------------------------------------  B. Risk of Treatment (any 1)   [] Drugs/treatments that require intensive monitoring for toxicity include:    [] IV ABX requiring serial renal monitoring for nephrotoxicity:     [] IV Narcotic analgesia for adverse drug reaction  [] Aggressive IV diuresis requiring serial monitoring for renal impairment  and electrolyte derangements  [] Critical electrolyte abnormalities requiring IV replacement and close serial monitoring  [] SQ Insulin SS- monitoring serial FSBS for Hypoglycemic adverse drug reaction  [] Other -   [] Change in code status:    [] Decision to escalate care:    [] Major surgery/procedure with associated risk factors:    ----------------------------------------------------------------------  C. Data (any 2)  [] Discussed current management and discharge planning options with Case Management.  [] Discussed management of the case with:    [] Telemetry personally reviewed and interpreted as documented above    [] Imaging personally reviewed and interpreted, includes:    [x] Data Review (any 3)  [x] All available Consultant notes from yesterday/today were reviewed  [x] All current labs were reviewed and interpreted for clinical significance   [x] Appropriate follow-up labs were ordered  [] Collateral history obtained from:       Time spent with patient including counseling, chart review and nursing communication: 38 minutes    Rolando Harvey Cha, MD

## 2024-11-04 ENCOUNTER — APPOINTMENT (OUTPATIENT)
Facility: HOSPITAL | Age: 69
DRG: 383 | End: 2024-11-04
Payer: MEDICAID

## 2024-11-04 PROCEDURE — 2580000003 HC RX 258: Performed by: INTERNAL MEDICINE

## 2024-11-04 PROCEDURE — 2500000003 HC RX 250 WO HCPCS: Performed by: INTERNAL MEDICINE

## 2024-11-04 PROCEDURE — 94761 N-INVAS EAR/PLS OXIMETRY MLT: CPT

## 2024-11-04 PROCEDURE — 76705 ECHO EXAM OF ABDOMEN: CPT

## 2024-11-04 PROCEDURE — 1100000000 HC RM PRIVATE

## 2024-11-04 PROCEDURE — 97161 PT EVAL LOW COMPLEX 20 MIN: CPT

## 2024-11-04 PROCEDURE — 94640 AIRWAY INHALATION TREATMENT: CPT

## 2024-11-04 PROCEDURE — 6370000000 HC RX 637 (ALT 250 FOR IP): Performed by: INTERNAL MEDICINE

## 2024-11-04 RX ORDER — CHLORDIAZEPOXIDE HYDROCHLORIDE 10 MG/1
10 CAPSULE, GELATIN COATED ORAL 2 TIMES DAILY
Qty: 2 CAPSULE | Refills: 0 | Status: SHIPPED | OUTPATIENT
Start: 2024-11-04 | End: 2024-11-05

## 2024-11-04 RX ORDER — CEPHALEXIN 500 MG/1
500 CAPSULE ORAL 3 TIMES DAILY
Qty: 9 CAPSULE | Refills: 0 | Status: SHIPPED | OUTPATIENT
Start: 2024-11-04 | End: 2024-11-07

## 2024-11-04 RX ORDER — MAGNESIUM HYDROXIDE/ALUMINUM HYDROXICE/SIMETHICONE 120; 1200; 1200 MG/30ML; MG/30ML; MG/30ML
30 SUSPENSION ORAL EVERY 6 HOURS PRN
Status: DISCONTINUED | OUTPATIENT
Start: 2024-11-04 | End: 2024-11-08 | Stop reason: HOSPADM

## 2024-11-04 RX ORDER — PREDNISONE 20 MG/1
20 TABLET ORAL 2 TIMES DAILY
Qty: 10 TABLET | Refills: 0 | Status: SHIPPED | OUTPATIENT
Start: 2024-11-04 | End: 2024-11-09

## 2024-11-04 RX ORDER — THIAMINE MONONITRATE (VIT B1) 100 MG
100 TABLET ORAL DAILY
Qty: 90 TABLET | Refills: 2 | Status: SHIPPED | OUTPATIENT
Start: 2024-11-04

## 2024-11-04 RX ORDER — BUDESONIDE AND FORMOTEROL FUMARATE DIHYDRATE 160; 4.5 UG/1; UG/1
2 AEROSOL RESPIRATORY (INHALATION) 2 TIMES DAILY
Qty: 10.2 G | Refills: 3 | Status: SHIPPED | OUTPATIENT
Start: 2024-11-04

## 2024-11-04 RX ORDER — ALBUTEROL SULFATE 90 UG/1
2 INHALANT RESPIRATORY (INHALATION) 4 TIMES DAILY PRN
Qty: 54 G | Refills: 1 | Status: SHIPPED | OUTPATIENT
Start: 2024-11-04

## 2024-11-04 RX ORDER — PANTOPRAZOLE SODIUM 40 MG/1
40 TABLET, DELAYED RELEASE ORAL
Qty: 30 TABLET | Refills: 3 | Status: SHIPPED | OUTPATIENT
Start: 2024-11-05

## 2024-11-04 RX ORDER — MULTIVITAMIN WITH IRON
1 TABLET ORAL DAILY
Qty: 30 TABLET | Refills: 2 | Status: SHIPPED | OUTPATIENT
Start: 2024-11-05

## 2024-11-04 RX ORDER — FOLIC ACID 1 MG/1
1 TABLET ORAL DAILY
Qty: 30 TABLET | Refills: 3 | Status: SHIPPED | OUTPATIENT
Start: 2024-11-05

## 2024-11-04 RX ORDER — DULOXETIN HYDROCHLORIDE 20 MG/1
20 CAPSULE, DELAYED RELEASE ORAL DAILY
Qty: 30 CAPSULE | Refills: 3 | Status: SHIPPED | OUTPATIENT
Start: 2024-11-04

## 2024-11-04 RX ORDER — DULOXETIN HYDROCHLORIDE 20 MG/1
20 CAPSULE, DELAYED RELEASE ORAL DAILY
Status: DISCONTINUED | OUTPATIENT
Start: 2024-11-04 | End: 2024-11-08 | Stop reason: HOSPADM

## 2024-11-04 RX ORDER — ATORVASTATIN CALCIUM 20 MG/1
20 TABLET, FILM COATED ORAL NIGHTLY
Qty: 30 TABLET | Refills: 3 | Status: SHIPPED | OUTPATIENT
Start: 2024-11-04

## 2024-11-04 RX ORDER — NICOTINE 21 MG/24HR
1 PATCH, TRANSDERMAL 24 HOURS TRANSDERMAL DAILY
Qty: 30 PATCH | Refills: 3 | Status: SHIPPED | OUTPATIENT
Start: 2024-11-05

## 2024-11-04 RX ORDER — MAGNESIUM HYDROXIDE/ALUMINUM HYDROXICE/SIMETHICONE 120; 1200; 1200 MG/30ML; MG/30ML; MG/30ML
30 SUSPENSION ORAL EVERY 6 HOURS PRN
Qty: 500 ML | Refills: 0 | Status: SHIPPED | OUTPATIENT
Start: 2024-11-04

## 2024-11-04 RX ORDER — CHLORDIAZEPOXIDE HYDROCHLORIDE 10 MG/1
10 CAPSULE, GELATIN COATED ORAL ONCE
Qty: 1 CAPSULE | Refills: 0 | Status: SHIPPED | OUTPATIENT
Start: 2024-11-06 | End: 2024-11-06

## 2024-11-04 RX ORDER — CHLORDIAZEPOXIDE HYDROCHLORIDE 10 MG/1
10 CAPSULE, GELATIN COATED ORAL 2 TIMES DAILY
Status: COMPLETED | OUTPATIENT
Start: 2024-11-04 | End: 2024-11-05

## 2024-11-04 RX ORDER — PANTOPRAZOLE SODIUM 40 MG/1
40 TABLET, DELAYED RELEASE ORAL
Status: DISCONTINUED | OUTPATIENT
Start: 2024-11-05 | End: 2024-11-08 | Stop reason: HOSPADM

## 2024-11-04 RX ADMIN — ASPIRIN 81 MG 81 MG: 81 TABLET ORAL at 08:59

## 2024-11-04 RX ADMIN — DULOXETINE HYDROCHLORIDE 20 MG: 20 CAPSULE, DELAYED RELEASE ORAL at 13:14

## 2024-11-04 RX ADMIN — CEPHALEXIN 500 MG: 250 CAPSULE ORAL at 13:14

## 2024-11-04 RX ADMIN — FOLIC ACID 1 MG: 1 TABLET ORAL at 08:59

## 2024-11-04 RX ADMIN — THIAMINE HCL TAB 100 MG 100 MG: 100 TAB at 08:59

## 2024-11-04 RX ADMIN — SODIUM CHLORIDE, PRESERVATIVE FREE 20 MG: 5 INJECTION INTRAVENOUS at 13:14

## 2024-11-04 RX ADMIN — SODIUM CHLORIDE, PRESERVATIVE FREE 10 ML: 5 INJECTION INTRAVENOUS at 09:08

## 2024-11-04 RX ADMIN — THERA TABS 1 TABLET: TAB at 08:59

## 2024-11-04 RX ADMIN — MICONAZOLE NITRATE: 20 POWDER TOPICAL at 09:01

## 2024-11-04 RX ADMIN — AZITHROMYCIN DIHYDRATE 500 MG: 500 TABLET ORAL at 08:59

## 2024-11-04 RX ADMIN — CHLORDIAZEPOXIDE HYDROCHLORIDE 10 MG: 10 CAPSULE ORAL at 18:05

## 2024-11-04 RX ADMIN — PREDNISONE 20 MG: 5 TABLET ORAL at 08:59

## 2024-11-04 RX ADMIN — CEPHALEXIN 500 MG: 250 CAPSULE ORAL at 18:05

## 2024-11-04 RX ADMIN — Medication 2 PUFF: at 08:22

## 2024-11-04 RX ADMIN — CHLORDIAZEPOXIDE HYDROCHLORIDE 10 MG: 5 CAPSULE ORAL at 08:58

## 2024-11-04 RX ADMIN — CEPHALEXIN 500 MG: 250 CAPSULE ORAL at 09:08

## 2024-11-04 ASSESSMENT — PAIN SCALES - GENERAL
PAINLEVEL_OUTOF10: 0
PAINLEVEL_OUTOF10: 0

## 2024-11-04 ASSESSMENT — PAIN DESCRIPTION - RADICULAR PAIN: RADICULAR_PAIN: ABSENT

## 2024-11-04 NOTE — DISCHARGE SUMMARY
Discharge Summary    Name: Baldomero Reece  [id number]  YOB: 1955 (Age: 69 y.o.)   Date of Admission: 10/31/2024  Date of Discharge: 11/4/2024  Attending Physician: Rolando Jo MD    Discharge Diagnosis:   # Cellulitis left posterior leg  # Gluteal tinea cruris  # COPD exacerbation, resolved  # Alcohol use disorder  # Alcohol withdrawal, resolves  # abdominal pain  # Gastritis  # Peripheral arterial disease  # Right BKA  # Left toes amputation  # Left foot paim  # neuropathy  # Alcoholic cirrhosis, compensated   # hepatitis  B and C  # Chronic pancytopenia   # Smoker     Consultations:  IP CONSULT TO CASE MANAGEMENT  IP CONSULT TO HOSPITALIST  IP CONSULT TO SOCIAL WORK    Brief Hospital Course by Main Problems:   Baldomero Reece is a 69 y.o. man with alcohol use disorder, history of CVA, COPD, hyperlipidemia, alcoholic cirrhosis and hep C NC positive, peripheral vascular disease, wheelchair-bound and homeless who presented to the emergency room complaining of pain in his buttock and posterior left leg.  Apparently patient had fallen out of his wheelchair about 4 days ago and was on the ground for 2 days. Patient admitted for uncontrolled pain. He is homeless and admits to drinking every day including this morning but says he now wants to quit and wants to come into the hospital so that he can go to rehab. Alcohol level is 184.  IV Ativan per [facility] protocol started, later changed to Librium taper.  Also admitted to have productive cough, associated with wheezing in the past few days. No fever or chills.  He is treated as COPD exacerbation with nebulized bronchodilator, azithromycin and systemic steroids.  Also noted possible fungal infection of his buttock and groin area  and cellulitis of left posterior leg.  Antibiotics started, currently on p.o. Keflex.  Also started on miconazole powder for inguinal tinea cruris. He continues to complain of left foot pain,

## 2024-11-04 NOTE — PLAN OF CARE
appropriate.      GOALS:  Problem: Physical Therapy - Adult  Goal: By Discharge: Performs mobility at highest level of function for planned discharge setting.  See evaluation for individualized goals.  Description: FUNCTIONAL STATUS PRIOR TO ADMISSION: The patient was functional at the wheelchair level and required maximum assistancefor transfers to the chair. and The patient and/or family endorse 3-4 falls within the last 3 months.    HOME SUPPORT PRIOR TO ADMISSION: Patient is homeless    Physical Therapy Goals  Initiated 11/4/2024  Pt stated goal: I want to get in and out of my chair  Pt will be I with LE HEP in 7 days.  Pt will perform bed mobility with Modified Roy in 7 days.  Pt will perform wheelchair transfers with Modified Roy in 7 days.   Pt will demonstrate improvement in static and dynamic seated balance from Contact Guard Assist to Modified Roy in 7 days.     Outcome: Progressing        PLAN :  Recommendations and Planned Interventions: bed mobility training, transfer training, therapeutic exercises, patient and family training/education, and therapeutic activities    Recommend next PT session: EOB transfers, seated static and dynamic balance, and LE therex    Frequency/Duration: Patient will be followed by physical therapy:  2-3x/week to address goals.    Recommendation for discharge: (in order for the patient to meet his/her long term goals)  Moderate intensity short-term skilled physical therapy up to 5x/week     Potential barriers for safe discharge: pt has poor safety awareness, pt has impaired cognition, pt is a high fall risk, and pt is homeless.    IF patient discharges home will need the following DME: patient owns DME required for discharge       SUBJECTIVE:   Patient stated “I use the bathroom in my chair and come to ER to get cleaned up.”    OBJECTIVE DATA SUMMARY:   HISTORY:    Past Medical History:   Diagnosis Date    Alcohol abuse     Below knee amputation (HCC)  from a bed to a chair (including a wheelchair)?   [] 1   [x] 2   [] 3   [] 4   5.  Need to walk in hospital room?   [x] 1   [] 2   [] 3   [] 4   6.  Climbing 3-5 steps with a railing?   [x] 1   [] 2   [] 3   [] 4   © 2007, Trustees of [facility], under license to [employer]. All rights reserved     Score:  Initial: 11/24 Most Recent: (Date: 11/4/2024 )   Interpretation of Tool:  Represents activities that are increasingly more difficult (i.e. Bed mobility, Transfers, Gait).  Score 24 23 22-20 19-15 14-10 9-7 6   Modifier CH CI CJ CK CL CM CN         Physical Therapy Evaluation Charge Determination   History Examination Presentation Decision-Making   MEDIUM  Complexity : 1-2 comorbidities / personal factors will impact the outcome/ POC  MEDIUM Complexity : 3 Standardized tests and measures addressing body structure, function, activity limitation and / or participation in recreation  LOW Complexity : Stable, uncomplicated  Other outcome measures [facility] 6  MEDIUM      Based on the above components, the patient evaluation is determined to be of the following complexity level: LOW    Pain Rating:  10/10 L side low back pain and L foot pain  Pain Intervention(s):   nursing notified, rest, and repositioning    Activity Tolerance:   Poor, requires rest breaks, and SpO2 stable on room air    After treatment patient left in no apparent distress:   Bed locked and in lowest position Patient left in no apparent distress in bed, Call bell within reach, Bed/ chair alarm activated, Side rails x3, and Updated patient's board on functional status and mobility recommendations and nsg updated.    COMMUNICATION/EDUCATION:   The patient’s plan of care was discussed with: Registered nurse and Case management     Patient Education  Education Given To: Patient  Education Provided: Role of Therapy;Plan of Care;Home Exercise Program;Equipment;ADL Adaptive Strategies;Transfer Training;Fall Prevention Strategies  Education Provided

## 2024-11-04 NOTE — PROGRESS NOTES
Patient is seen today for follow-up. Uneventful overnight. Evaluate by IR, no ascites to drain, unlikely SBP. Clinically SBO unlikely. Abdominal pain possible from cirrhosis and/ or gastritis. Will try protonix and maalox PRN. In addition, neuropathy allen likely reason for his left foot pain, will start PO duloxetine. No active symptoms of alcohol withdrawal, wean librium to BID today.     Patient otherwise stable to be discharge.

## 2024-11-05 LAB
GLUCOSE BLD STRIP.AUTO-MCNC: 97 MG/DL (ref 65–100)
PERFORMED BY:: NORMAL

## 2024-11-05 PROCEDURE — 94640 AIRWAY INHALATION TREATMENT: CPT

## 2024-11-05 PROCEDURE — 6370000000 HC RX 637 (ALT 250 FOR IP): Performed by: INTERNAL MEDICINE

## 2024-11-05 PROCEDURE — 82962 GLUCOSE BLOOD TEST: CPT

## 2024-11-05 PROCEDURE — 94761 N-INVAS EAR/PLS OXIMETRY MLT: CPT

## 2024-11-05 PROCEDURE — 2580000003 HC RX 258: Performed by: INTERNAL MEDICINE

## 2024-11-05 PROCEDURE — 1100000000 HC RM PRIVATE

## 2024-11-05 RX ADMIN — Medication 2 PUFF: at 08:42

## 2024-11-05 RX ADMIN — ATORVASTATIN CALCIUM 20 MG: 20 TABLET, FILM COATED ORAL at 00:30

## 2024-11-05 RX ADMIN — ATORVASTATIN CALCIUM 20 MG: 20 TABLET, FILM COATED ORAL at 22:12

## 2024-11-05 RX ADMIN — Medication 2 PUFF: at 20:30

## 2024-11-05 RX ADMIN — MICONAZOLE NITRATE: 20 POWDER TOPICAL at 00:30

## 2024-11-05 RX ADMIN — PANTOPRAZOLE SODIUM 40 MG: 40 TABLET, DELAYED RELEASE ORAL at 06:18

## 2024-11-05 RX ADMIN — CEPHALEXIN 500 MG: 250 CAPSULE ORAL at 17:56

## 2024-11-05 RX ADMIN — DULOXETINE HYDROCHLORIDE 20 MG: 20 CAPSULE, DELAYED RELEASE ORAL at 08:44

## 2024-11-05 RX ADMIN — THIAMINE HCL TAB 100 MG 100 MG: 100 TAB at 08:43

## 2024-11-05 RX ADMIN — PREDNISONE 20 MG: 5 TABLET ORAL at 08:42

## 2024-11-05 RX ADMIN — CEPHALEXIN 500 MG: 250 CAPSULE ORAL at 05:33

## 2024-11-05 RX ADMIN — MICONAZOLE NITRATE: 20 POWDER TOPICAL at 08:46

## 2024-11-05 RX ADMIN — AZITHROMYCIN DIHYDRATE 500 MG: 500 TABLET ORAL at 08:43

## 2024-11-05 RX ADMIN — SODIUM CHLORIDE, PRESERVATIVE FREE 10 ML: 5 INJECTION INTRAVENOUS at 00:30

## 2024-11-05 RX ADMIN — SODIUM CHLORIDE, PRESERVATIVE FREE 10 ML: 5 INJECTION INTRAVENOUS at 08:45

## 2024-11-05 RX ADMIN — THERA TABS 1 TABLET: TAB at 08:44

## 2024-11-05 RX ADMIN — ASPIRIN 81 MG 81 MG: 81 TABLET ORAL at 08:44

## 2024-11-05 RX ADMIN — CEPHALEXIN 500 MG: 250 CAPSULE ORAL at 00:30

## 2024-11-05 RX ADMIN — PREDNISONE 20 MG: 5 TABLET ORAL at 00:30

## 2024-11-05 RX ADMIN — MICONAZOLE NITRATE: 20 POWDER TOPICAL at 22:15

## 2024-11-05 RX ADMIN — CHLORDIAZEPOXIDE HYDROCHLORIDE 10 MG: 10 CAPSULE ORAL at 08:44

## 2024-11-05 RX ADMIN — CEPHALEXIN 500 MG: 250 CAPSULE ORAL at 12:56

## 2024-11-05 RX ADMIN — PREDNISONE 20 MG: 5 TABLET ORAL at 22:12

## 2024-11-05 RX ADMIN — SODIUM CHLORIDE, PRESERVATIVE FREE 10 ML: 5 INJECTION INTRAVENOUS at 22:12

## 2024-11-05 RX ADMIN — CEPHALEXIN 500 MG: 250 CAPSULE ORAL at 22:12

## 2024-11-05 RX ADMIN — FOLIC ACID 1 MG: 1 TABLET ORAL at 08:43

## 2024-11-05 ASSESSMENT — PAIN DESCRIPTION - RADICULAR PAIN: RADICULAR_PAIN: ABSENT

## 2024-11-05 ASSESSMENT — PAIN SCALES - GENERAL
PAINLEVEL_OUTOF10: 0
PAINLEVEL_OUTOF10: 0

## 2024-11-05 NOTE — CARE COORDINATION
DC Plan: [facility] and [facility] accepted. Both facilities have LTC beds and can take pt today.     Cm met with pt at the bedside and updated him on the two facilities that accepted. Pt chose [facility].     Cm messaged [facility] to see if pt will be in a private room due to him being on contact precaution. Awaiting response.     Cm attempted to contact [facility] [phone] to speak with admissions. Mk left a voice message.     _______________________________________________________    Mk has not received a response back from [facility].     Cm called [facility] again and received no answer from admission.

## 2024-11-05 NOTE — DISCHARGE SUMMARY
Discharge Summary    Name: Baldomero Reece  [id number]  YOB: 1955 (Age: 69 y.o.)   Date of Admission: 10/31/2024  Date of Discharge: 11/5/2024  Attending Physician: Casa Tena MD    Discharge Diagnosis:   # Cellulitis left posterior leg  # Gluteal tinea cruris  # COPD exacerbation, resolved  # Alcohol use disorder  # Alcohol withdrawal, resolves  # abdominal pain  # Gastritis  # Peripheral arterial disease  # Right BKA  # Left toes amputation  # Left foot paim  # neuropathy  # Alcoholic cirrhosis, compensated   # hepatitis  B and C  # Chronic pancytopenia   # Smoker     Consultations:  IP CONSULT TO CASE MANAGEMENT  IP CONSULT TO HOSPITALIST  IP CONSULT TO SOCIAL WORK    Brief Hospital Course by Main Problems:   Baldomero Reece is a 69 y.o. man with alcohol use disorder, history of CVA, COPD, hyperlipidemia, alcoholic cirrhosis and hep C NC positive, peripheral vascular disease, wheelchair-bound and homeless who presented to the emergency room complaining of pain in his buttock and posterior left leg.  Apparently patient had fallen out of his wheelchair about 4 days ago and was on the ground for 2 days. Patient admitted for uncontrolled pain. He is homeless and admits to drinking every day including this morning but says he now wants to quit and wants to come into the hospital so that he can go to rehab. Alcohol level is 184.  IV Ativan per CIWA protocol started, later changed to Librium taper.  Also admitted to have productive cough, associated with wheezing in the past few days. No fever or chills.  He is treated as COPD exacerbation with nebulized bronchodilator, azithromycin and systemic steroids.  Also noted possible fungal infection of his buttock and groin area  and cellulitis of left posterior leg.  Antibiotics started, currently on p.o. Keflex.  Also started on miconazole powder for inguinal tinea cruris. He continues to complain of left foot

## 2024-11-05 NOTE — PLAN OF CARE
Problem: Chronic Conditions and Co-morbidities  Goal: Patient's chronic conditions and co-morbidity symptoms are monitored and maintained or improved  Outcome: Progressing  Flowsheets (Taken 11/4/2024 1920)  Care Plan - Patient's Chronic Conditions and Co-Morbidity Symptoms are Monitored and Maintained or Improved: Monitor and assess patient's chronic conditions and comorbid symptoms for stability, deterioration, or improvement     Problem: Discharge Planning  Goal: Discharge to home or other facility with appropriate resources  Outcome: Progressing  Flowsheets (Taken 11/4/2024 1920)  Discharge to home or other facility with appropriate resources: Identify barriers to discharge with patient and caregiver     Problem: Pain  Goal: Verbalizes/displays adequate comfort level or baseline comfort level  Outcome: Progressing     Problem: Skin/Tissue Integrity  Goal: Absence of new skin breakdown  Description: 1.  Monitor for areas of redness and/or skin breakdown  2.  Assess vascular access sites hourly  3.  Every 4-6 hours minimum:  Change oxygen saturation probe site  4.  Every 4-6 hours:  If on nasal continuous positive airway pressure, respiratory therapy assess nares and determine need for appliance change or resting period.  Outcome: Progressing     Problem: Safety - Adult  Goal: Free from fall injury  Outcome: Progressing     Problem: ABCDS Injury Assessment  Goal: Absence of physical injury  Outcome: Progressing     Problem: Physical Therapy - Adult  Goal: By Discharge: Performs mobility at highest level of function for planned discharge setting.  See evaluation for individualized goals.  Description: FUNCTIONAL STATUS PRIOR TO ADMISSION: The patient was functional at the wheelchair level and required maximum assistancefor transfers to the chair. and The patient and/or family endorse 3-4 falls within the last 3 months.    HOME SUPPORT PRIOR TO ADMISSION: Patient is homeless    Physical Therapy Goals  Initiated

## 2024-11-06 LAB
BACTERIA SPEC CULT: NORMAL
BACTERIA SPEC CULT: NORMAL
Lab: NORMAL
Lab: NORMAL

## 2024-11-06 PROCEDURE — 6370000000 HC RX 637 (ALT 250 FOR IP): Performed by: INTERNAL MEDICINE

## 2024-11-06 PROCEDURE — 2580000003 HC RX 258: Performed by: INTERNAL MEDICINE

## 2024-11-06 PROCEDURE — 94640 AIRWAY INHALATION TREATMENT: CPT

## 2024-11-06 PROCEDURE — 97530 THERAPEUTIC ACTIVITIES: CPT

## 2024-11-06 PROCEDURE — 94761 N-INVAS EAR/PLS OXIMETRY MLT: CPT

## 2024-11-06 PROCEDURE — 1100000000 HC RM PRIVATE

## 2024-11-06 RX ADMIN — CEPHALEXIN 500 MG: 250 CAPSULE ORAL at 05:56

## 2024-11-06 RX ADMIN — CEPHALEXIN 500 MG: 250 CAPSULE ORAL at 23:50

## 2024-11-06 RX ADMIN — ASPIRIN 81 MG 81 MG: 81 TABLET ORAL at 08:45

## 2024-11-06 RX ADMIN — THIAMINE HCL TAB 100 MG 100 MG: 100 TAB at 08:45

## 2024-11-06 RX ADMIN — Medication 2 PUFF: at 22:26

## 2024-11-06 RX ADMIN — ATORVASTATIN CALCIUM 20 MG: 20 TABLET, FILM COATED ORAL at 23:50

## 2024-11-06 RX ADMIN — PREDNISONE 20 MG: 5 TABLET ORAL at 23:50

## 2024-11-06 RX ADMIN — SODIUM CHLORIDE, PRESERVATIVE FREE 10 ML: 5 INJECTION INTRAVENOUS at 23:45

## 2024-11-06 RX ADMIN — Medication 2 PUFF: at 06:34

## 2024-11-06 RX ADMIN — PREDNISONE 20 MG: 5 TABLET ORAL at 08:44

## 2024-11-06 RX ADMIN — CEPHALEXIN 500 MG: 250 CAPSULE ORAL at 11:44

## 2024-11-06 RX ADMIN — DULOXETINE HYDROCHLORIDE 20 MG: 20 CAPSULE, DELAYED RELEASE ORAL at 08:44

## 2024-11-06 RX ADMIN — FOLIC ACID 1 MG: 1 TABLET ORAL at 08:44

## 2024-11-06 RX ADMIN — PANTOPRAZOLE SODIUM 40 MG: 40 TABLET, DELAYED RELEASE ORAL at 05:56

## 2024-11-06 RX ADMIN — SODIUM CHLORIDE, PRESERVATIVE FREE 10 ML: 5 INJECTION INTRAVENOUS at 08:45

## 2024-11-06 RX ADMIN — CEPHALEXIN 500 MG: 250 CAPSULE ORAL at 18:08

## 2024-11-06 RX ADMIN — THERA TABS 1 TABLET: TAB at 08:45

## 2024-11-06 NOTE — PROGRESS NOTES
4 Eyes Skin Assessment     NAME:  Baldomero Reece  YOB: 1955  MEDICAL RECORD NUMBER:  [id number]    The patient is being assessed for  Other      I agree that at least one RN has performed a thorough Head to Toe Skin Assessment on the patient. ALL assessment sites listed below have been assessed.      Areas assessed by both nurses:    Head, Face, Ears, Shoulders, Back, Chest, Arms, Elbows, Hands, Sacrum. Buttock, Coccyx, Ischium, Legs. Feet and Heels, and Under Medical Devices         Does the Patient have a Wound? Yes wound(s) were present on assessment. LDA wound assessment was Initiated and completed by RN       Jack Prevention initiated by RN: Yes  Wound Care Orders initiated by RN: No    Pressure Injury (Stage 3,4, Unstageable, DTI, NWPT, and Complex wounds) if present, place Wound referral order by RN under : No    New Ostomies, if present place, Ostomy referral order under : No     Nurse 1 eSignature: Electronically signed by Fabiana Lynn RN on 11/6/24 at 4:43 AM EST    **SHARE this note so that the co-signing nurse can place an eSignature**    Nurse 2 eSignature: Electronically signed by Raquel Colorado RN on 11/6/24 at 5:23 AM EST

## 2024-11-06 NOTE — PLAN OF CARE
Problem: Chronic Conditions and Co-morbidities  Goal: Patient's chronic conditions and co-morbidity symptoms are monitored and maintained or improved  Outcome: Progressing  Flowsheets (Taken 11/5/2024 1944)  Care Plan - Patient's Chronic Conditions and Co-Morbidity Symptoms are Monitored and Maintained or Improved: Monitor and assess patient's chronic conditions and comorbid symptoms for stability, deterioration, or improvement     Problem: Discharge Planning  Goal: Discharge to home or other facility with appropriate resources  Outcome: Progressing  Flowsheets (Taken 11/5/2024 1944)  Discharge to home or other facility with appropriate resources: Identify barriers to discharge with patient and caregiver     Problem: Pain  Goal: Verbalizes/displays adequate comfort level or baseline comfort level  Outcome: Progressing     Problem: Skin/Tissue Integrity  Goal: Absence of new skin breakdown  Description: 1.  Monitor for areas of redness and/or skin breakdown  2.  Assess vascular access sites hourly  3.  Every 4-6 hours minimum:  Change oxygen saturation probe site  4.  Every 4-6 hours:  If on nasal continuous positive airway pressure, respiratory therapy assess nares and determine need for appliance change or resting period.  Outcome: Progressing     Problem: Safety - Adult  Goal: Free from fall injury  Outcome: Progressing     Problem: ABCDS Injury Assessment  Goal: Absence of physical injury  Outcome: Progressing

## 2024-11-06 NOTE — DISCHARGE SUMMARY
Discharge Summary    Name: Baldomero Reece  [id number]  YOB: 1955 (Age: 69 y.o.)   Date of Admission: 10/31/2024  Date of Discharge: 11/6/2024  Attending Physician: Casa Tena MD    Discharge Diagnosis:   # Cellulitis left posterior leg  # Gluteal tinea cruris  # COPD exacerbation, resolved  # Alcohol use disorder  # Alcohol withdrawal, resolves  # abdominal pain  # Gastritis  # Peripheral arterial disease  # Right BKA  # Left toes amputation  # Left foot paim  # neuropathy  # Alcoholic cirrhosis, compensated   # hepatitis  B and C  # Chronic pancytopenia   # Smoker     Consultations:  IP CONSULT TO CASE MANAGEMENT  IP CONSULT TO HOSPITALIST  IP CONSULT TO SOCIAL WORK    Brief Hospital Course by Main Problems:   Baldomero Reece is a 69 y.o. man with alcohol use disorder, history of CVA, COPD, hyperlipidemia, alcoholic cirrhosis and hep C NC positive, peripheral vascular disease, wheelchair-bound and homeless who presented to the emergency room complaining of pain in his buttock and posterior left leg.  Apparently patient had fallen out of his wheelchair about 4 days ago and was on the ground for 2 days. Patient admitted for uncontrolled pain. He is homeless and admits to drinking every day including this morning but says he now wants to quit and wants to come into the hospital so that he can go to rehab. Alcohol level is 184.  IV Ativan per CIWA protocol started, later changed to Librium taper.  Also admitted to have productive cough, associated with wheezing in the past few days. No fever or chills.  He is treated as COPD exacerbation with nebulized bronchodilator, azithromycin and systemic steroids.  Also noted possible fungal infection of his buttock and groin area  and cellulitis of left posterior leg.  Antibiotics started, currently on p.o. Keflex.  Also started on miconazole powder for inguinal tinea cruris. He continues to complain of left foot  the same as other medications prescribed for you. Read the directions carefully, and ask your doctor or other care provider to review them with you.                CONTINUE taking these medications      budesonide-formoterol 160-4.5 MCG/ACT Aero  Commonly known as: Symbicort  Inhale 2 puffs into the lungs 2 times daily     nicotine 21 MG/24HR  Commonly known as: NICODERM CQ  Place 1 patch onto the skin daily     vitamin B-1 100 MG tablet  Commonly known as: THIAMINE  Take 1 tablet by mouth daily            ASK your doctor about these medications      * chlordiazePOXIDE 10 MG capsule  Commonly known as: LIBRIUM  Take 1 capsule by mouth in the morning and at bedtime for 2 doses. Max Daily Amount: 20 mg  Ask about: Should I take this medication?           * This list has 1 medication(s) that are the same as other medications prescribed for you. Read the directions carefully, and ask your doctor or other care provider to review them with you.                   Where to Get Your Medications        These medications were sent to [id number] [place], [place] - [address]  - P [phone] - F [phone]  [address] [place] [id number]      Phone: [phone]   albuterol sulfate  (90 Base) MCG/ACT inhaler  aluminum & magnesium hydroxide-simethicone 200-200-20 MG/5ML Susp suspension  atorvastatin 20 MG tablet  budesonide-formoterol 160-4.5 MCG/ACT Aero  cephALEXin 500 MG capsule  chlordiazePOXIDE 10 MG capsule  chlordiazePOXIDE 10 MG capsule  DULoxetine 20 MG extended release capsule  folic acid 1 MG tablet  miconazole 2 % powder  multivitamin Tabs tablet  nicotine 21 MG/24HR  pantoprazole 40 MG tablet  predniSONE 20 MG tablet  vitamin B-1 100 MG tablet             DISPOSITION:    Home with Family:       Home with HH/PT/OT/RN:    SNF/LTC:    FANTA:    OTHER:            Code status: full  Recommended diet: regular diet  Recommended activity: activity as tolerated  Wound care:

## 2024-11-06 NOTE — CARE COORDINATION
DC Plan: LTC at [facility] had questions regarding pt's infections. Cm sent them a message via [employer] to address questions. Awaiting response back. Cm attempted to contact [facility] [phone] as well, but Cm received no answer. Cm left a voice message.     Cm messaged [facility] via [facility]. The room# they provided is not a private room. Cm sent them a message regarding pt being on contact precaution. Cm has not received a message back. Cm attempted to contact [facility] [phone]. CM left a voice message.     CM sent more SNF referrals out.

## 2024-11-06 NOTE — PLAN OF CARE
PHYSICAL THERAPY TREATMENT     Patient: Baldomero Reece (69 y.o. male)  Date: 11/6/2024  Diagnosis: Alcoholism (HCC) [F10.20]  Intertriginous candidiasis [B37.2] Alcoholism (HCC)      Precautions: Fall Risk, Skin                      Recommendations for nursing mobility: Encourage HEP in prep for ADLs/mobility; see handout for details, Frequent repositioning to prevent skin breakdown, Use of bed/chair alarm for safety, and Assist x1    In place during session: EKG/telemetry   Chart, physical therapy assessment, plan of care and goals were reviewed.  ASSESSMENT  Patient continues with skilled PT services and is progressing towards goals. Pt supine in bed upon PT arrival, agreeable to session. (See below for objective details and assist levels).     Overall pt tolerated session fair today. Pt transferred to eob with no hands on assist. Pt performed seated LE therex with minimal c/o pain and discomfort. Pt demonstrated good/fair sitting balance eob. Pt returned supine in bed and educated to perform supine LE therex to maintain strength and promote rom. Pt left supine in bed with all needs met. Will continue to benefit from skilled PT services, and will continue to progress as tolerated. Current PT DC recommendation Moderate intensity short-term skilled physical therapy up to 5x/week once medically appropriate.    GOALS:  Problem: Physical Therapy - Adult  Goal: By Discharge: Performs mobility at highest level of function for planned discharge setting.  See evaluation for individualized goals.  Description: FUNCTIONAL STATUS PRIOR TO ADMISSION: The patient was functional at the wheelchair level and required maximum assistancefor transfers to the chair. and The patient and/or family endorse 3-4 falls within the last 3 months.    HOME SUPPORT PRIOR TO ADMISSION: Patient is homeless    Physical Therapy Goals  Initiated 11/4/2024  Pt stated goal: I want to get in and out of my chair  Pt will be I with LE HEP in 7

## 2024-11-07 PROCEDURE — 97530 THERAPEUTIC ACTIVITIES: CPT

## 2024-11-07 PROCEDURE — 6370000000 HC RX 637 (ALT 250 FOR IP): Performed by: INTERNAL MEDICINE

## 2024-11-07 PROCEDURE — 1100000000 HC RM PRIVATE

## 2024-11-07 PROCEDURE — 2580000003 HC RX 258: Performed by: INTERNAL MEDICINE

## 2024-11-07 RX ADMIN — MICONAZOLE NITRATE: 20 POWDER TOPICAL at 00:01

## 2024-11-07 RX ADMIN — MICONAZOLE NITRATE: 20 POWDER TOPICAL at 23:16

## 2024-11-07 RX ADMIN — CEPHALEXIN 500 MG: 250 CAPSULE ORAL at 17:11

## 2024-11-07 RX ADMIN — CEPHALEXIN 500 MG: 250 CAPSULE ORAL at 23:16

## 2024-11-07 RX ADMIN — PANTOPRAZOLE SODIUM 40 MG: 40 TABLET, DELAYED RELEASE ORAL at 06:54

## 2024-11-07 RX ADMIN — FOLIC ACID 1 MG: 1 TABLET ORAL at 09:29

## 2024-11-07 RX ADMIN — CEPHALEXIN 500 MG: 250 CAPSULE ORAL at 11:25

## 2024-11-07 RX ADMIN — CEPHALEXIN 500 MG: 250 CAPSULE ORAL at 06:54

## 2024-11-07 RX ADMIN — DULOXETINE HYDROCHLORIDE 20 MG: 20 CAPSULE, DELAYED RELEASE ORAL at 09:29

## 2024-11-07 RX ADMIN — ATORVASTATIN CALCIUM 20 MG: 20 TABLET, FILM COATED ORAL at 21:01

## 2024-11-07 RX ADMIN — MICONAZOLE NITRATE: 20 POWDER TOPICAL at 09:30

## 2024-11-07 RX ADMIN — THERA TABS 1 TABLET: TAB at 09:29

## 2024-11-07 RX ADMIN — ASPIRIN 81 MG 81 MG: 81 TABLET ORAL at 09:29

## 2024-11-07 RX ADMIN — SODIUM CHLORIDE, PRESERVATIVE FREE 10 ML: 5 INJECTION INTRAVENOUS at 09:30

## 2024-11-07 RX ADMIN — PREDNISONE 20 MG: 5 TABLET ORAL at 09:30

## 2024-11-07 RX ADMIN — Medication 2 PUFF: at 09:13

## 2024-11-07 RX ADMIN — THIAMINE HCL TAB 100 MG 100 MG: 100 TAB at 09:29

## 2024-11-07 RX ADMIN — PREDNISONE 20 MG: 5 TABLET ORAL at 21:01

## 2024-11-07 NOTE — DISCHARGE SUMMARY
known as: NICODERM CQ  Place 1 patch onto the skin daily     vitamin B-1 100 MG tablet  Commonly known as: THIAMINE  Take 1 tablet by mouth daily            ASK your doctor about these medications      * chlordiazePOXIDE 10 MG capsule  Commonly known as: LIBRIUM  Take 1 capsule by mouth in the morning and at bedtime for 2 doses. Max Daily Amount: 20 mg  Ask about: Should I take this medication?     * chlordiazePOXIDE 10 MG capsule  Commonly known as: LIBRIUM  Take 1 capsule by mouth once for 1 dose. Max Daily Amount: 10 mg  Ask about: Should I take this medication?           * This list has 2 medication(s) that are the same as other medications prescribed for you. Read the directions carefully, and ask your doctor or other care provider to review them with you.                   Where to Get Your Medications        These medications were sent to [id number] [place], [place] - [address] - P [phone] - F [phone] [address] [facility] [id number]      Phone: [phone]   albuterol sulfate  (90 Base) MCG/ACT inhaler  aluminum & magnesium hydroxide-simethicone 200-200-20 MG/5ML Susp suspension  atorvastatin 20 MG tablet  budesonide-formoterol 160-4.5 MCG/ACT Aero  cephALEXin 500 MG capsule  chlordiazePOXIDE 10 MG capsule  chlordiazePOXIDE 10 MG capsule  DULoxetine 20 MG extended release capsule  folic acid 1 MG tablet  miconazole 2 % powder  multivitamin Tabs tablet  nicotine 21 MG/24HR  pantoprazole 40 MG tablet  predniSONE 20 MG tablet  vitamin B-1 100 MG tablet             DISPOSITION:    Home with Family:       Home with HH/PT/OT/RN:    SNF/LTC:    FANTA:    OTHER:            Code status: full  Recommended diet: regular diet  Recommended activity: activity as tolerated  Wound care: None      Follow up with:   PCP : Evonne Wu MD Mohiuddin, Abdul, MD  [facility] [place] [address] [phone]                Total time in minutes spent

## 2024-11-07 NOTE — PLAN OF CARE
PHYSICAL THERAPY TREATMENT     Patient: Baldomero Reece (69 y.o. male)  Date: 11/7/2024  Diagnosis: Alcoholism (HCC) [F10.20]  Intertriginous candidiasis [B37.2] Alcoholism (HCC)      Precautions: Fall Risk, Skin                      Recommendations for nursing mobility: Encourage HEP in prep for ADLs/mobility; see handout for details, Frequent repositioning to prevent skin breakdown, and Assist x1    In place during session: EKG/telemetry   Chart, physical therapy assessment, plan of care and goals were reviewed.  ASSESSMENT  Patient continues with skilled PT services and is progressing towards goals. Pt supine in bed upon PT arrival, agreeable to session. (See below for objective details and assist levels).     Overall pt tolerated session fair/poor today. Pt transferred to eob with no hands on assist. Pt performed seated LE therex with no c/o pain or discomfort. Pt declined completing transfer to wheelchair. Pt laid back down in bed and was left with all needs met. Will continue to benefit from skilled PT services, and will continue to progress as tolerated. Current PT DC recommendation Moderate intensity short-term skilled physical therapy up to 5x/week once medically appropriate.    GOALS:  Problem: Physical Therapy - Adult  Goal: By Discharge: Performs mobility at highest level of function for planned discharge setting.  See evaluation for individualized goals.  Description: FUNCTIONAL STATUS PRIOR TO ADMISSION: The patient was functional at the wheelchair level and required maximum assistancefor transfers to the chair. and The patient and/or family endorse 3-4 falls within the last 3 months.    HOME SUPPORT PRIOR TO ADMISSION: Patient is homeless    Physical Therapy Goals  Initiated 11/4/2024  Pt stated goal: I want to get in and out of my chair  Pt will be I with LE HEP in 7 days.  Pt will perform bed mobility with Modified [place] in 7 days.  Pt will perform wheelchair transfers with Modified

## 2024-11-07 NOTE — PLAN OF CARE
Problem: Chronic Conditions and Co-morbidities  Goal: Patient's chronic conditions and co-morbidity symptoms are monitored and maintained or improved  11/7/2024 1016 by Carito Vitale RN  Outcome: Progressing  Flowsheets (Taken 11/7/2024 1016)  Care Plan - Patient's Chronic Conditions and Co-Morbidity Symptoms are Monitored and Maintained or Improved: Monitor and assess patient's chronic conditions and comorbid symptoms for stability, deterioration, or improvement  11/7/2024 0656 by Lena Price RN  Outcome: Progressing     Problem: Discharge Planning  Goal: Discharge to home or other facility with appropriate resources  11/7/2024 1016 by Carito Vitale RN  Outcome: Progressing  Flowsheets (Taken 11/7/2024 1016)  Discharge to home or other facility with appropriate resources:   Identify barriers to discharge with patient and caregiver   Arrange for needed discharge resources and transportation as appropriate   Identify discharge learning needs (meds, wound care, etc)  11/7/2024 0656 by Lena Price RN  Outcome: Progressing     Problem: Pain  Goal: Verbalizes/displays adequate comfort level or baseline comfort level  11/7/2024 1016 by Carito Vitale RN  Outcome: Progressing  Flowsheets (Taken 11/7/2024 1016)  Verbalizes/displays adequate comfort level or baseline comfort level:   Encourage patient to monitor pain and request assistance   Assess pain using appropriate pain scale   Implement non-pharmacological measures as appropriate and evaluate response   Administer analgesics based on type and severity of pain and evaluate response  11/7/2024 0656 by Lena Price RN  Outcome: Progressing     Problem: Skin/Tissue Integrity  Goal: Absence of new skin breakdown  Description: 1.  Monitor for areas of redness and/or skin breakdown  2.  Assess vascular access sites hourly  3.  Every 4-6 hours minimum:  Change oxygen saturation probe site  4.  Every 4-6 hours:  If on nasal continuous positive airway

## 2024-11-07 NOTE — CARE COORDINATION
DC Plan: LTC at [facility] is no longer able to offer a bed for pt. They indicated their sister facility - [facility] has a bed available and they will forward information to them.     [facility] already declined the referral a couple days ago, however writer re-opened the referral. Mk called [facility] [phone] and spoke with Greta in admissions. Greta indicated she will need to speak with her DON and get back with writer.     Mk met with pt at the bedside and updated him. Pt is agreeable with going to [facility].     Mk received a phone call from Greta. Greta indicated they can accept pt. [facility] can take pt tomorrow morning. Greta would like to speak with pt to ask him some questions.     Mk updated attending via [employer].    Mk met with pt at the bedside. Mk called Greta and placed her on speaker phone. Greta asked pt questions.   Greta provided writer with room# 01.     CM to f/up with [facility] tomorrow.

## 2024-11-08 VITALS
TEMPERATURE: 98.2 F | HEART RATE: 83 BPM | BODY MASS INDEX: 22.22 KG/M2 | OXYGEN SATURATION: 95 % | SYSTOLIC BLOOD PRESSURE: 112 MMHG | RESPIRATION RATE: 18 BRPM | DIASTOLIC BLOOD PRESSURE: 70 MMHG | HEIGHT: 69 IN | WEIGHT: 150 LBS

## 2024-11-08 PROCEDURE — 6370000000 HC RX 637 (ALT 250 FOR IP): Performed by: INTERNAL MEDICINE

## 2024-11-08 PROCEDURE — 94640 AIRWAY INHALATION TREATMENT: CPT

## 2024-11-08 PROCEDURE — 94761 N-INVAS EAR/PLS OXIMETRY MLT: CPT

## 2024-11-08 RX ADMIN — FOLIC ACID 1 MG: 1 TABLET ORAL at 08:41

## 2024-11-08 RX ADMIN — MICONAZOLE NITRATE: 20 POWDER TOPICAL at 08:43

## 2024-11-08 RX ADMIN — PREDNISONE 20 MG: 5 TABLET ORAL at 08:41

## 2024-11-08 RX ADMIN — PANTOPRAZOLE SODIUM 40 MG: 40 TABLET, DELAYED RELEASE ORAL at 05:40

## 2024-11-08 RX ADMIN — Medication 2 PUFF: at 07:48

## 2024-11-08 RX ADMIN — THERA TABS 1 TABLET: TAB at 08:41

## 2024-11-08 RX ADMIN — DULOXETINE HYDROCHLORIDE 20 MG: 20 CAPSULE, DELAYED RELEASE ORAL at 08:41

## 2024-11-08 RX ADMIN — CEPHALEXIN 500 MG: 250 CAPSULE ORAL at 05:40

## 2024-11-08 RX ADMIN — ASPIRIN 81 MG 81 MG: 81 TABLET ORAL at 08:41

## 2024-11-08 RX ADMIN — CEPHALEXIN 500 MG: 250 CAPSULE ORAL at 15:39

## 2024-11-08 RX ADMIN — THIAMINE HCL TAB 100 MG 100 MG: 100 TAB at 08:41

## 2024-11-08 NOTE — DISCHARGE INSTR - COC
Continuity of Care Form    Patient Name: Baldomero Reece   :  1955  MRN:  [id number]    Admit date:  10/31/2024  Discharge date:  2024    Code Status Order: Full Code   Advance Directives:   Advance Care Flowsheet Documentation             Admitting Physician:  Aurora Chatman MD  PCP: Evonne Wu MD    Discharging Nurse: Roxana Guzman  Discharging Hospital Unit/Room#: 587/01  Discharging Unit Phone Number: [phone]    Emergency Contact:   No emergency contact information on file.    Past Surgical History:  Past Surgical History:   Procedure Laterality Date    FOOT DEBRIDEMENT Left 2024    LEFT FOOT INCISION AND DRAINAGE with misonix , First metatarsal head amputation and left fifth toe amputation, skin graft application left foot performed by Percy Salazar DPM at [facility]    LEG AMPUTATION BELOW KNEE Right     LOWER EXTREMITY AMPUTATION Left     transmetatarsal       Immunization History:     There is no immunization history on file for this patient.    Active Problems:  Patient Active Problem List   Diagnosis Code    Intraparenchymal hemorrhage of brain ([facility]) I61.9    Brain bleed ([facility]) I61.9    Alcoholic cirrhosis of liver without ascites ([facility]) K70.30    Alcohol abuse F10.10    Osteomyelitis of ankle and foot M86.9    COPD (chronic obstructive pulmonary disease) ([facility]) J44.9    Hx of right BKA ([facility]) Z89.511    History of multiple sclerosis ([facility]) G35    Hyperammonemia ([facility]) E72.20    Impaired ambulation R26.2    Hypertensive heart disease without heart failure I11.9    Polysubstance abuse ([facility]) F19.10    Sepsis without acute organ dysfunction ([facility]) A41.9    Tachycardia R00.0    Bacteremia R78.81    Ischemia of foot I99.8    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    Osteomyelitis of foot M86.9    Cerebrovascular accident (CVA), unspecified mechanism ([facility]) I63.9    Expressive aphasia R47.01    Right sided weakness R53.1    Alcohol abuse F10.10    Confusion  Clean;Dry;Intact 11/02/24 0733   Dressing/Treatment Foam 10/31/24 2300   Number of days: 264       Wound 09/07/24 Pretibial Proximal;Right right BKA (Active)   Wound Etiology Other 10/31/24 2300   Dressing Status New dressing applied 10/31/24 2300   Dressing/Treatment Foam 10/31/24 2300   Number of days: 61       Wound 10/31/24 Hip Left;Proximal;Dorsal abrasion, scratches (Active)   Wound Etiology Other 11/02/24 0733   Dressing Status New dressing applied 10/31/24 2300   Dressing/Treatment Zinc paste;Pharmaceutical agent (see MAR) 11/03/24 2219   Number of days: 7       Wound Hip Right;Dorsal abrasion, scratches (Active)   Wound Etiology Other 11/02/24 0733   Dressing Status New dressing applied 10/31/24 2300   Dressing/Treatment Zinc paste 11/02/24 0733   Number of days:        Wound 10/31/24 Buttocks abrasions (Active)   Wound Etiology Other 11/02/24 0733   Dressing Status Dry;Intact;Clean 11/02/24 0733   Dressing/Treatment Zinc paste 11/02/24 0733   Number of days: 7       Wound 10/31/24 Groin abrasions (Active)   Dressing/Treatment Zinc paste 10/31/24 2300   Number of days: 7        Elimination:  Continence:   Bowel: Yes  Bladder: Yes  Urinary Catheter: None   Colostomy/Ileostomy/Ileal Conduit: No       Date of Last BM: 11/7/2024    Intake/Output Summary (Last 24 hours) at 11/8/2024 0931  Last data filed at 11/8/2024 0542  Gross per 24 hour   Intake 200 ml   Output 1700 ml   Net -1500 ml     I/O last 3 completed shifts:  In: 200 [P.O.:200]  Out: 1700 [Urine:1700]    Safety Concerns:     At Risk for Falls and ***    Impairments/Disabilities:      Amputation - ***    Nutrition Therapy:  Current Nutrition Therapy:   - Oral Diet:  General    Routes of Feeding: Oral  Liquids: Thin Liquids  Daily Fluid Restriction: no  Last Modified Barium Swallow with Video (Video Swallowing Test): not done    Treatments at the Time of Hospital Discharge:   Respiratory Treatments: ***  Oxygen Therapy:  is not on home oxygen

## 2024-11-08 NOTE — CARE COORDINATION
DC Plan: [facility]       Room# 01       Report: [phone]       Transport: [facility] 5:30pm    Cm called [facility] [phone] and arranged w/c transport. Trip# [id number]    Cm updated [facility] via [facility] ETA for transport.     Cm updated pt's nurse.     Transition of Care Plan:    RUR: 32%  Prior Level of Functioning: needs assistance  Disposition: LTC  JEFRY: 11/8/24  If SNF or IPR: Date FOC offered: 11/1/24  Date FOC received: 11/7  Accepting facility: [facility]  Date authorization started with reference number: N/A  Date authorization received and expires: N/A  Follow up appointments: Yes  DME needed: None  Transportation at discharge: W/c  IM/IMM Medicare/ letter given: N/A  Is patient a [facility] and connected with [facility]?    If yes, was  transfer form completed and [facility] notified? N/A  Caregiver Contact: None  Discharge Caregiver contacted prior to discharge?   Care Conference needed? No  Barriers to discharge: None    Apparently, transport sent the wrong mode of transportation. They did not send a w/c van with a lift which was initially arranged. Transport has to find another company that can accommodate.     Transport still has not picked up pt this afternoon. Otto called [facility] to figure out what is going on with transport. OTTO was informed they are still working on finding a transport company that can accommodate. Otto was informed to call back in an hr.   Cm reached out to CM  - Elena regarding situation. Cm can reach out to [facility] if transport is not secured by 4pm.     Otto called [facility] again to f/up on status of pt's transport. Cm was placed on hold. Call got disconnected after 12 minutes on hold.   Cm called [facility] back and cancelled pt's ride.     Otto called [facility] and arranged w/c transport [phone]. ETA 5:30pm.

## 2024-11-08 NOTE — PLAN OF CARE
Problem: Chronic Conditions and Co-morbidities  Goal: Patient's chronic conditions and co-morbidity symptoms are monitored and maintained or improved  11/8/2024 0853 by Carito Vitale, RN  Outcome: Adequate for Discharge  11/8/2024 0435 by Na Marie, RN  Outcome: Progressing     Problem: Discharge Planning  Goal: Discharge to home or other facility with appropriate resources  Outcome: Adequate for Discharge     Problem: Pain  Goal: Verbalizes/displays adequate comfort level or baseline comfort level  Outcome: Adequate for Discharge     Problem: Skin/Tissue Integrity  Goal: Absence of new skin breakdown  Description: 1.  Monitor for areas of redness and/or skin breakdown  2.  Assess vascular access sites hourly  3.  Every 4-6 hours minimum:  Change oxygen saturation probe site  4.  Every 4-6 hours:  If on nasal continuous positive airway pressure, respiratory therapy assess nares and determine need for appliance change or resting period.  Outcome: Adequate for Discharge     Problem: Safety - Adult  Goal: Free from fall injury  Outcome: Adequate for Discharge     Problem: ABCDS Injury Assessment  Goal: Absence of physical injury  Outcome: Adequate for Discharge

## 2024-11-08 NOTE — PROGRESS NOTES
Pt discharged to [facility] via [facility]. Report called to Patricia at [facility]. All questions and concerns addressed. Pts IV and tele-box removed.

## (undated) DEVICE — GAUZE,PACKING STRIP,IODOFORM,1/2"X5YD,ST: Brand: CURAD

## (undated) DEVICE — TUBE SET SONICONE SHARPVAC DISP (MIN ORDER 5)

## (undated) DEVICE — BANDAGE,ELASTIC,ESMARK,STERILE,4"X9',LF: Brand: MEDLINE

## (undated) DEVICE — SHEET,DRAPE,UNDERBUTTOCK,GRAD POUCH,PORT: Brand: MEDLINE

## (undated) DEVICE — EXTREMITY-MRMC: Brand: MEDLINE INDUSTRIES, INC.

## (undated) DEVICE — DRESSING STERILE PETRO W3XL8IN N ADH OIL EMUL GZ CURAD

## (undated) DEVICE — BANDAGE,GAUZE,BULKEE II,4.5"X4.1YD,STRL: Brand: MEDLINE

## (undated) DEVICE — STRIP WND PK W0.25INXL5YD IODO GZ TIGHTLY WVN CURAD

## (undated) DEVICE — GLOVE ORANGE PI 7   MSG9070

## (undated) DEVICE — Device

## (undated) DEVICE — SOLUTION IRRIG 1000ML 0.9% SOD CHL USP POUR PLAS BTL

## (undated) DEVICE — DISPOSABLE TOURNIQUET CUFF SINGLE BLADDER, DUAL PORT AND QUICK CONNECT CONNECTOR: Brand: COLOR CUFF

## (undated) DEVICE — 450 ML BOTTLE OF 0.05% CHLORHEXIDINE GLUCONATE IN 99.95% STERILE WATER FOR IRRIGATION, USP AND APPLICATOR.: Brand: IRRISEPT ANTIMICROBIAL WOUND LAVAGE